# Patient Record
Sex: FEMALE | Race: WHITE | NOT HISPANIC OR LATINO | Employment: OTHER | URBAN - NONMETROPOLITAN AREA
[De-identification: names, ages, dates, MRNs, and addresses within clinical notes are randomized per-mention and may not be internally consistent; named-entity substitution may affect disease eponyms.]

---

## 2018-08-14 ENCOUNTER — TRANSFERRED RECORDS (OUTPATIENT)
Dept: HEALTH INFORMATION MANAGEMENT | Facility: HOSPITAL | Age: 83
End: 2018-08-14

## 2018-08-17 ENCOUNTER — TRANSFERRED RECORDS (OUTPATIENT)
Dept: HEALTH INFORMATION MANAGEMENT | Facility: HOSPITAL | Age: 83
End: 2018-08-17

## 2018-08-20 ENCOUNTER — TRANSFERRED RECORDS (OUTPATIENT)
Dept: HEALTH INFORMATION MANAGEMENT | Facility: HOSPITAL | Age: 83
End: 2018-08-20

## 2018-08-20 LAB
ALT SERPL-CCNC: 22 U/L (ref 18–65)
AST SERPL-CCNC: 18 U/L (ref 10–30)
CHOLEST SERPL-MCNC: 174 MG/DL
CREAT SERPL-MCNC: 1.07 MG/DL (ref 0.7–1.2)
GFR SERPL CREATININE-BSD FRML MDRD: 49 ML/MIN/1.73M2
GLUCOSE SERPL-MCNC: 82 MG/DL (ref 60–99)
HDLC SERPL-MCNC: 47 MG/DL
LDLC SERPL CALC-MCNC: 96 MG/DL
POTASSIUM SERPL-SCNC: 3.9 MEQ/L (ref 3.5–5.1)
TRIGL SERPL-MCNC: 153 MG/DL
TSH SERPL-ACNC: 3.08 UIU/ML (ref 0.35–4.8)

## 2018-08-20 RX ORDER — OLOPATADINE HYDROCHLORIDE 1 MG/ML
1 SOLUTION/ DROPS OPHTHALMIC 2 TIMES DAILY
Status: ON HOLD | COMMUNITY
End: 2021-11-28

## 2018-08-20 RX ORDER — LORAZEPAM 0.5 MG/1
0.5 TABLET ORAL AT BEDTIME
Status: ON HOLD | COMMUNITY
End: 2024-01-01

## 2018-08-20 RX ORDER — MULTIPLE VITAMINS W/ MINERALS TAB 9MG-400MCG
1 TAB ORAL DAILY
Status: ON HOLD | COMMUNITY
End: 2021-11-29

## 2018-08-20 RX ORDER — PREDNISONE 10 MG/1
5 TABLET ORAL DAILY
COMMUNITY
End: 2023-01-01 | Stop reason: DRUGHIGH

## 2018-08-21 ENCOUNTER — ANESTHESIA EVENT (OUTPATIENT)
Dept: SURGERY | Facility: HOSPITAL | Age: 83
End: 2018-08-21
Payer: MEDICARE

## 2018-08-21 RX ORDER — ACETAMINOPHEN AND CODEINE PHOSPHATE 300; 30 MG/1; MG/1
1 TABLET ORAL EVERY 6 HOURS PRN
COMMUNITY
End: 2021-11-27

## 2018-08-21 ASSESSMENT — LIFESTYLE VARIABLES: TOBACCO_USE: 1

## 2018-08-21 NOTE — ANESTHESIA PREPROCEDURE EVALUATION
Anesthesia Evaluation     . Pt has had prior anesthetic.     History of anesthetic complications   - PONV        ROS/MED HX    ENT/Pulmonary:     (+)tobacco use, Past use , . .    Neurologic:  - neg neurologic ROS     Cardiovascular:  - neg cardiovascular ROS       METS/Exercise Tolerance:     Hematologic:  - neg hematologic  ROS       Musculoskeletal:   (+) , , other musculoskeletal- osteopenia      GI/Hepatic:  - neg GI/hepatic ROS       Renal/Genitourinary:         Endo:     (+) Chronic steroid usage for Arthritis .      Psychiatric:  - neg psychiatric ROS       Infectious Disease:         Malignancy:      - no malignancy   Other:    - neg other ROS                 Physical Exam      Airway   Mallampati: IV  TM distance: >3 FB  Neck ROM: full    Dental   (+) upper dentures and missing    Cardiovascular   Rhythm and rate: regular and normal  (+) murmur     PE comment: 1-2/6 murmur @ RSB    Pulmonary    breath sounds clear to auscultation                    Anesthesia Plan      History & Physical Review  History and physical reviewed and following examination; no interval change.    ASA Status:  2 .    NPO Status:  > 8 hours    Plan for Peripheral Nerve Block and MAC with Intravenous and Propofol induction. Maintenance will be TIVA.  Reason for MAC:  Deep or markedly invasive procedure (G8)  PONV prophylaxis:  Ondansetron (or other 5HT-3) and Dexamethasone or Solumedrol  Will provide periop stress-dose steroids      Postoperative Care  Postoperative pain management:  IV analgesics, Peripheral nerve block (Single Shot) and Oral pain medications.      Consents  Anesthetic plan, risks, benefits and alternatives discussed with:  Patient..                          .

## 2018-08-22 ENCOUNTER — HOSPITAL ENCOUNTER (OUTPATIENT)
Facility: HOSPITAL | Age: 83
Discharge: HOME | End: 2018-08-22
Attending: ORTHOPAEDIC SURGERY | Admitting: ORTHOPAEDIC SURGERY
Payer: MEDICARE

## 2018-08-22 ENCOUNTER — ANESTHESIA (OUTPATIENT)
Dept: SURGERY | Facility: HOSPITAL | Age: 83
End: 2018-08-22
Payer: MEDICARE

## 2018-08-22 ENCOUNTER — APPOINTMENT (OUTPATIENT)
Dept: GENERAL RADIOLOGY | Facility: HOSPITAL | Age: 83
End: 2018-08-22
Attending: ORTHOPAEDIC SURGERY
Payer: MEDICARE

## 2018-08-22 VITALS
DIASTOLIC BLOOD PRESSURE: 94 MMHG | BODY MASS INDEX: 23.39 KG/M2 | RESPIRATION RATE: 16 BRPM | OXYGEN SATURATION: 94 % | WEIGHT: 137 LBS | TEMPERATURE: 97.9 F | HEIGHT: 64 IN | SYSTOLIC BLOOD PRESSURE: 145 MMHG

## 2018-08-22 DIAGNOSIS — S52.571A OTHER CLOSED INTRA-ARTICULAR FRACTURE OF DISTAL END OF RIGHT RADIUS, INITIAL ENCOUNTER: Primary | ICD-10-CM

## 2018-08-22 PROCEDURE — 27210794 ZZH OR GENERAL SUPPLY STERILE: Performed by: ORTHOPAEDIC SURGERY

## 2018-08-22 PROCEDURE — 25607 OPTX DST RD XARTC FX/EPI SEP: CPT | Performed by: NURSE ANESTHETIST, CERTIFIED REGISTERED

## 2018-08-22 PROCEDURE — 25000128 H RX IP 250 OP 636: Performed by: NURSE ANESTHETIST, CERTIFIED REGISTERED

## 2018-08-22 PROCEDURE — 99100 ANES PT EXTEME AGE<1 YR&>70: CPT | Performed by: NURSE ANESTHETIST, CERTIFIED REGISTERED

## 2018-08-22 PROCEDURE — 76942 ECHO GUIDE FOR BIOPSY: CPT | Mod: 26 | Performed by: NURSE ANESTHETIST, CERTIFIED REGISTERED

## 2018-08-22 PROCEDURE — 25000128 H RX IP 250 OP 636: Performed by: ORTHOPAEDIC SURGERY

## 2018-08-22 PROCEDURE — 71000027 ZZH RECOVERY PHASE 2 EACH 15 MINS: Performed by: ORTHOPAEDIC SURGERY

## 2018-08-22 PROCEDURE — 40000277 XR SURGERY CARM FLUORO LESS THAN 5 MIN W STILLS: Mod: TC

## 2018-08-22 PROCEDURE — 25000125 ZZHC RX 250: Performed by: NURSE ANESTHETIST, CERTIFIED REGISTERED

## 2018-08-22 PROCEDURE — 25000128 H RX IP 250 OP 636: Performed by: ANESTHESIOLOGY

## 2018-08-22 PROCEDURE — 27110028 ZZH OR GENERAL SUPPLY NON-STERILE: Performed by: ORTHOPAEDIC SURGERY

## 2018-08-22 PROCEDURE — 36000065 ZZH SURGERY LEVEL 4 W FLUORO 1ST 30 MIN: Performed by: ORTHOPAEDIC SURGERY

## 2018-08-22 PROCEDURE — 64450 NJX AA&/STRD OTHER PN/BRANCH: CPT | Mod: 59 | Performed by: NURSE ANESTHETIST, CERTIFIED REGISTERED

## 2018-08-22 PROCEDURE — 36000063 ZZH SURGERY LEVEL 4 EA 15 ADDTL MIN: Performed by: ORTHOPAEDIC SURGERY

## 2018-08-22 PROCEDURE — C1713 ANCHOR/SCREW BN/BN,TIS/BN: HCPCS | Performed by: ORTHOPAEDIC SURGERY

## 2018-08-22 PROCEDURE — 37000009 ZZH ANESTHESIA TECHNICAL FEE, EACH ADDTL 15 MIN: Performed by: ORTHOPAEDIC SURGERY

## 2018-08-22 PROCEDURE — 40000305 ZZH STATISTIC PRE PROC ASSESS I: Performed by: ORTHOPAEDIC SURGERY

## 2018-08-22 PROCEDURE — 25607 OPTX DST RD XARTC FX/EPI SEP: CPT | Performed by: ANESTHESIOLOGY

## 2018-08-22 PROCEDURE — 37000008 ZZH ANESTHESIA TECHNICAL FEE, 1ST 30 MIN: Performed by: ORTHOPAEDIC SURGERY

## 2018-08-22 DEVICE — IMPLANTABLE DEVICE: Type: IMPLANTABLE DEVICE | Site: RADIUS | Status: FUNCTIONAL

## 2018-08-22 RX ORDER — LABETALOL HYDROCHLORIDE 5 MG/ML
10 INJECTION, SOLUTION INTRAVENOUS
Status: DISCONTINUED | OUTPATIENT
Start: 2018-08-22 | End: 2018-08-22 | Stop reason: HOSPADM

## 2018-08-22 RX ORDER — EPHEDRINE SULFATE 50 MG/ML
INJECTION, SOLUTION INTRAMUSCULAR; INTRAVENOUS; SUBCUTANEOUS PRN
Status: DISCONTINUED | OUTPATIENT
Start: 2018-08-22 | End: 2018-08-22

## 2018-08-22 RX ORDER — FENTANYL CITRATE 50 UG/ML
25-50 INJECTION, SOLUTION INTRAMUSCULAR; INTRAVENOUS
Status: DISCONTINUED | OUTPATIENT
Start: 2018-08-22 | End: 2018-08-22 | Stop reason: HOSPADM

## 2018-08-22 RX ORDER — HYDROCODONE BITARTRATE AND ACETAMINOPHEN 5; 325 MG/1; MG/1
1-2 TABLET ORAL EVERY 4 HOURS PRN
Qty: 30 TABLET | Refills: 0 | Status: SHIPPED | OUTPATIENT
Start: 2018-08-22 | End: 2021-11-27

## 2018-08-22 RX ORDER — FENTANYL CITRATE 50 UG/ML
INJECTION, SOLUTION INTRAMUSCULAR; INTRAVENOUS PRN
Status: DISCONTINUED | OUTPATIENT
Start: 2018-08-22 | End: 2018-08-22

## 2018-08-22 RX ORDER — PROPOFOL 10 MG/ML
INJECTION, EMULSION INTRAVENOUS CONTINUOUS PRN
Status: DISCONTINUED | OUTPATIENT
Start: 2018-08-22 | End: 2018-08-22

## 2018-08-22 RX ORDER — ONDANSETRON 4 MG/1
4 TABLET, ORALLY DISINTEGRATING ORAL EVERY 30 MIN PRN
Status: DISCONTINUED | OUTPATIENT
Start: 2018-08-22 | End: 2018-08-22 | Stop reason: HOSPADM

## 2018-08-22 RX ORDER — PROPOFOL 10 MG/ML
INJECTION, EMULSION INTRAVENOUS PRN
Status: DISCONTINUED | OUTPATIENT
Start: 2018-08-22 | End: 2018-08-22

## 2018-08-22 RX ORDER — SODIUM CHLORIDE, SODIUM LACTATE, POTASSIUM CHLORIDE, CALCIUM CHLORIDE 600; 310; 30; 20 MG/100ML; MG/100ML; MG/100ML; MG/100ML
INJECTION, SOLUTION INTRAVENOUS CONTINUOUS
Status: DISCONTINUED | OUTPATIENT
Start: 2018-08-22 | End: 2018-08-22 | Stop reason: HOSPADM

## 2018-08-22 RX ORDER — ONDANSETRON 2 MG/ML
4 INJECTION INTRAMUSCULAR; INTRAVENOUS EVERY 30 MIN PRN
Status: DISCONTINUED | OUTPATIENT
Start: 2018-08-22 | End: 2018-08-22 | Stop reason: HOSPADM

## 2018-08-22 RX ORDER — NALOXONE HYDROCHLORIDE 0.4 MG/ML
.1-.4 INJECTION, SOLUTION INTRAMUSCULAR; INTRAVENOUS; SUBCUTANEOUS
Status: DISCONTINUED | OUTPATIENT
Start: 2018-08-22 | End: 2018-08-22 | Stop reason: HOSPADM

## 2018-08-22 RX ORDER — DEXAMETHASONE SODIUM PHOSPHATE 4 MG/ML
4 INJECTION, SOLUTION INTRA-ARTICULAR; INTRALESIONAL; INTRAMUSCULAR; INTRAVENOUS; SOFT TISSUE EVERY 10 MIN PRN
Status: DISCONTINUED | OUTPATIENT
Start: 2018-08-22 | End: 2018-08-22 | Stop reason: HOSPADM

## 2018-08-22 RX ORDER — ROPIVACAINE HYDROCHLORIDE 5 MG/ML
INJECTION, SOLUTION EPIDURAL; INFILTRATION; PERINEURAL PRN
Status: DISCONTINUED | OUTPATIENT
Start: 2018-08-22 | End: 2018-08-22

## 2018-08-22 RX ORDER — DEXAMETHASONE SODIUM PHOSPHATE 10 MG/ML
INJECTION, SOLUTION INTRAMUSCULAR; INTRAVENOUS PRN
Status: DISCONTINUED | OUTPATIENT
Start: 2018-08-22 | End: 2018-08-22

## 2018-08-22 RX ORDER — GLYCOPYRROLATE 0.2 MG/ML
INJECTION, SOLUTION INTRAMUSCULAR; INTRAVENOUS PRN
Status: DISCONTINUED | OUTPATIENT
Start: 2018-08-22 | End: 2018-08-22

## 2018-08-22 RX ORDER — MEPERIDINE HYDROCHLORIDE 50 MG/ML
12.5 INJECTION INTRAMUSCULAR; INTRAVENOUS; SUBCUTANEOUS
Status: DISCONTINUED | OUTPATIENT
Start: 2018-08-22 | End: 2018-08-22 | Stop reason: HOSPADM

## 2018-08-22 RX ORDER — FENTANYL CITRATE 50 UG/ML
25-50 INJECTION, SOLUTION INTRAMUSCULAR; INTRAVENOUS EVERY 5 MIN PRN
Status: DISCONTINUED | OUTPATIENT
Start: 2018-08-22 | End: 2018-08-22 | Stop reason: HOSPADM

## 2018-08-22 RX ORDER — CEFAZOLIN SODIUM 2 G/100ML
2 INJECTION, SOLUTION INTRAVENOUS
Status: COMPLETED | OUTPATIENT
Start: 2018-08-22 | End: 2018-08-22

## 2018-08-22 RX ADMIN — PROPOFOL 20 MG: 10 INJECTION, EMULSION INTRAVENOUS at 07:49

## 2018-08-22 RX ADMIN — PROPOFOL 70 MCG/KG/MIN: 10 INJECTION, EMULSION INTRAVENOUS at 07:42

## 2018-08-22 RX ADMIN — DEXAMETHASONE SODIUM PHOSPHATE 10 MG: 10 INJECTION, SOLUTION INTRAMUSCULAR; INTRAVENOUS at 07:20

## 2018-08-22 RX ADMIN — CEFAZOLIN SODIUM 2 G: 2 INJECTION, SOLUTION INTRAVENOUS at 07:36

## 2018-08-22 RX ADMIN — FENTANYL CITRATE 50 MCG: 50 INJECTION, SOLUTION INTRAMUSCULAR; INTRAVENOUS at 07:39

## 2018-08-22 RX ADMIN — FENTANYL CITRATE 25 MCG: 50 INJECTION, SOLUTION INTRAMUSCULAR; INTRAVENOUS at 07:58

## 2018-08-22 RX ADMIN — FENTANYL CITRATE 25 MCG: 50 INJECTION, SOLUTION INTRAMUSCULAR; INTRAVENOUS at 07:56

## 2018-08-22 RX ADMIN — PROPOFOL 20 MG: 10 INJECTION, EMULSION INTRAVENOUS at 07:45

## 2018-08-22 RX ADMIN — PROPOFOL 20 MG: 10 INJECTION, EMULSION INTRAVENOUS at 07:56

## 2018-08-22 RX ADMIN — PROPOFOL 20 MG: 10 INJECTION, EMULSION INTRAVENOUS at 07:43

## 2018-08-22 RX ADMIN — ROPIVACAINE HYDROCHLORIDE 20 ML: 5 INJECTION, SOLUTION EPIDURAL; INFILTRATION; PERINEURAL at 07:20

## 2018-08-22 RX ADMIN — SODIUM CHLORIDE, POTASSIUM CHLORIDE, SODIUM LACTATE AND CALCIUM CHLORIDE: 600; 310; 30; 20 INJECTION, SOLUTION INTRAVENOUS at 07:31

## 2018-08-22 RX ADMIN — PROPOFOL 20 MG: 10 INJECTION, EMULSION INTRAVENOUS at 07:39

## 2018-08-22 RX ADMIN — GLYCOPYRROLATE 0.2 MG: 0.2 INJECTION, SOLUTION INTRAMUSCULAR; INTRAVENOUS at 08:30

## 2018-08-22 RX ADMIN — Medication 5 MG: at 08:35

## 2018-08-22 RX ADMIN — HYDROCORTISONE SODIUM SUCCINATE 100 MG: 100 INJECTION, POWDER, FOR SOLUTION INTRAMUSCULAR; INTRAVENOUS at 07:31

## 2018-08-22 NOTE — DISCHARGE INSTRUCTIONS
Post-Anesthesia Patient Instructions    IMMEDIATELY FOLLOWING SURGERY:  Do not drive or operate machinery for the first twenty four hours after surgery.  Do not make any important decisions for twenty four hours after surgery or while taking narcotic pain medications or sedatives.  If you develop intractable nausea and vomiting or a severe headache please notify your doctor immediately.    FOLLOW-UP: YOUR FOLLOW-UP APPOINTMENT WITH DR ACOSTA AT [facility] [place] IS AUGUST, 29TH, 2018 AT 3:30PM     WOUND CARE INSTRUCTIONS (if applicable):  Keep a dry clean dressing on the anesthesia/puncture wound site if there is drainage.  Once the wound has quit draining you may leave it open to air.  Generally you should leave the bandage intact for twenty four hours unless there is drainage.  If the epidural site drains for more than 36-48 hours please call the anesthesia department.    QUESTIONS?:  Please feel free to call your physician or the hospital  if you have any questions, and they will be happy to assist you.

## 2018-08-22 NOTE — ANESTHESIA PROCEDURE NOTES
Peripheral nerve/Neuraxial procedure note : Other (axillary)  Pre-Procedure    Location: pre-op    Procedure Times:8/22/2018 7:18 AM and 8/22/2018 7:25 AM  Pre-Anesthestic Checklist: patient identified, IV checked, site marked, risks and benefits discussed, informed consent, monitors and equipment checked, pre-op evaluation, at physician/surgeon's request and post-op pain management    Timeout  Correct Patient: Yes   Correct Procedure: Yes   Correct Site: Yes   Correct Laterality: Yes   Correct Position: Yes   Site Marked: Yes   .   Procedure Documentation    .    Procedure:  right  Other (axillary).     Ultrasound used to identify targeted nerve, plexus, or vascular marker and placed a needle adjacent to it., Ultrasound was used to visualize the spread of the anesthetic in close proximity to the above stated nerve. A permanent image is entered into the patient's record.  Patient Prep;chlorhexidine gluconate and isopropyl alcohol.  .  Needle: insulated, short bevel Needle Gauge: 20.    Needle Length (Inches) 2  Insertion Method: Single Shot.       Assessment/Narrative  Paresthesias: Resolved.  Injection made incrementally with aspirations every 5 mL..  The placement was negative for: blood aspirated, painful injection and site bleeding.  Bolus given via needle..   Secured via.   Complications: none. Comments:  Assisted by mathew lucas crna

## 2018-08-22 NOTE — BRIEF OP NOTE
[facility]  Orthopedics Brief Operative Note    Pre-operative diagnosis: FRACTURE RIGHT WRIST   Post-operative diagnosis Same as Above   Procedure: Procedure(s):  OPEN REDUCTION INTERNAL FIXATION RIGHT DISTAL RADIUS - Wound Class: I-Clean   Surgeon: Taqueria Edwards MD, MD   Assistants(s): Macario Rutherford PAC   Anesthesia: General    Estimated blood loss: Less than 10 ml    Total IV fluids: (See anesthesia record)   Blood transfusion: No transfusion was given during surgery   Total urine output: (See anesthesia record)   Drains: None   Specimens: None   Implants: Synthes DR Plate   Findings: Displaced DR Fx   Complications: None   Condition: Stable   Comments: See dictated operative report for full details      Dictation Number: [id number]

## 2018-08-22 NOTE — OR NURSING
Patient and responsible adult given discharge instructions with no questions regarding instructions. Keagan score 20/20. Pain level 0/10.  Discharged from unit via wheelchair. Patient discharged to home.

## 2018-08-22 NOTE — OP NOTE
Procedure Date: 08/22/2018      DATE OF SERVICE: 08/22/2018      PREOPERATIVE DIAGNOSIS:  Closed right intraarticular distal radius fracture.      POSTOPERATIVE DIAGNOSIS:  Closed right intraarticular distal radius fracture.      PROCEDURES:  Open reduction and internal fixation, 3-part intraarticular distal radius fracture.      SURGEON:  Taqueria Edwards MD      ASSISTANT:  Macario Rutherford PA-C.       ANESTHESIA:  Axillary nerve block with sedation.      FINDINGS:   1.  Displaced 3-part intraarticular distal radius fracture.   2.  Satisfactory reduction postoperatively..   3.  Evidence of prior fractures and mild malunion.      IMPLANTS:  Synthes precontoured 3-hole distal radius locking plate with a combination of cortical and locking screws.      ESTIMATED BLOOD LOSS:  Is 5 mL.      SPECIMENS:  None.      DRAINS:  None.      COMPLICATIONS:  None.      TOURNIQUET TIME:  Was 29 minutes.      INDICATIONS:  The patient is an 85-year-old female who had a mechanical fall.  She takes prednisone. This is her third distal radius fracture of the right hand.  She is right hand dominant.  She had significant articular step-off displacement.  Given this, I discussed operative stabilization of the fracture. Risks and benefits were discussed with her.  Informed consent was obtained.      DESCRIPTION OF PROCEDURE:  The patient was seen in the preoperative area, her surgical site was marked.  Questions were answered.  She was taken to the operating room and placed under axillary nerve block and sedation.  Right upper extremity splint was removed.  Her arm was scrubbed with Hibiclens wash and was then prepped with ChloraPrep and draped in a sterile fashion.  Timeout was called to identify the correct patient, correct surgical site.  The limb was exsanguinated, tourniquet inflated to 250 mmHg. I made an FCR splitting approach to the distal radius.  I elevated the pronator quadratus sharply off the distal radius in an ulnar direction  and placed Hohmann retractors, identified the fracture site.  I cleaned the fracture edges and reduced it.  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  Once I had the fracture adequately aligned,  I placed a Synthes 3-hole distal radius locking plate stainless steel and placed a screw in the shaft and then 2 screws distally and confirmed plate placement and alignment.  Satisfied with this, I placed a total of 4 locking screws distally and one locking screw and 1 cortical screw proximally in the shaft.  I then took final x-rays, 4 views of the wrist and was satisfied with the alignment.  The wound was then copiously irrigated, tourniquet deflated.  Hemostasis was achieved.  I closed the wound with a 3-0 Vicryl with 3-0 Monocryl in running fashion.  Steri-Strips were applied.  A volar wrist splint was placed.  She was awakened, transferred to the PACU in stable condition.      POSTOPERATIVE PLAN:  The patient will be in her dressing until follow up. She will follow up in a week for wound check.  We will also take x-rays, 3 views right wrist out of splint.  Likely transition to an Exos brace at that time and start early finger wrist range of motion.         ANTONIO ACOSTA MD             D: 2018   T: 2018   MT: NANI      Name:     GONZALEZ BAUTISTA   MRN:      [id number]        Account:        [id number]   :      1933           Procedure Date: 2018      Document: [id number]

## 2018-08-22 NOTE — IP AVS SNAPSHOT
[facility] Preop/Phase II    [address] [address]    Phone:  [phone]                                       After Visit Summary   8/22/2018    Shannon Walls    MRN: [id number]           After Visit Summary Signature Page     I have received my discharge instructions, and my questions have been answered. I have discussed any challenges I see with this plan with the nurse or doctor.    ..........................................................................................................................................  Patient/Patient Representative Signature      ..........................................................................................................................................  Patient Representative Print Name and Relationship to Patient    ..................................................               ................................................  Date                                            Time    ..........................................................................................................................................  Reviewed by Signature/Title    ...................................................              ..............................................  Date                                                            Time

## 2018-08-22 NOTE — ANESTHESIA CARE TRANSFER NOTE
Patient: Shannon Walls    Procedure(s):  OPEN REDUCTION INTERNAL FIXATION RIGHT DISTAL RADIUS - Wound Class: I-Clean    Diagnosis: FRACTURE RIGHT WRIST  Diagnosis Additional Information: No value filed.    Anesthesia Type:   Peripheral Nerve Block, General, LMA     Note:  Airway :Nasal Cannula  Patient transferred to:Phase II  Handoff Report: Identifed the Patient, Identified the Reponsible Provider, Reviewed the pertinent medical history, Discussed the surgical course, Reviewed Intra-OP anesthesia mangement and issues during anesthesia, Set expectations for post-procedure period and Allowed opportunity for questions and acknowledgement of understanding      Vitals: (Last set prior to Anesthesia Care Transfer)    CRNA VITALS  8/22/2018 0825 - 8/22/2018 0905      8/22/2018             Resp Rate (set): 8                Electronically Signed By: SHAINA Ocasio CRNA  August 22, 2018  9:05 AM

## 2018-08-22 NOTE — IP AVS SNAPSHOT
MRN:[id number]                      After Visit Summary   8/22/2018    Shannon Walls    MRN: [id number]           Thank you!     Thank you for choosing [facility] for your care. Our goal is always to provide you with excellent care. Hearing back from our patients is one way we can continue to improve our services. Please take a few minutes to complete the written survey that you may receive in the mail after you visit with us. Thank you!        Patient Information     Date Of Birth          1/12/1933        About your hospital stay     You were admitted on:  August 22, 2018 You last received care in the:  [facility] Preop/Phase II    You were discharged on:  August 22, 2018       Who to Call     For medical emergencies, please call 911.  For non-urgent questions about your medical care, please call your primary care provider or clinic, [phone]  For questions related to your surgery, please call your surgery clinic        Attending Provider     Provider Specialty    Taqueria Edwards MD Orthopedics       Primary Care Provider Office Phone # Fax #    Gilda Mcgregor [phone]      After Care Instructions     Diet Instructions       Resume pre-procedure diet            Discharge Instructions       Patient to follow up with appointment in 1 weeks            Dressing       Keep dressing clean and dry.  Dressing / incisional care as instructed by RN and or Surgeon  Keep Dressing Intact/Clean/Dry until follow up            No lifting        No lifting over 2 lbs and no strenuous physical activity for 2 weeks                  Further instructions from your care team           Post-Anesthesia Patient Instructions    IMMEDIATELY FOLLOWING SURGERY:  Do not drive or operate machinery for the first twenty four hours after surgery.  Do not make any important decisions for twenty four hours after surgery or while taking narcotic pain medications or sedatives.  If you develop intractable  "nausea and vomiting or a severe headache please notify your doctor immediately.    FOLLOW-UP: YOUR FOLLOW-UP APPOINTMENT WITH DR EDWARDS [facility] IS  AT 3:30PM     WOUND CARE INSTRUCTIONS (if applicable):  Keep a dry clean dressing on the anesthesia/puncture wound site if there is drainage.  Once the wound has quit draining you may leave it open to air.  Generally you should leave the bandage intact for twenty four hours unless there is drainage.  If the epidural site drains for more than 36-48 hours please call the anesthesia department.    QUESTIONS?:  Please feel free to call your physician or the hospital  if you have any questions, and they will be happy to assist you.       Pending Results     No orders found from 2018 to 2018.            Admission Information     Date & Time Provider Department Dept. Phone    2018 Taqueria Edwards MD [facility] Preop/Phase -[phone]      Your Vitals Were     Blood Pressure Temperature Respirations Height Weight Pulse Oximetry    136/69 97.1  F (36.2  C) (Oral) 16 1.626 m (5' 4\") 62.1 kg (137 lb) 91%    BMI (Body Mass Index)                   23.52 kg/m2           MyChart Information     [employer] lets you send messages to your doctor, view your test results, renew your prescriptions, schedule appointments and more. To sign up, go to [email or web address] . Click on \"Log in\" on the left side of the screen, which will take you to the Welcome page. Then click on \"Sign up Now\" on the right side of the page.     You will be asked to enter the access code listed below, as well as some personal information. Please follow the directions to create your username and password.     Your access code is: [email or web address]  Expires: 2018  9:17 AM     Your access code will  in 90 days. If you need help or a new code, please call your [facility] clinic or [phone].        Care EveryWhere ID     This is your " Care EveryWhere ID. This could be used by other organizations to access your [facility] medical records  [id number]        Equal Access to Services     ALMA DELIA PEREZ : Hadii gilmar Gale, jonathan warren, vanda mitchellmapartha bowden, machelle ricepravinchente osorio. So [facility] [phone].    ATENCIÓN: Si habla español, tiene a ohara disposición servicios gratuitos de asistencia lingüística. Llame al [phone].    We comply with applicable federal civil rights laws and [place] laws. We do not discriminate on the basis of race, color, national origin, age, disability, sex, sexual orientation, or gender identity.               Review of your medicines      START taking        Dose / Directions    HYDROcodone-acetaminophen 5-325 MG per tablet   Commonly known as:  NORCO   Used for:  Other closed intra-articular fracture of distal end of right radius, initial encounter        Dose:  1-2 tablet   Take 1-2 tablets by mouth every 4 hours as needed for other (Moderate to Severe Pain)   Quantity:  30 tablet   Refills:  0         CONTINUE these medicines which have NOT CHANGED        Dose / Directions    CALTRATE 600 PO        Dose:  600 mg   Take 600 mg by mouth 2 times daily   Refills:  0       ERYTHROMYCIN OP        Apply 1 application to affected area 3 times daily   Refills:  0       LORAZEPAM PO        Dose:  0.5 mg   Take 0.5 mg by mouth Take 1/2 to 1 tablet orally every 6 hours as needed   Refills:  0       Multi-vitamin Tabs tablet        Dose:  1 tablet   Take 1 tablet by mouth daily   Refills:  0       olopatadine 0.1 % ophthalmic solution   Commonly known as:  PATANOL        Dose:  1 drop   1 drop 2 times daily To affected eye   Refills:  0       OMEPRAZOLE PO        Dose:  20 mg   Take 20 mg by mouth every morning   Refills:  0       PREDNISONE PO        Dose:  5 mg   Take 5 mg by mouth daily   Refills:  0       TYLENOL WITH CODEINE #3 300-30 MG per tablet   Generic drug:  acetaminophen-codeine         Dose:  1 tablet   Take 1 tablet by mouth every 6 hours as needed for mild pain   Refills:  0       VITAMIN D PO        Dose:  400 Units   Take 400 Units by mouth daily   Refills:  0            Where to get your medicines      Some of these will need a paper prescription and others can be bought over the counter. Ask your nurse if you have questions.     Bring a paper prescription for each of these medications     HYDROcodone-acetaminophen 5-325 MG per tablet                Protect others around you: Learn how to safely use, store and throw away your medicines at www.disposemymeds.org.        Information about OPIOIDS     PRESCRIPTION OPIOIDS: WHAT YOU NEED TO KNOW   We gave you an opioid (narcotic) pain medicine. It is important to manage your pain, but opioids are not always the best choice. You should first try all the other options your care team gave you. Take this medicine for as short a time (and as few doses) as possible.    Some activities can increase your pain, such as bandage changes or therapy sessions. It may help to take your pain medicine 30 to 60 minutes before these activities. Reduce your stress by getting enough sleep, working on hobbies you enjoy and practicing relaxation or meditation. Talk to your care team about ways to manage your pain beyond prescription opioids.    These medicines have risks:    DO NOT drive when on new or higher doses of pain medicine. These medicines can affect your alertness and reaction times, and you could be arrested for driving under the influence (DUI). If you need to use opioids long-term, talk to your care team about driving.    DO NOT operate heavy machinery    DO NOT do any other dangerous activities while taking these medicines.    DO NOT drink any alcohol while taking these medicines.     If the opioid prescribed includes acetaminophen, DO NOT take with any other medicines that contain acetaminophen. Read all labels carefully. Look for the word   acetaminophen  or  Tylenol.  Ask your pharmacist if you have questions or are unsure.    You can get addicted to pain medicines, especially if you have a history of addiction (chemical, alcohol or substance dependence). Talk to your care team about ways to reduce this risk.    All opioids tend to cause constipation. Drink plenty of water and eat foods that have a lot of fiber, such as fruits, vegetables, prune juice, apple juice and high-fiber cereal. Take a laxative (Miralax, milk of magnesia, Colace, Senna) if you don t move your bowels at least every other day. Other side effects include upset stomach, sleepiness, dizziness, throwing up, tolerance (needing more of the medicine to have the same effect), physical dependence and slowed breathing.    Store your pills in a secure place, locked if possible. We will not replace any lost or stolen medicine. If you don t finish your medicine, please throw away (dispose) as directed by your pharmacist. The [place] Pollution [employer] has more information about safe disposal: https://www.pca.[facility].mn.us/living-green/managing-unwanted-medications             Medication List: This is a list of all your medications and when to take them. Check marks below indicate your daily home schedule. Keep this list as a reference.      Medications           Morning Afternoon Evening Bedtime As Needed    CALTRATE 600 PO   Take 600 mg by mouth 2 times daily                                ERYTHROMYCIN OP   Apply 1 application to affected area 3 times daily                                HYDROcodone-acetaminophen 5-325 MG per tablet   Commonly known as:  NORCO   Take 1-2 tablets by mouth every 4 hours as needed for other (Moderate to Severe Pain)                                LORAZEPAM PO   Take 0.5 mg by mouth Take 1/2 to 1 tablet orally every 6 hours as needed                                Multi-vitamin Tabs tablet   Take 1 tablet by mouth daily                                 olopatadine 0.1 % ophthalmic solution   Commonly known as:  PATANOL   1 drop 2 times daily To affected eye                                OMEPRAZOLE PO   Take 20 mg by mouth every morning                                PREDNISONE PO   Take 5 mg by mouth daily                                TYLENOL WITH CODEINE #3 300-30 MG per tablet   Take 1 tablet by mouth every 6 hours as needed for mild pain   Generic drug:  acetaminophen-codeine                                VITAMIN D PO   Take 400 Units by mouth daily

## 2018-08-22 NOTE — ANESTHESIA POSTPROCEDURE EVALUATION
Patient: Shannon Walls    Procedure(s):  OPEN REDUCTION INTERNAL FIXATION RIGHT DISTAL RADIUS - Wound Class: I-Clean    Diagnosis:FRACTURE RIGHT WRIST  Diagnosis Additional Information: No value filed.    Anesthesia Type:  Peripheral Nerve Block, General, LMA    Note:  Anesthesia Post Evaluation    Patient location during evaluation: Phase 2 and Bedside  Patient participation: Able to participate in evaluation but full recovery from regional anesthesia has not yet ocurrred but is anticipated to occur within 48 hours  Level of consciousness: awake and alert  Pain management: adequate  Airway patency: patent  Cardiovascular status: acceptable  Respiratory status: acceptable  Hydration status: stable  PONV: none     Anesthetic complications: None          Last vitals:  Vitals:    08/22/18 0950 08/22/18 0955 08/22/18 1000   BP: 139/77 139/84 145/94   Resp: 16 16 16   Temp:   97.9  F (36.6  C)   SpO2: 94% 95% 94%         Electronically Signed By: Uriel Smalls MD  August 22, 2018  10:37 AM

## 2018-10-03 ENCOUNTER — HOSPITAL ENCOUNTER (OUTPATIENT)
Dept: OCCUPATIONAL THERAPY | Facility: HOSPITAL | Age: 83
Setting detail: THERAPIES SERIES
End: 2018-10-03
Attending: ORTHOPAEDIC SURGERY
Payer: MEDICARE

## 2018-10-03 ENCOUNTER — TRANSFERRED RECORDS (OUTPATIENT)
Dept: HEALTH INFORMATION MANAGEMENT | Facility: CLINIC | Age: 83
End: 2018-10-03

## 2018-10-03 PROCEDURE — G8987 SELF CARE CURRENT STATUS: HCPCS | Mod: GO,CJ

## 2018-10-03 PROCEDURE — 97110 THERAPEUTIC EXERCISES: CPT | Mod: GO

## 2018-10-03 PROCEDURE — G8988 SELF CARE GOAL STATUS: HCPCS | Mod: GO,CI

## 2018-10-03 PROCEDURE — 97166 OT EVAL MOD COMPLEX 45 MIN: CPT | Mod: GO

## 2018-10-03 PROCEDURE — 40000118 ZZH STATISTIC OT DEPT VISIT

## 2018-10-15 ENCOUNTER — HOSPITAL ENCOUNTER (OUTPATIENT)
Dept: OCCUPATIONAL THERAPY | Facility: HOSPITAL | Age: 83
Setting detail: THERAPIES SERIES
End: 2018-10-15
Attending: FAMILY MEDICINE
Payer: MEDICARE

## 2018-10-15 PROCEDURE — 40000118 ZZH STATISTIC OT DEPT VISIT

## 2018-10-15 PROCEDURE — 97140 MANUAL THERAPY 1/> REGIONS: CPT | Mod: GO

## 2018-10-15 PROCEDURE — 97110 THERAPEUTIC EXERCISES: CPT | Mod: GO

## 2018-10-17 ENCOUNTER — HOSPITAL ENCOUNTER (OUTPATIENT)
Dept: OCCUPATIONAL THERAPY | Facility: HOSPITAL | Age: 83
Setting detail: THERAPIES SERIES
End: 2018-10-17
Attending: FAMILY MEDICINE
Payer: MEDICARE

## 2018-10-17 PROCEDURE — 40000118 ZZH STATISTIC OT DEPT VISIT

## 2018-10-17 PROCEDURE — 97140 MANUAL THERAPY 1/> REGIONS: CPT | Mod: GO

## 2018-10-17 PROCEDURE — 97110 THERAPEUTIC EXERCISES: CPT | Mod: GO

## 2018-10-22 ENCOUNTER — HOSPITAL ENCOUNTER (OUTPATIENT)
Dept: OCCUPATIONAL THERAPY | Facility: HOSPITAL | Age: 83
Setting detail: THERAPIES SERIES
End: 2018-10-22
Attending: FAMILY MEDICINE
Payer: MEDICARE

## 2018-10-22 PROCEDURE — 40000118 ZZH STATISTIC OT DEPT VISIT

## 2018-10-22 PROCEDURE — 97110 THERAPEUTIC EXERCISES: CPT | Mod: GO

## 2018-10-24 ENCOUNTER — HOSPITAL ENCOUNTER (OUTPATIENT)
Dept: OCCUPATIONAL THERAPY | Facility: HOSPITAL | Age: 83
Setting detail: THERAPIES SERIES
End: 2018-10-24
Attending: FAMILY MEDICINE
Payer: MEDICARE

## 2018-10-24 PROCEDURE — 40000118 ZZH STATISTIC OT DEPT VISIT

## 2018-10-24 PROCEDURE — 97110 THERAPEUTIC EXERCISES: CPT | Mod: GO

## 2018-10-31 ENCOUNTER — HOSPITAL ENCOUNTER (OUTPATIENT)
Dept: OCCUPATIONAL THERAPY | Facility: HOSPITAL | Age: 83
Setting detail: THERAPIES SERIES
End: 2018-10-31
Attending: FAMILY MEDICINE
Payer: MEDICARE

## 2018-10-31 PROCEDURE — 97110 THERAPEUTIC EXERCISES: CPT | Mod: GO

## 2018-10-31 PROCEDURE — 40000118 ZZH STATISTIC OT DEPT VISIT

## 2018-11-02 ENCOUNTER — HOSPITAL ENCOUNTER (OUTPATIENT)
Dept: OCCUPATIONAL THERAPY | Facility: HOSPITAL | Age: 83
Setting detail: THERAPIES SERIES
End: 2018-11-02
Attending: FAMILY MEDICINE
Payer: MEDICARE

## 2018-11-02 PROCEDURE — 97110 THERAPEUTIC EXERCISES: CPT | Mod: GO

## 2018-11-02 PROCEDURE — 97140 MANUAL THERAPY 1/> REGIONS: CPT | Mod: GO

## 2018-11-02 PROCEDURE — 40000118 ZZH STATISTIC OT DEPT VISIT

## 2018-11-05 ENCOUNTER — HOSPITAL ENCOUNTER (OUTPATIENT)
Dept: OCCUPATIONAL THERAPY | Facility: HOSPITAL | Age: 83
Setting detail: THERAPIES SERIES
End: 2018-11-05
Attending: FAMILY MEDICINE
Payer: MEDICARE

## 2018-11-05 PROCEDURE — 40000118 ZZH STATISTIC OT DEPT VISIT

## 2018-11-05 PROCEDURE — 97140 MANUAL THERAPY 1/> REGIONS: CPT | Mod: GO

## 2018-11-05 PROCEDURE — 97110 THERAPEUTIC EXERCISES: CPT | Mod: GO

## 2018-11-07 ENCOUNTER — HOSPITAL ENCOUNTER (OUTPATIENT)
Dept: OCCUPATIONAL THERAPY | Facility: HOSPITAL | Age: 83
Setting detail: THERAPIES SERIES
End: 2018-11-07
Attending: FAMILY MEDICINE
Payer: MEDICARE

## 2018-11-07 PROCEDURE — 97140 MANUAL THERAPY 1/> REGIONS: CPT | Mod: GO

## 2018-11-07 PROCEDURE — G8988 SELF CARE GOAL STATUS: HCPCS | Mod: GO,CI

## 2018-11-07 PROCEDURE — 40000118 ZZH STATISTIC OT DEPT VISIT

## 2018-11-07 PROCEDURE — 97110 THERAPEUTIC EXERCISES: CPT | Mod: GO

## 2018-11-07 PROCEDURE — G8989 SELF CARE D/C STATUS: HCPCS | Mod: GO,CI

## 2018-11-07 NOTE — PROGRESS NOTES
11/07/18 1029   Notes   Note Type Discharge Summary   Signing Clinician's Name / Credentials   Signing clinician's name / credentials Aimee Seo, OTR/L, CLT   Session Number   Session Number 9/16   Providers   Referring Physician Taqueria Edwards MD   Reporting Period   Reporting period from 10/03/18   Reporting period to 11/07/18   OT Medicare Only G-code   G-code Self Care   Self Care   Self Care Goal,  (eval/re-eval, every progress note & discharge) CI: 1-19% impairment   Self Care Discharge Status,  (discharge) CI: 1-19% impairment   Discharge Self Care Modifier Rationale Quick Dash   General Information   Rxs Authorized 16   Rxs Used 9   Medical Diagnosis right wrist distal radius fracture.     Orders Evaluate And Treat As Indicated   Insurance Medicare   Start Of Care Date 10/03/18   Beginning of Cert (Date Period) 10/03/18   End of Cert period date 11/14/18   Onset date of current episode/exacerbation 08/14/18   Surgical procedure ORIF   Date of surgery 08/22/18   Days Post Surgery 77   Subjective Measures   Subjective Pt treated [id number].  Pt reports she saw Dr. Edwards yesterday and her stated her arm wasn't perfect to start with and it won't be now.  Pt is comfortable with discontinuing OT at this time and continuing her home program   Initial Pain level 4/10   Current Pain level 0/10   Functional Improvement Reported Leisure Activities;Self care activities   QuickDASH [Functional Disability Questionnaire; 0-100 (0=no dysfunction; 100=dysfunction)] Open Dash   Open Jar 5   Heavy Household Chores 1   Carry a shopping bag 1   Wash back 1   Cut food with knife 1   Recreational activities 1   Social activities 1   Work, daily activities 1   Pain 2   Tingling 1   Sleeping 1   QuickDASH Sum 16   QuickDASH Count 11   QuickDASH Disability/Symptom Score 11.36   Objective Measures   Objective Measures ROM;Strength   ROM   Location (anatomical) forearm/elbow   Location Right   Motion  Supination;Pronation;Extension   ROM Comments forearm supination 78, elbow ext -17   Strength   Location Right    21#, improved 15#   Therapeutic Exercise   Therapeutic Exercise Strengthening   Skilled Interventions To Increase Strength   Minutes of Treatment 10   Strengthening   Strengthening Isometrics ;Isotonics Wrist;Isotonics Forearm;Isotonics Pinch   Isometrics  3 sets;10 reps   Equipment red flexbar   Isotonics Pinch 3 Point;Lateral   Sets/Reps 1 set;30 reps   Resistance Yellow;Red;Green;Blue;Black;Clothes Pin   Isotonics Wrist Extension;Flexion   Sets/Reps 3 sets;10 reps   Resistance Red  (flexbar)   Isotonics Forearm All Planes   Sets/Reps 3 sets;10 reps   Resistance Red  (flexbar)   Manual   Manual MEM   Skilled Interventions To Decrease Edema   Minutes of Treatment 10   MEM elbow;forearm;hand   Position Sitting   Time 10   Hand Goals   Hand Goals Sports/Recreation;Household Chores;Driving   Household Chores   Current Functional Task (hang clothes and iron)   Previous Performance Level Independent   Current Performance Level Mild difficulty   Goal Target Task (hang clothes and use heavy iron)   Goal Target Performance Level Mild difficulty   Due Date 11/28/18   Date Goal Met 11/07/18   Driving   Current Functional Task Shifting   Previous Performance Level Independent   Current Performance Level (no difficulty)   Goal Target Task Shift into gear   Goal Target Performance Level Mild difficulty   Due Date 11/28/18   Date Goal Met 11/07/18   Sports/Recreation   Current Functional Task Playing   Previous Performance Level Independent   Curent Performance Level (no difficulty)   Goal Target Task Play cards   Goal Target Performance Level No difficulty   Due Date 11/19/18   Date Goal Met 11/07/18   Assessment   Clinical Impression(s) Comments Pt has progressed nicely and able to cont independently with home program   Response to Therapy: Improvements ROM;Strength;Edema;Pain;Self Care Skills   Plan    Homework Cont AROM and yellow theraputty exercises   Plan d/c OT   Total Session Time   Timed Code Treatment Minutes 20   Total Treatment Time (sum of timed and untimed services) 30  (10 min remeasuring and reviewing goals)

## 2021-11-09 ENCOUNTER — IMMUNIZATION (OUTPATIENT)
Dept: FAMILY MEDICINE | Facility: OTHER | Age: 86
End: 2021-11-09
Attending: FAMILY MEDICINE
Payer: MEDICARE

## 2021-11-09 PROCEDURE — 91300 PR COVID VAC PFIZER DIL RECON 30 MCG/0.3 ML IM: CPT

## 2021-11-27 ENCOUNTER — HOSPITAL ENCOUNTER (OUTPATIENT)
Facility: HOSPITAL | Age: 86
Setting detail: OBSERVATION
Discharge: HOME | End: 2021-11-29
Attending: NURSE PRACTITIONER | Admitting: INTERNAL MEDICINE
Payer: MEDICARE

## 2021-11-27 ENCOUNTER — APPOINTMENT (OUTPATIENT)
Dept: CT IMAGING | Facility: HOSPITAL | Age: 86
End: 2021-11-27
Attending: NURSE PRACTITIONER
Payer: MEDICARE

## 2021-11-27 ENCOUNTER — APPOINTMENT (OUTPATIENT)
Dept: GENERAL RADIOLOGY | Facility: HOSPITAL | Age: 86
End: 2021-11-27
Attending: NURSE PRACTITIONER
Payer: MEDICARE

## 2021-11-27 DIAGNOSIS — S32.10XA FRACTURE OF SACRUM (H): ICD-10-CM

## 2021-11-27 DIAGNOSIS — M84.40XA PATHOLOGICAL FRACTURE: ICD-10-CM

## 2021-11-27 DIAGNOSIS — M84.48XA SACRAL INSUFFICIENCY FRACTURE, INITIAL ENCOUNTER: Primary | ICD-10-CM

## 2021-11-27 LAB
ALBUMIN SERPL-MCNC: 3.2 G/DL (ref 3.4–5)
ALP SERPL-CCNC: 57 U/L (ref 40–150)
ALT SERPL W P-5'-P-CCNC: 29 U/L (ref 0–50)
ANION GAP SERPL CALCULATED.3IONS-SCNC: 5 MMOL/L (ref 3–14)
AST SERPL W P-5'-P-CCNC: 20 U/L (ref 0–45)
BASOPHILS # BLD AUTO: 0.1 10E3/UL (ref 0–0.2)
BASOPHILS NFR BLD AUTO: 1 %
BILIRUB SERPL-MCNC: 0.6 MG/DL (ref 0.2–1.3)
BUN SERPL-MCNC: 19 MG/DL (ref 7–30)
CALCIUM SERPL-MCNC: 9.2 MG/DL (ref 8.5–10.1)
CHLORIDE BLD-SCNC: 106 MMOL/L (ref 94–109)
CO2 SERPL-SCNC: 31 MMOL/L (ref 20–32)
CREAT SERPL-MCNC: 1.08 MG/DL (ref 0.52–1.04)
EOSINOPHIL # BLD AUTO: 0.1 10E3/UL (ref 0–0.7)
EOSINOPHIL NFR BLD AUTO: 0 %
ERYTHROCYTE [DISTWIDTH] IN BLOOD BY AUTOMATED COUNT: 13.5 % (ref 10–15)
GFR SERPL CREATININE-BSD FRML MDRD: 46 ML/MIN/1.73M2
GLUCOSE BLD-MCNC: 129 MG/DL (ref 70–99)
HCT VFR BLD AUTO: 43.3 % (ref 35–47)
HGB BLD-MCNC: 14.4 G/DL (ref 11.7–15.7)
IMM GRANULOCYTES # BLD: 0.1 10E3/UL
IMM GRANULOCYTES NFR BLD: 1 %
INR PPP: 1.11 (ref 0.85–1.15)
LYMPHOCYTES # BLD AUTO: 1.9 10E3/UL (ref 0.8–5.3)
LYMPHOCYTES NFR BLD AUTO: 14 %
MCH RBC QN AUTO: 32.1 PG (ref 26.5–33)
MCHC RBC AUTO-ENTMCNC: 33.3 G/DL (ref 31.5–36.5)
MCV RBC AUTO: 96 FL (ref 78–100)
MONOCYTES # BLD AUTO: 1.6 10E3/UL (ref 0–1.3)
MONOCYTES NFR BLD AUTO: 12 %
NEUTROPHILS # BLD AUTO: 9.7 10E3/UL (ref 1.6–8.3)
NEUTROPHILS NFR BLD AUTO: 72 %
NRBC # BLD AUTO: 0 10E3/UL
NRBC BLD AUTO-RTO: 0 /100
NT-PROBNP SERPL-MCNC: 4886 PG/ML (ref 0–1800)
PLATELET # BLD AUTO: 260 10E3/UL (ref 150–450)
POTASSIUM BLD-SCNC: 3.8 MMOL/L (ref 3.4–5.3)
PROT SERPL-MCNC: 6.3 G/DL (ref 6.8–8.8)
RBC # BLD AUTO: 4.49 10E6/UL (ref 3.8–5.2)
SARS-COV-2 RNA RESP QL NAA+PROBE: NEGATIVE
SODIUM SERPL-SCNC: 142 MMOL/L (ref 133–144)
WBC # BLD AUTO: 13.5 10E3/UL (ref 4–11)

## 2021-11-27 PROCEDURE — 250N000013 HC RX MED GY IP 250 OP 250 PS 637: Mod: GY | Performed by: NURSE PRACTITIONER

## 2021-11-27 PROCEDURE — 85610 PROTHROMBIN TIME: CPT | Performed by: EMERGENCY MEDICINE

## 2021-11-27 PROCEDURE — C9803 HOPD COVID-19 SPEC COLLECT: HCPCS

## 2021-11-27 PROCEDURE — 73502 X-RAY EXAM HIP UNI 2-3 VIEWS: CPT

## 2021-11-27 PROCEDURE — 99284 EMERGENCY DEPT VISIT MOD MDM: CPT | Performed by: EMERGENCY MEDICINE

## 2021-11-27 PROCEDURE — U0003 INFECTIOUS AGENT DETECTION BY NUCLEIC ACID (DNA OR RNA); SEVERE ACUTE RESPIRATORY SYNDROME CORONAVIRUS 2 (SARS-COV-2) (CORONAVIRUS DISEASE [COVID-19]), AMPLIFIED PROBE TECHNIQUE, MAKING USE OF HIGH THROUGHPUT TECHNOLOGIES AS DESCRIBED BY CMS-2020-01-R: HCPCS | Performed by: NURSE PRACTITIONER

## 2021-11-27 PROCEDURE — 83880 ASSAY OF NATRIURETIC PEPTIDE: CPT | Performed by: INTERNAL MEDICINE

## 2021-11-27 PROCEDURE — 99219 PR INITIAL OBSERVATION CARE,LEVEL II: CPT | Performed by: INTERNAL MEDICINE

## 2021-11-27 PROCEDURE — 99285 EMERGENCY DEPT VISIT HI MDM: CPT | Mod: 25

## 2021-11-27 PROCEDURE — G1004 CDSM NDSC: HCPCS

## 2021-11-27 PROCEDURE — 82040 ASSAY OF SERUM ALBUMIN: CPT | Performed by: EMERGENCY MEDICINE

## 2021-11-27 PROCEDURE — 36415 COLL VENOUS BLD VENIPUNCTURE: CPT | Performed by: EMERGENCY MEDICINE

## 2021-11-27 PROCEDURE — 85025 COMPLETE CBC W/AUTO DIFF WBC: CPT | Performed by: EMERGENCY MEDICINE

## 2021-11-27 PROCEDURE — 72131 CT LUMBAR SPINE W/O DYE: CPT | Mod: ME

## 2021-11-27 PROCEDURE — G0378 HOSPITAL OBSERVATION PER HR: HCPCS

## 2021-11-27 PROCEDURE — 80053 COMPREHEN METABOLIC PANEL: CPT | Performed by: EMERGENCY MEDICINE

## 2021-11-27 RX ORDER — ACETAMINOPHEN 325 MG/1
650 TABLET ORAL EVERY 4 HOURS PRN
Status: DISCONTINUED | OUTPATIENT
Start: 2021-11-27 | End: 2021-11-28

## 2021-11-27 RX ORDER — SENNOSIDES 8.6 MG
8.6 TABLET ORAL 2 TIMES DAILY
Status: DISCONTINUED | OUTPATIENT
Start: 2021-11-27 | End: 2021-11-29 | Stop reason: HOSPADM

## 2021-11-27 RX ORDER — ACETAMINOPHEN 325 MG/1
975 TABLET ORAL ONCE
Status: COMPLETED | OUTPATIENT
Start: 2021-11-27 | End: 2021-11-27

## 2021-11-27 RX ORDER — FUROSEMIDE 20 MG
40 TABLET ORAL EVERY MORNING
COMMUNITY
End: 2024-01-01

## 2021-11-27 RX ORDER — FUROSEMIDE 20 MG
20 TABLET ORAL DAILY
Status: DISCONTINUED | OUTPATIENT
Start: 2021-11-28 | End: 2021-11-29 | Stop reason: HOSPADM

## 2021-11-27 RX ORDER — DIGOXIN 125 MCG
62.5 TABLET ORAL EVERY MORNING
Status: ON HOLD | COMMUNITY
End: 2024-01-01

## 2021-11-27 RX ORDER — LORAZEPAM 0.5 MG/1
0.5 TABLET ORAL EVERY 4 HOURS PRN
Status: DISCONTINUED | OUTPATIENT
Start: 2021-11-27 | End: 2021-11-29 | Stop reason: HOSPADM

## 2021-11-27 RX ORDER — DIGOXIN 125 MCG
125 TABLET ORAL DAILY
Status: DISCONTINUED | OUTPATIENT
Start: 2021-11-28 | End: 2021-11-29 | Stop reason: HOSPADM

## 2021-11-27 RX ORDER — PANTOPRAZOLE SODIUM 40 MG/1
40 TABLET, DELAYED RELEASE ORAL EVERY MORNING
Status: DISCONTINUED | OUTPATIENT
Start: 2021-11-28 | End: 2021-11-29 | Stop reason: HOSPADM

## 2021-11-27 RX ORDER — PREDNISONE 5 MG/1
5 TABLET ORAL DAILY
Status: DISCONTINUED | OUTPATIENT
Start: 2021-11-28 | End: 2021-11-29 | Stop reason: HOSPADM

## 2021-11-27 RX ADMIN — ACETAMINOPHEN 975 MG: 325 TABLET, FILM COATED ORAL at 20:42

## 2021-11-27 RX ADMIN — ACETAMINOPHEN 650 MG: 325 TABLET, FILM COATED ORAL at 23:03

## 2021-11-27 RX ADMIN — OXYCODONE HYDROCHLORIDE 2.5 MG: 5 TABLET ORAL at 23:04

## 2021-11-27 ASSESSMENT — ENCOUNTER SYMPTOMS
FEVER: 0
HEADACHES: 0
SHORTNESS OF BREATH: 0
LIGHT-HEADEDNESS: 0
PALPITATIONS: 0

## 2021-11-27 ASSESSMENT — VISUAL ACUITY: OU: 1

## 2021-11-27 NOTE — ED TRIAGE NOTES
Pt opened car door and slipped on snow landing on her right side.  Pts friend was there and helped her get into car.  No loss of consciousness.  Pt able to extend her knee without difficulty.  Cms intact.  Pt rates pain 5/10

## 2021-11-28 LAB
ALBUMIN UR-MCNC: 30 MG/DL
ANION GAP SERPL CALCULATED.3IONS-SCNC: 5 MMOL/L (ref 3–14)
APPEARANCE UR: CLEAR
BILIRUB UR QL STRIP: NEGATIVE
BUN SERPL-MCNC: 20 MG/DL (ref 7–30)
CALCIUM SERPL-MCNC: 8.9 MG/DL (ref 8.5–10.1)
CHLORIDE BLD-SCNC: 106 MMOL/L (ref 94–109)
CO2 SERPL-SCNC: 30 MMOL/L (ref 20–32)
COLOR UR AUTO: ABNORMAL
CREAT SERPL-MCNC: 1.18 MG/DL (ref 0.52–1.04)
DIGOXIN SERPL-MCNC: 0.8 UG/L
ERYTHROCYTE [DISTWIDTH] IN BLOOD BY AUTOMATED COUNT: 13.7 % (ref 10–15)
GFR SERPL CREATININE-BSD FRML MDRD: 41 ML/MIN/1.73M2
GLUCOSE BLD-MCNC: 96 MG/DL (ref 70–99)
GLUCOSE UR STRIP-MCNC: NEGATIVE MG/DL
HCT VFR BLD AUTO: 43.2 % (ref 35–47)
HGB BLD-MCNC: 14.1 G/DL (ref 11.7–15.7)
HGB UR QL STRIP: NEGATIVE
HYALINE CASTS: 2 /LPF
KETONES UR STRIP-MCNC: NEGATIVE MG/DL
LEUKOCYTE ESTERASE UR QL STRIP: NEGATIVE
MCH RBC QN AUTO: 31.7 PG (ref 26.5–33)
MCHC RBC AUTO-ENTMCNC: 32.6 G/DL (ref 31.5–36.5)
MCV RBC AUTO: 97 FL (ref 78–100)
MUCOUS THREADS #/AREA URNS LPF: PRESENT /LPF
NITRATE UR QL: NEGATIVE
PH UR STRIP: 7 [PH] (ref 4.7–8)
PLATELET # BLD AUTO: 248 10E3/UL (ref 150–450)
POTASSIUM BLD-SCNC: 3.6 MMOL/L (ref 3.4–5.3)
RBC # BLD AUTO: 4.45 10E6/UL (ref 3.8–5.2)
RBC URINE: 2 /HPF
SODIUM SERPL-SCNC: 141 MMOL/L (ref 133–144)
SP GR UR STRIP: 1.02 (ref 1–1.03)
SQUAMOUS EPITHELIAL: 0 /HPF
UROBILINOGEN UR STRIP-MCNC: NORMAL MG/DL
WBC # BLD AUTO: 9.1 10E3/UL (ref 4–11)
WBC URINE: 3 /HPF

## 2021-11-28 PROCEDURE — 250N000013 HC RX MED GY IP 250 OP 250 PS 637: Performed by: INTERNAL MEDICINE

## 2021-11-28 PROCEDURE — 80162 ASSAY OF DIGOXIN TOTAL: CPT | Performed by: INTERNAL MEDICINE

## 2021-11-28 PROCEDURE — G0378 HOSPITAL OBSERVATION PER HR: HCPCS

## 2021-11-28 PROCEDURE — 85027 COMPLETE CBC AUTOMATED: CPT | Performed by: INTERNAL MEDICINE

## 2021-11-28 PROCEDURE — 99225 PR SUBSEQUENT OBSERVATION CARE,LEVEL II: CPT | Performed by: INTERNAL MEDICINE

## 2021-11-28 PROCEDURE — 82310 ASSAY OF CALCIUM: CPT | Performed by: INTERNAL MEDICINE

## 2021-11-28 PROCEDURE — 36415 COLL VENOUS BLD VENIPUNCTURE: CPT | Performed by: INTERNAL MEDICINE

## 2021-11-28 PROCEDURE — 250N000013 HC RX MED GY IP 250 OP 250 PS 637

## 2021-11-28 PROCEDURE — 250N000013 HC RX MED GY IP 250 OP 250 PS 637: Mod: GY | Performed by: INTERNAL MEDICINE

## 2021-11-28 PROCEDURE — 81001 URINALYSIS AUTO W/SCOPE: CPT | Performed by: INTERNAL MEDICINE

## 2021-11-28 PROCEDURE — 250N000012 HC RX MED GY IP 250 OP 636 PS 637: Mod: GY | Performed by: INTERNAL MEDICINE

## 2021-11-28 RX ORDER — ONDANSETRON 4 MG/1
4 TABLET, ORALLY DISINTEGRATING ORAL EVERY 6 HOURS PRN
Status: DISCONTINUED | OUTPATIENT
Start: 2021-11-28 | End: 2021-11-29 | Stop reason: HOSPADM

## 2021-11-28 RX ORDER — NAPROXEN SODIUM 220 MG
220 TABLET ORAL 2 TIMES DAILY WITH MEALS
Status: DISCONTINUED | OUTPATIENT
Start: 2021-11-28 | End: 2021-11-28

## 2021-11-28 RX ORDER — ONDANSETRON 2 MG/ML
4 INJECTION INTRAMUSCULAR; INTRAVENOUS EVERY 6 HOURS PRN
Status: DISCONTINUED | OUTPATIENT
Start: 2021-11-28 | End: 2021-11-29 | Stop reason: HOSPADM

## 2021-11-28 RX ORDER — ACETAMINOPHEN 325 MG/1
650 TABLET ORAL 4 TIMES DAILY
Status: DISCONTINUED | OUTPATIENT
Start: 2021-11-28 | End: 2021-11-29 | Stop reason: HOSPADM

## 2021-11-28 RX ORDER — GABAPENTIN 100 MG/1
200 CAPSULE ORAL 3 TIMES DAILY
Status: DISCONTINUED | OUTPATIENT
Start: 2021-11-28 | End: 2021-11-29 | Stop reason: HOSPADM

## 2021-11-28 RX ORDER — NAPROXEN 250 MG/1
250 TABLET ORAL 2 TIMES DAILY WITH MEALS
Status: DISCONTINUED | OUTPATIENT
Start: 2021-11-28 | End: 2021-11-29 | Stop reason: HOSPADM

## 2021-11-28 RX ORDER — LANOLIN ALCOHOL/MO/W.PET/CERES
3 CREAM (GRAM) TOPICAL
Status: DISCONTINUED | OUTPATIENT
Start: 2021-11-28 | End: 2021-11-29 | Stop reason: HOSPADM

## 2021-11-28 RX ADMIN — LORAZEPAM 0.5 MG: 0.5 TABLET ORAL at 20:28

## 2021-11-28 RX ADMIN — NAPROXEN 250 MG: 250 TABLET ORAL at 17:31

## 2021-11-28 RX ADMIN — PANTOPRAZOLE SODIUM 40 MG: 40 TABLET, DELAYED RELEASE ORAL at 08:55

## 2021-11-28 RX ADMIN — OXYCODONE HYDROCHLORIDE 2.5 MG: 5 TABLET ORAL at 04:50

## 2021-11-28 RX ADMIN — DIGOXIN 125 MCG: 125 TABLET ORAL at 08:53

## 2021-11-28 RX ADMIN — ACETAMINOPHEN 650 MG: 325 TABLET, FILM COATED ORAL at 12:36

## 2021-11-28 RX ADMIN — GABAPENTIN 200 MG: 100 CAPSULE ORAL at 08:51

## 2021-11-28 RX ADMIN — STANDARDIZED SENNA CONCENTRATE 8.6 MG: 8.6 TABLET ORAL at 08:51

## 2021-11-28 RX ADMIN — ACETAMINOPHEN 650 MG: 325 TABLET, FILM COATED ORAL at 08:51

## 2021-11-28 RX ADMIN — OXYCODONE HYDROCHLORIDE 2.5 MG: 5 TABLET ORAL at 16:28

## 2021-11-28 RX ADMIN — ACETAMINOPHEN 650 MG: 325 TABLET, FILM COATED ORAL at 16:28

## 2021-11-28 RX ADMIN — LORAZEPAM 0.5 MG: 0.5 TABLET ORAL at 01:19

## 2021-11-28 RX ADMIN — STANDARDIZED SENNA CONCENTRATE 8.6 MG: 8.6 TABLET ORAL at 20:28

## 2021-11-28 RX ADMIN — PREDNISONE 5 MG: 5 TABLET ORAL at 08:52

## 2021-11-28 RX ADMIN — OXYCODONE HYDROCHLORIDE 2.5 MG: 5 TABLET ORAL at 12:36

## 2021-11-28 RX ADMIN — ACETAMINOPHEN 650 MG: 325 TABLET, FILM COATED ORAL at 20:27

## 2021-11-28 RX ADMIN — GABAPENTIN 200 MG: 100 CAPSULE ORAL at 14:50

## 2021-11-28 RX ADMIN — GABAPENTIN 200 MG: 100 CAPSULE ORAL at 20:28

## 2021-11-28 RX ADMIN — NAPROXEN 250 MG: 250 TABLET ORAL at 08:53

## 2021-11-28 RX ADMIN — FUROSEMIDE 20 MG: 20 TABLET ORAL at 08:55

## 2021-11-28 RX ADMIN — OXYCODONE HYDROCHLORIDE 2.5 MG: 5 TABLET ORAL at 20:26

## 2021-11-28 NOTE — PLAN OF CARE
"Reason for hospital stay:  Sacral fracture  Living situation PTA: alone  Most recent vitals: BP 99/73 (BP Location: Right arm)   Pulse 110   Temp 98.1  F (36.7  C) (Oral)   Resp 16   Ht 1.575 m (5' 2\")   Wt 59 kg (130 lb 1.1 oz)   SpO2 93%   BMI 23.79 kg/m      Pain Management:  oral pain medications  LOC:  A&Ox4  Cardiac:  HRR with this assessment  Respiratory:  Lungs clear throughout  GI:  bsa  :  voiding without difficulty   Skin Issues:  bruising noted on left leg and right arm    IVF:  none  ABX:  none    Nutrition: Regular diet, eating well  ADL's:  standby assist with walker  Ambulation: Standby assist with walker  Safety:  No alarms at this time, pt uses call light appropriately and makes needs known      Comments: Pt was able to lift right leg a little bit during the afternoon walk, will be walking again soon.  Pt reports no pain with sitting or lying in bed but reports 6/10 pain with weight bearing and ambulation.       11/28/2021  4:41 PM  Jahaira Petit RN    "

## 2021-11-28 NOTE — ED PROVIDER NOTES
History     Chief Complaint   Patient presents with     Fall     unable to put weight on right leg.       The history is provided by the patient and medical records.     Shannon Walls is a 88 year old female who presents to the ED with right groin pain which began after she slipped on snow while getting in to the car. She did not hit her head or lose consciousness.  She is not anticoagulated.  Patient lives independently, she is unable to move the right leg on her own.  She tells me she cannot move the leg because it is painful, not because the muscles were at work.  Patient has a walker at home but notes that the walker will not help her with ambulation at this point.  Patient reports that pain is located in the right groin and radiates down the inner thigh, also notes some discomfort along the lateral portion of the femur.  Patient reports chronic back pain.    Allergies:  No Known Allergies    Problem List:    Patient Active Problem List    Diagnosis Date Noted     Pathological fracture 11/27/2021     Priority: Medium     Fracture of sacrum (H) 11/27/2021     Priority: Medium        Past Medical History:    No past medical history on file.    Past Surgical History:    Past Surgical History:   Procedure Laterality Date     EYE SURGERY       OPEN REDUCTION INTERNAL FIXATION WRIST Right 8/22/2018    Procedure: OPEN REDUCTION INTERNAL FIXATION WRIST;  OPEN REDUCTION INTERNAL FIXATION RIGHT DISTAL RADIUS;  Surgeon: Taqueria Edwards MD;  Location: [facility] OR       Family History:    No family history on file.    Social History:  Marital Status:   [5]  Social History     Tobacco Use     Smoking status: Former Smoker     Smokeless tobacco: Never Used   Substance Use Topics     Alcohol use: Not on file     Drug use: Not on file        Medications:    Calcium Carbonate (CALTRATE 600 PO)  Cholecalciferol (VITAMIN D PO)  digoxin (LANOXIN) 125 MCG tablet  furosemide (LASIX) 20 MG tablet  LORAZEPAM PO  multivitamin,  therapeutic with minerals (MULTI-VITAMIN) TABS tablet  olopatadine (PATANOL) 0.1 % ophthalmic solution  OMEPRAZOLE PO  PREDNISONE PO          Review of Systems   Constitutional: Negative for fever.   Eyes: Negative for visual disturbance.   Respiratory: Negative for shortness of breath.    Cardiovascular: Negative for chest pain and palpitations.   Musculoskeletal: Positive for gait problem.   Neurological: Negative for light-headedness and headaches.       Physical Exam   BP: 156/87  Pulse: 98 (irregular)  Temp: 97.8  F (36.6  C)  Resp: 16  SpO2: 95 %      Physical Exam  Vitals and nursing note reviewed.   Constitutional:       General: She is not in acute distress.     Appearance: She is well-developed. She is not ill-appearing.   HENT:      Head: Normocephalic and atraumatic.      Nose: Nose normal.      Mouth/Throat:      Mouth: Mucous membranes are moist.   Eyes:      General: Vision grossly intact.      Pupils: Pupils are equal, round, and reactive to light.   Cardiovascular:      Rate and Rhythm: Normal rate and regular rhythm.      Pulses:           Radial pulses are 2+ on the right side and 2+ on the left side.        Dorsalis pedis pulses are 2+ on the right side and 2+ on the left side.        Posterior tibial pulses are 2+ on the right side and 2+ on the left side.   Pulmonary:      Effort: Pulmonary effort is normal. No accessory muscle usage or respiratory distress.   Musculoskeletal:      Cervical back: Neck supple. No spinous process tenderness.      Right lower leg: No edema.      Left lower leg: No edema.        Legs:       Comments: Pain with internal and external rotation of the hip.  No evidence of deformity  CMS intact    The knee and ankle are with normal range of motion.     Skin:     General: Skin is warm and dry.      Capillary Refill: Capillary refill takes less than 2 seconds.   Neurological:      Mental Status: She is alert and oriented to person, place, and time.      Sensory: Sensation  is intact.      Gait: Gait abnormal.   Psychiatric:         Mood and Affect: Mood normal.         Behavior: Behavior is cooperative.         ED Course     The patient was interviewed and examined.  She was slightly hypertensive, afebrile, heart rate in the 90s, no increased work of breathing or hypoxia.  On exam, the patient had pain with internal and external rotation of the hip.  She had pain with ambulation and weightbearing in the inner thigh.  Initial x-ray of the hip and pelvis was negative for fracture however patient is having pain out of proportion for what I am seeing on exam/x-ray therefore further imaging was warranted.  I opted to obtain a CT scan of the lumbar spine and hip.    ED Course as of 11/27/21 2226   Sat Nov 27, 2021 2224 Case discussed with Dr. Duggan, hospitalist, who graciously agreed to admit the patient to observation for pain control and rehab.  Jairon Cuevas D.O.       Results for orders placed or performed during the hospital encounter of 11/27/21 (from the past 24 hour(s))   XR Pelvis including Hip Right 2-3 Views    Narrative    XR PELVIS AND HIP RIGHT 2 VIEWS    HISTORY: fall, groin pain .    COMPARISON: None.    TECHNIQUE: AP pelvis, 2 views right hip.    FINDINGS:    Osteoporosis. No proximal femoral fracture. No anterior pelvic ring  fracture. Mildly limited assessment of the sacrum due to bowel gas.  Lumbar degenerative changes. Vascular calcifications.        Impression    IMPRESSION:     No evidence of acute fracture.      AKOSUA ROJO MD         SYSTEM ID:  [id number]   CT Hip Right w/o Contrast    Narrative    CT LUMBAR SPINE W/O CONTRAST, CT HIP RIGHT W/O CONTRAST    HISTORY: Compression fracture, L-spine; please run through pelvis and  assure views of the right hip .    COMPARISON: None.    TECHNIQUE: Helical noncontrast CT images of the lumbar spine and right  hip.    FINDINGS:    Osteoporosis.    There is mild superior endplate height loss at L1, favored to  be  chronic. There is moderate superior endplate height loss at L4, age  indeterminate.     There is lumbar levoscoliosis with degenerative facet hypertrophy and  multifocal foraminal narrowing.      Degenerative hypertrophy is present at both SI joints. There is slight  subchondral cortical lucency along the SI joints. Nondisplaced sacral  sufficiency fractures are questioned, age indeterminant.     Vascular calcifications are noted      Impression    IMPRESSION:     Age-indeterminate moderate L4 osteoporotic pathologic fracture.     Equivocal age indeterminant sacral insufficiency fractures.    Chronic mild L1 osteoporotic pathologic fracture.      No evidence of acute right hip fracture.    Correlate for focal tenderness. MR may be helpful to delineate  fracture age.    AKOSUA ROJO MD         SYSTEM ID:  [id number]   Lumbar spine CT w/o contrast    Narrative    CT LUMBAR SPINE W/O CONTRAST, CT HIP RIGHT W/O CONTRAST    HISTORY: Compression fracture, L-spine; please run through pelvis and  assure views of the right hip .    COMPARISON: None.    TECHNIQUE: Helical noncontrast CT images of the lumbar spine and right  hip.    FINDINGS:    Osteoporosis.    There is mild superior endplate height loss at L1, favored to be  chronic. There is moderate superior endplate height loss at L4, age  indeterminate.     There is lumbar levoscoliosis with degenerative facet hypertrophy and  multifocal foraminal narrowing.      Degenerative hypertrophy is present at both SI joints. There is slight  subchondral cortical lucency along the SI joints. Nondisplaced sacral  sufficiency fractures are questioned, age indeterminant.     Vascular calcifications are noted      Impression    IMPRESSION:     Age-indeterminate moderate L4 osteoporotic pathologic fracture.     Equivocal age indeterminant sacral insufficiency fractures.    Chronic mild L1 osteoporotic pathologic fracture.      No evidence of acute right hip  fracture.    Correlate for focal tenderness. MR may be helpful to delineate  fracture age.    AKOSUA ROJO MD         SYSTEM ID:  [id number]       Medications   digoxin (LANOXIN) tablet 125 mcg (has no administration in time range)   furosemide (LASIX) tablet 20 mg (has no administration in time range)   LORazepam (ATIVAN) tablet 0.5 mg (has no administration in time range)   pantoprazole (PROTONIX) EC tablet 40 mg (has no administration in time range)   predniSONE (DELTASONE) tablet 5 mg (has no administration in time range)   oxyCODONE IR (ROXICODONE) half-tab 2.5 mg (has no administration in time range)   acetaminophen (TYLENOL) tablet 650 mg (has no administration in time range)   acetaminophen (TYLENOL) tablet 975 mg (975 mg Oral Given 11/27/21 2042)       Assessments & Plan (with Medical Decision Making)     Plain films of the hip were negative, CT scan of the lumbar spine suggests age indeterminate moderate L4 pathologic fracture, equivalent age indeterminant sacral insufficiency fracture, and a chronic mild L1 osteoporotic pathologic fracture without evidence of acute right hip fracture.  I suspect sacral fractures after independent review of the CT images as the patient has significant pain within the inner thigh.  The patient resides independently, she will need to stay in the hospital, unfortunately there are no beds available and the patient will need to board in the emergency department.  Patient tells me that she is comfortable sitting in the chair after she had Tylenol.  She knows that she cannot go home because even with the assist of a walker she will be unable to move about her house.  I spoke with Dr. Cuevas who will be assuming care of the patient while she boards in the ED.  She will need either short-term rehab with a PT evaluation or nursing home placement until she can get her pain under control.  COVID is pending, home meds and dietary orders placed.  Patient comfortable with the plan  of care, transferred to the emergency department at 2200.      I have reviewed the nursing notes.    I have reviewed the findings, diagnosis, plan and need for follow up with the patient.       UC to ED Handoff for boarding patient     Discussed with Dr. Cuevas at 2200  Patient accepted for ED boarder as the patient has fractures and is unable to walk  Pending studies include covid  Code Status: Not Addressed           New Prescriptions    No medications on file       Final diagnoses:   Fracture of sacrum (H)   Pathological fracture - L1 and L4       11/27/2021   [facility]     Sheri Patel, CNP  11/27/21 2210       Chester Cuevas,   11/27/21 2223

## 2021-11-28 NOTE — PLAN OF CARE
Telephone report given with opportunity to observe patient.    Report given to LIZBET Askew RN   11/28/2021  5:45 PM

## 2021-11-28 NOTE — PLAN OF CARE
Shannon Walls  MRN: [id number]    Report from Sari MATTHEWS RN.  Pt admitted observation for pain control. Pt fell when she was getting out of the car and now c/o right hip/groin pain. Pt needs assist of 1 for transfers. Denies pain at this time. CMS intact.   Patient is alert and oriented X 3,Patient oriented to room, unit, hourly rounding, and plan of care.  Call light within reach. Explained admission packet with patient bill of rights brochure. Will continue to monitor and document as needed.     Inpatient nursing criteria listed below was met:    Health care directives status obtained and documented: Yes     Core Measure diagnosis present:: No  Vaccine assessment done and vaccines ordered if appropriate. Pt had flu vax in oct 2021   Clergy visit ordered if patient requests: Yes  Skin issues/needs documented:yes  Isolation needs addressed, if appropriate: N/A  Fall Prevention (Med and High risk): Care plan updated, Education given and documented and signage used: Yes  Care Plan initiated: Yes  Education Documented (Reminder to educate patient if MRSA is present on admission): Yes  Education Assessment documented:Yes  Patient has discharge needs (If yes, please explain): Yes, will need medical supplies ie w/w, BSC and PT/OT

## 2021-11-28 NOTE — PLAN OF CARE
"Reason for hospital stay:  Sacral Fracture  Living situation PTA: Alone  Most recent vitals: BP 96/53   Pulse 83   Temp 97.9  F (36.6  C) (Oral)   Resp 16   Ht 1.575 m (5' 2\")   Wt 59 kg (130 lb 1.1 oz)   SpO2 95%   BMI 23.79 kg/m      Pain Management:  Oral pain medications  LOC:  A&Ox4  Cardiac:  Irregular heart beat d/t afib  Respiratory:  Lungs clear throughout  GI:  BSA, pt does report having history of constipation but states prune juice usually does the trick  :  Voiding without issues.   Skin Issues:  Bruising noted on left leg     IVF:  none  ABX:  none    Nutrition: Regular diet  ADL's:  Stand by assist with walker  Ambulation:Standby assist with walker  Safety:  Pt uses call light appropriately     Discharge care conference held.   Attendees: Nursing and Physician  Comments: Pt to see PT and OT tomorrow to determine if pt needs placement for rehab.       11/28/2021  10:22 AM  Jahaira Petit RN    "

## 2021-11-28 NOTE — PLAN OF CARE
Pt up to BSC with assist of 1 staff. Pt moving slowly, and painful to move. Pt does not have pain while sitting up and does not have pain if not moving. Pt sitting up in chair watching tv for comfort. Urine sample sent.

## 2021-11-28 NOTE — PROGRESS NOTES
Care Transitions focused note:      Writer attempted to meet with patient to review SUBRAMANIAN letter and complete CM assessment. Patient was asleep and did not rouse to voice or gentle touch. Writer will continue to attempt to meet with patient.

## 2021-11-28 NOTE — H&P
[facility]    History and Physical - Hospitalist Service       Date of Admission:  11/27/2021    Assessment & Plan      Shannon Walls is a 88 year old female admitted on 11/27/2021.     Fall with L4 compression fracture and sacral insufficiency fracture: scheduled Tylenol, Aleve, Gabapentin, with narcotic for breakthrough, PT/OT    Chronic AFIB on ASA as anticoagulant: continue ASA    General: check UA, Digoxin level, BNP    BNP is elevated 4886. 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 Could consider ECHO and trial of Lasix     Diet: Regular Diet Adult    DVT Prophylaxis: Pneumatic Compression Devices  Garcia Catheter: Not present  Central Lines: None  Code Status:   DNR       Disposition Plan   Expected discharge:<=2 daysrecommended to may need rehabilitation admit once discharge plan established.     The patient's care was discussed with the Patient and a close friend, who helps with care    Dejon Duggan, [facility]  Securely message with the Vocera Web Console (learn more here)  Text page via [employer] Paging/Directory        ______________________________________________________________________    Chief Complaint   Slipped and fell in the snow and could not get up today    History is obtained from the patient, ER Provider, EHR review    History of Present Illness   Shannon Walls is a 88 year old female who lives alone independently and who has a close community network who overlooks and assists with her cares; she is generally healthy and has no fall history    ER Course: vitals were unremarkable and there was no distress. The right groin pain could be completely relieved by lying down in bed. Lab diagnostics resulted an elevated WBC (13.5) with left shift    Review of Systems    Constitutional: No fever or chills, no generalized weakness, no unintentional weight change, no particular change in  appetite  Ears, Nose & Throat: no sore throat, no nasal drainage, no congestion. No ear pain, no ear drainage, no particular change in hearing  Eyes: no particular change in vision, no redness, no drainage  Cardiovascular: No chest pain at rest, no chest pain with familiar activities.  Pulmonary: No cough, no particular change in work of breathing, no particular change in shortness of breath with position changes or familiar activites  Gastrointestinal: No abdominal pain, no nausea, no vomiting, no diarrhea. No particular change in bowel movement pattern, no black stools, no bloody stools  Genitourinary: No particular change in incontinence, no pain with urination, no particular change in stream, no particular change in amount urinated with urge, no discharge  Skin: No particular change in bruising, no rashes  Musculoskeletal: no particular change in strength, no particular change in muscle development. New right groin, inner thigh pain  Neurological: no numbness and tingling, no headache, no particular change in balance, no dizziness  Psychologic: No particular change in depression and/or anxiety  Endocrine: No particular change in heat or cold intolerance        Past Medical History    I have reviewed this patient's medical history and updated it with pertinent information if needed.   No past medical history on file.    Past Surgical History   I have reviewed this patient's surgical history and updated it with pertinent information if needed.  Past Surgical History:   Procedure Laterality Date     EYE SURGERY       OPEN REDUCTION INTERNAL FIXATION WRIST Right 8/22/2018    Procedure: OPEN REDUCTION INTERNAL FIXATION WRIST;  OPEN REDUCTION INTERNAL FIXATION RIGHT DISTAL RADIUS;  Surgeon: Taqueria Edwards MD;  Location: [facility]       Social History   I have reviewed this patient's social history and updated it with pertinent information if needed.  Social History     Tobacco Use     Smoking status: Former Smoker      "Smokeless tobacco: Never Used   Substance Use Topics     Alcohol use: Not on file     Drug use: Not on file       Family History   Not relevant to this admit    Prior to Admission Medications   Prior to Admission Medications   Prescriptions Last Dose Informant Patient Reported? Taking?   Calcium Carbonate (CALTRATE 600 PO) 2021 at Unknown time  Yes Yes   Sig: Take 600 mg by mouth 2 times daily   Cholecalciferol (VITAMIN D PO) 2021 at Unknown time  Yes Yes   Sig: Take 400 Units by mouth daily    LORAZEPAM PO 2021 at Unknown time  Yes Yes   Sig: Take 0.5 mg by mouth Take 1/2 to 1 tablet orally every 6 hours as needed   OMEPRAZOLE PO 2021 at Unknown time  Yes Yes   Sig: Take 20 mg by mouth every morning    PREDNISONE PO 2021 at Unknown time  Yes Yes   Sig: Take 5 mg by mouth daily    digoxin (LANOXIN) 125 MCG tablet 2021 at Unknown time  Yes Yes   Sig: Take 125 mcg by mouth daily   furosemide (LASIX) 20 MG tablet 2021 at Unknown time  Yes Yes   Sig: Take 20 mg by mouth daily   multivitamin, therapeutic with minerals (MULTI-VITAMIN) TABS tablet 2021 at Unknown time  Yes Yes   Sig: Take 1 tablet by mouth daily   olopatadine (PATANOL) 0.1 % ophthalmic solution More than a month at Unknown time  Yes Yes   Si drop 2 times daily To affected eye      Facility-Administered Medications: None     Allergies   No Known Allergies    Physical Exam   Vital Signs: Temp: 97.8  F (36.6  C) Temp src: Tympanic BP: 156/87 Pulse: 98 (irregular)   Resp: 16 SpO2: 95 % O2 Device: None (Room air)    Weight: 0 lbs 0 oz    Case reviewed with the ER Provider, EHR reviewed; patient seen in ER room 11, with a friend present    Vital signs:  Temp: 97.8  F (36.6  C) Temp src: Tympanic BP: 156/87 Pulse: 98 (irregular)   Resp: 16 SpO2: 95 % O2 Device: None (Room air)        Estimated body mass index is 23.52 kg/m  as calculated from the following:    Height as of 18: 1.626 m (5' 4\").    Weight as " of 8/22/18: 62.1 kg (137 lb).      General: No distress, interactive  Head: normocephalic, no obvious trauma  Eyes: Gaze directed normally, sclera clear, no discharge, no abnormal ocular movements  Ears: Normal appearing age-related external ears, no discharge, stable hearing acuity loss  Nose: Normal age-related appearance  Mouth: Normal appearing oral mucosa, Gums and throat appear age-related normal  Neck: Normal age-related appearance, age-related range of motion, supple, no adenopathy  Pulmonary: Normal work of breathing, no expiratory delay, no coarseness, no wheezing  Cardiovascular: Distant heart sounds, irregular rhythm   Abdomen: No obvious distention, soft, bowel sounds present with normal frequency and pitch  Rectal: Deferred  Back: Age-related normal  Skin: Age-related normal, no rashes  Extremities: Not tender, no lower extremity edema. Moving upper and lower extremities  Neurological: Grossly in tact  Psychiatric: Mood is stable, appropriately interactive          Data   Data reviewed today: I reviewed all medications, new labs and imaging results over the last 24 hours.

## 2021-11-28 NOTE — ED NOTES
Pt ate dinner.  Took pain meds without difficulty.  Oriented to call light and tv remote.  All questions answered.  No other needs at this time

## 2021-11-28 NOTE — PLAN OF CARE
Face to face report given with opportunity to observe patient.    Report given to mohini Jeffries RN   11/28/2021  7:13 AM

## 2021-11-28 NOTE — PLAN OF CARE
Pt up to BSC with assist. Staff needed to assist pt up to a sitting position, pt then stood by herself and pivoted to the BSC. Pt then shuffled her way to the chair while holding onto the bed rail.

## 2021-11-28 NOTE — ED TRIAGE NOTES
Pt presents with a friend and fell before getting into her friends car. States that she can't lift her right leg.

## 2021-11-28 NOTE — PLAN OF CARE
Pt sitting up in chair, ordered breakfast. Pt denies pain when not moving. Pt watching TV. Call light in reach.

## 2021-11-28 NOTE — PROGRESS NOTES
[facility]    Hospitalist Progress Note      Assessment & Plan   Shannon Walls is a 88 year old female who was admitted on 11/27/2021.      1.  Status post fall: Patient was not trying to get in the car and slipped on the snow falling on her right side.  Friend helped her get up to try to get up a couple of stairs to get into her house were unable to do so due to pain in her right groin therefore she came to the ER for evaluation.  Imaging in the ER showed sort of an age indeterminate L4 fracture and sacral insufficiency fractures.  No evidence of acute right hip fracture.  At rest she has absolutely no pain.  Getting up she has some discomfort although today with nurse help she was able to stand up using a walker and walk to the door and back.  Does have the right groin pain.  So we will have physical therapy see the patient tomorrow and assess whether or not she is safe to return home or if she requires temporary structured nursing facility stay.  I am somewhat favoring that at this point just given the fact that she does live alone.    2.  History of chronic A. fib on aspirin as a stroke prophylaxis agent.  Rates up a little bit after she got up walking around.  But otherwise she has done well.      Diet: Regular Diet Adult  Fluids: None    DVT Prophylaxis: Pneumatic Compression Devices  Code Status: No CPR- Do NOT Intubate    Disposition: Expected discharge in 1-2 days once safe disposition is found.    Markel Ricardo    Interval History   Patient alert katey has no pain resting in bed.  Getting up she has 7-9 out of pain right groin area.  Was able to walk to the door and back with walker and nursing assistance.  Otherwise has no complaints no nausea vomiting shortness of breath chest pain abdominal pain.  Did discuss her options.  We will have physical therapy see her tomorrow.  And then make a decision as to whether or not she is safe to return home or should go to rehab at a structured  nursing facility.    -Data reviewed today: I reviewed all new labs and imaging results over the last 24 hours. I personally reviewed no images or EKG's today.    Physical Exam   Temp: 97.9  F (36.6  C) Temp src: Oral BP: 96/53 Pulse: 83   Resp: 16 SpO2: 95 % O2 Device: None (Room air)    Vitals:    11/28/21 0050   Weight: 59 kg (130 lb 1.1 oz)     Vital Signs with Ranges  Temp:  [97.5  F (36.4  C)-97.9  F (36.6  C)] 97.9  F (36.6  C)  Pulse:  [] 83  Resp:  [16] 16  BP: ()/(53-96) 96/53  SpO2:  [92 %-95 %] 95 %  I/O last 3 completed shifts:  In: -   Out: 250 [Urine:250]    Peripheral IV 11/27/21 Left Upper forearm (Active)   Site Assessment WDL 11/27/21 2306   Line Status Saline locked 11/27/21 2306   Number of days: 1       Incision/Surgical Site 08/22/18 Right Wrist (Active)   Number of days: 1194     No line/device    Constitutional: Alert and oriented x3. No distress    HEENT: Normocephalic/atraumatic, PERRL, EOMI, mouth moist, neck supple, no LN.     Cardiovascular:IrregRRR. No  Murmur, no  rubs, or gallops.  JVD no.  Bruits np.  Pulses 2+    Respiratory: Clear to auscultation bilaterally    Abdomen: Soft, nontender, nondistended, no organomegaly. Bowel sounds present    Extremities: Warm/dry.  Right leg is slightly larger than left.    Neuro:   Non focal, cranial nerves intact, Moves all extremities.  Medications       acetaminophen  650 mg Oral 4x Daily     digoxin  125 mcg Oral Daily     furosemide  20 mg Oral Daily     gabapentin  200 mg Oral TID     naproxen  250 mg Oral BID w/meals     pantoprazole  40 mg Oral QAM     predniSONE  5 mg Oral Daily     sennosides  8.6 mg Oral BID       Data   Recent Labs   Lab 11/28/21  0725 11/27/21  2259   WBC 9.1 13.5*   HGB 14.1 14.4   MCV 97 96    260   INR  --  1.11    142   POTASSIUM 3.6 3.8   CHLORIDE 106 106   CO2 30 31   BUN 20 19   CR 1.18* 1.08*   ANIONGAP 5 5   EFFIE 8.9 9.2   GLC 96 129*   ALBUMIN  --  3.2*   PROTTOTAL  --  6.3*    BILITOTAL  --  0.6   ALKPHOS  --  57   ALT  --  29   AST  --  20       Recent Results (from the past 24 hour(s))   XR Pelvis including Hip Right 2-3 Views    Narrative    XR PELVIS AND HIP RIGHT 2 VIEWS    HISTORY: fall, groin pain .    COMPARISON: None.    TECHNIQUE: AP pelvis, 2 views right hip.    FINDINGS:    Osteoporosis. No proximal femoral fracture. No anterior pelvic ring  fracture. Mildly limited assessment of the sacrum due to bowel gas.  Lumbar degenerative changes. Vascular calcifications.        Impression    IMPRESSION:     No evidence of acute fracture.      AKOSUA ROJO MD         SYSTEM ID:  [id number]   CT Hip Right w/o Contrast    Narrative    CT LUMBAR SPINE W/O CONTRAST, CT HIP RIGHT W/O CONTRAST    HISTORY: Compression fracture, L-spine; please run through pelvis and  assure views of the right hip .    COMPARISON: None.    TECHNIQUE: Helical noncontrast CT images of the lumbar spine and right  hip.    FINDINGS:    Osteoporosis.    There is mild superior endplate height loss at L1, favored to be  chronic. There is moderate superior endplate height loss at L4, age  indeterminate.     There is lumbar levoscoliosis with degenerative facet hypertrophy and  multifocal foraminal narrowing.      Degenerative hypertrophy is present at both SI joints. There is slight  subchondral cortical lucency along the SI joints. Nondisplaced sacral  sufficiency fractures are questioned, age indeterminant.     Vascular calcifications are noted      Impression    IMPRESSION:     Age-indeterminate moderate L4 osteoporotic pathologic fracture.     Equivocal age indeterminant sacral insufficiency fractures.    Chronic mild L1 osteoporotic pathologic fracture.      No evidence of acute right hip fracture.    Correlate for focal tenderness. MR may be helpful to delineate  fracture age.    AKOSUA ROJO MD         SYSTEM ID:  [id number]   Lumbar spine CT w/o contrast    Narrative    CT LUMBAR SPINE W/O  CONTRAST, CT HIP RIGHT W/O CONTRAST    HISTORY: Compression fracture, L-spine; please run through pelvis and  assure views of the right hip .    COMPARISON: None.    TECHNIQUE: Helical noncontrast CT images of the lumbar spine and right  hip.    FINDINGS:    Osteoporosis.    There is mild superior endplate height loss at L1, favored to be  chronic. There is moderate superior endplate height loss at L4, age  indeterminate.     There is lumbar levoscoliosis with degenerative facet hypertrophy and  multifocal foraminal narrowing.      Degenerative hypertrophy is present at both SI joints. There is slight  subchondral cortical lucency along the SI joints. Nondisplaced sacral  sufficiency fractures are questioned, age indeterminant.     Vascular calcifications are noted      Impression    IMPRESSION:     Age-indeterminate moderate L4 osteoporotic pathologic fracture.     Equivocal age indeterminant sacral insufficiency fractures.    Chronic mild L1 osteoporotic pathologic fracture.      No evidence of acute right hip fracture.    Correlate for focal tenderness. MR may be helpful to delineate  fracture age.    AKOSUA ROJO MD         SYSTEM ID:  [id number]

## 2021-11-28 NOTE — PLAN OF CARE
Pt brought down to room 3304 via wheel chair all medications and belonging brought down at this time as well.

## 2021-11-29 ENCOUNTER — APPOINTMENT (OUTPATIENT)
Dept: OCCUPATIONAL THERAPY | Facility: HOSPITAL | Age: 86
End: 2021-11-29
Attending: INTERNAL MEDICINE
Payer: MEDICARE

## 2021-11-29 ENCOUNTER — APPOINTMENT (OUTPATIENT)
Dept: PHYSICAL THERAPY | Facility: HOSPITAL | Age: 86
End: 2021-11-29
Attending: INTERNAL MEDICINE
Payer: MEDICARE

## 2021-11-29 VITALS
SYSTOLIC BLOOD PRESSURE: 142 MMHG | HEIGHT: 62 IN | WEIGHT: 130.07 LBS | OXYGEN SATURATION: 92 % | RESPIRATION RATE: 16 BRPM | TEMPERATURE: 98.4 F | HEART RATE: 114 BPM | BODY MASS INDEX: 23.94 KG/M2 | DIASTOLIC BLOOD PRESSURE: 75 MMHG

## 2021-11-29 PROCEDURE — 250N000009 HC RX 250: Performed by: INTERNAL MEDICINE

## 2021-11-29 PROCEDURE — G0378 HOSPITAL OBSERVATION PER HR: HCPCS

## 2021-11-29 PROCEDURE — 250N000013 HC RX MED GY IP 250 OP 250 PS 637: Mod: GY | Performed by: INTERNAL MEDICINE

## 2021-11-29 PROCEDURE — 97166 OT EVAL MOD COMPLEX 45 MIN: CPT | Mod: GO

## 2021-11-29 PROCEDURE — 99217 PR OBSERVATION CARE DISCHARGE: CPT | Performed by: INTERNAL MEDICINE

## 2021-11-29 PROCEDURE — 97530 THERAPEUTIC ACTIVITIES: CPT | Mod: GO

## 2021-11-29 PROCEDURE — 97530 THERAPEUTIC ACTIVITIES: CPT | Mod: GP

## 2021-11-29 PROCEDURE — 97161 PT EVAL LOW COMPLEX 20 MIN: CPT | Mod: GP

## 2021-11-29 PROCEDURE — 250N000012 HC RX MED GY IP 250 OP 636 PS 637: Performed by: INTERNAL MEDICINE

## 2021-11-29 RX ORDER — MULTIVITAMIN,THERAPEUTIC
1 TABLET ORAL DAILY
COMMUNITY
End: 2024-01-01

## 2021-11-29 RX ORDER — OXYCODONE HYDROCHLORIDE 5 MG/1
2.5 TABLET ORAL EVERY 6 HOURS PRN
Qty: 30 TABLET | Refills: 0 | Status: ON HOLD | OUTPATIENT
Start: 2021-11-29 | End: 2022-12-30

## 2021-11-29 RX ORDER — ASPIRIN 81 MG/1
81 TABLET ORAL DAILY
COMMUNITY
End: 2023-01-01

## 2021-11-29 RX ORDER — A,C,E/ZINC/SOD SEL/COPP/LUTEIN 300MCG-200
1 TABLET ORAL DAILY
Status: ON HOLD | COMMUNITY
End: 2021-11-29

## 2021-11-29 RX ORDER — ERGOCALCIFEROL (VITAMIN D2) 10 MCG
1 TABLET ORAL EVERY MORNING
COMMUNITY

## 2021-11-29 RX ORDER — METOPROLOL TARTRATE 25 MG/1
25 TABLET, FILM COATED ORAL 2 TIMES DAILY
Status: DISCONTINUED | OUTPATIENT
Start: 2021-11-29 | End: 2021-11-29

## 2021-11-29 RX ORDER — METOPROLOL TARTRATE 1 MG/ML
5 INJECTION, SOLUTION INTRAVENOUS ONCE
Status: COMPLETED | OUTPATIENT
Start: 2021-11-29 | End: 2021-11-29

## 2021-11-29 RX ADMIN — DIGOXIN 125 MCG: 125 TABLET ORAL at 08:56

## 2021-11-29 RX ADMIN — GABAPENTIN 200 MG: 100 CAPSULE ORAL at 14:21

## 2021-11-29 RX ADMIN — METOPROLOL TARTRATE 5 MG: 1 INJECTION, SOLUTION INTRAVENOUS at 01:31

## 2021-11-29 RX ADMIN — ACETAMINOPHEN 650 MG: 325 TABLET, FILM COATED ORAL at 08:51

## 2021-11-29 RX ADMIN — GABAPENTIN 200 MG: 100 CAPSULE ORAL at 08:52

## 2021-11-29 RX ADMIN — PREDNISONE 5 MG: 5 TABLET ORAL at 08:52

## 2021-11-29 RX ADMIN — ACETAMINOPHEN 650 MG: 325 TABLET, FILM COATED ORAL at 16:04

## 2021-11-29 RX ADMIN — FUROSEMIDE 20 MG: 20 TABLET ORAL at 08:52

## 2021-11-29 RX ADMIN — OXYCODONE HYDROCHLORIDE 2.5 MG: 5 TABLET ORAL at 05:56

## 2021-11-29 RX ADMIN — NAPROXEN 250 MG: 250 TABLET ORAL at 08:52

## 2021-11-29 RX ADMIN — PANTOPRAZOLE SODIUM 40 MG: 40 TABLET, DELAYED RELEASE ORAL at 08:51

## 2021-11-29 RX ADMIN — OXYCODONE HYDROCHLORIDE 2.5 MG: 5 TABLET ORAL at 13:44

## 2021-11-29 RX ADMIN — STANDARDIZED SENNA CONCENTRATE 8.6 MG: 8.6 TABLET ORAL at 08:51

## 2021-11-29 RX ADMIN — NAPROXEN 250 MG: 250 TABLET ORAL at 16:04

## 2021-11-29 RX ADMIN — ACETAMINOPHEN 650 MG: 325 TABLET, FILM COATED ORAL at 13:41

## 2021-11-29 RX ADMIN — OXYCODONE HYDROCHLORIDE 2.5 MG: 5 TABLET ORAL at 17:24

## 2021-11-29 NOTE — DISCHARGE INSTRUCTIONS
[facility] nurse will be contacting you tomorrow to set up a hospital follow up appointment. If you do not hear from them please call them at [phone].

## 2021-11-29 NOTE — PROGRESS NOTES
Assessment completed by face to face visit with Shannon.    LOC: alert & oriented x 4. Pleasant and interactive with conversation.     Dx: fracture of sacrum  Chronic Disease Management: CHF, A-fib, polymyalgia rheumatic, osteopenia    Lives with: alone  Living at:  Home in [place]  Transportation: YES Drives self    Primary PCP: Gilda Mcgregor  Insurance:  Medicare/BCBS  Medicare SUBRAMANIAN letter reviewed with Shannon.    Support System:  Many friends involved and supportive  Homecare/PCA: No  /County Services:   No  : NO     Health Care Directive: On file with her PCP's office.   Guardian: No  POA: No    Pharmacy: [facility]  Meds management: Independent    Adequate Resources for needs (housing, utilities, food/med): YES  Household chores: Independent  Work/community/social activity: YES Retired from teaching. Community and social activity as needed.     ADLs: Independent  Ambulation:Independent  Falls: The fall that resulted in this hospital stay is the only fall in 3 years.   Nutrition: No nutrition concerns reported.  Sleep: No sleep concerns reported.    Equipment used: None, but she has a lift chair and a walker at home, if she needs them.      Oxygen supplier: N/A      Does the supplier have valid oxygen orders: N/A    Mental health: No mental health concerns reported.   Substance abuse: Denies  Exposure to violence/abuse: Denies  Stressors: Denies    Able to Return to Prior Living Arrangements: YES    Choice of Vendor: N/A    Barriers: None noted at this time.     ENRIQUE: Low    Plan: Anticipate discharge to home when medically ready.

## 2021-11-29 NOTE — PROGRESS NOTES
Inpatient Physical Therapy Evaluation    Name: Shannon Walls MRN# [id number]   Age: 88 year old YOB: 1933     Date of Consultation: 2021  Consultation is requested by:  Dr. Duggan  Specific Consultation Request:  Eval and Treat, Discharge facilitation  Primary care provider: Gilda Mcgregor    General Information:   Onset Date: 2021     History of Current Problem/Admission: Pathological fracture [M84.40XA]  Fracture of sacrum (H) [S32.10XA]    Prior Level of Function: Independent  Ambulation: 0 - Independent   Transferrin - Independent   Toiletin - Independent    Bathin - Independent   Dressin - Independent   Eatin - Independent   Communication: 0 - Understands/communicates without difficulty  Swallowing:   Cognition: 0 - no cognitive issues reported    Fall history within the last 6 months: Yes    Current Living Situation: Patient has 2 stairs to enter the home. Patient has a railing on R side.     Current Equipment Used at Home: none, does have FWW     Patient & Family Goals: To return home with help, and get Home Health PT     Past Medical History:   No past medical history on file.    Past Surgical History:  Past Surgical History:   Procedure Laterality Date     EYE SURGERY       OPEN REDUCTION INTERNAL FIXATION WRIST Right 2018    Procedure: OPEN REDUCTION INTERNAL FIXATION WRIST;  OPEN REDUCTION INTERNAL FIXATION RIGHT DISTAL RADIUS;  Surgeon: Taqueria Edwards MD;  Location: [facility]       Medications:   Current Facility-Administered Medications   Medication     acetaminophen (TYLENOL) tablet 650 mg     digoxin (LANOXIN) tablet 125 mcg     furosemide (LASIX) tablet 20 mg     gabapentin (NEURONTIN) capsule 200 mg     LORazepam (ATIVAN) tablet 0.5 mg     melatonin tablet 3 mg     naproxen (NAPROSYN) tablet 250 mg     ondansetron (ZOFRAN-ODT) ODT tab 4 mg    Or     ondansetron (ZOFRAN) injection 4 mg     oxyCODONE IR (ROXICODONE) half-tab 2.5 mg      pantoprazole (PROTONIX) EC tablet 40 mg     predniSONE (DELTASONE) tablet 5 mg     sennosides (SENOKOT) tablet 8.6 mg       Weight Bearing Status: WBAT     Cognitive Status Examination:  Orientation: awake and alert  Level of Consciousness: alert  Follows Commands and Answers Questions: 100% of the time  Personal Safety and Judgement: Intact  Memory: Immediate recall intact  Comments:     Pain:   Currently in pain? Yes  Pain Location? right SI joint  Pain Ratin    Physical Therapy Evaluation:   Integumentary/Edema: NT  ROM: WFL  Strength: WFL  Bed Mobility: NT  Transfers: SBA  Gait: SBA  Stairs: SBA  Balance: Fair  Sensory: Intact  Coordination: Intact    Physical Therapy Interventions: Gait Training , Strengthening and Progressive Activity/Exercise     Clinical Impressions:  Criteria for Skilled Therapeutic Intervention Met: Yes, treatment indicated  PT Diagnosis: Impaired Gait   Influenced by the following impairments: Impaired gait, low back pain, decreased ambulation  Functional limitations due to impairment: walking, self care, sitting, transfers  Clinical presentation: Stable/Uncomplicated  Clinical presentation rationale: therapist discretion  Clinical Decision making (complexity): Low Complexity  Frequency: 6 times/week  Predicted Duration of Therapy Intervention (days/wks): 5 days  Anticipated Discharge Disposition: Home with Home Therapy  Anticipated Equipment Needs at Discharge:   Risks and Benefits of therapy have been explained: Yes  Patient, Family & other staff in agreement with plan of care: Yes  Clinical Impression Comments: Patient showing improvement this afternoon, able to ambulate distances up to 220' and ambulate up to 6 stairs w/ rest breaks w/ SBA.  Patient has significant assistance at home, and likely will do well if she can continue to have help w/ transfers and self cares.  Would benefit from having a commode to reduce level of assistace, and home health PT to start working on gait  and LE strengthening to improve healing time.     Total Eval Time: 10

## 2021-11-29 NOTE — PROGRESS NOTES
Name: Shannon Walls    MRN#: [id number]    Reason for Hospitalization: Pathological fracture [M84.40XA]  Fracture of sacrum (H) [S32.10XA]    ENRIQUE: Low    Discharge Date: 11/29/2021    Patient / Family response to discharge plan: Patient is agreeable to discharge home today. She has a walker, wheelchair and commode to use at home and she reports that she has many friends who will take turns being at home with her, if she needs assistance. Her friend, Merlyn, will be transporting her home.     Follow-Up Appt:   Future Appointments   Date Time Provider Department Center   11/30/2021 10:00 AM Celeste Pyle, PT [facility]       Other Providers (Care Coordinator, County Services, PCA services etc): No    Discharge Disposition: home    Lena Leiva CM

## 2021-11-29 NOTE — PLAN OF CARE
"/89 (BP Location: Left arm, Patient Position: Chair)   Pulse 111   Temp 98  F (36.7  C) (Tympanic)   Resp 16   Ht 1.575 m (5' 2\")   Wt 59 kg (130 lb 1.1 oz)   SpO2 93%   BMI 23.79 kg/m       Pt is A&O, makes needs known. BP soft, pt asymptomatic. Afebrile. Pt denies pain at rest, reports severe pain with any type of activity. Scheduled tylenol and PRN oxycodone given this shift. On RA, O2 sats mid 90's. LS clear. HR irregular. BS active. Assist of 1 gait belt & walker. IV SL. Friend here to visit this afternoon, concerns from patient and friend about outpatient status- case management in to see patient and friend.     Face to face report given with opportunity to observe patient.    Report given to LIZBET Morris RN   11/29/2021  3:28 PM    "

## 2021-11-29 NOTE — PROGRESS NOTES
11/29/21 1600   Signing Clinician's Name / Credentials   Signing clinician's name / credentials Chasity Joyce, OTR/L   Quick Adds   Rehab Discipline OT   Therapeutic Activity   Minutes of Treatment 23 minutes   Symptoms Noted During/After Treatment Increased pain   Treatment Detail Pt transferred off the toilet with CGA using the grab bar on the L (simulated home set up as she has a grab bar on the R). She ambulated to the sink using FWW with CGA. Pt stood at the sink with SBA and washed her hands. Pt returned to the chair with CGA. DC plans were discussed. Pt indicated she feels comfortable returning home and has lots of friend support. Pt informed that she did well from an OT perspective but she would likely have to trial stairs with PT. Pt was left resting in the chair with the call light within reach.   OT Discharge Planning    OT Discharge Recommendation (DC Rec) home with home care occupational therapy;Home with assist   OT Rationale for DC Rec Pt presents with mild deficits in ADLs due to pain, decreased balance, and decreased activity tolerance. Pt was still able to participate in ADLs today with SBA-CGA with 6/10 pain. She states she has very good support from friends, and can have someone stay with her at home. Pt does have 2 steps to enter her home, and then the bedroom and bathroom are on the main floor. Pt may be able to return home with assist and home therapy pending PT evaluation. Plan to treat pt during her acute care stay to progress her safety and independence in ADLs and continue to assist with discharge planning.   OT Brief overview of current status  SBA to CGA   Additional Documentation   OT Plan Progress safety and independence in ADLs as able   Total Session Time   Total Session Time (minutes) 23 minutes

## 2021-11-29 NOTE — PLAN OF CARE
Took over care of this pt at 1730. A&O. Did take nap while in chair. BP in chair: 77/55. Denied dizziness. Assisted to BSC and then to bed. BP when lying flat: 113/72. Transfers with assist 1-2. Moves slowly. Does not holler out with pain when up, tho that is when pain most severe. Med with oxycodone and ativan at HS. Oral cares done. Dentures out for night.  Uses call light approp. Free of falls/injuries this shift.     Face to face report given with opportunity to observe patient.    Report given to Fortunato Taylor RN   11/28/2021  11:08 PM

## 2021-11-29 NOTE — PROGRESS NOTES
Nursing noticed higher AFIB rates, so tried IV Metoprolol, which seemed to show promise. Started PO Metoprolol for this am    Desaturation noticed overnight, so ordered oxygen

## 2021-11-29 NOTE — PLAN OF CARE
"BP 91/72 (BP Location: Right arm)   Pulse 98   Temp 97.2  F (36.2  C) (Tympanic)   Resp 16   Ht 1.575 m (5' 2\")   Wt 59 kg (130 lb 1.1 oz)   SpO2 96%   BMI 23.79 kg/m      Pt A&O, able to make needs known. Pt denied pain at rest but does report pain with activity, PRN oxycodone given for pain management. Pt placed on 1L of oxygen due to dipping to 89% on RA. Apical pulse irregular, tachycardic 110s-120s, IV metoprolol given HR 90s-100s after this. Low BPs this shift. Assist of one with walker and gait belt.     Face to face report given with opportunity to observe patient.    Report given to Polina Coon RN   11/29/2021  7:34 AM  3  "

## 2021-11-29 NOTE — PROGRESS NOTES
Inpatient Occupational Therapy Evaluation    Name: Shannon Walls MRN# [id number]   Age: 88 year old YOB: 1933     Date of Consultation: 2021  Consultation is requested by: Dr. Duggan  Specific Consultation Request: self-care deficits/confusion; discharge recommendations and therapy to facilitate a safe discharge  Primary care provider: Gilda Mcgregor    General Information:   Onset Date: 2021    History of Current Problem/Admission: Pathological fracture [M84.40XA]  Fracture of sacrum (H) [S32.10XA]    Prior Level of Function: Pt completely independent in ADLs prior to admission and did not use an assistive device.   Ambulation: 0 - Independent   Transferrin - Independent   Toiletin - Assistive Equipment    Bathin - Assistive Equipment   Dressin - Independent   Eatin - Independent   Communication: 0 - Understands/communicates without difficulty  Swallowin - swallows foods/liquids without difficulty  Cognition: 0 - no cognitive issues reported    Fall history within the last 6 months: Yes - just the one that resulted in hospitalization     Current Living Situation: Pt lives alone in a house with 2 steps to enter home and13 steps to the basement. Bedroom and bathroom are on the main floor. Bathroom has grab bars on the R of the higher toilet and near the tub. Pt uses just a tub; she cannot use her shower/tub combo.     Current Equipment Used at Home: grab bars in bathroom     Patient & Family Goals: return home with assist of friends    Past Medical History:   No past medical history on file.    Past Surgical History:  Past Surgical History:   Procedure Laterality Date     EYE SURGERY       OPEN REDUCTION INTERNAL FIXATION WRIST Right 2018    Procedure: OPEN REDUCTION INTERNAL FIXATION WRIST;  OPEN REDUCTION INTERNAL FIXATION RIGHT DISTAL RADIUS;  Surgeon: Taqueria Edwards MD;  Location: [facility]       Medications:   Current Facility-Administered  Medications   Medication     acetaminophen (TYLENOL) tablet 650 mg     digoxin (LANOXIN) tablet 125 mcg     furosemide (LASIX) tablet 20 mg     gabapentin (NEURONTIN) capsule 200 mg     LORazepam (ATIVAN) tablet 0.5 mg     melatonin tablet 3 mg     naproxen (NAPROSYN) tablet 250 mg     ondansetron (ZOFRAN-ODT) ODT tab 4 mg    Or     ondansetron (ZOFRAN) injection 4 mg     oxyCODONE IR (ROXICODONE) half-tab 2.5 mg     pantoprazole (PROTONIX) EC tablet 40 mg     predniSONE (DELTASONE) tablet 5 mg     sennosides (SENOKOT) tablet 8.6 mg       Weight Bearing Status: WBAT     Cognitive Status Examination:  Orientation: oriented to time, place and person  Level of Consciousness: alert  Follows Commands and Answers Questions: 100% of the time  Personal Safety and Judgement: Intact  Memory: Immediate recall intact and Long-term memory intact  Comments: no cognitive concerns    Pain:   Currently in pain? None at rest, increased to 6/10 with activity   Pain Location? R groin/thigh region  Pain Ratin    Occupational Therapy Evaluation:   Integumentary/Edema: NT  Range of Motion: BUEs WFL   Strength: BUEs WFL  Muscle Tone Assessment: No issues observed  Coordination: Normal    Mobility:   Transfer Skills: CGA-SBA sit<>stand from the chair  Bed to Chair/Chair to Bed: NT as pt was already up in the chair upon OT arrival  Toilet Transfer: CGA on/off the toilet   Balance: Fair with support     ADLs:  Lower Body Dressing: SBA to doff/don socks in sitting  Toileting: Independent with con-cares  Grooming: SBA standing at the sink    IADLs:   Previous Responsibilities of the Patient: Meal Prep, Housekeeping, Laundry, Shopping, Medication Management, Finances  and Driving   Comments: Pt receives assist with yard work (does weed wacking in the summer) otherwise completes all IADLs herself      Activities of Daily Living Analysis:   Impairments Contributing to Impaired Activities of Daily Living: Balance impaired , pain and activity  tolerance decreased    Occupational Therapy Interventions: ADL Retraining , IADL retraining, strengthening , home program guidelines , progressive activity/exercise and risk factor education     Clinical Impressions:  Criteria for Skilled Therapeutic Intervention Met: Yes, treatment indicated  OT Diagnosis: Impaired ADLs  Influenced by the following impairments: pain, decreased balance, decreased activity tolerance  Functional limitations due to impairment: decreased tolerance for ADLs and IADLs  Clinical presentation: Stable/Uncomplicated  Clinical presentation rationale: clinical judgement  Clinical Decision making (complexity): Moderate Complexity  Frequency: 5 times/week  Predicted Duration of Therapy Intervention (days/wks): 5 days    Anticipated Discharge Disposition: Home with Assist, Home with Home Therapy  Anticipated Equipment Needs at Discharge:   Risks and Benefits of therapy have been explained: Yes  Patient, Family & other staff in agreement with plan of care: Yes  Clinical Impression Comments: Pt presents with mild deficits in ADLs due to pain, decreased balance, and decreased activity tolerance. Pt was still able to participate in ADLs today with SBA-CGA with 6/10 pain. She states she has very good support from friends, and can have someone stay with her at home. Pt does have 2 steps to enter her home, and then the bedroom and bathroom are on the main floor. Pt may be able to return home with assist and home therapy pending PT evaluation. Plan to treat pt during her acute care stay to progress her safety and independence in ADLs and continue to assist with discharge planning.     Total Eval Time: 20

## 2021-11-29 NOTE — PLAN OF CARE
Patient discharged at 5:31 PM via wheel chair accompanied by family friend and staff. Prescriptions sent to patients preferred pharmacy. All belongings sent with patient.     Discharge instructions reviewed with Patient. Listed belongings gathered and returned to patient.     Patient discharged to Home.   Report called to n/a    Surgical Patient   Surgical Procedures during stay: no  Did patient receive discharge instruction on wound care and recognition of infection symptoms? No    MISC  Follow up appointment made:  No - clinic will call pt   Home medications returned to patient: Yes  Patient reports pain was well managed at discharge: Yes

## 2021-11-29 NOTE — PROGRESS NOTES
[facility]    Hospitalist Progress Note      Assessment & Plan   Shannon Walls is a 88 year old female who was admitted on 11/27/2021.         1.  Status post fall: Patient was not trying to get in the car and slipped on the snow falling on her right side.  Friend helped her get up to try to get up a couple of stairs to get into her house were unable to do so due to pain in her right groin therefore she came to the ER for evaluation.  Imaging in the ER showed sort of an age indeterminate L4 fracture and sacral insufficiency fractures.  No evidence of acute right hip fracture.  At rest she has absolutely no pain.  Getting up she has some discomfort although today with nurse help she was able to stand up using a walker and walk to the door and back.  Does have the right groin pain.  So we will have physical therapy see the patient tomorrow and assess whether or not she is safe to return home or if she requires temporary structured nursing facility stay.  I am somewhat favoring that at this point just given the fact that she does live alone.     He was seen by physical therapy.  She did ambulate quite well they feel that she should actually do fine at home.  She will get home physical therapy.  She has friends will come check on her.  They are getting a commode for her also.      2.  History of chronic A. fib on aspirin as a stroke prophylaxis agent.  Rates up a little bit after she got up walking around.  But otherwise she has done well.  Arm keeping her just on the Lanoxin only she has had issues with bradycardia and hypotension in the past we will leave her well enough alone.  This is her usual outpatient regimen.     Diet: Regular Diet Adult  Fluids: none    DVT Prophylaxis: Pneumatic Compression Devices  Code Status: No CPR- Do NOT Intubate    Disposition: Expected discharge today   Markel Ricardo    Interval History   Overnight her heart rate was up a little bit they did give her some  metoprolol.  Did not start the oral dosing.  Otherwise she is in no real distress.  Ambulated quite a distance with physical therapy.  Feels she is safe to go home.    -Data reviewed today: I reviewed all new labs and imaging results over the last 24 hours. I personally reviewed no images or EKG's today.    Physical Exam   Temp: 97.1  F (36.2  C) Temp src: Tympanic BP: 90/60 Pulse: 80   Resp: 16 SpO2: 96 % O2 Device: Nasal cannula Oxygen Delivery: 1 LPM  Vitals:    11/28/21 0050   Weight: 59 kg (130 lb 1.1 oz)     Vital Signs with Ranges  Temp:  [97.1  F (36.2  C)-98.1  F (36.7  C)] 97.1  F (36.2  C)  Pulse:  [] 80  Resp:  [16] 16  BP: ()/(55-73) 90/60  SpO2:  [89 %-96 %] 96 %  I/O last 3 completed shifts:  In: -   Out: 200 [Urine:200]    Peripheral IV 11/27/21 Left Upper forearm (Active)   Site Assessment WDL 11/29/21 1100   Line Status Saline locked 11/29/21 1100   Phlebitis Scale 0-->no symptoms 11/29/21 1100   Infiltration Scale 0 11/29/21 1100   Number of days: 2       Incision/Surgical Site 08/22/18 Right Wrist (Active)   Number of days: 1195     No line/device    Constitutional:  Alert and oriented x3. No distress    HEENT: Normocephalic/atraumatic, PERRL, EOMI, mouth moist, neck supple, no LN.     Cardiovascular:IrregRRR. no Murmur, no  rubs, or gallops.  JVD no.  Bruits no.  Pulses 2+    Respiratory: Clear to auscultation bilaterally    Abdomen: Soft, nontender, nondistended, no organomegaly. Bowel sounds present    Extremities: Warm/dry. No edema    Neuro:   Non focal, cranial nerves intact, Moves all extremities.  Medications       acetaminophen  650 mg Oral 4x Daily     digoxin  125 mcg Oral Daily     furosemide  20 mg Oral Daily     gabapentin  200 mg Oral TID     naproxen  250 mg Oral BID w/meals     pantoprazole  40 mg Oral QAM     predniSONE  5 mg Oral Daily     sennosides  8.6 mg Oral BID       Data   Recent Labs   Lab 11/28/21  0725 11/27/21  9971   WBC 9.1 13.5*   HGB 14.1 14.4   MCV  97 96    260   INR  --  1.11    142   POTASSIUM 3.6 3.8   CHLORIDE 106 106   CO2 30 31   BUN 20 19   CR 1.18* 1.08*   ANIONGAP 5 5   EFFIE 8.9 9.2   GLC 96 129*   ALBUMIN  --  3.2*   PROTTOTAL  --  6.3*   BILITOTAL  --  0.6   ALKPHOS  --  57   ALT  --  29   AST  --  20       No results found for this or any previous visit (from the past 24 hour(s)).

## 2021-11-29 NOTE — PROVIDER NOTIFICATION
Dr. Ricardo notified of morning BP 90/60, HR irregular 60-80s. BID 25mg PO metoprolol discontinued.

## 2021-11-29 NOTE — PLAN OF CARE
Prior to Admission Medication Reconciliation:     Medications added:   [] None  [x] As listed below:    Asa 81 mg    Medications deleted:   [x] None  [] As listed below:      Medications marked for review/removal by attending:  [x] None  [] As listed below:    Changes made to existing medications:   [] None  [x] Updated strengths and frequencies to most current  [x] As listed below:    Digoxin from 125 mcg (ordered) daily to 62.5 mcg daily    Lorazepam from 1/2-1 tab q6h prn to 1 tab at bedtime     caltrate from BID to daily    Last times/dates taken verified with patient:  [x] Yes- completed myself  [] Prepared PTA medlist for review only. (will not be available to review personally)  [] Did not review with patient. Rx verification only. Review completed by nursing.    [] Nurse completed no changes made (double checked entries)  [] Unable to review with patient at this time:  [x] Nurse completed/changes made:     Pt reports to me that she took her PTA meds Friday, nurse input Saturday, pt may have days mixed up    Allergies listed at another location:  []Allergies match allergies listed in Epic  [x]Other allergies listed: see below    Allergy review:    []Did not review: reviewed by nursing  []Did not review: pt unable at this time  []Patient/MAR verified NKDA  []Patient/MAR verified current existing allergies: no changes made  [x]Patient confirmed current existing allergies and new allergies added:    Levothyroxine    Nitrogen- pt unsure about this allergy    Medication reconciliation sources:   [x]Patient  []Patient family member/emergency contact: **  [x][facility] Report Review:  Home Medications     - Last Reconciled 07/13/21 by Lupe Gleason, Med Assist    [x]cholecalciferol (vitamin D3) 10 mcg (400 unit) tablet 400 units PO DAILY   [x]digoxin 125 mcg (0.125 mg) tablet 62.5 mcg (1/2 x 125 mcg (0.125 mg)) PO DAILY   [x]furosemide 20 mg tablet (Lasix) 20 mg PO DAILY   [x]lorazepam 0.5 mg tablet 0.5 mg PO  BEDTIME   [x]multivitamin-minerals-lutein tablet  1 tab PO DAILY   [x]omeprazole 20 mg capsule,delayed release 20 mg PO BID   [x]prednisone 10 mg tablet 5 mg (1/2 x 10 mg) PO DAILY     Allergies    levothyroxine Adverse Reaction (Intermediate, Verified 03/31/21 09:21)   Swelling in feet, SOB  liquid nitrogen Adverse Reaction (Intermediate, Uncoded 02/03/21 14:09)   Blisters, swelling at sight  []Epic Chart Review  []Care Everywhere review  []Pharmacy med list: **  []Pharmacy phone call  [x]Outside meds dispense report: see below  []Nursing home or Assisted Living MAR:  []Other: **    Pharmacy desired at discharge: [facility]    Is patient on coumadin?  [x]No    Requests for consultation by provider or pharmacist:   [x] Patient understands why all of their meds were prescribed and how to take them. No questions.   [] Managing party has no questions.   [] Patient/ managing party has questions about the following:  [] Did not review with patient. Cannot assess.     Fill dates and reported compliancy:  [x] Fill dates coincide with reported compliancy for all/most maintenance meds.   [] Fill dates do not coincide with compliancy with maintenance meds. See notes in PTA medlist and in comments.    [] Fill dates do not coincide with the following medications but pt reports compliancy:  [] Did not review with patient. Cannot assess.     Historian accuracy:  [] Excellent- alert and oriented, understands why meds were prescribed and how to take, able to answer specifics  [x] Good- alert and oriented, understands why meds were prescribed and how to take, some confusion   [] Fair- alert and oriented, doesn't know medications without list, cannot answer specifics about medications, but has a decent process for which to take at home  [] Poor- does not know medications, may not have a process to take at home, may be cognitively unable to review at this time  []Medication management done by family member or facility, no concerns about  historian accuracy.   [] Did not review with patient. Cannot assess.     Medication Management:  [x] Manages meds independently  [] Family member/ other party manages meds/assists:  [] Meds managed by staff at facility  [] Meds set up by home care, family/other party helps administer  [] Meds set up by home care, self administers  [] Did not review with patient. Cannot assess.     Other medications aside from PTA:  [x] Denies taking any medications aside from those listed in PTA meds  [] Reports taking another medication(s) but cannot recall the name(s)  [] Refuses to say.  [] Did not review with patient. Cannot assess.     Comments: none    Alexandra Anderson on 11/29/2021 at 7:32 AM       Discrepancies: [x] No []Not Applicable [x]Yes: listed below    Issues completing PTA medication reconciliation:  [] On hold for a long time  [] Waited for a call back  [] Fax didn't come through  [] Fax took a long time  [] Other:    Notifying appropriate party of changes/additions/discrepancies:  []No pertinent changes made, notification not necessary.   [x] Notified attending provider via text page/phone call  [] Notified attending provider in person  [] Notified pharmacy  [] Notified nurse  [] Medications have not been reconciled by a provider yet, notification not necessary  [] Pt is not admitted to floor yet, PTA meds completed before admission.     Medications Prior to Admission   Medication Sig Dispense Refill Last Dose     aspirin 81 MG EC tablet Take 81 mg by mouth daily   11/26/2021 at AM     calcium carbonate (CALTRATE 600) 1500 (600 Ca) MG tablet Take 600 mg by mouth 2 times daily    11/27/2021 at Unknown time     digoxin (LANOXIN) 125 MCG tablet Take 62.5 mcg by mouth daily    11/27/2021 at Unknown time     furosemide (LASIX) 20 MG tablet Take 20 mg by mouth daily   11/27/2021 at Unknown time     LORazepam (ATIVAN) 0.5 MG tablet Take 0.5 mg by mouth At Bedtime    Past Week at Unknown time     omeprazole (PRILOSEC) 20 MG   capsule Take 20 mg by mouth 2 times daily (before meals)    11/27/2021 at Unknown time     predniSONE (DELTASONE) 10 MG tablet Take 5 mg by mouth daily    11/27/2021 at Unknown time     Vitamin D, Cholecalciferol, 10 MCG (400 UNIT) TABS Take 1 tablet by mouth daily              Medication Dispense History (from 11/29/2020 to 11/27/2021)  Expand All  Collapse All    Cephalexin     Dispensed Days Supply Quantity Provider Pharmacy   CEPHALEXIN 500 MG CAPSULE [facility]13/2021 [facility]   CEPHALEXIN 500 MG CAPSULE 06/16/2021 7 [facility] [place]   CEPHALEXIN 500MG CAP 12/23/2020 5 10 capsule RYAN CASTILLO [facility] [place]           Clindamycin HCl     Dispensed Days Supply Quantity Provider Pharmacy   CLINDAMYCIN  MG CAPSULE 07/22/2021 7 [facility] [place]   CLINDAMYCIN  MG CAPSULE 06/28/2021 7 [facility] [place]           Digoxin     Dispensed Days Supply Quantity Provider Pharmacy   DIGOXIN 0.125 MG TABLET 10/04/2021 60 30 Units [facility] VIEIRA [facility] [place]   DIGOXIN 0.125 MG TABLET 07/28/2021 30 30 Units [facility] [place]   DIGOXIN 0.125 MG TABLET 06/16/2021 30 30 Units [facility]IE[facility] [place]   DIGOXIN 0.125 MG TABLET 05/04/2021 30 30 Units [facility] [place]           Furosemide     Dispensed Days Supply Quantity Provider Pharmacy   FUROSEMIDE 40 MG TABLET 05/04/2021 90 90 Units [facility] [place]   FUROSEMIDE 20MG TAB 04/26/2021 90 90 tablet [facility] [place]   FUROSEMIDE 20MG TAB 01/25/2021 90 90 [facility] [place]           LORazepam     Dispensed Days Supply Quantity Provider Pharmacy   LORAZEPAM 0.5 MG TABLET 10/18/2021 60 60 Units [facility] [place]   LORAZEPAM  0.5 MG TABLET 08/23/2021 60 60 Units [facility] [place]   LORAZEPAM 0.5 MG TABLET 06/16/2021 60 60 Units [facility] [place]   LORAZEPAM 0.5MG TAB 04/26/2021 60 60 tablet [facility] [place]   LORAZEPAM 0.5MG TAB 02/23/2021 60 60 tablet [facility] [place]   LORAZEPAM 0.5MG TAB 12/24/2020 60 60 tablet [facility] [place]           Levothyroxine Sodium     Dispensed Days Supply Quantity Provider Pharmacy   LEVOTHYROXINE SODIUM 50MCG TAB 01/27/2021 90 90 tablet [facility] ...           Omeprazole     Dispensed Days Supply Quantity Provider Pharmacy   OMEPRAZOLE DR 20 MG CAPSULE 10/04/2021 90 180 Units [facility] [place]   OMEPRAZOLE DR 20 MG CAPSULE 07/05/2021 90 180 Units [facility] [place]   OMEPRAZOLE 20MG CAP 04/26/2021 90 90 capsule [facility] [place]   OMEPRAZOLE 20MG CAP 01/25/2021 90 90 capsule [facility] [place]           Thyroid     Dispensed Days Supply Quantity Provider Pharmacy   NP THYROID 30 30MG TAB 04/01/2021 90 90 tablet [facility] [place]           predniSONE     Dispensed Days Supply Quantity Provider Pharmacy   PREDNISONE 10 MG TABLET 10/14/2021 90 [facility] [place]   PREDNISONE 10 MG TABLET 07/06/2021 90 45 Units [facility] [place]   PREDNISONE 10MG TAB 04/26/2021 90 45 tablet [facility] [place]   PREDNISONE 10MG TAB 01/25/2021 90 45 tablet [facility] [place]           Disclaimer    Certain dispenses may not be available or accurate in this report, including over-the-counter medications, low cost prescriptions, prescriptions paid for by the patient or non-participating sources, or errors in insurance claims  information. The provider should independently verify medication history with the patient.     External Sources

## 2021-11-29 NOTE — PROVIDER NOTIFICATION
Pt's HR ranging from 110s-120s, apical pulse irregular, BP 93/57.  Orders for Metoprolol 5 mg IV once.

## 2021-11-30 ENCOUNTER — TELEPHONE (OUTPATIENT)
Dept: CASE MANAGEMENT | Facility: HOSPITAL | Age: 86
End: 2021-11-30
Payer: MEDICARE

## 2021-11-30 NOTE — TELEPHONE ENCOUNTER
.Shannon Walls, was discharged to home on 11/29/21 from [facility]. I spoke today with her regarding the patient's discharge.   She indicates she has receive(d) sufficient information upon discharge. Medications were reviewed in full on discharge, including: Medications to be started; medications to be stopped; medications to be continued from preadmission and any side effects. Prescriptions were received at discharge and were able to be filled. Medications are being taken as prescribed.   She indicates they have an appointment scheduled for 12/1/21 and have transportation available. They are able to confirm their appointment time and have it written down.   She did not have any questions regarding discharge instructions or condition.  Per their request, the following employee (s) can be recognized for their outstanding services delivered:  Everybody was just great  Suggestions to improve service: None  She was informed they may receive a survey in the mail from the hospital. Asked if they would kindly complete the survey in order for [facility] to know if services fully met patient needs.    Zoey Sandhu RN on 11/30/2021 at 1:22 PM

## 2021-11-30 NOTE — PLAN OF CARE
Occupational Therapy Discharge Summary    Reason for therapy discharge:    Discharged to home with home PT.    Progress towards therapy goal(s). See goals on Care Plan in [facility] electronic health record for goal details.  Goals partially met.  Barriers to achieving goals:   discharge on same date as initial evaluation.    Therapy recommendation(s):    Continued therapy is recommended.  Rationale/Recommendations:  see OT evaluation from earlier today.

## 2022-01-02 NOTE — DISCHARGE SUMMARY
[facility]  Hospitalist Discharge Summary      Date of Admission:  11/27/2021  Date of Discharge:  11/29/2021  6:04 PM  Discharging Provider: Markel Ricardo MD      Discharge Diagnoses      Sacral fracture  Pathological fracture L4 vertebrae  Status post fall  Permanent atrial fibrillation    Follow-ups Needed After Discharge   Follow-up Appointments     Follow-up and recommended labs and tests       Follow up with primary care provider, Gilda Mcgregor, within 7 days   for hospital follow- up.  No follow up labs or test are needed.         None    Unresulted Labs Ordered in the Past 30 Days of this Admission     No orders found from 10/28/2021 to 11/28/2021.      These results will be followed up by not needed    Discharge Disposition   Discharged to home  Condition at discharge: Stable      Hospital Course   Patient is an 88-year-old female who was admitted on 1127.  She was trying to get into her car and slipped on snow falling onto her right side.  A friend was with her helped her get up and try to get up a couple of stairs into her home but was unable to do so due to the pain in her right groin therefore she was brought to the ER for evaluation.  She had imaging in the ER which showed an age indeterminate L4 fracture and sacral insufficiency fractures.  There was no evidence of any acute hip fracture.  At rest she really had no pain at all.  But trying to get up she had a fair amount of discomfort and was admitted to the hospital for further work-up and evaluation.    The patient had no discomfort as a said just sitting in bed.  As time went on she was able to stand up with a walker and walk back to the door and back and did have some right groin pain.  She was given some pain medications.  She was subsequently seen by physical therapy and Occupational Therapy on the day of her discharge 11/29.  They both felt that she actually did relatively well and that should be able to return home.  She  was able to do several stairs and walk a couple 100 feet with a walker.  She felt comfortable with it also.  And at this point she was decided to be discharged home.  Some pain medication.  She does have friends who will help her.  Also will follow up with Dr. Tegan Lee within the next week.    Patient also has a history of A. fib.  She is on stroke prophylaxis with aspirin.  Rate was up a little bit when she got up walking around but otherwise basically stable.      Consultations This Hospital Stay   PHYSICAL THERAPY ADULT IP CONSULT  OCCUPATIONAL THERAPY ADULT IP CONSULT    Code Status   Prior    Time Spent on this Encounter   I, Markel Ricardo MD, personally saw the patient today and spent greater than 30 minutes discharging this patient.       Markel Ricardo MD  [address]  [address] [place] [id number]  Phone: [phone]  Fax: [phone]  ______________________________________________________________________    Physical Exam   Vital Signs:                    Weight: 130 lbs 1.14 oz  Constitutional: awake, alert, cooperative, no apparent distress, and appears stated age  Respiratory: No increased work of breathing, good air exchange, clear to auscultation bilaterally, no crackles or wheezing  Cardiovascular: Irregularly irregular rhythm no murmurs rubs or gallops pulses 2+ no jugular venous distention.  GI: No scars, normal bowel sounds, soft, non-distended, non-tender, no masses palpated, no hepatosplenomegally  Musculoskeletal: There is no redness, warmth, or swelling of the joints.  Full range of motion noted.  Motor strength is 5 out of 5 all extremities bilaterally.  Tone is normal.       Primary Care Physician   Gilda Mcgregor    Discharge Orders      Home Care PT Referral for Hospital Discharge      Reason for your hospital stay    Patient suffered of fall getting into her car.  Landing on her right side.  Had right groin pain.  Evaluation showed no hip fracture  and does have probably a sacral insufficiency fracture and unclear or undetermined age of an L4 fracture also     Follow-up and recommended labs and tests     Follow up with primary care provider, Gilda Mcgregor, within 7 days for hospital follow- up.  No follow up labs or test are needed.     Activity    Your activity upon discharge: activity as tolerated with walker only     MD face to face encounter    Documentation of Face to Face and Certification for Home Health Services    I certify that patient: Shannon Walls is under my care and that I, or a nurse practitioner or physician's assistant working with me, had a face-to-face encounter that meets the physician face-to-face encounter requirements with this patient on: November 29, 2021.    This encounter with the patient was in whole, or in part, for the following medical condition, which is the primary reason for home health care: Status post fall with sacral insufficiency fracture.    I certify that, based on my findings, the following services are medically necessary home health services: Physical Therapy.    My clinical findings support the need for the above services because: Physical Therapy Services are needed to assess and treat the following functional impairments: Pain with ambulation.    Further, I certify that my clinical findings support that this patient is homebound (i.e. absences from home require considerable and taxing effort and are for medical reasons or [employer] services or infrequently or of short duration when for other reasons) because: Requires assistance of another person or specialized equipment to access medical services because patient: Is unable to exit home safely on own due to: Pain...    Based on the above findings. I certify that this patient is confined to the home and needs intermittent skilled nursing care, physical therapy and/or speech therapy.  The patient is under my care, and I have initiated the establishment  of the plan of care.  This patient will be followed by a physician who will periodically review the plan of care.  Physician/Provider to provide follow up care: Gilda Mcgregor    Attending hospital physician (the Medicare certified [place] provider): Markel Ricardo MD  Physician Signature: See electronic signature associated with these discharge orders.  Date: 11/29/2021     Diet    Follow this diet upon discharge: Orders Placed This Encounter      Regular Diet Adult       Significant Results and Procedures   Most Recent 3 CBC's:Recent Labs   Lab Test 11/28/21 0725 11/27/21 2259   WBC 9.1 13.5*   HGB 14.1 14.4   MCV 97 96    260     Most Recent 3 BMP's:Recent Labs   Lab Test 11/28/21 0725 11/27/21 2259 08/20/18  0000    142  --    POTASSIUM 3.6 3.8 3.9   CHLORIDE 106 106  --    CO2 30 31  --    BUN 20 19  --    CR 1.18* 1.08* 1.07   ANIONGAP 5 5  --    EFFIE 8.9 9.2  --    GLC 96 129* 82     Most Recent 2 LFT's:Recent Labs   Lab Test 11/27/21 2259 08/20/18  0000   AST 20 18   ALT 29 22   ALKPHOS 57  --    BILITOTAL 0.6  --      Most Recent TSH and T4:Recent Labs   Lab Test 08/20/18  0000   TSH 3.080     Most Recent Urinalysis:Recent Labs   Lab Test 11/28/21  0507   COLOR Light Yellow   APPEARANCE Clear   URINEGLC Negative   URINEBILI Negative   URINEKETONE Negative   SG 1.017   UBLD Negative   URINEPH 7.0   PROTEIN 30 *   NITRITE Negative   LEUKEST Negative   RBCU 2   WBCU 3     Most Recent ESR & CRP:No lab results found.,   Results for orders placed or performed during the hospital encounter of 11/27/21   XR Pelvis including Hip Right 2-3 Views    Narrative    XR PELVIS AND HIP RIGHT 2 VIEWS    HISTORY: fall, groin pain .    COMPARISON: None.    TECHNIQUE: AP pelvis, 2 views right hip.    FINDINGS:    Osteoporosis. No proximal femoral fracture. No anterior pelvic ring  fracture. Mildly limited assessment of the sacrum due to bowel gas.  Lumbar degenerative changes. Vascular calcifications.         Impression    IMPRESSION:     No evidence of acute fracture.      AKOSUA ROJO MD         SYSTEM ID:  [id number]   Lumbar spine CT w/o contrast    Narrative    CT LUMBAR SPINE W/O CONTRAST, CT HIP RIGHT W/O CONTRAST    HISTORY: Compression fracture, L-spine; please run through pelvis and  assure views of the right hip .    COMPARISON: None.    TECHNIQUE: Helical noncontrast CT images of the lumbar spine and right  hip.    FINDINGS:    Osteoporosis.    There is mild superior endplate height loss at L1, favored to be  chronic. There is moderate superior endplate height loss at L4, age  indeterminate.     There is lumbar levoscoliosis with degenerative facet hypertrophy and  multifocal foraminal narrowing.      Degenerative hypertrophy is present at both SI joints. There is slight  subchondral cortical lucency along the SI joints. Nondisplaced sacral  sufficiency fractures are questioned, age indeterminant.     Vascular calcifications are noted      Impression    IMPRESSION:     Age-indeterminate moderate L4 osteoporotic pathologic fracture.     Equivocal age indeterminant sacral insufficiency fractures.    Chronic mild L1 osteoporotic pathologic fracture.      No evidence of acute right hip fracture.    Correlate for focal tenderness. MR may be helpful to delineate  fracture age.    AKOSUA ROJO MD         SYSTEM ID:  [id number]   CT Hip Right w/o Contrast    Narrative    CT LUMBAR SPINE W/O CONTRAST, CT HIP RIGHT W/O CONTRAST    HISTORY: Compression fracture, L-spine; please run through pelvis and  assure views of the right hip .    COMPARISON: None.    TECHNIQUE: Helical noncontrast CT images of the lumbar spine and right  hip.    FINDINGS:    Osteoporosis.    There is mild superior endplate height loss at L1, favored to be  chronic. There is moderate superior endplate height loss at L4, age  indeterminate.     There is lumbar levoscoliosis with degenerative facet hypertrophy and  multifocal  foraminal narrowing.      Degenerative hypertrophy is present at both SI joints. There is slight  subchondral cortical lucency along the SI joints. Nondisplaced sacral  sufficiency fractures are questioned, age indeterminant.     Vascular calcifications are noted      Impression    IMPRESSION:     Age-indeterminate moderate L4 osteoporotic pathologic fracture.     Equivocal age indeterminant sacral insufficiency fractures.    Chronic mild L1 osteoporotic pathologic fracture.      No evidence of acute right hip fracture.    Correlate for focal tenderness. MR may be helpful to delineate  fracture age.    AKOSUA ROJO MD         SYSTEM ID:  [id number]       Discharge Medications   Discharge Medication List as of 11/29/2021  3:52 PM      START taking these medications    Details   oxyCODONE (ROXICODONE) 5 MG tablet Take 0.5 tablets (2.5 mg) by mouth every 6 hours as needed for moderate to severe pain, Disp-30 tablet, R-0, E-Prescribe         CONTINUE these medications which have NOT CHANGED    Details   aspirin 81 MG EC tablet Take 81 mg by mouth daily, Historical      calcium carbonate (CALTRATE 600) 1500 (600 Ca) MG tablet Take 600 mg by mouth daily , Historical      digoxin (LANOXIN) 125 MCG tablet Take 62.5 mcg by mouth daily , Historical      furosemide (LASIX) 20 MG tablet Take 20 mg by mouth daily, Historical      LORazepam (ATIVAN) 0.5 MG tablet Take 0.5 mg by mouth At Bedtime , Historical      multivitamin, therapeutic (THERA-VIT) TABS tablet Take 1 tablet by mouth daily, Historical      omeprazole (PRILOSEC) 20 MG DR capsule Take 20 mg by mouth 2 times daily (before meals) , Historical      predniSONE (DELTASONE) 10 MG tablet Take 5 mg by mouth daily , Historical      Vitamin D, Cholecalciferol, 10 MCG (400 UNIT) TABS Take 1 tablet by mouth daily, Historical           Allergies   Allergies   Allergen Reactions     Levothyroxine Shortness Of Breath and Swelling     Nitrogen Swelling and Blisters      Liquid nitrogen

## 2022-12-29 ENCOUNTER — APPOINTMENT (OUTPATIENT)
Dept: CT IMAGING | Facility: HOSPITAL | Age: 87
DRG: 309 | End: 2022-12-29
Attending: INTERNAL MEDICINE
Payer: MEDICARE

## 2022-12-29 ENCOUNTER — HOSPITAL ENCOUNTER (EMERGENCY)
Facility: HOSPITAL | Age: 87
End: 2022-12-29
Payer: MEDICARE

## 2022-12-29 ENCOUNTER — APPOINTMENT (OUTPATIENT)
Dept: GENERAL RADIOLOGY | Facility: HOSPITAL | Age: 87
DRG: 309 | End: 2022-12-29
Attending: INTERNAL MEDICINE
Payer: MEDICARE

## 2022-12-29 ENCOUNTER — HOSPITAL ENCOUNTER (INPATIENT)
Facility: HOSPITAL | Age: 87
LOS: 6 days | Discharge: NURSING, REHAB OR LONG-TERM CARE | DRG: 309 | End: 2023-01-04
Attending: INTERNAL MEDICINE | Admitting: FAMILY MEDICINE
Payer: MEDICARE

## 2022-12-29 DIAGNOSIS — R41.82 ALTERED MENTAL STATUS, UNSPECIFIED ALTERED MENTAL STATUS TYPE: ICD-10-CM

## 2022-12-29 DIAGNOSIS — I48.91 ATRIAL FIBRILLATION WITH RAPID VENTRICULAR RESPONSE (H): ICD-10-CM

## 2022-12-29 LAB
ALBUMIN SERPL BCG-MCNC: 4 G/DL (ref 3.5–5.2)
ALP SERPL-CCNC: 46 U/L (ref 35–104)
ALT SERPL W P-5'-P-CCNC: 17 U/L (ref 10–35)
ANION GAP SERPL CALCULATED.3IONS-SCNC: 15 MMOL/L (ref 7–15)
AST SERPL W P-5'-P-CCNC: 20 U/L (ref 10–35)
BASOPHILS # BLD AUTO: 0 10E3/UL (ref 0–0.2)
BASOPHILS NFR BLD AUTO: 0 %
BILIRUB SERPL-MCNC: 0.6 MG/DL
BUN SERPL-MCNC: 47.1 MG/DL (ref 8–23)
CALCIUM SERPL-MCNC: 9.5 MG/DL (ref 8.8–10.2)
CHLORIDE SERPL-SCNC: 101 MMOL/L (ref 98–107)
CREAT SERPL-MCNC: 1.24 MG/DL (ref 0.51–0.95)
CRP SERPL-MCNC: 7.57 MG/L
DEPRECATED HCO3 PLAS-SCNC: 23 MMOL/L (ref 22–29)
DIGOXIN SERPL-MCNC: <0.4 NG/ML (ref 0.6–2)
EOSINOPHIL # BLD AUTO: 0.1 10E3/UL (ref 0–0.7)
EOSINOPHIL NFR BLD AUTO: 1 %
ERYTHROCYTE [DISTWIDTH] IN BLOOD BY AUTOMATED COUNT: 13.5 % (ref 10–15)
FLUAV RNA SPEC QL NAA+PROBE: NEGATIVE
FLUBV RNA RESP QL NAA+PROBE: NEGATIVE
GFR SERPL CREATININE-BSD FRML MDRD: 41 ML/MIN/1.73M2
GLUCOSE SERPL-MCNC: 146 MG/DL (ref 70–99)
HCT VFR BLD AUTO: 44.1 % (ref 35–47)
HGB BLD-MCNC: 14.6 G/DL (ref 11.7–15.7)
HOLD SPECIMEN: NORMAL
IMM GRANULOCYTES # BLD: 0 10E3/UL
IMM GRANULOCYTES NFR BLD: 0 %
LACTATE SERPL-SCNC: 2 MMOL/L (ref 0.7–2)
LYMPHOCYTES # BLD AUTO: 1.2 10E3/UL (ref 0.8–5.3)
LYMPHOCYTES NFR BLD AUTO: 12 %
MCH RBC QN AUTO: 31.1 PG (ref 26.5–33)
MCHC RBC AUTO-ENTMCNC: 33.1 G/DL (ref 31.5–36.5)
MCV RBC AUTO: 94 FL (ref 78–100)
MONOCYTES # BLD AUTO: 1.1 10E3/UL (ref 0–1.3)
MONOCYTES NFR BLD AUTO: 12 %
NEUTROPHILS # BLD AUTO: 6.9 10E3/UL (ref 1.6–8.3)
NEUTROPHILS NFR BLD AUTO: 75 %
NRBC # BLD AUTO: 0 10E3/UL
NRBC BLD AUTO-RTO: 0 /100
NT-PROBNP SERPL-MCNC: ABNORMAL PG/ML (ref 0–1800)
PLATELET # BLD AUTO: 232 10E3/UL (ref 150–450)
POTASSIUM SERPL-SCNC: 4 MMOL/L (ref 3.4–5.3)
PROT SERPL-MCNC: 6.1 G/DL (ref 6.4–8.3)
RBC # BLD AUTO: 4.69 10E6/UL (ref 3.8–5.2)
RSV RNA SPEC NAA+PROBE: NEGATIVE
SARS-COV-2 RNA RESP QL NAA+PROBE: NEGATIVE
SODIUM SERPL-SCNC: 139 MMOL/L (ref 136–145)
TROPONIN T SERPL HS-MCNC: 32 NG/L
WBC # BLD AUTO: 9.4 10E3/UL (ref 4–11)

## 2022-12-29 PROCEDURE — 250N000011 HC RX IP 250 OP 636: Performed by: INTERNAL MEDICINE

## 2022-12-29 PROCEDURE — 96361 HYDRATE IV INFUSION ADD-ON: CPT

## 2022-12-29 PROCEDURE — 84443 ASSAY THYROID STIM HORMONE: CPT | Performed by: FAMILY MEDICINE

## 2022-12-29 PROCEDURE — 83880 ASSAY OF NATRIURETIC PEPTIDE: CPT | Performed by: INTERNAL MEDICINE

## 2022-12-29 PROCEDURE — 96374 THER/PROPH/DIAG INJ IV PUSH: CPT

## 2022-12-29 PROCEDURE — 80053 COMPREHEN METABOLIC PANEL: CPT | Performed by: INTERNAL MEDICINE

## 2022-12-29 PROCEDURE — 96376 TX/PRO/DX INJ SAME DRUG ADON: CPT

## 2022-12-29 PROCEDURE — 83735 ASSAY OF MAGNESIUM: CPT | Performed by: FAMILY MEDICINE

## 2022-12-29 PROCEDURE — C9803 HOPD COVID-19 SPEC COLLECT: HCPCS

## 2022-12-29 PROCEDURE — 36415 COLL VENOUS BLD VENIPUNCTURE: CPT | Performed by: INTERNAL MEDICINE

## 2022-12-29 PROCEDURE — 120N000004 HC R&B MS OVERFLOW

## 2022-12-29 PROCEDURE — 99285 EMERGENCY DEPT VISIT HI MDM: CPT | Mod: 25,CS

## 2022-12-29 PROCEDURE — 99285 EMERGENCY DEPT VISIT HI MDM: CPT | Mod: CS | Performed by: INTERNAL MEDICINE

## 2022-12-29 PROCEDURE — 93005 ELECTROCARDIOGRAM TRACING: CPT

## 2022-12-29 PROCEDURE — 258N000003 HC RX IP 258 OP 636: Performed by: INTERNAL MEDICINE

## 2022-12-29 PROCEDURE — 85610 PROTHROMBIN TIME: CPT | Performed by: FAMILY MEDICINE

## 2022-12-29 PROCEDURE — G1010 CDSM STANSON: HCPCS

## 2022-12-29 PROCEDURE — 86140 C-REACTIVE PROTEIN: CPT | Performed by: INTERNAL MEDICINE

## 2022-12-29 PROCEDURE — 71045 X-RAY EXAM CHEST 1 VIEW: CPT

## 2022-12-29 PROCEDURE — 80162 ASSAY OF DIGOXIN TOTAL: CPT | Performed by: INTERNAL MEDICINE

## 2022-12-29 PROCEDURE — 87637 SARSCOV2&INF A&B&RSV AMP PRB: CPT | Performed by: INTERNAL MEDICINE

## 2022-12-29 PROCEDURE — 84484 ASSAY OF TROPONIN QUANT: CPT | Performed by: INTERNAL MEDICINE

## 2022-12-29 PROCEDURE — 93010 ELECTROCARDIOGRAM REPORT: CPT | Performed by: INTERNAL MEDICINE

## 2022-12-29 PROCEDURE — 83605 ASSAY OF LACTIC ACID: CPT | Performed by: INTERNAL MEDICINE

## 2022-12-29 PROCEDURE — 85025 COMPLETE CBC W/AUTO DIFF WBC: CPT | Performed by: INTERNAL MEDICINE

## 2022-12-29 RX ORDER — DILTIAZEM HYDROCHLORIDE 5 MG/ML
10 INJECTION INTRAVENOUS ONCE
Status: COMPLETED | OUTPATIENT
Start: 2022-12-29 | End: 2022-12-29

## 2022-12-29 RX ADMIN — SODIUM CHLORIDE 500 ML: 9 INJECTION, SOLUTION INTRAVENOUS at 21:27

## 2022-12-29 RX ADMIN — DILTIAZEM HYDROCHLORIDE 10 MG: 5 INJECTION INTRAVENOUS at 22:56

## 2022-12-29 RX ADMIN — DILTIAZEM HYDROCHLORIDE 10 MG: 5 INJECTION INTRAVENOUS at 21:28

## 2022-12-29 ASSESSMENT — ENCOUNTER SYMPTOMS
WOUND: 0
FEVER: 0
NAUSEA: 0
ABDOMINAL PAIN: 0
WHEEZING: 0
LIGHT-HEADEDNESS: 0
VOICE CHANGE: 0
CONFUSION: 1
ANAL BLEEDING: 0
NUMBNESS: 0
COUGH: 0
NECK PAIN: 0
BLOOD IN STOOL: 0
DIZZINESS: 0
MYALGIAS: 0
BACK PAIN: 0
COLOR CHANGE: 0
FATIGUE: 1
WEAKNESS: 1
ABDOMINAL DISTENTION: 0
ARTHRALGIAS: 0
CHEST TIGHTNESS: 0
HEMATURIA: 0
VOMITING: 0
ALTERED MENTAL STATUS: 1
HEADACHES: 0
SHORTNESS OF BREATH: 0
DYSURIA: 0
DIAPHORESIS: 0
CHILLS: 0
FLANK PAIN: 0
PALPITATIONS: 0
NECK STIFFNESS: 0

## 2022-12-29 ASSESSMENT — ACTIVITIES OF DAILY LIVING (ADL)
ADLS_ACUITY_SCORE: 35
ADLS_ACUITY_SCORE: 35

## 2022-12-30 ENCOUNTER — APPOINTMENT (OUTPATIENT)
Dept: OCCUPATIONAL THERAPY | Facility: HOSPITAL | Age: 87
DRG: 309 | End: 2022-12-30
Attending: FAMILY MEDICINE
Payer: MEDICARE

## 2022-12-30 ENCOUNTER — APPOINTMENT (OUTPATIENT)
Dept: CARDIOLOGY | Facility: HOSPITAL | Age: 87
DRG: 309 | End: 2022-12-30
Attending: FAMILY MEDICINE
Payer: MEDICARE

## 2022-12-30 ENCOUNTER — APPOINTMENT (OUTPATIENT)
Dept: PHYSICAL THERAPY | Facility: HOSPITAL | Age: 87
DRG: 309 | End: 2022-12-30
Attending: FAMILY MEDICINE
Payer: MEDICARE

## 2022-12-30 LAB
ALBUMIN UR-MCNC: 100 MG/DL
ANION GAP SERPL CALCULATED.3IONS-SCNC: 10 MMOL/L (ref 7–15)
APPEARANCE UR: CLEAR
BILIRUB UR QL STRIP: NEGATIVE
BUN SERPL-MCNC: 35.2 MG/DL (ref 8–23)
CALCIUM SERPL-MCNC: 8.8 MG/DL (ref 8.8–10.2)
CHLORIDE SERPL-SCNC: 103 MMOL/L (ref 98–107)
COLOR UR AUTO: YELLOW
CREAT SERPL-MCNC: 1.11 MG/DL (ref 0.51–0.95)
DEPRECATED HCO3 PLAS-SCNC: 27 MMOL/L (ref 22–29)
ERYTHROCYTE [DISTWIDTH] IN BLOOD BY AUTOMATED COUNT: 13.3 % (ref 10–15)
GFR SERPL CREATININE-BSD FRML MDRD: 47 ML/MIN/1.73M2
GLUCOSE SERPL-MCNC: 107 MG/DL (ref 70–99)
GLUCOSE SERPL-MCNC: 111 MG/DL (ref 70–99)
GLUCOSE UR STRIP-MCNC: NEGATIVE MG/DL
HCT VFR BLD AUTO: 41.3 % (ref 35–47)
HGB BLD-MCNC: 13.4 G/DL (ref 11.7–15.7)
HGB UR QL STRIP: NEGATIVE
HYALINE CASTS: 17 /LPF
INR PPP: 1.31 (ref 0.85–1.15)
KETONES UR STRIP-MCNC: NEGATIVE MG/DL
LEUKOCYTE ESTERASE UR QL STRIP: NEGATIVE
LVEF ECHO: NORMAL
MAGNESIUM SERPL-MCNC: 2 MG/DL (ref 1.7–2.3)
MAGNESIUM SERPL-MCNC: 2.1 MG/DL (ref 1.7–2.3)
MCH RBC QN AUTO: 31.1 PG (ref 26.5–33)
MCHC RBC AUTO-ENTMCNC: 32.4 G/DL (ref 31.5–36.5)
MCV RBC AUTO: 96 FL (ref 78–100)
MUCOUS THREADS #/AREA URNS LPF: PRESENT /LPF
NITRATE UR QL: NEGATIVE
PH UR STRIP: 6 [PH] (ref 4.7–8)
PLATELET # BLD AUTO: 205 10E3/UL (ref 150–450)
POTASSIUM SERPL-SCNC: 4.2 MMOL/L (ref 3.4–5.3)
RBC # BLD AUTO: 4.31 10E6/UL (ref 3.8–5.2)
RBC URINE: 0 /HPF
SODIUM SERPL-SCNC: 140 MMOL/L (ref 136–145)
SP GR UR STRIP: 1.03 (ref 1–1.03)
SQUAMOUS EPITHELIAL: <1 /HPF
TROPONIN T SERPL HS-MCNC: 26 NG/L
TSH SERPL DL<=0.005 MIU/L-ACNC: 1.08 UIU/ML (ref 0.3–4.2)
UROBILINOGEN UR STRIP-MCNC: NORMAL MG/DL
WBC # BLD AUTO: 8.1 10E3/UL (ref 4–11)
WBC URINE: 3 /HPF

## 2022-12-30 PROCEDURE — 99223 1ST HOSP IP/OBS HIGH 75: CPT | Mod: AI | Performed by: FAMILY MEDICINE

## 2022-12-30 PROCEDURE — 83735 ASSAY OF MAGNESIUM: CPT | Performed by: FAMILY MEDICINE

## 2022-12-30 PROCEDURE — 250N000011 HC RX IP 250 OP 636: Performed by: INTERNAL MEDICINE

## 2022-12-30 PROCEDURE — 93306 TTE W/DOPPLER COMPLETE: CPT | Mod: 26 | Performed by: INTERNAL MEDICINE

## 2022-12-30 PROCEDURE — 93306 TTE W/DOPPLER COMPLETE: CPT

## 2022-12-30 PROCEDURE — 84484 ASSAY OF TROPONIN QUANT: CPT | Performed by: FAMILY MEDICINE

## 2022-12-30 PROCEDURE — 97165 OT EVAL LOW COMPLEX 30 MIN: CPT | Mod: GO

## 2022-12-30 PROCEDURE — 250N000013 HC RX MED GY IP 250 OP 250 PS 637: Performed by: FAMILY MEDICINE

## 2022-12-30 PROCEDURE — 85014 HEMATOCRIT: CPT | Performed by: INTERNAL MEDICINE

## 2022-12-30 PROCEDURE — 82947 ASSAY GLUCOSE BLOOD QUANT: CPT | Performed by: FAMILY MEDICINE

## 2022-12-30 PROCEDURE — 97162 PT EVAL MOD COMPLEX 30 MIN: CPT | Mod: GP

## 2022-12-30 PROCEDURE — 36415 COLL VENOUS BLD VENIPUNCTURE: CPT | Performed by: INTERNAL MEDICINE

## 2022-12-30 PROCEDURE — 36415 COLL VENOUS BLD VENIPUNCTURE: CPT | Performed by: FAMILY MEDICINE

## 2022-12-30 PROCEDURE — 82947 ASSAY GLUCOSE BLOOD QUANT: CPT | Performed by: INTERNAL MEDICINE

## 2022-12-30 PROCEDURE — 81001 URINALYSIS AUTO W/SCOPE: CPT | Performed by: FAMILY MEDICINE

## 2022-12-30 PROCEDURE — 250N000011 HC RX IP 250 OP 636: Performed by: FAMILY MEDICINE

## 2022-12-30 PROCEDURE — 200N000001 HC R&B ICU

## 2022-12-30 RX ORDER — DIGOXIN 0.25 MG/ML
250 INJECTION INTRAMUSCULAR; INTRAVENOUS EVERY 6 HOURS
Status: DISCONTINUED | OUTPATIENT
Start: 2022-12-30 | End: 2022-12-30

## 2022-12-30 RX ORDER — DEXTROSE MONOHYDRATE 25 G/50ML
25-50 INJECTION, SOLUTION INTRAVENOUS
Status: DISCONTINUED | OUTPATIENT
Start: 2022-12-30 | End: 2023-01-04 | Stop reason: HOSPADM

## 2022-12-30 RX ORDER — HEPARIN SODIUM 5000 [USP'U]/.5ML
5000 INJECTION, SOLUTION INTRAVENOUS; SUBCUTANEOUS EVERY 8 HOURS
Status: DISCONTINUED | OUTPATIENT
Start: 2022-12-30 | End: 2023-01-01

## 2022-12-30 RX ORDER — LEVOTHYROXINE, LIOTHYRONINE 19; 4.5 UG/1; UG/1
30 TABLET ORAL
COMMUNITY
Start: 2022-12-19

## 2022-12-30 RX ORDER — DIGOXIN 0.25 MG/ML
250 INJECTION INTRAMUSCULAR; INTRAVENOUS ONCE
Status: COMPLETED | OUTPATIENT
Start: 2022-12-30 | End: 2022-12-30

## 2022-12-30 RX ORDER — MAGNESIUM OXIDE 400 MG/1
400 TABLET ORAL EVERY 4 HOURS
Status: COMPLETED | OUTPATIENT
Start: 2022-12-30 | End: 2022-12-30

## 2022-12-30 RX ORDER — NICOTINE POLACRILEX 4 MG
15-30 LOZENGE BUCCAL
Status: DISCONTINUED | OUTPATIENT
Start: 2022-12-30 | End: 2023-01-04 | Stop reason: HOSPADM

## 2022-12-30 RX ORDER — ACETAMINOPHEN 650 MG/20.3ML
650 LIQUID ORAL EVERY 4 HOURS PRN
Status: DISCONTINUED | OUTPATIENT
Start: 2022-12-30 | End: 2023-01-04 | Stop reason: HOSPADM

## 2022-12-30 RX ORDER — DIGOXIN 0.25 MG/ML
500 INJECTION INTRAMUSCULAR; INTRAVENOUS ONCE
Status: COMPLETED | OUTPATIENT
Start: 2022-12-30 | End: 2022-12-30

## 2022-12-30 RX ORDER — ACETAMINOPHEN 325 MG/1
650 TABLET ORAL EVERY 4 HOURS PRN
Status: DISCONTINUED | OUTPATIENT
Start: 2022-12-30 | End: 2023-01-04 | Stop reason: HOSPADM

## 2022-12-30 RX ORDER — ONDANSETRON 4 MG/1
4 TABLET, ORALLY DISINTEGRATING ORAL EVERY 6 HOURS PRN
Status: DISCONTINUED | OUTPATIENT
Start: 2022-12-30 | End: 2023-01-04 | Stop reason: HOSPADM

## 2022-12-30 RX ADMIN — HEPARIN SODIUM 5000 UNITS: 5000 INJECTION, SOLUTION INTRAVENOUS; SUBCUTANEOUS at 08:19

## 2022-12-30 RX ADMIN — HEPARIN SODIUM 5000 UNITS: 5000 INJECTION, SOLUTION INTRAVENOUS; SUBCUTANEOUS at 01:09

## 2022-12-30 RX ADMIN — DIGOXIN 250 MCG: 0.25 INJECTION INTRAMUSCULAR; INTRAVENOUS at 01:09

## 2022-12-30 RX ADMIN — HEPARIN SODIUM 5000 UNITS: 5000 INJECTION, SOLUTION INTRAVENOUS; SUBCUTANEOUS at 17:18

## 2022-12-30 RX ADMIN — DIGOXIN 250 MCG: 0.25 INJECTION INTRAMUSCULAR; INTRAVENOUS at 06:23

## 2022-12-30 RX ADMIN — MAGNESIUM OXIDE TAB 400 MG (241.3 MG ELEMENTAL MG) 400 MG: 400 (241.3 MG) TAB at 04:05

## 2022-12-30 RX ADMIN — DIGOXIN 500 MCG: 0.25 INJECTION INTRAMUSCULAR; INTRAVENOUS at 11:29

## 2022-12-30 RX ADMIN — MAGNESIUM OXIDE TAB 400 MG (241.3 MG ELEMENTAL MG) 400 MG: 400 (241.3 MG) TAB at 08:19

## 2022-12-30 RX ADMIN — DIGOXIN 250 MCG: 0.25 INJECTION INTRAMUSCULAR; INTRAVENOUS at 03:50

## 2022-12-30 ASSESSMENT — ACTIVITIES OF DAILY LIVING (ADL)
ADLS_ACUITY_SCORE: 29
PREVIOUS_RESPONSIBILITIES: HOUSEKEEPING;LAUNDRY
ADLS_ACUITY_SCORE: 29
ADLS_ACUITY_SCORE: 21
ADLS_ACUITY_SCORE: 29
ADLS_ACUITY_SCORE: 29

## 2022-12-30 NOTE — ED NOTES
Pt reports she first noticed the bilateral ankle and feet swelling approximately one week ago. Updated Dr. Carreon.

## 2022-12-30 NOTE — PROGRESS NOTES
12/30/22 1100   Appointment Info   Signing Clinician's Name / Credentials (OT) Chasity Joyce, OTR/L   Living Environment   People in Home alone   Current Living Arrangements house   Home Accessibility stairs within home   Number of Stairs, Within Home, Primary greater than 10 stairs   Transportation Anticipated family or friend will provide   Living Environment Comments Pt lives in a 1 story home + basement. Bathroom is handicap accessible. Pt has no local family but very supportive friends. One of her friends offered to stay with her a day when she returns home.   Self-Care   Usual Activity Tolerance good   Current Activity Tolerance moderate   Equipment Currently Used at Home shower chair;grab bar, toilet;grab bar, tub/shower   Fall history within last six months no   Activity/Exercise/Self-Care Comment Pt independent with ADLs   Instrumental Activities of Daily Living (IADL)   Previous Responsibilities housekeeping;laundry   IADL Comments Pt has MOWs, has some assist with laundry. She does not drive and friends transport her. They also do her shopping.   General Information   Onset of Illness/Injury or Date of Surgery 12/29/22   Referring Physician Dr. Luo   Patient/Family Therapy Goal Statement (OT) return home when medically ready   Additional Occupational Profile Info/Pertinent History of Current Problem Pt here with AMS, afib, elevated trop, elevated CRP, chronic NATALIE   Cognitive Status Examination   Orientation Status orientation to person, place and time   Affect/Mental Status (Cognitive) other (see comments)  (lethargic at times during eval and then pt appeared to have some difficulties with confusion but mild)   Follows Commands WNL   Pain Assessment   Patient Currently in Pain No   Range of Motion Comprehensive   Comment, General Range of Motion WFL   Strength Comprehensive (MMT)   Comment, General Manual Muscle Testing (MMT) Assessment WFL but weakness noted   Bed Mobility   Comment (Bed Mobility)  NT, pt already up in the chair upon OT arrival   Transfers   Transfers sit-stand transfer;toilet transfer   Sit-Stand Transfer   Sit-Stand [place] (Transfers) contact guard   Assistive Device (Sit-Stand Transfers) walker, front-wheeled   Toilet Transfer   Type (Toilet Transfer) sit-stand;stand-sit   [place] Level (Toilet Transfer) contact guard   Assistive Device (Toilet Transfer) commode chair  (due to pt still having some HR issues this morning and pt on wall monitor)   Balance   Balance Comments no LOB   Activities of Daily Living   BADL Assessment/Intervention lower body dressing;toileting   Lower Body Dressing Assessment/Training   Position (Lower Body Dressing) unsupported sitting   Comment, (Lower Body Dressing) pt needed some assist due to the chair and cushion being too high   [place] Level (Lower Body Dressing) minimum assist (75% patient effort)   Toileting   Position (Toileting) unsupported sitting   Comment, (Toileting) she was able to complete con-cares   [place] Level (Toileting) modified independence   Clinical Impression   Criteria for Skilled Therapeutic Interventions Met (OT) Yes, treatment indicated   OT Diagnosis impaired ADLs   Influenced by the following impairments decreased activity tolerance, mild confusion, weakness   Assessment of Occupational Performance 1-3 Performance Deficits   Identified Performance Deficits ADLs   Planned Therapy Interventions (OT) ADL retraining;cognition;risk factor education;progressive activity/exercise;home program guidelines;strengthening   Clinical Decision Making Complexity (OT) low complexity   Risk & Benefits of therapy have been explained evaluation/treatment results reviewed;care plan/treatment goals reviewed;participants voiced agreement with care plan;patient   Clinical Impression Comments Pt appears safe to return home at MI based off of her current functional status. Pt has a very good support system of friends. No concerns with  home accessibility. She would benefit from home OT as she is functioning slightly below her baseline to continue progressing her strength, activity tolerance and ADLs.   OT Total Evaluation Time   OT [place], Low Complexity Minutes (30295) 20   OT Goals   Therapy Frequency (OT) 5 times/wk   OT Predicted Duration/Target Date for Goal Attainment 01/06/23   OT Goals Hygiene/Grooming;Toilet Transfer/Toileting;Aerobic Activity   OT: Hygiene/Grooming independent   OT: Toilet Transfer/Toileting Modified independent   OT: Perform aerobic activity with stable cardiovascular response 10 minutes   OT Discharge Planning   OT Plan Progress strength, activity tolerance, and ADLs   OT Discharge Recommendation (DC Rec) home with home care occupational therapy;home with assist   OT Rationale for DC Rec Pt appears safe to return home at DC based off of her current functional status. Pt has a very good support system of friends. No concerns with home accessibility. She may benefit from home OT as she is functioning slightly below her baseline to continue progressing her strength, activity tolerance and ADLs.   OT Brief overview of current status Nishant LB dressing, CGA transfers from chair and to/from commode, mod I toileting, HR increased to about 120 with using the commode   Total Session Time   Total Session Time (sum of timed and untimed services) 20

## 2022-12-30 NOTE — ED NOTES
Up to the bedside commode with stand by assist. Voided approximately 3cc.   Able to transfer to commode without difficulty. Remains alert and oriented x 4.

## 2022-12-30 NOTE — PROGRESS NOTES
Assessment completed by face to face visit with Shannon.     LOC: alert & oriented x 4. Pleasant and interactive with conversation.      Dx: A-fib, altered mental status  Chronic Disease Management: CHF, A-fib, polymyalgia rheumatic, osteopenia     Lives with: alone  Living at:  Home in [place]  Transportation: YES Drives self or her friends drive her     Primary PCP: Gilda Mcgregor  Insurance:  Medicare/BCBS  Medicare IMM Admit letter reviewed with Shannon.     Support System:  Many friends involved and supportive  Homecare/PCA: No  /County Services:   No  : NO        Health Care Directive: On file with her PCP's office.   Guardian: No  POA: No     Pharmacy: [facility]  Meds management: Independent     Adequate Resources for needs (housing, utilities, food/med): YES  Household chores: Independent  Work/community/social activity: YES Retired from teaching. Community and social activity as needed.      ADLs: Independent  Ambulation:Independent  Falls: Denies any recent falls.  Nutrition: No nutrition concerns reported. Gets Meals on Wheels  Sleep: No sleep concerns reported.     Equipment used: Shower chair, grab bars and she has a lift chair and a walker at home, if she needs them.                                        Oxygen supplier: N/A                                                 Does the supplier have valid oxygen orders: N/A     Mental health: No mental health concerns reported.   Substance abuse: Denies  Exposure to violence/abuse: Denies  Stressors: Denies     Able to Return to Prior Living Arrangements: YES     Choice of Vendor: Pt/family was provided with the Medicare Compare list for Home Care.  Discussed associated Medicare star ratings to assist with choice for referrals/discharge planning Yes    Education was given to pt/family that star ratings are updated/maintained by Medicare and can be reviewed by visiting www.medicare.gov Yes    Patient/family  choices for facility in priority are:   First Choice : [facility] [place]                 [phone]    Second Choice: [facility] [place]: [facility]                                                      [phone]       Barriers: None noted at this time.      ENRIQUE: Low     Plan: Anticipate discharge to home with home care when medically ready.

## 2022-12-30 NOTE — PROGRESS NOTES
[facility]    Hospitalist Progress Note  Patient is an 89-year-old female who apparently lives independently.  She was brought in by a friend because of some increasing weakness and confusion.  Friend felt that she was a lot slower than her usual responses.  She does have a history of chronic persistent atrial fibrillation.  Unclear if she has been compliant with her medications.  Upon arrival her heart rate was 180s A. fib blood pressure 115-122 systolic.  Afebrile sats are 92% room air.  Labs her creatinine was up a little bit at 1.24 BUN was 47 normals in the 20 range electrolytes were normal CRP was 7.5 BNP was quite high at 33,000 troponin was 32 TSH 1.08 white count was 9400.  Hemoglobin was 18.6 COVID influenza negative dig level was less than 0.4.  Which she is supposed to be on for her rate control.  Chest x-ray was unremarkable.  She was given a small bolus of fluid.  Was given a dose of IV diltiazem which dropped her pressures to less than 100.  Is already given 2 doses.  Rate was still up she was given 2 doses of IV digoxin 0.25 mg.  And admitted to the ICU.    1.  Atrial fibrillation rapid ventricular response: Patient does have known chronic A. fib.  At times not sure clear if she is compliant with her medications.  She is only on digoxin for rate control.  Is only on aspirin also for deep stroke prophylaxis given multiple falls in the past.  An echocardiogram done in 2019 showed basically normal systolic function.  Currently heart rates in the 100-120 range.  Blood pressure stable 100-135 systolic range.  We will finish up a load of the digoxin in the past she has been well controlled with that if need be can add a little bit of beta-blocker we will repeat her echocardiogram today.  Her oxygenation remains normal on room air.  Her echo does show a reduced systolic function EF from 35 to 40% globally so.  Does have mild aortic stenosis and severe left atrial enlargement.  I do not have an  echo to compare this to but secondhand previous EF's have been 55% several years ago.  We will give her 500 mcg of digoxin now just to get her rate under control with ambulation she does jump up pretty quickly which she did in the past also.  But was doing okay on just the diltiazem so we will get her completely loaded and go from there.    2.  Question encephalopathy: Patient apparently was little more confused than usual per her family but no signs of infection.  CT unremarkable neurologically stable.  We will follow at this point seems to be better.  She is a little confused for both me and the nursing staff.  We will follow her closely at this time.  This afternoon she is actually doing better at this time able to have a good conversation with me seems relatively alert and with it so is improved to go for a walk did get pretty fatigued after short distance but at least started.    3.  Elevated BNP: She has been voiding a significant amount on her own about a liter.  Initially thought she may be a bit on the dry side of things but perhaps not at this time we will see how she does on her own without giving her any further fluid as she is hemodynamically stable.  As above her echo does show reduction in systolic function compared to several years ago.  Likely this is due to her persistent A. fib there is no focal wall motion abnormalities.  She is voiding adequately on her own.  Normally she is on some Lasix but I will withhold that for now.  4.  Acute kidney injury:.  Creatinine are improved from yesterday 35/1.11.  Monitor closely.    Diet: Combination Diet Low Saturated Fat Na <2400mg Diet  Fluids: None    DVT Prophylaxis: Heparin SQ  Code Status: No CPR- Do NOT Intubate    Disposition: Expected discharge in probably a couple days as we get her heart rate controlled and mental status cleared up.  Hopefully she will be able to return to her previous living situation.  .    Markel Ricardo MD    Interval History    Midday here is actually doing very well she is alert and pleasant for most part is pretty clear now she was a little foggy this morning.  Did go for short walk and did okay.  Heart rate did jump up a fair amount of 1 6170 during that time.  Otherwise she is in the 80s to 90s.  We will finish up her digoxin load give another 500 mcg for a total of 1 mg.  And see how she does if need be could consider trying some minimal metoprolol.    -Data reviewed today: I reviewed all new labs and imaging results over the last 24 hours. I personally reviewed no images or EKG's today.    Physical Exam   Temp: 99.6  F (37.6  C) Temp src: Tympanic BP: 126/84 Pulse: 107   Resp: 20 SpO2: (!) 88 % O2 Device: None (Room air)    Vitals:    12/29/22 2047 12/30/22 0048   Weight: 52.6 kg (116 lb) 55.2 kg (121 lb 11.1 oz)     Vital Signs with Ranges  Temp:  [97.7  F (36.5  C)-99.6  F (37.6  C)] 99.6  F (37.6  C)  Pulse:  [105-186] 107  Resp:  [13-47] 20  BP: ()/() 126/84  SpO2:  [88 %-94 %] 88 %  No intake/output data recorded.    Peripheral IV 12/29/22 Left Upper forearm (Active)   Site Assessment [facility] 12/30/22 0400   Line Status Saline locked 12/30/22 0400   Number of days: 1       Peripheral IV 12/30/22 Anterior;Distal;Left Lower forearm (Active)   Site Assessment [facility] 12/30/22 0400   Line Status Saline locked 12/30/22 0400   Number of days: 0       Incision/Surgical Site 08/22/18 Right Wrist (Active)   Number of days: 1591     No line/device    Constitutional: Alert and oriented x3. No distress    HEENT: Normocephalic/atraumatic, PERRL, EOMI, mouth moist, neck supple, no LN.     Cardiovascular: Irregular RRR.  1 out of 6 to 2 out of 6 systolic murmur, no  rubs, or gallops.  JVD appears flat.  Bruits no.  Pulses 2    Respiratory:  Clear to auscultation bilaterally    Abdomen: Soft, nontender, nondistended, no organomegaly. Bowel sounds present    Extremities: Warm/dry.  Trace edema    Neuro:   Non focal, cranial nerves intact,  Moves all extremities.  Medications     Reason anticoagulant not prescribed for atrial fibrillation         digoxin  250 mcg Intravenous Q6H     heparin ANTICOAGULANT  5,000 Units Subcutaneous Q8H     magnesium oxide  400 mg Oral Q4H       Data   Recent Labs   Lab 12/30/22  0106 12/29/22  2106   WBC  --  9.4   HGB  --  14.6   MCV  --  94   PLT  --  232   INR  --  1.31*   NA  --  139   POTASSIUM  --  4.0   CHLORIDE  --  101   CO2  --  23   BUN  --  47.1*   CR  --  1.24*   ANIONGAP  --  15   EFFIE  --  9.5   * 146*   ALBUMIN  --  4.0   PROTTOTAL  --  6.1*   BILITOTAL  --  0.6   ALKPHOS  --  46   ALT  --  17   AST  --  20       No results found for this or any previous visit (from the past 24 hour(s)).

## 2022-12-30 NOTE — PROGRESS NOTES
"NOTE    Shannon Walls : Admission Nutrition Risk Screen - 2-13lb weight loss, reduced appetite/intake    89 yof admitted for AMS. Medical hx includes CHF, a fib. Poor appetite before admission. Intake this admission continues to be poor. Weight trending down over the last year. Minimal weights available to determine timeframe of weight loss.    Diet Order: Low Saturated Fat, <2400mg sodium  Intake: none documented so far    Height: 5' 3\"  Weight: 121 lbs 11.1 oz  Body mass index is 21.56 kg/m .  Weight Status:  Normal BMI  Weight History:   Wt Readings from Last 10 Encounters:   12/30/22 55.2 kg (121 lb 11.1 oz)   11/28/21 59 kg (130 lb 1.1 oz)   08/22/18 62.1 kg (137 lb)      Malnutrition Diagnosis: At risk with poor intake and weight loss    NUTRITION RECOMMENDATIONS  - Encourage intake during meal times  - Offer Ensure with poor appetite/intake    MONITORING AND EVALUATION:  RD will monitor intake, weight, labs                                "

## 2022-12-30 NOTE — CARE PLAN
Prior to Admission Medication Reconciliation:     Medications added:   [x] None  [] As listed below:    Medications deleted:   [] None  [x] As listed below:    Oxycodone from 2021- therapy completed    Medications marked for review/removal by attending:  [x] None  [] As listed below:    Changes made to existing medications:   [x] None  [] Updated time of day, strengths and frequencies to most current.     Pertinent notes/medications patient takes different than prescribed:     Patient has specific ways she takes her medications but unable to remember specifics at this point.     Last times/dates taken verified with patient:  [x] Yes- completed myself : past week at some point. Confusion.   [] Nurse completed, no changes made (double checked entries)  [] Unable to review with patient at this time:    Allergy review:    []Did not review: reviewed by nursing  [x]Allergies reviewed and updated if needed.     Medication reconciliation sources:   [x]Patient  []Patient family member/emergency contact: **  [x][facility] Report Review:  [x]Vit d3 400 units daily   [x]Digoxin 125 mcg 62.5 mcg daily   [x]Lasix 20 mg daily   [x]lorazepam 0.5 mg at bedtime   []Eye multivitamin daily   [x]Omeprazole 20 mg BID   [x]Prednisone 10 mg 5 mg daily   [x]Thyroid (pork) 30 mg daily   []Epic Chart Review  []Care Everywhere review  []Pharmacy med list/phone call: **  []Fill dates reflect compliancy. No concerns.  []Fill dates do not reflect compliancy on the following medications:   [x]Outside meds dispense report:   [x]Fill dates reflect compliancy. No concerns.  []Fill dates do not reflect compliancy on the following medications:   []HomeCare medlist, Nursing home or Assisted Living MAR:  []Behavioral Health Provider:  []Other: **    [place] [facility] [place]    Is patient on coumadin?   [x]No  []Yes      Fill dates and reported compliancy:  [x] Compliant: fill dates reflect reported compliancy.   [] Not applicable. Patient is  not taking any prescribed maintenance medications at this time.   [] Fill dates do not coincide with compliancy, but reports compliancy.    [] Fill dates reflect compliancy, but reports non-compliancy.   [] Cannot assess at this time.     Historian accuracy:  [] Excellent- alert and oriented, understands why meds were prescribed and how to take, able to answer specifics  [] Good- alert and oriented, understands why meds were prescribed and how to take, some confusion   [x] Fair- alert and oriented, doesn't know medications without list, cannot answer specifics about medications, but has a decent process for which to take at home  [] Poor- does not know medications, may not have a process to take at home, may be cognitively unable to review at this time  []Medication management done by family member or facility, no concerns about historian accuracy.   [] Cannot assess at this time.     Medication Management:  [x] Manages meds independently  [] Family member/ other party manages meds/assists:  [] Meds managed by staff at facility  [] Meds set up by home care, family/other party helps administer  [] Meds set up by home care, self administers  [] Cannot assess at this time.     Other medications aside from PTA:  [x] Denies taking any other medications aside from those listed in PTA meds, this includes over-the-counter vitamins, supplements and analgesics.   [] Unable to confirm at this time.     Alexandra Anderson on 12/30/2022 at 7:10 AM     Notifying appropriate party of changes/additions/discrepancies:  [x]No pertinent changes made, notification not necessary.   [] Notified attending provider via text page/phone call/sticky note or other:  [] Notified other:  [] Medications have not been reconciled by a provider yet, notification not necessary  [] Pt is not admitted to floor yet or patient is boarding, PTA meds completed before admission.     Medications Prior to Admission   Medication Sig Dispense Refill Last Dose      aspirin 81 MG EC tablet Take 81 mg by mouth daily   Past Week     calcium carbonate (OS-EFFIE) 1500 (600 Ca) MG tablet Take 600 mg by mouth daily    Past Week     digoxin (LANOXIN) 125 MCG tablet Take 62.5 mcg by mouth daily    Past Week     furosemide (LASIX) 20 MG tablet Take 20 mg by mouth daily   Past Week     LORazepam (ATIVAN) 0.5 MG tablet Take 0.5 mg by mouth At Bedtime    Past Week     multivitamin, therapeutic (THERA-VIT) TABS tablet Take 1 tablet by mouth daily   Past Week     NP THYROID 30 MG tablet Take 30 mg by mouth daily   Past Week     omeprazole (PRILOSEC) 20 MG DR capsule Take 20 mg by mouth 2 times daily (before meals)    Past Week     predniSONE (DELTASONE) 10 MG tablet Take 5 mg by mouth daily    Past Week     Vitamin D, Cholecalciferol, 10 MCG (400 UNIT) TABS Take 1 tablet by mouth daily   Past Week

## 2022-12-30 NOTE — ED NOTES
's after IV Cardizem 10mg. Pt laying in bed with HOB elevated. Eyes closed. RR even and non-labored.

## 2022-12-30 NOTE — PROGRESS NOTES
12/30/22 1200   Appointment Info   Signing Clinician's Name / Credentials (PT) Celeste Pyle DPT   Living Environment   People in Home alone   Current Living Arrangements house   Home Accessibility stairs within home   Number of Stairs, Within Home, Primary greater than 10 stairs  (To basement)   Transportation Anticipated family or friend will provide   Living Environment Comments Pt lives in a 1 story home + basement. Bathroom is handicap accessible. Pt has no local family but very supportive friends. One of her friends offered to stay with her a day when she returns home. Reports she has felt a decline in function since last week   Self-Care   Usual Activity Tolerance good   Current Activity Tolerance moderate   Equipment Currently Used at Home shower chair;grab bar, toilet;grab bar, tub/shower;walker, rolling   Fall history within last six months no   General Information   Onset of Illness/Injury or Date of Surgery 12/30/22   Referring Physician Dr. Luo   Patient/Family Therapy Goals Statement (PT) Go back home   Pertinent History of Current Problem (include personal factors and/or comorbidities that impact the POC) Pt hospitalized c A-fib, weakness and confusion   Existing Precautions/Restrictions fall   Cognition   Affect/Mental Status (Cognition) WFL   Orientation Status (Cognition) oriented x 4   Follows Commands (Cognition) WFL   Pain Assessment   Patient Currently in Pain No   Integumentary/Edema   Integumentary/Edema no deficits were identifed   Range of Motion (ROM)   Range of Motion ROM is WFL   Strength (Manual Muscle Testing)   Strength (Manual Muscle Testing) Deficits observed during functional mobility   Transfers   Transfers sit-stand transfer   Sit-Stand Transfer   Sit-Stand Drumright (Transfers) contact guard   Assistive Device (Sit-Stand Transfers) walker, front-wheeled   Gait/Stairs (Locomotion)   Drumright Level (Gait) supervision   Assistive Device (Gait) walker, front-wheeled    Distance in Feet 150'   Comment, (Gait/Stairs) HR increased to 170 on telemonitor during ambulation. Pt asymptomatic but ended ambulation at that time. Pt was 90's at rest. Nursing monitoring HR during session   Sensory Examination   Sensory Perception patient reports no sensory changes   Coordination   Coordination no deficits were identified   Muscle Tone   Muscle Tone no deficits were identified   Clinical Impression   Criteria for Skilled Therapeutic Intervention Yes, treatment indicated   PT Diagnosis (PT) Decreased activity tolerance   Influenced by the following impairments Mild LE weakness, decrased activity tolerance   Functional limitations due to impairments Decreased ability to tolerate ambulation, stairs   Clinical Presentation (PT Evaluation Complexity) Evolving/Changing   Clinical Presentation Rationale Clinical judgement   Clinical Decision Making (Complexity) moderate complexity   Planned Therapy Interventions (PT) balance training;bed mobility training;gait training;patient/family education;strengthening;transfer training;progressive activity/exercise   Risk & Benefits of therapy have been explained evaluation/treatment results reviewed;patient;care plan/treatment goals reviewed;current/potential barriers reviewed;risks/benefits reviewed;participants voiced agreement with care plan   Clinical Impression Comments PT evaluation completed for discharge recommendation. Pt did well with transfers and ambulation however session was limited by pt experiencing high HR. Anticipate pt will be safe to discharge home with assistance and home PT if needed. Plan to treat pt during her stay and monitor progress   PT Total Evaluation Time   PT Eval, Moderate Complexity Minutes (85390) 20   Physical Therapy Goals   PT Frequency 6x/week   PT Predicted Duration/Target Date for Goal Attainment 01/06/23   PT Goals Transfers;Bed Mobility;Gait   PT: Bed Mobility Independent   PT: Transfers Modified independent   PT:  Gait Modified independent;Greater than 200 feet   PT Discharge Planning   PT Plan Progress mobility as tolerated   PT Discharge Recommendation (DC Rec) home with assist;home with home care physical therapy   PT Rationale for DC Rec PT evaluation completed for discharge recommendation. Pt did well with transfers and ambulation however session was limited by pt experiencing high HR. Anticipate pt will be safe to discharge home with assistance and home PT if needed. Plan to treat pt during her stay and monitor progress   PT Brief overview of current status 150' c FWW and supervision limited by elevated HR   Total Session Time   Total Session Time (sum of timed and untimed services) 20

## 2022-12-30 NOTE — H&P
[facility]    History and Physical - Hospitalist Service       Date of Admission:  12/29/2022    Assessment & Plan      Shannon Walls is a 89 year old female admitted on 12/29/2022.     Atrial fibrillation RVR  -PTA Digoxin 62.5 mcg daily, states have not been compliant, level <0.4  -CHADS 4, only on aspirin baby PTA, will need to discuss  -ED given Diltiazem 10mg x 2, not rate control  -will Digoxin IV load, may need further drip, ICU, cardiac  -check labs, Mg, TSH      Altered mental status/encephalopathy  -confusion ?duration  -pending UA  -Head CT negative, can consider CTA head/neck  -PT/OT    Elevated Trop  -Trop 32, recheck  -EKG, no chest pain/dyspnea    Elevated CRP  -CRP 7.57, monitor    NATALIE/chronic  -Cr 1.24 < 1.18    CHF  -BNP 59036  -PTA lasix, borderline BP  -no chest pain/dyspnea/cough  -Bilateral LE edema, at times have forgotten to take lasix  -check Echo    Compression fractures 09/2022  -chronic prednisone    PCP Dr[facility]    Day team to verify med reconciliation     Diet:   Cardiac  DVT Prophylaxis: Heparin SQ  Garcia Catheter: Not present  Central Lines: None  Cardiac Monitoring: Yes  Code Status:   DNR/DNI    Clinically Significant Risk Factors Present on Admission                               Disposition Plan      Expected Discharge Date: 12/31/2022             ICU, anticipate > 2 midnight stay    The patient's care was discussed with the Patient.    Bhavani Luo MD  Hospitalist Service  [facility]  Securely message with the Vocera Web Console (learn more here)  Text page via [employer] Paging/Directory         ______________________________________________________________________    Chief Complaint   Confusion    History is obtained from the patient, electronic health record and emergency department physician    History of Present Illness   Shannon Walls is a 89 year old female who lives alone, history of Chronic Afib - Digoxin (noncompliant)/Aspirin,  CHF, CKD, Compression fx - prednisone?, presented to ED via friend bringing in, with some weakness and confusion, less sharp then usual. She denies any headache, chest pain/dyspnea, nausea, abdominal pain, fever/chills, diarrhea, blood stool/melena. No specific weakness/paresthesia. She has some LE edema, no pain. Fells generalized weak, has chronic back pain, wa doing well until recently tryingto close door due to wind and increased back pain, taking prednisone.Ambulatory at home.   ED:'s Afib, given Diltiazem 10mg x 2, BP borderline  Dig level < 0.4, Cr 1.24, Trop 34, BNP 86585  Head CT negative    Review of Systems    The 10 point Review of Systems is negative other than noted in the HPI or here.     Past Medical History    I have reviewed this patient's medical history and updated it with pertinent information if needed.       Past Surgical History   I have reviewed this patient's surgical history and updated it with pertinent information if needed.  Past Surgical History:   Procedure Laterality Date     EYE SURGERY       OPEN REDUCTION INTERNAL FIXATION WRIST Right 8/22/2018    Procedure: OPEN REDUCTION INTERNAL FIXATION WRIST;  OPEN REDUCTION INTERNAL FIXATION RIGHT DISTAL RADIUS;  Surgeon: Taqueria Edwards MD;  Location: [facility] OR       Social History   I have reviewed this patient's social history and updated it with pertinent information if needed.  Social History     Tobacco Use     Smoking status: Former     Smokeless tobacco: Never       Family History         Prior to Admission Medications   Prior to Admission Medications   Prescriptions Last Dose Informant Patient Reported? Taking?   LORazepam (ATIVAN) 0.5 MG tablet   Yes No   Sig: Take 0.5 mg by mouth At Bedtime    Vitamin D, Cholecalciferol, 10 MCG (400 UNIT) TABS   Yes No   Sig: Take 1 tablet by mouth daily   aspirin 81 MG EC tablet   Yes No   Sig: Take 81 mg by mouth daily   calcium carbonate (CALTRATE 600) 1500 (600 Ca) MG tablet   Yes No   Sig:  Take 600 mg by mouth daily    digoxin (LANOXIN) 125 MCG tablet   Yes No   Sig: Take 62.5 mcg by mouth daily    furosemide (LASIX) 20 MG tablet   Yes No   Sig: Take 20 mg by mouth daily   multivitamin, therapeutic (THERA-VIT) TABS tablet   Yes No   Sig: Take 1 tablet by mouth daily   omeprazole (PRILOSEC) 20 MG DR capsule   Yes No   Sig: Take 20 mg by mouth 2 times daily (before meals)    oxyCODONE (ROXICODONE) 5 MG tablet   No No   Sig: Take 0.5 tablets (2.5 mg) by mouth every 6 hours as needed for moderate to severe pain   predniSONE (DELTASONE) 10 MG tablet   Yes No   Sig: Take 5 mg by mouth daily       Facility-Administered Medications: None     Allergies   Allergies   Allergen Reactions     Levothyroxine Shortness Of Breath and Swelling     Nitrogen Swelling and Blisters     Liquid nitrogen       Physical Exam   Vital Signs: Temp: 97.7  F (36.5  C) Temp src: Oral BP: 103/75 Pulse: (!) 131   Resp: 18 SpO2: 92 % O2 Device: None (Room air)    Weight: 116 lbs 0 oz    General Appearance: Alert in NAD  Eyes: EOMI, PERRL  HEENT: AT/NC, OP clear, tongue midline, Neck Supple  Respiratory: Clear bilateral  Cardiovascular: IRRR  GI: Soft, bowel sounds present, NT/ND, no guarding  Lymph/Hematologic: No bruising  Genitourinary: NO CVAT  Skin: No rash, 1+ Bilateral LE edema, nontender  Musculoskeletal: Movement all extremities  Neurologic: CN2-12 grossly intact, nonfocal  Psychiatric: Appropriate affect    Data   Data reviewed today: I reviewed all medications, new labs and imaging results over the last 24 hours. I personally reviewed labs, imaging, EKG

## 2022-12-30 NOTE — UTILIZATION REVIEW
[facility] Utilization Review  Admission Status; Secondary Review Determination     Admission Date: 12/29/2022  8:53 PM      Under the authority of the Utilization Management Committee, the utilization review process indicated a secondary review on the above patient.  The review outcome is based on review of the medical records, discussions with staff, and applying clinical experience noted on the date of the review.        (X)      Inpatient Status Appropriate - This patient's medical care is consistent with medical management for inpatient care and reasonable inpatient medical practice.          RATIONALE FOR DETERMINATION   89-year-old female with history of atrial fibrillation with RVR, altered mental status, admitted with worsening weakness and confusion.  Acute DTs with A. fib, no fevers, patient received 1 dose of IV diltiazem, metoprolol blood sugars to less than 100, received digoxin and admitted for further work-up.  Echocardiogram showed reduced systolic function from 35 to 40% with aortic stenosis and severe left atrial enlargement.  Plan to complete digoxin load, intermittent episodes of heart rate greater than 160, needs close monitoring in the hospital with ongoing interventions, anticipate more than 2 midnight hospital stay with ongoing interventions, recommend continue inpatient status      The severity of illness, intensity of service provided, expected LOS and risk for adverse outcome make the care complex, high risk and appropriate for hospital admission.The patient requires hospital based medical care which is anticipated to require a stay of 2 or more midnights; according to CMS guidelines the patient should be admitted as inpatient        The information on this document is developed by the utilization review team in order for the business office to ensure compliance.  This only denotes the appropriateness of proper admission status and does not reflect the quality of care  rendered.         The definitions of Inpatient Status and Observation Status used in making the determination above are those provided in the CMS Coverage Manual, Chapter 1 and Chapter 6, section 70.4.      Sincerely,       Lisandro Rosario MD  Physician Advisor  Utilization Review-[place]    Phone: [phone]

## 2022-12-30 NOTE — ED PROVIDER NOTES
History     Chief Complaint   Patient presents with     Altered Mental Status     The history is provided by the patient and a friend.   Altered Mental Status  Presenting symptoms: confusion    Severity:  Mild  Most recent episode:  Today  Episode history:  Multiple  Timing:  Intermittent  Chronicity:  New  Associated symptoms: weakness    Associated symptoms: no abdominal pain, no fever, no headaches, no light-headedness, no nausea, no palpitations, no rash and no vomiting      Allergies:  Allergies   Allergen Reactions     Levothyroxine Shortness Of Breath and Swelling     Nitrogen Swelling and Blisters     Liquid nitrogen       Problem List:    Patient Active Problem List    Diagnosis Date Noted     Atrial fibrillation with rapid ventricular response (H) 12/29/2022     Priority: Medium     Altered mental status, unspecified altered mental status type 12/29/2022     Priority: Medium     Permanent atrial fibrillation (H) 11/29/2021     Priority: Medium     Pathological fracture L4 vertebrae 11/27/2021     Priority: Medium     Fracture of sacrum (H) 11/27/2021     Priority: Medium        Past Medical History:    No past medical history on file.    Past Surgical History:    Past Surgical History:   Procedure Laterality Date     EYE SURGERY       OPEN REDUCTION INTERNAL FIXATION WRIST Right 8/22/2018    Procedure: OPEN REDUCTION INTERNAL FIXATION WRIST;  OPEN REDUCTION INTERNAL FIXATION RIGHT DISTAL RADIUS;  Surgeon: Taqueria Edwards MD;  Location: [facility] OR       Family History:    No family history on file.    Social History:  Marital Status:   [5]  Social History     Tobacco Use     Smoking status: Former     Smokeless tobacco: Never        Medications:    No current outpatient medications on file.        Review of Systems   Constitutional: Positive for fatigue. Negative for chills, diaphoresis and fever.   HENT: Negative for voice change.    Eyes: Negative for visual disturbance.   Respiratory: Negative for  "cough, chest tightness, shortness of breath and wheezing.    Cardiovascular: Negative for chest pain, palpitations and leg swelling.   Gastrointestinal: Negative for abdominal distention, abdominal pain, anal bleeding, blood in stool, nausea and vomiting.   Genitourinary: Negative for decreased urine volume, dysuria, flank pain and hematuria.   Musculoskeletal: Negative for arthralgias, back pain, gait problem, myalgias, neck pain and neck stiffness.   Skin: Negative for color change, pallor, rash and wound.   Neurological: Positive for weakness. Negative for dizziness, syncope, light-headedness, numbness and headaches.   Psychiatric/Behavioral: Positive for confusion. Negative for suicidal ideas.       Physical Exam   BP: 113/73  Pulse: (!) 186  Temp: 97.7  F (36.5  C)  Resp: 16  Height: 162.6 cm (5' 4\")  Weight: 52.6 kg (116 lb)  SpO2: 92 %      Physical Exam  Vitals and nursing note reviewed.   Constitutional:       Appearance: She is well-developed.   HENT:      Head: Normocephalic and atraumatic.      Mouth/Throat:      Pharynx: No oropharyngeal exudate.   Eyes:      Conjunctiva/sclera: Conjunctivae normal.      Pupils: Pupils are equal, round, and reactive to light.   Neck:      Thyroid: No thyromegaly.      Vascular: No JVD.      Trachea: No tracheal deviation.   Cardiovascular:      Rate and Rhythm: Tachycardia present. Rhythm irregular.      Heart sounds: Normal heart sounds. No murmur heard.    No friction rub. No gallop.   Pulmonary:      Effort: Pulmonary effort is normal. No respiratory distress.      Breath sounds: Normal breath sounds. No stridor. No wheezing or rales.   Chest:      Chest wall: No tenderness.   Abdominal:      General: Bowel sounds are normal. There is no distension.      Palpations: Abdomen is soft. There is no mass.      Tenderness: There is no abdominal tenderness. There is no guarding or rebound.   Musculoskeletal:         General: No tenderness. Normal range of motion.      " Cervical back: Normal range of motion and neck supple.   Lymphadenopathy:      Cervical: No cervical adenopathy.   Skin:     General: Skin is warm and dry.      Coloration: Skin is not pale.      Findings: No erythema or rash.   Neurological:      Mental Status: She is alert and oriented to person, place, and time.   Psychiatric:         Behavior: Behavior normal.         ED Course                 Procedures                Results for orders placed or performed during the hospital encounter of 12/29/22 (from the past 24 hour(s))   [place] Draw    Narrative    The following orders were created for panel order [place] Draw.  Procedure                               Abnormality         Status                     ---------                               -----------         ------                     Extra Blue Top Tube[[id number]]                              Final result               Extra Green Top (Lithium...[[id number]]                      Final result               Extra Purple Top Tube[[id number]]                            Final result               Extra Heparinized Syringe[[id number]]                        Final result                 Please view results for these tests on the individual orders.   Extra Blue Top Tube   Result Value Ref Range    Hold Specimen JIC    Extra Green Top (Lithium Heparin) Tube   Result Value Ref Range    Hold Specimen JIC    Extra Purple Top Tube   Result Value Ref Range    Hold Specimen JIC    Extra Heparinized Syringe   Result Value Ref Range    Hold Specimen JIC    CBC with Platelets & Differential    Narrative    The following orders were created for panel order CBC with Platelets & Differential.  Procedure                               Abnormality         Status                     ---------                               -----------         ------                     CBC with platelets and d...[[id number]]                      Final result                 Please view results for  these tests on the individual orders.   Comprehensive metabolic panel   Result Value Ref Range    Sodium 139 136 - 145 mmol/L    Potassium 4.0 3.4 - 5.3 mmol/L    Chloride 101 98 - 107 mmol/L    Carbon Dioxide (CO2) 23 22 - 29 mmol/L    Anion Gap 15 7 - 15 mmol/L    Urea Nitrogen 47.1 (H) 8.0 - 23.0 mg/dL    Creatinine 1.24 (H) 0.51 - 0.95 mg/dL    Calcium 9.5 8.8 - 10.2 mg/dL    Glucose 146 (H) 70 - 99 mg/dL    Alkaline Phosphatase 46 35 - 104 U/L    AST 20 10 - 35 U/L    ALT 17 10 - 35 U/L    Protein Total 6.1 (L) 6.4 - 8.3 g/dL    Albumin 4.0 3.5 - 5.2 g/dL    Bilirubin Total 0.6 <=1.2 mg/dL    GFR Estimate 41 (L) >60 mL/min/1.73m2   CRP inflammation   Result Value Ref Range    CRP Inflammation 7.57 (H) <5.00 mg/L   CBC with platelets and differential   Result Value Ref Range    WBC Count 9.4 4.0 - 11.0 10e3/uL    RBC Count 4.69 3.80 - 5.20 10e6/uL    Hemoglobin 14.6 11.7 - 15.7 g/dL    Hematocrit 44.1 35.0 - 47.0 %    MCV 94 78 - 100 fL    MCH 31.1 26.5 - 33.0 pg    MCHC 33.1 31.5 - 36.5 g/dL    RDW 13.5 10.0 - 15.0 %    Platelet Count 232 150 - 450 10e3/uL    % Neutrophils 75 %    % Lymphocytes 12 %    % Monocytes 12 %    % Eosinophils 1 %    % Basophils 0 %    % Immature Granulocytes 0 %    NRBCs per 100 WBC 0 <1 /100    Absolute Neutrophils 6.9 1.6 - 8.3 10e3/uL    Absolute Lymphocytes 1.2 0.8 - 5.3 10e3/uL    Absolute Monocytes 1.1 0.0 - 1.3 10e3/uL    Absolute Eosinophils 0.1 0.0 - 0.7 10e3/uL    Absolute Basophils 0.0 0.0 - 0.2 10e3/uL    Absolute Immature Granulocytes 0.0 <=0.4 10e3/uL    Absolute NRBCs 0.0 10e3/uL   Digoxin level   Result Value Ref Range    Digoxin <0.4 (L) 0.6 - 2.0 ng/mL   Nt probnp inpatient   Result Value Ref Range    N terminal Pro BNP Inpatient 33,109 (H) 0 - 1,800 pg/mL   Troponin T, High Sensitivity   Result Value Ref Range    Troponin T, High Sensitivity 32 (H) <=14 ng/L   Magnesium   Result Value Ref Range    Magnesium 2.0 1.7 - 2.3 mg/dL   TSH with free T4 reflex   Result  Value Ref Range    TSH 1.08 0.30 - 4.20 uIU/mL   INR   Result Value Ref Range    INR 1.31 (H) 0.85 - 1.15   Lactic acid whole blood   Result Value Ref Range    Lactic Acid 2.0 0.7 - 2.0 mmol/L   Symptomatic Influenza A/B & SARS-CoV2 (COVID-19) Virus PCR Multiplex Nasopharyngeal    Specimen: Nasopharyngeal; Swab   Result Value Ref Range    Influenza A PCR Negative Negative    Influenza B PCR Negative Negative    RSV PCR Negative Negative    SARS CoV2 PCR Negative Negative    Narrative    Testing was performed using the Xpert Xpress CoV2/Flu/RSV Assay on the Cepheid GeneXpert Instrument. This test should be ordered for the detection of SARS-CoV-2 and influenza viruses in individuals who meet clinical and/or epidemiological criteria. Test performance is unknown in asymptomatic patients. This test is for in vitro diagnostic use under the FDA EUA for laboratories certified under CLIA to perform high or moderate complexity testing. This test has not been FDA cleared or approved. A negative result does not rule out the presence of PCR inhibitors in the specimen or target RNA in concentration below the limit of detection for the assay. If only one viral target is positive but coinfection with multiple targets is suspected, the sample should be re-tested with another FDA cleared, approved, or authorized test, if coinfection would change clinical management. This test was validated by the [facility]. These laboratories are certified under the Clinical Laboratory Improvement Amendments of 1988 (CLIA-88) as qualified to perform high complexity laboratory testing.   XR Chest Port 1 View    Narrative    Exam:  XR CHEST PORT 1 VIEW    HISTORY: Palpitations.    COMPARISON:  None.    FINDINGS:     The cardiomediastinal contours are mildly enlarged.      Mild streaky bibasilar opacities.  No definite pleural effusion. No  pneumothorax.    No acute osseous abnormality.       Impression    IMPRESSION:      Mild  streaky bibasilar opacities are most likely atelectasis or  scarring.      PAULO MARTINEZ MD         SYSTEM ID:  [id number]   CT Head w/o Contrast    Narrative    PROCEDURE: CT HEAD W/O CONTRAST   12/29/2022 10:01 PM    HISTORY:Female, age,  89 years, , , AMS    COMPARISON:No relevant prior imaging.    TECHNIQUE: CT of the brain without contrast. Axial; sagittal and  coronal MIP images were reviewed.    FINDINGS: Ventricles and sulci are enlarged consistent with atrophy.  Gray and white matter demonstrate scattered areas of decreased  density.    There is no evidence of mass, mass effect or midline shift. No  evidence of acute hemorrhage.    The bones are unremarkable. No fracture.       Impression    IMPRESSION:   No acute intracranial abnormality.  No acute fracture.     Nonspecific white matter changes suggesting small vessel disease.    This report is in agreement with the preliminary report.      This facility minimizes radiation dose by adjusting the mA and/or kV  according to each patient size.      This CT scan was performed using one or more the following dose  reduction techniques:    -Automated exposure control,  -Adjustment of the mA and/or kV according to patient's size, and/or,  -Use of iterative reconstruction technique.      RIK HUTCHINSON MD         SYSTEM ID:  [id number]   Troponin T, High Sensitivity   Result Value Ref Range    Troponin T, High Sensitivity 26 (H) <=14 ng/L   Glucose   Result Value Ref Range    Glucose 111 (H) 70 - 99 mg/dL   UA with Microscopic reflex to Culture    Specimen: Urine, Clean Catch   Result Value Ref Range    Color Urine Yellow Colorless, Straw, Light Yellow, Yellow    Appearance Urine Clear Clear    Glucose Urine Negative Negative mg/dL    Bilirubin Urine Negative Negative    Ketones Urine Negative Negative mg/dL    Specific Gravity Urine 1.026 1.003 - 1.035    Blood Urine Negative Negative    pH Urine 6.0 4.7 - 8.0    Protein Albumin Urine 100 (A) Negative mg/dL     Urobilinogen Urine Normal Normal, 2.0 mg/dL    Nitrite Urine Negative Negative    Leukocyte Esterase Urine Negative Negative    Mucus Urine Present (A) None Seen /LPF    RBC Urine 0 <=2 /HPF    WBC Urine 3 <=5 /HPF    Squamous Epithelials Urine <1 <=1 /HPF    Hyaline Casts Urine 17 (H) <=2 /LPF    Narrative    Urine Culture not indicated   Magnesium   Result Value Ref Range    Magnesium 2.1 1.7 - 2.3 mg/dL   Basic metabolic panel   Result Value Ref Range    Sodium 140 136 - 145 mmol/L    Potassium 4.2 3.4 - 5.3 mmol/L    Chloride 103 98 - 107 mmol/L    Carbon Dioxide (CO2) 27 22 - 29 mmol/L    Anion Gap 10 7 - 15 mmol/L    Urea Nitrogen 35.2 (H) 8.0 - 23.0 mg/dL    Creatinine 1.11 (H) 0.51 - 0.95 mg/dL    Calcium 8.8 8.8 - 10.2 mg/dL    Glucose 107 (H) 70 - 99 mg/dL    GFR Estimate 47 (L) >60 mL/min/1.73m2   CBC with platelets   Result Value Ref Range    WBC Count 8.1 4.0 - 11.0 10e3/uL    RBC Count 4.31 3.80 - 5.20 10e6/uL    Hemoglobin 13.4 11.7 - 15.7 g/dL    Hematocrit 41.3 35.0 - 47.0 %    MCV 96 78 - 100 fL    MCH 31.1 26.5 - 33.0 pg    MCHC 32.4 31.5 - 36.5 g/dL    RDW 13.3 10.0 - 15.0 %    Platelet Count 205 150 - 450 10e3/uL   Echocardiogram Complete   Result Value Ref Range    LVEF  35-40% (moderately reduced)     Narrative    [id number]  [id number]  [id number]  [id number]^DANY^[facility]  Ech[facility]ography Laboratory  [address] [place] [id number]     Name: GONZALEZ BAUTISTA  MRN: [id number]  : 1933  Study Date: 2022 07:06 AM  Age: 89 yrs  Gender: Female  Patient Location: [facility]  Reason For Study: Atrial Fibrillation  History: Afib  Ordering Physician: LUPE SAENZ  Performed By: Cira Beaver RDCS     BSA: 1.6 m2  Height: 63 in  Weight: 121 lb  ______________________________________________________________________________  Procedure  Complete Portable Echo Adult. The patient's rhythm is atrial  fibrillation.  ______________________________________________________________________________  Interpretation Summary  Left ventricular function is decreased. The ejection fraction is 35-40%  (moderately reduced).  Mild to moderate diffuse hypokinesis is present.  The right ventricle is normal size.  Global right ventricular function is normal.  Severe left atrial enlargement is present.  Mild mitral insufficiency is present.  Mild aortic valve calcification is present.  Mild aortic stenosis is present.  The mean gradient across the aortic valve is17 mmHg.  Trace tricuspid insufficiency is present.  Mild pulmonic insufficiency is present.  Previous study not available for comparison.  ______________________________________________________________________________  Left Ventricle  Left ventricular function is decreased. The ejection fraction is 35-40%  (moderately reduced). Mild to moderate diffuse hypokinesis is present.     Right Ventricle  The right ventricle is normal size. Global right ventricular function is  normal.     Atria  Severe left atrial enlargement is present.     Mitral Valve  Mild mitral insufficiency is present.     Aortic Valve  Mild aortic valve calcification is present. Mild aortic stenosis is present.  The mean AoV pressure gradient is 17.0 mmHg. The peak AoV pressure gradient is  32.5 mmHg. The peak aortic velocity is 2.85 m/sec. The mean gradient across  the aortic valve is17 mmHg.     Tricuspid Valve  Trace tricuspid insufficiency is present.     Pulmonic Valve  Mild pulmonic insufficiency is present.     Vessels  Ascending aorta 3.13 cm.     Pericardium  No pericardial effusion is present.     Compared to Previous Study  Previous study not available for comparison.     ______________________________________________________________________________  MMode/2D Measurements & Calculations  IVSd: 1.1 cm  LVIDd: 4.7 cm  LVIDs: 4.0 cm  LVPWd: 1.1 cm     FS: 14.3 %  LV mass(C)d: 174.6 grams  LV  mass(C)dI: 111.8 grams/m2  Ao root diam: 3.4 cm  LA dimension: 4.2 cm  asc Aorta Diam: 3.1 cm  LA/Ao: 1.3  LVOT diam: 2.0 cm  LVOT area: 3.2 cm2  LA Volume Indexed (AL/bp): 60.1 ml/m2  RWT: 0.45     Doppler Measurements & Calculations  Ao V2 max: 285.0 cm/sec  Ao max P.5 mmHg  Ao V2 mean: 193.0 cm/sec  Ao mean P.0 mmHg  Ao V2 VTI: 43.9 cm  KOJO(I,D): 0.88 cm2  KOJO(V,D): 1.1 cm2     LV V1 max PG: 3.9 mmHg  LV V1 max: 99.0 cm/sec  LV V1 VTI: 12.1 cm  SV(LVOT): 38.5 ml  SI(LVOT): 24.7 ml/m2  AV Tavo Ratio (DI): 0.35  KOJO Index (cm2/m2): 0.56     ______________________________________________________________________________  Report approved by: Michelle Corral 2022 11:17 AM             Medications   glucose gel 15-30 g (has no administration in time range)     Or   dextrose 50 % injection 25-50 mL (has no administration in time range)     Or   glucagon injection 1 mg (has no administration in time range)   No anticoagulants IF patient has had acute trauma/surgery or recent intracranial, GI or urinary tract bleeding.  (has no administration in time range)   heparin ANTICOAGULANT injection 5,000 Units (5,000 Units Subcutaneous Given 22)   acetaminophen (TYLENOL) tablet 650 mg (has no administration in time range)     Or   acetaminophen (TYLENOL) solution 650 mg (has no administration in time range)   sodium chloride (PF) 0.9% PF flush 3 mL (3 mLs Intracatheter Given 22 172)   ondansetron (ZOFRAN ODT) ODT tab 4 mg (has no administration in time range)   diltiazem (CARDIZEM) injection 10 mg (10 mg Intravenous Given 22)   0.9% sodium chloride BOLUS (0 mLs Intravenous Stopped 22)   diltiazem (CARDIZEM) injection 10 mg (10 mg Intravenous Given 22)   digoxin (LANOXIN) injection 250 mcg (250 mcg Intravenous Given 22)     Followed by   digoxin (LANOXIN) injection 500 mcg (500 mcg Intravenous Given 22)   digoxin (LANOXIN) injection 250 mcg  (250 mcg Intravenous Given 12/30/22 0350)   magnesium oxide (MAG-OX) tablet 400 mg (400 mg Oral Given 12/30/22 5919)       Assessments & Plan (with Medical Decision Making)   Episode of confusion today , brought by friend, forgot to take her daily medication   Generalized weakness, no neuro deficit in exam   Lives alone, walks with walker at baseline , today had more difficulty to walk  Non complaint with medication  EKG afib @ 160s  Labs reviewed   ml NS given  Cardizem 10 mg IV x 2 given  Rate did not controlled    AMS unknown etiology,    AFib RVR  Spoke to Dr Thompson, accepted for admission for ICU  I have reviewed the nursing notes.    I have reviewed the findings, diagnosis, plan and need for follow up with the patient.          Current Discharge Medication List          Final diagnoses:   Atrial fibrillation with rapid ventricular response (H)   Altered mental status, unspecified altered mental status type       12/29/2022   [facility]     Freeman Carreon MD  12/30/22 2054

## 2022-12-30 NOTE — ED TRIAGE NOTES
Pt was brought in by a friend with some weakness and confusion. Friend reports she the patient is usually very sharp and today she is slow with her responses. Pt reports a history of Afib.

## 2022-12-31 ENCOUNTER — APPOINTMENT (OUTPATIENT)
Dept: PHYSICAL THERAPY | Facility: HOSPITAL | Age: 87
DRG: 309 | End: 2022-12-31
Payer: MEDICARE

## 2022-12-31 LAB
ANION GAP SERPL CALCULATED.3IONS-SCNC: 13 MMOL/L (ref 7–15)
BUN SERPL-MCNC: 26.4 MG/DL (ref 8–23)
CALCIUM SERPL-MCNC: 9.3 MG/DL (ref 8.8–10.2)
CHLORIDE SERPL-SCNC: 100 MMOL/L (ref 98–107)
CREAT SERPL-MCNC: 0.87 MG/DL (ref 0.51–0.95)
DEPRECATED HCO3 PLAS-SCNC: 24 MMOL/L (ref 22–29)
ERYTHROCYTE [DISTWIDTH] IN BLOOD BY AUTOMATED COUNT: 13.1 % (ref 10–15)
GFR SERPL CREATININE-BSD FRML MDRD: 63 ML/MIN/1.73M2
GLUCOSE SERPL-MCNC: 110 MG/DL (ref 70–99)
HCT VFR BLD AUTO: 48.7 % (ref 35–47)
HGB BLD-MCNC: 16.2 G/DL (ref 11.7–15.7)
MAGNESIUM SERPL-MCNC: 1.9 MG/DL (ref 1.7–2.3)
MCH RBC QN AUTO: 31.3 PG (ref 26.5–33)
MCHC RBC AUTO-ENTMCNC: 33.3 G/DL (ref 31.5–36.5)
MCV RBC AUTO: 94 FL (ref 78–100)
PLATELET # BLD AUTO: 221 10E3/UL (ref 150–450)
POTASSIUM SERPL-SCNC: 4 MMOL/L (ref 3.4–5.3)
RBC # BLD AUTO: 5.17 10E6/UL (ref 3.8–5.2)
SODIUM SERPL-SCNC: 137 MMOL/L (ref 136–145)
WBC # BLD AUTO: 11.3 10E3/UL (ref 4–11)

## 2022-12-31 PROCEDURE — 97530 THERAPEUTIC ACTIVITIES: CPT | Mod: GP | Performed by: PHYSICAL THERAPIST

## 2022-12-31 PROCEDURE — 80048 BASIC METABOLIC PNL TOTAL CA: CPT | Performed by: INTERNAL MEDICINE

## 2022-12-31 PROCEDURE — 250N000013 HC RX MED GY IP 250 OP 250 PS 637: Performed by: INTERNAL MEDICINE

## 2022-12-31 PROCEDURE — 250N000011 HC RX IP 250 OP 636: Performed by: FAMILY MEDICINE

## 2022-12-31 PROCEDURE — 83735 ASSAY OF MAGNESIUM: CPT | Performed by: FAMILY MEDICINE

## 2022-12-31 PROCEDURE — 85027 COMPLETE CBC AUTOMATED: CPT | Performed by: INTERNAL MEDICINE

## 2022-12-31 PROCEDURE — 99233 SBSQ HOSP IP/OBS HIGH 50: CPT | Performed by: INTERNAL MEDICINE

## 2022-12-31 PROCEDURE — 36415 COLL VENOUS BLD VENIPUNCTURE: CPT | Performed by: INTERNAL MEDICINE

## 2022-12-31 PROCEDURE — 120N000001 HC R&B MED SURG/OB

## 2022-12-31 RX ORDER — MAGNESIUM OXIDE 400 MG/1
400 TABLET ORAL EVERY 4 HOURS
Status: COMPLETED | OUTPATIENT
Start: 2022-12-31 | End: 2022-12-31

## 2022-12-31 RX ORDER — METOPROLOL SUCCINATE 25 MG/1
25 TABLET, EXTENDED RELEASE ORAL DAILY
Status: DISCONTINUED | OUTPATIENT
Start: 2022-12-31 | End: 2023-01-01

## 2022-12-31 RX ORDER — DIGOXIN 125 MCG
125 TABLET ORAL DAILY
Status: DISCONTINUED | OUTPATIENT
Start: 2022-12-31 | End: 2023-01-04 | Stop reason: HOSPADM

## 2022-12-31 RX ADMIN — METOPROLOL SUCCINATE 25 MG: 25 TABLET, EXTENDED RELEASE ORAL at 08:43

## 2022-12-31 RX ADMIN — HEPARIN SODIUM 5000 UNITS: 5000 INJECTION, SOLUTION INTRAVENOUS; SUBCUTANEOUS at 08:44

## 2022-12-31 RX ADMIN — HEPARIN SODIUM 5000 UNITS: 5000 INJECTION, SOLUTION INTRAVENOUS; SUBCUTANEOUS at 16:15

## 2022-12-31 RX ADMIN — MAGNESIUM OXIDE TAB 400 MG (241.3 MG ELEMENTAL MG) 400 MG: 400 (241.3 MG) TAB at 10:56

## 2022-12-31 RX ADMIN — DIGOXIN 125 MCG: 125 TABLET ORAL at 08:43

## 2022-12-31 RX ADMIN — MAGNESIUM OXIDE TAB 400 MG (241.3 MG ELEMENTAL MG) 400 MG: 400 (241.3 MG) TAB at 06:07

## 2022-12-31 RX ADMIN — HEPARIN SODIUM 5000 UNITS: 5000 INJECTION, SOLUTION INTRAVENOUS; SUBCUTANEOUS at 01:07

## 2022-12-31 ASSESSMENT — ACTIVITIES OF DAILY LIVING (ADL)
ADLS_ACUITY_SCORE: 29

## 2022-12-31 NOTE — PROGRESS NOTES
[facility]    Hospitalist Progress Note                   Patient is an 89-year-old female who apparently lives independently.  She was brought in by a friend because of some increasing weakness and confusion.  Friend felt that she was a lot slower than her usual responses.  She does have a history of chronic persistent atrial fibrillation.  Unclear if she has been compliant with her medications.  Upon arrival her heart rate was 180s A. fib blood pressure 115-122 systolic.  Afebrile sats are 92% room air.  Labs her creatinine was up a little bit at 1.24 BUN was 47 normals in the 20 range electrolytes were normal CRP was 7.5 BNP was quite high at 33,000 troponin was 32 TSH 1.08 white count was 9400.  Hemoglobin was 18.6 COVID influenza negative dig level was less than 0.4.  Which she is supposed to be on for her rate control.  Chest x-ray was unremarkable.  She was given a small bolus of fluid.  Was given a dose of IV diltiazem which dropped her pressures to less than 100.  Is already given 2 doses.  Rate was still up she was given 2 doses of IV digoxin 0.25 mg.  And admitted to the ICU.     1.  Atrial fibrillation rapid ventricular response: Patient does have known chronic A. fib.  At times not sure clear if she is compliant with her medications.  She is only on digoxin for rate control.  Is only on aspirin also for deep stroke prophylaxis given multiple falls in the past.  An echocardiogram done in 2019 showed basically normal systolic function.  Currently heart rates in the 100-120 range.  Blood pressure stable 100-135 systolic range.  We will finish up a load of the digoxin in the past she has been well controlled with that if need be can add a little bit of beta-blocker we will repeat her echocardiogram today.  Her oxygenation remains normal on room air.  Her echo does show a reduced systolic function EF from 35 to 40% globally so.  Does have mild aortic stenosis and severe left atrial enlargement.   I do not have an echo to compare this to but secondhand previous EF's have been 55% several years ago.  We will give her 500 mcg of digoxin now just to get her rate under control with ambulation she does jump up pretty quickly which she did in the past also.  But was doing okay on just the diltiazem so we will get her completely loaded and go from there.  -12/31: We will order bump on digoxin heart rate usually less than 100 but does go up with exertion we will add some beta-blocker on top of this continue with her usual oral dose of digoxin.  She has not been a candidate for anticoagulation from previous discussions.  She is on aspirin at this time.  Hemodynamically stable however.     2.  Question encephalopathy: Patient apparently was little more confused than usual per her family but no signs of infection.  CT unremarkable neurologically stable.  We will follow at this point seems to be better.  She is a little confused for both me and the nursing staff.  We will follow her closely at this time.  This afternoon she is actually doing better at this time able to have a good conversation with me seems relatively alert and with it so is improved to go for a walk did get pretty fatigued after short distance but at least started.  -12/31: Mental status wise she seems to be a bit better.  Still is a little fuzzy on the edges.  We will see how she does over the next day or so in terms of ability to return home.  To get up and ambulate.     3.  Elevated BNP: She has been voiding a significant amount on her own about a liter.  Initially thought she may be a bit on the dry side of things but perhaps not at this time we will see how she does on her own without giving her any further fluid as she is hemodynamically stable.  As above her echo does show reduction in systolic function compared to several years ago.  Likely this is due to her persistent A. fib there is no focal wall motion abnormalities.  She is voiding  adequately on her own.  Normally she is on some Lasix but I will withhold that for now.  -12/31: Echo did show decreased systolic function a fair amount from previously she has been voiding on her own without any Lasix significantly did have a lot of urinary retention has been straight cath on several occasions.  If she needs it again would place a Garcia catheter and leave it in place.      4.  Acute kidney injury:.  Creatinine are improved from yesterday 35/1.11.  Monitor closely.  -12/31: BUN/creatinine have continued to improve as patient is sort of auto diuresing.  26 and 0.87 today.    5.  Disposition: We need to get the patient up mobilizing her once again today we will be adding the beta-blocker     Diet: Combination Diet Low Saturated Fat Na <2400mg Diet  Fluids: None    DVT Prophylaxis: Heparin SQ  Code Status: No CPR- Do NOT Intubate    Disposition: Expected discharge in 2-3 days once safe disposition is found.    Markel Ricardo MD    Interval History   No big issues.  Patient is awoken easily she is very alert and pleasant denies any current pains or problems just fairly weak.  No current nausea at all.  No chest pains.  Heart rate A. fib less than 100.  I decided to add a little bit of oral beta-blocker low-dose Toprol-XL in addition to the dig and see if that helps with her exercise heart rate.  Otherwise no fevers having some difficulty voiding with postvoid residual if continues to be unable to void will need Garcia catheter placed unfortunately.    -Data reviewed today: I reviewed all new labs and imaging results over the last 24 hours. I personally reviewed no images or EKG's today.    Physical Exam   Temp: 97.5  F (36.4  C) Temp src: Tympanic BP: 114/84 Pulse: 100   Resp: 22 SpO2: 92 % O2 Device: None (Room air)    Vitals:    12/29/22 2047 12/30/22 0048 12/31/22 0513   Weight: 52.6 kg (116 lb) 55.2 kg (121 lb 11.1 oz) 52.6 kg (115 lb 15.4 oz)     Vital Signs with Ranges  Temp:  [97.4  F (36.3   C)-99.4  F (37.4  C)] 97.5  F (36.4  C)  Pulse:  [] 100  Resp:  [0-108] 22  BP: (108-151)/() 114/84  SpO2:  [74 %-96 %] 92 %  I/O last 3 completed shifts:  In: 360 [P.O.:360]  Out: 2000 [Urine:2000]    Peripheral IV 12/29/22 Left Upper forearm (Active)   Site Assessment L 12/31/22 0400   Line Status Saline locked;Checked every 1-2 hour 12/31/22 0400   Number of days: 2       Peripheral IV 12/30/22 Anterior;Distal;Left Lower forearm (Active)   Site Assessment [facility] 12/31/22 0400   Line Status Saline locked;Checked every 1-2 hour 12/31/22 0400   Number of days: 1       Incision/Surgical Site 08/22/18 Right Wrist (Active)   Number of days: 1592     No line/device    Constitutional: Alert and oriented x3. No distress    HEENT: Normocephalic/atraumatic, PERRL, EOMI, mouth moist, neck supple, no LN.     Cardiovascular:IRRR. no Murmur, no  rubs, or gallops.  JVD no.  Bruits no.  Pulses 2    Respiratory: Clear to auscultation bilaterally    Abdomen: Soft, nontender, nondistended, no organomegaly. Bowel sounds present    Extremities: Warm/dry. No edema    Neuro:   Non focal, cranial nerves intact, Moves all extremities.  Medications     Reason anticoagulant not prescribed for atrial fibrillation         heparin ANTICOAGULANT  5,000 Units Subcutaneous Q8H     magnesium oxide  400 mg Oral Q4H     sodium chloride (PF)  3 mL Intracatheter Q8H       Data   Recent Labs   Lab 12/31/22  0510 12/30/22  0657 12/30/22  0106 12/29/22  2106   WBC 11.3* 8.1  --  9.4   HGB 16.2* 13.4  --  14.6   MCV 94 96  --  94    205  --  232   INR  --   --   --  1.31*    140  --  139   POTASSIUM 4.0 4.2  --  4.0   CHLORIDE 100 103  --  101   CO2 24 27  --  23   BUN 26.4* 35.2*  --  47.1*   CR 0.87 1.11*  --  1.24*   ANIONGAP 13 10  --  15   EFFIE 9.3 8.8  --  9.5   * 107* 111* 146*   ALBUMIN  --   --   --  4.0   PROTTOTAL  --   --   --  6.1*   BILITOTAL  --   --   --  0.6   ALKPHOS  --   --   --  46   ALT  --   --   --   17   AST  --   --   --  20       Recent Results (from the past 24 hour(s))   Echocardiogram Complete   Result Value    LVEF  35-40% (moderately reduced)    [facility]    [id number]  [id number]  [id number]  [id number]^DANY^LUPE     [facility]  Echocardiography Laboratory  [address] [address]     Name: GONZALEZ BAUTISTA  MRN: [id number]  : 1933  Study Date: 2022 07:06 AM  Age: 89 yrs  Gender: Female  Patient Location: Psychiatric  Reason For Study: Atrial Fibrillation  History: Afib  Ordering Physician: LUPE SAENZ  Performed By: Cira Beaver RDCS     BSA: 1.6 m2  Height: 63 in  Weight: 121 lb  ______________________________________________________________________________  Procedure  Complete Portable Echo Adult. The patient's rhythm is atrial fibrillation.  ______________________________________________________________________________  Interpretation Summary  Left ventricular function is decreased. The ejection fraction is 35-40%  (moderately reduced).  Mild to moderate diffuse hypokinesis is present.  The right ventricle is normal size.  Global right ventricular function is normal.  Severe left atrial enlargement is present.  Mild mitral insufficiency is present.  Mild aortic valve calcification is present.  Mild aortic stenosis is present.  The mean gradient across the aortic valve is17 mmHg.  Trace tricuspid insufficiency is present.  Mild pulmonic insufficiency is present.  Previous study not available for comparison.  ______________________________________________________________________________  Left Ventricle  Left ventricular function is decreased. The ejection fraction is 35-40%  (moderately reduced). Mild to moderate diffuse hypokinesis is present.     Right Ventricle  The right ventricle is normal size. Global right ventricular function is  normal.     Atria  Severe left atrial enlargement is present.     Mitral Valve  Mild mitral insufficiency is present.      Aortic Valve  Mild aortic valve calcification is present. Mild aortic stenosis is present.  The mean AoV pressure gradient is 17.0 mmHg. The peak AoV pressure gradient is  32.5 mmHg. The peak aortic velocity is 2.85 m/sec. The mean gradient across  the aortic valve is17 mmHg.     Tricuspid Valve  Trace tricuspid insufficiency is present.     Pulmonic Valve  Mild pulmonic insufficiency is present.     Vessels  Ascending aorta 3.13 cm.     Pericardium  No pericardial effusion is present.     Compared to Previous Study  Previous study not available for comparison.     ______________________________________________________________________________  MMode/2D Measurements & Calculations  IVSd: 1.1 cm  LVIDd: 4.7 cm  LVIDs: 4.0 cm  LVPWd: 1.1 cm     FS: 14.3 %  LV mass(C)d: 174.6 grams  LV mass(C)dI: 111.8 grams/m2  Ao root diam: 3.4 cm  LA dimension: 4.2 cm  asc Aorta Diam: 3.1 cm  LA/Ao: 1.3  LVOT diam: 2.0 cm  LVOT area: 3.2 cm2  LA Volume Indexed (AL/bp): 60.1 ml/m2  RWT: 0.45     Doppler Measurements & Calculations  Ao V2 max: 285.0 cm/sec  Ao max P.5 mmHg  Ao V2 mean: 193.0 cm/sec  Ao mean P.0 mmHg  Ao V2 VTI: 43.9 cm  KOJO(I,D): 0.88 cm2  KOJO(V,D): 1.1 cm2     LV V1 max PG: 3.9 mmHg  LV V1 max: 99.0 cm/sec  LV V1 VTI: 12.1 cm  SV(LVOT): 38.5 ml  SI(LVOT): 24.7 ml/m2  AV Tavo Ratio (DI): 0.35  KOJO Index (cm2/m2): 0.56     ______________________________________________________________________________  Report approved by: Michelle Corral 2022 11:17 AM

## 2022-12-31 NOTE — PROGRESS NOTES
12/31/22 1128   Appointment Info   Signing Clinician's Name / Credentials (PT) Delaney Turcios DPT   Interventions   Interventions Quick Adds Therapeutic Activity   Therapeutic Activity   Therapeutic Activities: dynamic activities to improve functional performance Minutes ([id number]) 23   Symptoms Noted During/After Treatment Fatigue   Treatment Detail/Skilled Intervention Pt up in chair upon PT arrival, agreeable to PT.  Reports she continues to feel tired.  Pt's HR at rest prior to activity in the 90s.  ICU RN monitoring telemetry during activity.  Pt transferred sit>stand CGA from chair.  Pt ambulated 90' with FWW CGA before RN instructed PT to have pt rest due to HR increasing into 160s, pt denies symptoms but also requests seated rest break due to general fatigue.  After seated rest break, HR returned to 104.  Pt transferred sit>stand CGA from w/c, able to make 180 degree turns with FWW and ambulated additional 90' CGA, HR in 140s with activity.  Discussed discharge and pt feels she has enough support from family and friends to return home.  Pt left sitting up in chair with call light in reach and clip alarm on.   PT Discharge Planning   PT Plan Progress mobility as tolerated, trial stairs   PT Discharge Recommendation (DC Rec) home with assist;home with home care physical therapy   PT Rationale for DC Rec Pt tolerating activity well, is more or less housebound at baseline, states she doesn't get out much.  Pt tolerating household level of activity.  Recommend home with continued assist and possible home PT services to improve mobility.   PT Brief overview of current status Pt up in chair upon PT arrival, agreeable to PT.  Reports she continues to feel tired.  Pt's HR at rest prior to activity in the 90s.  ICU RN monitoring telemetry during activity.  Pt transferred sit>stand CGA from chair.  Pt ambulated 90' with FWW CGA before RN instructed PT to have pt rest due to HR increasing into 160s, pt denies symptoms  but also requests seated rest break due to general fatigue.  After seated rest break, HR returned to 104.  Pt transferred sit>stand CGA from w/c, able to make 180 degree turns with FWW and ambulated additional 90' CGA, HR in 140s with activity.  Discussed discharge and pt feels she has enough support from family and friends to return home.  Pt left sitting up in chair with call light in reach and clip alarm on.   Total Session Time   Timed Code Treatment Minutes 23   Total Session Time (sum of timed and untimed services) 23

## 2023-01-01 ENCOUNTER — PATIENT OUTREACH (OUTPATIENT)
Dept: FAMILY MEDICINE | Facility: OTHER | Age: 88
End: 2023-01-01
Payer: MEDICARE

## 2023-01-01 ENCOUNTER — HOSPITAL ENCOUNTER (OUTPATIENT)
Dept: ULTRASOUND IMAGING | Facility: HOSPITAL | Age: 88
Discharge: HOME | End: 2023-10-31
Attending: NURSE PRACTITIONER
Payer: MEDICARE

## 2023-01-01 ENCOUNTER — MEDICAL CORRESPONDENCE (OUTPATIENT)
Dept: HEALTH INFORMATION MANAGEMENT | Facility: CLINIC | Age: 88
End: 2023-01-01
Payer: MEDICARE

## 2023-01-01 ENCOUNTER — OFFICE VISIT (OUTPATIENT)
Dept: WOUND CARE | Facility: OTHER | Age: 88
End: 2023-01-01
Attending: CLINICAL NURSE SPECIALIST
Payer: MEDICARE

## 2023-01-01 ENCOUNTER — ANCILLARY PROCEDURE (OUTPATIENT)
Dept: GENERAL RADIOLOGY | Facility: OTHER | Age: 88
End: 2023-01-01
Attending: NURSE PRACTITIONER
Payer: MEDICARE

## 2023-01-01 ENCOUNTER — APPOINTMENT (OUTPATIENT)
Dept: GENERAL RADIOLOGY | Facility: HOSPITAL | Age: 88
End: 2023-01-01
Attending: STUDENT IN AN ORGANIZED HEALTH CARE EDUCATION/TRAINING PROGRAM
Payer: MEDICARE

## 2023-01-01 ENCOUNTER — TELEPHONE (OUTPATIENT)
Dept: WOUND CARE | Facility: OTHER | Age: 88
End: 2023-01-01

## 2023-01-01 ENCOUNTER — APPOINTMENT (OUTPATIENT)
Dept: OCCUPATIONAL THERAPY | Facility: OTHER | Age: 88
DRG: 309 | End: 2023-01-01
Attending: FAMILY MEDICINE
Payer: MEDICARE

## 2023-01-01 ENCOUNTER — APPOINTMENT (OUTPATIENT)
Dept: CT IMAGING | Facility: HOSPITAL | Age: 88
End: 2023-01-01
Attending: STUDENT IN AN ORGANIZED HEALTH CARE EDUCATION/TRAINING PROGRAM
Payer: MEDICARE

## 2023-01-01 ENCOUNTER — OFFICE VISIT (OUTPATIENT)
Dept: WOUND CARE | Facility: OTHER | Age: 88
End: 2023-01-01
Attending: NURSE PRACTITIONER
Payer: MEDICARE

## 2023-01-01 ENCOUNTER — APPOINTMENT (OUTPATIENT)
Dept: OCCUPATIONAL THERAPY | Facility: OTHER | Age: 88
DRG: 309 | End: 2023-01-01
Payer: MEDICARE

## 2023-01-01 ENCOUNTER — APPOINTMENT (OUTPATIENT)
Dept: CT IMAGING | Facility: HOSPITAL | Age: 88
End: 2023-01-01
Attending: NURSE PRACTITIONER
Payer: MEDICARE

## 2023-01-01 ENCOUNTER — TRANSFERRED RECORDS (OUTPATIENT)
Dept: HEALTH INFORMATION MANAGEMENT | Facility: CLINIC | Age: 88
End: 2023-01-01

## 2023-01-01 ENCOUNTER — MEDICAL CORRESPONDENCE (OUTPATIENT)
Dept: HEALTH INFORMATION MANAGEMENT | Facility: HOSPITAL | Age: 88
End: 2023-01-01

## 2023-01-01 ENCOUNTER — APPOINTMENT (OUTPATIENT)
Dept: PHYSICAL THERAPY | Facility: OTHER | Age: 88
DRG: 309 | End: 2023-01-01
Payer: MEDICARE

## 2023-01-01 ENCOUNTER — HOSPITAL ENCOUNTER (EMERGENCY)
Facility: HOSPITAL | Age: 88
Discharge: HOME | End: 2023-10-21
Attending: STUDENT IN AN ORGANIZED HEALTH CARE EDUCATION/TRAINING PROGRAM | Admitting: STUDENT IN AN ORGANIZED HEALTH CARE EDUCATION/TRAINING PROGRAM
Payer: MEDICARE

## 2023-01-01 ENCOUNTER — TRANSCRIBE ORDERS (OUTPATIENT)
Dept: OTHER | Age: 88
End: 2023-01-01

## 2023-01-01 ENCOUNTER — HOSPITAL ENCOUNTER (EMERGENCY)
Facility: HOSPITAL | Age: 88
Discharge: HOME | End: 2023-10-13
Attending: NURSE PRACTITIONER | Admitting: NURSE PRACTITIONER
Payer: MEDICARE

## 2023-01-01 ENCOUNTER — PATIENT OUTREACH (OUTPATIENT)
Dept: FAMILY MEDICINE | Facility: OTHER | Age: 88
End: 2023-01-01

## 2023-01-01 ENCOUNTER — APPOINTMENT (OUTPATIENT)
Dept: CT IMAGING | Facility: OTHER | Age: 88
DRG: 309 | End: 2023-01-01
Attending: FAMILY MEDICINE
Payer: MEDICARE

## 2023-01-01 ENCOUNTER — TELEPHONE (OUTPATIENT)
Dept: VASCULAR SURGERY | Facility: CLINIC | Age: 88
End: 2023-01-01
Payer: MEDICARE

## 2023-01-01 ENCOUNTER — HOSPITAL ENCOUNTER (INPATIENT)
Facility: OTHER | Age: 88
LOS: 4 days | Discharge: NURSING, REHAB OR LONG-TERM CARE | DRG: 309 | End: 2023-12-18
Attending: FAMILY MEDICINE | Admitting: FAMILY MEDICINE
Payer: MEDICARE

## 2023-01-01 ENCOUNTER — TELEPHONE (OUTPATIENT)
Dept: FAMILY MEDICINE | Facility: OTHER | Age: 88
End: 2023-01-01
Payer: MEDICARE

## 2023-01-01 ENCOUNTER — OFFICE VISIT (OUTPATIENT)
Dept: PODIATRY | Facility: OTHER | Age: 88
End: 2023-01-01
Attending: PODIATRIST
Payer: MEDICARE

## 2023-01-01 ENCOUNTER — MEDICAL CORRESPONDENCE (OUTPATIENT)
Dept: HEALTH INFORMATION MANAGEMENT | Facility: CLINIC | Age: 88
End: 2023-01-01

## 2023-01-01 ENCOUNTER — HOSPITAL ENCOUNTER (EMERGENCY)
Facility: HOSPITAL | Age: 88
Discharge: NURSING, REHAB OR LONG-TERM CARE | End: 2023-12-29
Attending: STUDENT IN AN ORGANIZED HEALTH CARE EDUCATION/TRAINING PROGRAM | Admitting: STUDENT IN AN ORGANIZED HEALTH CARE EDUCATION/TRAINING PROGRAM
Payer: MEDICARE

## 2023-01-01 ENCOUNTER — HOSPITAL ENCOUNTER (EMERGENCY)
Facility: HOSPITAL | Age: 88
Discharge: HOME | End: 2023-10-04
Attending: STUDENT IN AN ORGANIZED HEALTH CARE EDUCATION/TRAINING PROGRAM | Admitting: STUDENT IN AN ORGANIZED HEALTH CARE EDUCATION/TRAINING PROGRAM
Payer: MEDICARE

## 2023-01-01 ENCOUNTER — APPOINTMENT (OUTPATIENT)
Dept: PHYSICAL THERAPY | Facility: OTHER | Age: 88
DRG: 309 | End: 2023-01-01
Attending: FAMILY MEDICINE
Payer: MEDICARE

## 2023-01-01 ENCOUNTER — HOSPITAL ENCOUNTER (EMERGENCY)
Facility: HOSPITAL | Age: 88
Discharge: HOME | End: 2023-12-22
Attending: NURSE PRACTITIONER | Admitting: NURSE PRACTITIONER
Payer: MEDICARE

## 2023-01-01 VITALS
DIASTOLIC BLOOD PRESSURE: 70 MMHG | SYSTOLIC BLOOD PRESSURE: 113 MMHG | TEMPERATURE: 98.4 F | HEART RATE: 89 BPM | OXYGEN SATURATION: 96 %

## 2023-01-01 VITALS
HEART RATE: 80 BPM | TEMPERATURE: 98.6 F | DIASTOLIC BLOOD PRESSURE: 67 MMHG | HEIGHT: 59 IN | BODY MASS INDEX: 21.51 KG/M2 | OXYGEN SATURATION: 91 % | WEIGHT: 106.7 LBS | RESPIRATION RATE: 18 BRPM | SYSTOLIC BLOOD PRESSURE: 154 MMHG

## 2023-01-01 VITALS
OXYGEN SATURATION: 92 % | HEART RATE: 106 BPM | RESPIRATION RATE: 18 BRPM | TEMPERATURE: 97.8 F | DIASTOLIC BLOOD PRESSURE: 86 MMHG | SYSTOLIC BLOOD PRESSURE: 111 MMHG

## 2023-01-01 VITALS
TEMPERATURE: 97.9 F | HEART RATE: 100 BPM | DIASTOLIC BLOOD PRESSURE: 73 MMHG | SYSTOLIC BLOOD PRESSURE: 106 MMHG | OXYGEN SATURATION: 90 %

## 2023-01-01 VITALS
OXYGEN SATURATION: 97 % | DIASTOLIC BLOOD PRESSURE: 121 MMHG | TEMPERATURE: 97.6 F | HEART RATE: 93 BPM | RESPIRATION RATE: 20 BRPM | SYSTOLIC BLOOD PRESSURE: 145 MMHG

## 2023-01-01 VITALS
OXYGEN SATURATION: 96 % | SYSTOLIC BLOOD PRESSURE: 125 MMHG | RESPIRATION RATE: 16 BRPM | HEART RATE: 92 BPM | DIASTOLIC BLOOD PRESSURE: 78 MMHG | BODY MASS INDEX: 19.26 KG/M2 | WEIGHT: 108.7 LBS | TEMPERATURE: 97.2 F

## 2023-01-01 VITALS
HEART RATE: 131 BPM | SYSTOLIC BLOOD PRESSURE: 127 MMHG | BODY MASS INDEX: 19.49 KG/M2 | TEMPERATURE: 98.2 F | WEIGHT: 110.01 LBS | OXYGEN SATURATION: 91 % | RESPIRATION RATE: 19 BRPM | DIASTOLIC BLOOD PRESSURE: 80 MMHG

## 2023-01-01 VITALS
OXYGEN SATURATION: 94 % | TEMPERATURE: 97.6 F | DIASTOLIC BLOOD PRESSURE: 74 MMHG | HEART RATE: 70 BPM | SYSTOLIC BLOOD PRESSURE: 118 MMHG

## 2023-01-01 VITALS
SYSTOLIC BLOOD PRESSURE: 116 MMHG | BODY MASS INDEX: 19.49 KG/M2 | HEART RATE: 76 BPM | TEMPERATURE: 97.8 F | WEIGHT: 110 LBS | DIASTOLIC BLOOD PRESSURE: 77 MMHG

## 2023-01-01 VITALS
SYSTOLIC BLOOD PRESSURE: 116 MMHG | OXYGEN SATURATION: 93 % | DIASTOLIC BLOOD PRESSURE: 81 MMHG | HEART RATE: 58 BPM | TEMPERATURE: 97.9 F

## 2023-01-01 VITALS
HEART RATE: 66 BPM | DIASTOLIC BLOOD PRESSURE: 69 MMHG | OXYGEN SATURATION: 91 % | SYSTOLIC BLOOD PRESSURE: 100 MMHG | TEMPERATURE: 97.6 F

## 2023-01-01 VITALS
HEART RATE: 108 BPM | SYSTOLIC BLOOD PRESSURE: 113 MMHG | OXYGEN SATURATION: 97 % | DIASTOLIC BLOOD PRESSURE: 82 MMHG | TEMPERATURE: 97.4 F | RESPIRATION RATE: 20 BRPM

## 2023-01-01 DIAGNOSIS — R79.1 SUPRATHERAPEUTIC INR: ICD-10-CM

## 2023-01-01 DIAGNOSIS — S00.03XA CONTUSION OF SCALP, INITIAL ENCOUNTER: ICD-10-CM

## 2023-01-01 DIAGNOSIS — I83.029 STASIS ULCER OF LEFT LOWER EXTREMITY (H): ICD-10-CM

## 2023-01-01 DIAGNOSIS — R68.89 ABNORMAL ANKLE BRACHIAL INDEX (ABI): ICD-10-CM

## 2023-01-01 DIAGNOSIS — L08.9 WOUND INFECTION: Primary | ICD-10-CM

## 2023-01-01 DIAGNOSIS — I69.398 OTHER SEQUELAE OF CEREBRAL INFARCTION: ICD-10-CM

## 2023-01-01 DIAGNOSIS — L97.929 STASIS ULCER OF LEFT LOWER EXTREMITY (H): ICD-10-CM

## 2023-01-01 DIAGNOSIS — R60.0 BILATERAL LOWER EXTREMITY EDEMA: ICD-10-CM

## 2023-01-01 DIAGNOSIS — N17.9 ACUTE KIDNEY INJURY (H): ICD-10-CM

## 2023-01-01 DIAGNOSIS — I50.22 CHRONIC SYSTOLIC HEART FAILURE (H): ICD-10-CM

## 2023-01-01 DIAGNOSIS — I48.91 ATRIAL FIBRILLATION WITH RVR (H): ICD-10-CM

## 2023-01-01 DIAGNOSIS — I70.235 ATHEROSCLEROSIS OF NATIVE ARTERY OF RIGHT LOWER EXTREMITY WITH ULCERATION OF OTHER PART OF FOOT (H): ICD-10-CM

## 2023-01-01 DIAGNOSIS — S91.105A OPEN WOUND OF LESSER TOE OF LEFT FOOT: ICD-10-CM

## 2023-01-01 DIAGNOSIS — S81.802A OPEN WOUND OF LEFT LOWER LEG, INITIAL ENCOUNTER: ICD-10-CM

## 2023-01-01 DIAGNOSIS — L97.929 STASIS ULCER OF LEFT LOWER EXTREMITY (H): Primary | ICD-10-CM
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
## 2023-01-01 DIAGNOSIS — R53.1 GENERALIZED WEAKNESS: ICD-10-CM

## 2023-01-01 DIAGNOSIS — I63.9 ISCHEMIC STROKE (H): ICD-10-CM
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
## 2023-01-01 DIAGNOSIS — S81.802A OPEN WOUND OF LEFT LOWER LEG, INITIAL ENCOUNTER: Primary | ICD-10-CM

## 2023-01-01 DIAGNOSIS — I83.029 STASIS ULCER OF LEFT LOWER EXTREMITY (H): Primary | ICD-10-CM

## 2023-01-01 DIAGNOSIS — R55 COLLAPSE: ICD-10-CM

## 2023-01-01 DIAGNOSIS — M62.81 MUSCLE WEAKNESS (GENERALIZED): ICD-10-CM

## 2023-01-01 DIAGNOSIS — R09.89 ABSENT PEDAL PULSES: Primary | ICD-10-CM
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
## 2023-01-01 DIAGNOSIS — S91.104A UNSPECIFIED OPEN WOUND OF RIGHT LESSER TOE(S) WITHOUT DAMAGE TO NAIL, INITIAL ENCOUNTER: ICD-10-CM

## 2023-01-01 DIAGNOSIS — L97.922 ULCER OF LEFT LOWER LEG, WITH FAT LAYER EXPOSED (H): Primary | ICD-10-CM

## 2023-01-01 DIAGNOSIS — Z79.01 LONG TERM (CURRENT) USE OF ANTICOAGULANTS: Primary | ICD-10-CM

## 2023-01-01 DIAGNOSIS — L97.516 ULCER OF RIGHT FOOT WITH BONE INVOLVEMENT WITHOUT EVIDENCE OF NECROSIS (H): ICD-10-CM

## 2023-01-01 DIAGNOSIS — L97.922 NONHEALING ULCER OF LEFT LOWER LEG WITH FAT LAYER EXPOSED (H): Primary | ICD-10-CM

## 2023-01-01 DIAGNOSIS — L97.222 NON-PRESSURE CHRONIC ULCER OF LEFT CALF WITH FAT LAYER EXPOSED (H): ICD-10-CM

## 2023-01-01 DIAGNOSIS — S01.80XA OPEN WOUND OF FOREHEAD, INITIAL ENCOUNTER: ICD-10-CM

## 2023-01-01 DIAGNOSIS — Z91.81 PERSONAL HISTORY OF FALL: ICD-10-CM

## 2023-01-01 DIAGNOSIS — S00.03XA SCALP HEMATOMA, INITIAL ENCOUNTER: ICD-10-CM

## 2023-01-01 DIAGNOSIS — Z66 DNAR (DO NOT ATTEMPT RESUSCITATION): ICD-10-CM

## 2023-01-01 DIAGNOSIS — L97.922 ULCER OF LEFT LOWER LEG, WITH FAT LAYER EXPOSED (H): ICD-10-CM

## 2023-01-01 DIAGNOSIS — S80.211A KNEE ABRASION, RIGHT, INITIAL ENCOUNTER: ICD-10-CM
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
## 2023-01-01 DIAGNOSIS — T14.8XXA BRUISING: ICD-10-CM

## 2023-01-01 DIAGNOSIS — I73.9 PERIPHERAL ARTERIAL DISEASE (H): ICD-10-CM

## 2023-01-01 DIAGNOSIS — R68.89 ABNORMAL ANKLE BRACHIAL INDEX: ICD-10-CM

## 2023-01-01 DIAGNOSIS — L60.3 ONYCHODYSTROPHY: ICD-10-CM

## 2023-01-01 DIAGNOSIS — R09.89 ABSENT PEDAL PULSES: ICD-10-CM

## 2023-01-01 DIAGNOSIS — S91.104A UNSPECIFIED OPEN WOUND OF RIGHT LESSER TOE(S) WITHOUT DAMAGE TO NAIL, INITIAL ENCOUNTER: Primary | ICD-10-CM

## 2023-01-01 DIAGNOSIS — E86.0 DEHYDRATION: ICD-10-CM

## 2023-01-01 DIAGNOSIS — T14.8XXA WOUND INFECTION: Primary | ICD-10-CM

## 2023-01-01 LAB
ALBUMIN SERPL BCG-MCNC: 3.7 G/DL (ref 3.5–5.2)
ALBUMIN UR-MCNC: 70 MG/DL
ALP SERPL-CCNC: 65 U/L (ref 40–150)
ALT SERPL W P-5'-P-CCNC: 24 U/L (ref 0–50)
ANION GAP SERPL CALCULATED.3IONS-SCNC: 10 MMOL/L (ref 7–15)
ANION GAP SERPL CALCULATED.3IONS-SCNC: 15 MMOL/L (ref 7–15)
ANION GAP SERPL CALCULATED.3IONS-SCNC: 9 MMOL/L (ref 7–15)
ANION GAP SERPL CALCULATED.3IONS-SCNC: 9 MMOL/L (ref 7–15)
APPEARANCE UR: CLEAR
APTT PPP: 37 SECONDS (ref 22–38)
AST SERPL W P-5'-P-CCNC: 29 U/L (ref 0–45)
ATRIAL RATE - MUSE: 132 BPM
ATRIAL RATE - MUSE: NORMAL BPM
BACTERIA #/AREA URNS HPF: ABNORMAL /HPF
BACTERIA WND CULT: ABNORMAL
BASO+EOS+MONOS # BLD AUTO: NORMAL 10*3/UL
BASO+EOS+MONOS # BLD AUTO: NORMAL 10*3/UL
BASO+EOS+MONOS NFR BLD AUTO: NORMAL %
BASO+EOS+MONOS NFR BLD AUTO: NORMAL %
BASOPHILS # BLD AUTO: 0 10E3/UL (ref 0–0.2)
BASOPHILS # BLD AUTO: 0 10E3/UL (ref 0–0.2)
BASOPHILS # BLD AUTO: 0.1 10E3/UL (ref 0–0.2)
BASOPHILS NFR BLD AUTO: 0 %
BASOPHILS NFR BLD AUTO: 0 %
BASOPHILS NFR BLD AUTO: 1 %
BILIRUB SERPL-MCNC: 0.8 MG/DL
BILIRUB UR QL STRIP: NEGATIVE
BUN SERPL-MCNC: 16.8 MG/DL (ref 8–23)
BUN SERPL-MCNC: 18.9 MG/DL (ref 8–23)
BUN SERPL-MCNC: 24.3 MG/DL (ref 8–23)
BUN SERPL-MCNC: 25.3 MG/DL (ref 8–23)
BUN SERPL-MCNC: 32.5 MG/DL (ref 8–23)
BUN SERPL-MCNC: 58.4 MG/DL (ref 8–23)
CALCIUM SERPL-MCNC: 8.8 MG/DL (ref 8.2–9.6)
CALCIUM SERPL-MCNC: 9 MG/DL (ref 8.8–10.2)
CALCIUM SERPL-MCNC: 9.3 MG/DL (ref 8.2–9.6)
CALCIUM SERPL-MCNC: 9.4 MG/DL (ref 8.2–9.6)
CALCIUM SERPL-MCNC: 9.4 MG/DL (ref 8.2–9.6)
CALCIUM SERPL-MCNC: 9.5 MG/DL (ref 8.2–9.6)
CHLORIDE SERPL-SCNC: 97 MMOL/L (ref 98–107)
CHLORIDE SERPL-SCNC: 97 MMOL/L (ref 98–107)
CHLORIDE SERPL-SCNC: 98 MMOL/L (ref 98–107)
COLOR UR AUTO: YELLOW
CREAT SERPL-MCNC: 0.91 MG/DL (ref 0.51–0.95)
CREAT SERPL-MCNC: 0.98 MG/DL (ref 0.51–0.95)
CREAT SERPL-MCNC: 0.99 MG/DL (ref 0.51–0.95)
CREAT SERPL-MCNC: 1.07 MG/DL (ref 0.51–0.95)
CREAT SERPL-MCNC: 1.09 MG/DL (ref 0.51–0.95)
CREAT SERPL-MCNC: 1.42 MG/DL (ref 0.51–0.95)
DEPRECATED HCO3 PLAS-SCNC: 28 MMOL/L (ref 22–29)
DEPRECATED HCO3 PLAS-SCNC: 29 MMOL/L (ref 22–29)
DEPRECATED HCO3 PLAS-SCNC: 30 MMOL/L (ref 22–29)
DEPRECATED HCO3 PLAS-SCNC: 30 MMOL/L (ref 22–29)
DIASTOLIC BLOOD PRESSURE - MUSE: NORMAL MMHG
DIGOXIN SERPL-MCNC: 1 NG/ML (ref 0.6–2)
DIGOXIN SERPL-MCNC: 1.6 NG/ML (ref 0.6–2)
DIGOXIN SERPL-MCNC: 2.9 NG/ML (ref 0.6–2)
EGFRCR SERPLBLD CKD-EPI 2021: 35 ML/MIN/1.73M2
EGFRCR SERPLBLD CKD-EPI 2021: 48 ML/MIN/1.73M2
EGFRCR SERPLBLD CKD-EPI 2021: 54 ML/MIN/1.73M2
EGFRCR SERPLBLD CKD-EPI 2021: 55 ML/MIN/1.73M2
EGFRCR SERPLBLD CKD-EPI 2021: 60 ML/MIN/1.73M2
EOSINOPHIL # BLD AUTO: 0 10E3/UL (ref 0–0.7)
EOSINOPHIL NFR BLD AUTO: 0 %
ERYTHROCYTE [DISTWIDTH] IN BLOOD BY AUTOMATED COUNT: 13.2 % (ref 10–15)
ERYTHROCYTE [DISTWIDTH] IN BLOOD BY AUTOMATED COUNT: 14.2 % (ref 10–15)
ERYTHROCYTE [DISTWIDTH] IN BLOOD BY AUTOMATED COUNT: 14.2 % (ref 10–15)
ERYTHROCYTE [DISTWIDTH] IN BLOOD BY AUTOMATED COUNT: 14.6 % (ref 10–15)
ERYTHROCYTE [DISTWIDTH] IN BLOOD BY AUTOMATED COUNT: 14.8 % (ref 10–15)
ERYTHROCYTE [DISTWIDTH] IN BLOOD BY AUTOMATED COUNT: 14.8 % (ref 10–15)
ERYTHROCYTE [DISTWIDTH] IN BLOOD BY AUTOMATED COUNT: 15.3 % (ref 10–15)
GFR SERPL CREATININE-BSD FRML MDRD: 49 ML/MIN/1.73M2
GLUCOSE SERPL-MCNC: 101 MG/DL (ref 70–99)
GLUCOSE SERPL-MCNC: 106 MG/DL (ref 70–99)
GLUCOSE SERPL-MCNC: 133 MG/DL (ref 70–99)
GLUCOSE SERPL-MCNC: 149 MG/DL (ref 70–99)
GLUCOSE SERPL-MCNC: 96 MG/DL (ref 70–99)
GLUCOSE SERPL-MCNC: 97 MG/DL (ref 70–99)
GLUCOSE UR STRIP-MCNC: NEGATIVE MG/DL
HCT VFR BLD AUTO: 39.5 % (ref 35–47)
HCT VFR BLD AUTO: 39.6 % (ref 35–47)
HCT VFR BLD AUTO: 41.7 % (ref 35–47)
HCT VFR BLD AUTO: 42.7 % (ref 35–47)
HCT VFR BLD AUTO: 43 % (ref 35–47)
HCT VFR BLD AUTO: 44.8 % (ref 35–47)
HCT VFR BLD AUTO: 48.5 % (ref 35–47)
HGB BLD-MCNC: 12.9 G/DL (ref 11.7–15.7)
HGB BLD-MCNC: 12.9 G/DL (ref 11.7–15.7)
HGB BLD-MCNC: 13.5 G/DL (ref 11.7–15.7)
HGB BLD-MCNC: 14.1 G/DL (ref 11.7–15.7)
HGB BLD-MCNC: 14.6 G/DL (ref 11.7–15.7)
HGB BLD-MCNC: 14.7 G/DL (ref 11.7–15.7)
HGB BLD-MCNC: 16.1 G/DL (ref 11.7–15.7)
HGB UR QL STRIP: NEGATIVE
HOLD SPECIMEN: NORMAL
HYALINE CASTS: 11 /LPF
IMM GRANULOCYTES # BLD: 0 10E3/UL
IMM GRANULOCYTES # BLD: 0.1 10E3/UL
IMM GRANULOCYTES # BLD: 0.1 10E3/UL
IMM GRANULOCYTES NFR BLD: 1 %
INR PPP: 2.3 (ref 0.85–1.15)
INR PPP: 2.48 (ref 0.85–1.15)
INR PPP: 2.63 (ref 0.85–1.15)
INR PPP: 2.85 (ref 0.85–1.15)
INR PPP: 3.03 (ref 0.85–1.15)
INR PPP: 3.43 (ref 0.85–1.15)
INR PPP: 5.8 (ref 0.85–1.15)
INTERPRETATION ECG - MUSE: NORMAL
KETONES UR STRIP-MCNC: NEGATIVE MG/DL
LEUKOCYTE ESTERASE UR QL STRIP: NEGATIVE
LYMPHOCYTES # BLD AUTO: 0.8 10E3/UL (ref 0.8–5.3)
LYMPHOCYTES # BLD AUTO: 0.9 10E3/UL (ref 0.8–5.3)
LYMPHOCYTES # BLD AUTO: 1.5 10E3/UL (ref 0.8–5.3)
LYMPHOCYTES NFR BLD AUTO: 13 %
LYMPHOCYTES NFR BLD AUTO: 15 %
LYMPHOCYTES NFR BLD AUTO: 6 %
MAGNESIUM SERPL-MCNC: 2.1 MG/DL (ref 1.7–2.3)
MCH RBC QN AUTO: 30.4 PG (ref 26.5–33)
MCH RBC QN AUTO: 31 PG (ref 26.5–33)
MCH RBC QN AUTO: 31.2 PG (ref 26.5–33)
MCH RBC QN AUTO: 31.3 PG (ref 26.5–33)
MCH RBC QN AUTO: 31.5 PG (ref 26.5–33)
MCH RBC QN AUTO: 31.6 PG (ref 26.5–33)
MCH RBC QN AUTO: 33 PG (ref 26.5–33)
MCHC RBC AUTO-ENTMCNC: 32.4 G/DL (ref 31.5–36.5)
MCHC RBC AUTO-ENTMCNC: 32.6 G/DL (ref 31.5–36.5)
MCHC RBC AUTO-ENTMCNC: 32.6 G/DL (ref 31.5–36.5)
MCHC RBC AUTO-ENTMCNC: 32.7 G/DL (ref 31.5–36.5)
MCHC RBC AUTO-ENTMCNC: 32.8 G/DL (ref 31.5–36.5)
MCHC RBC AUTO-ENTMCNC: 33.2 G/DL (ref 31.5–36.5)
MCHC RBC AUTO-ENTMCNC: 34.4 G/DL (ref 31.5–36.5)
MCV RBC AUTO: 93 FL (ref 78–100)
MCV RBC AUTO: 95 FL (ref 78–100)
MCV RBC AUTO: 95 FL (ref 78–100)
MCV RBC AUTO: 96 FL (ref 78–100)
MCV RBC AUTO: 97 FL (ref 78–100)
MONOCYTES # BLD AUTO: 0.8 10E3/UL (ref 0–1.3)
MONOCYTES # BLD AUTO: 0.8 10E3/UL (ref 0–1.3)
MONOCYTES # BLD AUTO: 1 10E3/UL (ref 0–1.3)
MONOCYTES NFR BLD AUTO: 11 %
MONOCYTES NFR BLD AUTO: 8 %
MONOCYTES NFR BLD AUTO: 8 %
MUCOUS THREADS #/AREA URNS LPF: PRESENT /LPF
NEUTROPHILS # BLD AUTO: 10.6 10E3/UL (ref 1.6–8.3)
NEUTROPHILS # BLD AUTO: 5.5 10E3/UL (ref 1.6–8.3)
NEUTROPHILS # BLD AUTO: 7.5 10E3/UL (ref 1.6–8.3)
NEUTROPHILS NFR BLD AUTO: 75 %
NEUTROPHILS NFR BLD AUTO: 76 %
NEUTROPHILS NFR BLD AUTO: 84 %
NITRATE UR QL: NEGATIVE
NRBC # BLD AUTO: 0 10E3/UL
NRBC BLD AUTO-RTO: 0 /100
NT-PROBNP SERPL-MCNC: 1498 PG/ML (ref 0–1800)
NT-PROBNP SERPL-MCNC: 2113 PG/ML (ref 0–1800)
P AXIS - MUSE: NORMAL DEGREES
PH UR STRIP: 5.5 [PH] (ref 4.7–8)
PLATELET # BLD AUTO: 239 10E3/UL (ref 150–450)
PLATELET # BLD AUTO: 255 10E3/UL (ref 150–450)
PLATELET # BLD AUTO: 260 10E3/UL (ref 150–450)
PLATELET # BLD AUTO: 281 10E3/UL (ref 150–450)
PLATELET # BLD AUTO: 296 10E3/UL (ref 150–450)
PLATELET # BLD AUTO: 308 10E3/UL (ref 150–450)
PLATELET # BLD AUTO: 310 10E3/UL (ref 150–450)
POTASSIUM SERPL-SCNC: 3.9 MMOL/L (ref 3.4–5.3)
POTASSIUM SERPL-SCNC: 4.1 MMOL/L (ref 3.4–5.3)
POTASSIUM SERPL-SCNC: 4.2 MMOL/L (ref 3.4–5.3)
POTASSIUM SERPL-SCNC: 4.3 MMOL/L (ref 3.4–5.3)
POTASSIUM SERPL-SCNC: 4.4 MMOL/L (ref 3.4–5.3)
POTASSIUM SERPL-SCNC: 4.5 MMOL/L (ref 3.4–5.3)
PR INTERVAL - MUSE: NORMAL MS
PROCALCITONIN SERPL IA-MCNC: 0.18 NG/ML
PROT SERPL-MCNC: 5.9 G/DL (ref 6.4–8.3)
QRS DURATION - MUSE: 72 MS
QRS DURATION - MUSE: 74 MS
QRS DURATION - MUSE: 76 MS
QRS DURATION - MUSE: 78 MS
QT - MUSE: 284 MS
QT - MUSE: 292 MS
QT - MUSE: 298 MS
QT - MUSE: 304 MS
QTC - MUSE: 375 MS
QTC - MUSE: 385 MS
QTC - MUSE: 388 MS
QTC - MUSE: 420 MS
R AXIS - MUSE: -38 DEGREES
R AXIS - MUSE: -41 DEGREES
R AXIS - MUSE: -49 DEGREES
R AXIS - MUSE: -50 DEGREES
RBC # BLD AUTO: 4.12 10E6/UL (ref 3.8–5.2)
RBC # BLD AUTO: 4.13 10E6/UL (ref 3.8–5.2)
RBC # BLD AUTO: 4.29 10E6/UL (ref 3.8–5.2)
RBC # BLD AUTO: 4.46 10E6/UL (ref 3.8–5.2)
RBC # BLD AUTO: 4.55 10E6/UL (ref 3.8–5.2)
RBC # BLD AUTO: 4.81 10E6/UL (ref 3.8–5.2)
RBC # BLD AUTO: 5.1 10E6/UL (ref 3.8–5.2)
RBC URINE: 1 /HPF
SODIUM SERPL-SCNC: 136 MMOL/L (ref 135–145)
SODIUM SERPL-SCNC: 136 MMOL/L (ref 135–145)
SODIUM SERPL-SCNC: 137 MMOL/L (ref 135–145)
SODIUM SERPL-SCNC: 137 MMOL/L (ref 135–145)
SODIUM SERPL-SCNC: 137 MMOL/L (ref 136–145)
SODIUM SERPL-SCNC: 141 MMOL/L (ref 135–145)
SP GR UR STRIP: 1.02 (ref 1–1.03)
SQUAMOUS EPITHELIAL: 0 /HPF
SYSTOLIC BLOOD PRESSURE - MUSE: NORMAL MMHG
T AXIS - MUSE: -33 DEGREES
T AXIS - MUSE: 111 DEGREES
T AXIS - MUSE: 244 DEGREES
T AXIS - MUSE: 34 DEGREES
UROBILINOGEN UR STRIP-MCNC: NORMAL MG/DL
VENTRICULAR RATE- MUSE: 102 BPM
VENTRICULAR RATE- MUSE: 105 BPM
VENTRICULAR RATE- MUSE: 132 BPM
VENTRICULAR RATE- MUSE: 92 BPM
WBC # BLD AUTO: 10.2 10E3/UL (ref 4–11)
WBC # BLD AUTO: 12.6 10E3/UL (ref 4–11)
WBC # BLD AUTO: 12.6 10E3/UL (ref 4–11)
WBC # BLD AUTO: 7.4 10E3/UL (ref 4–11)
WBC # BLD AUTO: 9.6 10E3/UL (ref 4–11)
WBC # BLD AUTO: 9.8 10E3/UL (ref 4–11)
WBC # BLD AUTO: 9.9 10E3/UL (ref 4–11)
WBC URINE: 4 /HPF

## 2023-01-01 PROCEDURE — 90714 TD VACC NO PRESV 7 YRS+ IM: CPT | Performed by: STUDENT IN AN ORGANIZED HEALTH CARE EDUCATION/TRAINING PROGRAM

## 2023-01-01 PROCEDURE — 85027 COMPLETE CBC AUTOMATED: CPT | Performed by: FAMILY MEDICINE

## 2023-01-01 PROCEDURE — 250N000011 HC RX IP 250 OP 636: Performed by: INTERNAL MEDICINE

## 2023-01-01 PROCEDURE — 250N000012 HC RX MED GY IP 250 OP 636 PS 637: Performed by: FAMILY MEDICINE

## 2023-01-01 PROCEDURE — 85610 PROTHROMBIN TIME: CPT | Performed by: STUDENT IN AN ORGANIZED HEALTH CARE EDUCATION/TRAINING PROGRAM

## 2023-01-01 PROCEDURE — 97116 GAIT TRAINING THERAPY: CPT | Mod: GP

## 2023-01-01 PROCEDURE — 73630 X-RAY EXAM OF FOOT: CPT | Mod: TC,RT

## 2023-01-01 PROCEDURE — 250N000013 HC RX MED GY IP 250 OP 250 PS 637: Performed by: INTERNAL MEDICINE

## 2023-01-01 PROCEDURE — 83735 ASSAY OF MAGNESIUM: CPT | Performed by: INTERNAL MEDICINE

## 2023-01-01 PROCEDURE — 83880 ASSAY OF NATRIURETIC PEPTIDE: CPT | Performed by: STUDENT IN AN ORGANIZED HEALTH CARE EDUCATION/TRAINING PROGRAM

## 2023-01-01 PROCEDURE — G0463 HOSPITAL OUTPT CLINIC VISIT: HCPCS | Mod: 25

## 2023-01-01 PROCEDURE — 97530 THERAPEUTIC ACTIVITIES: CPT | Mod: GO | Performed by: OCCUPATIONAL THERAPIST

## 2023-01-01 PROCEDURE — 96376 TX/PRO/DX INJ SAME DRUG ADON: CPT | Mod: XU | Performed by: FAMILY MEDICINE

## 2023-01-01 PROCEDURE — 85610 PROTHROMBIN TIME: CPT | Performed by: FAMILY MEDICINE

## 2023-01-01 PROCEDURE — 85025 COMPLETE CBC W/AUTO DIFF WBC: CPT | Performed by: FAMILY MEDICINE

## 2023-01-01 PROCEDURE — 80048 BASIC METABOLIC PNL TOTAL CA: CPT | Performed by: FAMILY MEDICINE

## 2023-01-01 PROCEDURE — 99284 EMERGENCY DEPT VISIT MOD MDM: CPT | Performed by: STUDENT IN AN ORGANIZED HEALTH CARE EDUCATION/TRAINING PROGRAM

## 2023-01-01 PROCEDURE — 72125 CT NECK SPINE W/O DYE: CPT | Mod: ME

## 2023-01-01 PROCEDURE — 120N000001 HC R&B MED SURG/OB

## 2023-01-01 PROCEDURE — 36415 COLL VENOUS BLD VENIPUNCTURE: CPT | Performed by: INTERNAL MEDICINE

## 2023-01-01 PROCEDURE — 93005 ELECTROCARDIOGRAM TRACING: CPT

## 2023-01-01 PROCEDURE — 250N000013 HC RX MED GY IP 250 OP 250 PS 637: Performed by: FAMILY MEDICINE

## 2023-01-01 PROCEDURE — 11042 DBRDMT SUBQ TIS 1ST 20SQCM/<: CPT | Performed by: CLINICAL NURSE SPECIALIST

## 2023-01-01 PROCEDURE — 200N000001 HC R&B ICU

## 2023-01-01 PROCEDURE — 99223 1ST HOSP IP/OBS HIGH 75: CPT | Mod: AI | Performed by: FAMILY MEDICINE

## 2023-01-01 PROCEDURE — 85730 THROMBOPLASTIN TIME PARTIAL: CPT | Performed by: FAMILY MEDICINE

## 2023-01-01 PROCEDURE — 80048 BASIC METABOLIC PNL TOTAL CA: CPT | Performed by: INTERNAL MEDICINE

## 2023-01-01 PROCEDURE — 11042 DBRDMT SUBQ TIS 1ST 20SQCM/<: CPT

## 2023-01-01 PROCEDURE — 71046 X-RAY EXAM CHEST 2 VIEWS: CPT

## 2023-01-01 PROCEDURE — 999N000157 HC STATISTIC RCP TIME EA 10 MIN

## 2023-01-01 PROCEDURE — 73562 X-RAY EXAM OF KNEE 3: CPT | Mod: RT

## 2023-01-01 PROCEDURE — 05HB33Z INSERTION OF INFUSION DEVICE INTO RIGHT BASILIC VEIN, PERCUTANEOUS APPROACH: ICD-10-PCS | Performed by: FAMILY MEDICINE

## 2023-01-01 PROCEDURE — 250N000011 HC RX IP 250 OP 636: Mod: JZ | Performed by: FAMILY MEDICINE

## 2023-01-01 PROCEDURE — 96374 THER/PROPH/DIAG INJ IV PUSH: CPT | Mod: XU | Performed by: FAMILY MEDICINE

## 2023-01-01 PROCEDURE — 97165 OT EVAL LOW COMPLEX 30 MIN: CPT | Mod: GO | Performed by: OCCUPATIONAL THERAPIST

## 2023-01-01 PROCEDURE — 36415 COLL VENOUS BLD VENIPUNCTURE: CPT | Performed by: STUDENT IN AN ORGANIZED HEALTH CARE EDUCATION/TRAINING PROGRAM

## 2023-01-01 PROCEDURE — 72125 CT NECK SPINE W/O DYE: CPT | Mod: MA

## 2023-01-01 PROCEDURE — 250N000011 HC RX IP 250 OP 636: Performed by: FAMILY MEDICINE

## 2023-01-01 PROCEDURE — 80162 ASSAY OF DIGOXIN TOTAL: CPT | Performed by: FAMILY MEDICINE

## 2023-01-01 PROCEDURE — 99214 OFFICE O/P EST MOD 30 MIN: CPT | Mod: 25 | Performed by: NURSE PRACTITIONER

## 2023-01-01 PROCEDURE — 99232 SBSQ HOSP IP/OBS MODERATE 35: CPT | Performed by: INTERNAL MEDICINE

## 2023-01-01 PROCEDURE — 250N000009 HC RX 250: Performed by: FAMILY MEDICINE

## 2023-01-01 PROCEDURE — 71260 CT THORAX DX C+: CPT | Mod: MA

## 2023-01-01 PROCEDURE — 70450 CT HEAD/BRAIN W/O DYE: CPT | Mod: ME

## 2023-01-01 PROCEDURE — 93010 ELECTROCARDIOGRAM REPORT: CPT | Performed by: INTERNAL MEDICINE

## 2023-01-01 PROCEDURE — 99285 EMERGENCY DEPT VISIT HI MDM: CPT | Mod: 25

## 2023-01-01 PROCEDURE — 82040 ASSAY OF SERUM ALBUMIN: CPT | Performed by: FAMILY MEDICINE

## 2023-01-01 PROCEDURE — 99233 SBSQ HOSP IP/OBS HIGH 50: CPT | Performed by: FAMILY MEDICINE

## 2023-01-01 PROCEDURE — 11042 DBRDMT SUBQ TIS 1ST 20SQCM/<: CPT | Performed by: NURSE PRACTITIONER

## 2023-01-01 PROCEDURE — 99214 OFFICE O/P EST MOD 30 MIN: CPT | Performed by: NURSE PRACTITIONER

## 2023-01-01 PROCEDURE — 258N000003 HC RX IP 258 OP 636: Performed by: STUDENT IN AN ORGANIZED HEALTH CARE EDUCATION/TRAINING PROGRAM

## 2023-01-01 PROCEDURE — 97530 THERAPEUTIC ACTIVITIES: CPT | Mod: GP

## 2023-01-01 PROCEDURE — 36415 COLL VENOUS BLD VENIPUNCTURE: CPT | Performed by: FAMILY MEDICINE

## 2023-01-01 PROCEDURE — 99284 EMERGENCY DEPT VISIT MOD MDM: CPT | Mod: 25

## 2023-01-01 PROCEDURE — G1010 CDSM STANSON: HCPCS

## 2023-01-01 PROCEDURE — 84145 PROCALCITONIN (PCT): CPT | Performed by: STUDENT IN AN ORGANIZED HEALTH CARE EDUCATION/TRAINING PROGRAM

## 2023-01-01 PROCEDURE — 96361 HYDRATE IV INFUSION ADD-ON: CPT | Performed by: STUDENT IN AN ORGANIZED HEALTH CARE EDUCATION/TRAINING PROGRAM

## 2023-01-01 PROCEDURE — 96375 TX/PRO/DX INJ NEW DRUG ADDON: CPT | Mod: XU | Performed by: FAMILY MEDICINE

## 2023-01-01 PROCEDURE — 85027 COMPLETE CBC AUTOMATED: CPT | Performed by: STUDENT IN AN ORGANIZED HEALTH CARE EDUCATION/TRAINING PROGRAM

## 2023-01-01 PROCEDURE — 99232 SBSQ HOSP IP/OBS MODERATE 35: CPT | Performed by: FAMILY MEDICINE

## 2023-01-01 PROCEDURE — 82310 ASSAY OF CALCIUM: CPT | Performed by: STUDENT IN AN ORGANIZED HEALTH CARE EDUCATION/TRAINING PROGRAM

## 2023-01-01 PROCEDURE — 80048 BASIC METABOLIC PNL TOTAL CA: CPT | Performed by: STUDENT IN AN ORGANIZED HEALTH CARE EDUCATION/TRAINING PROGRAM

## 2023-01-01 PROCEDURE — 93005 ELECTROCARDIOGRAM TRACING: CPT | Performed by: FAMILY MEDICINE

## 2023-01-01 PROCEDURE — 99213 OFFICE O/P EST LOW 20 MIN: CPT | Mod: 25 | Performed by: CLINICAL NURSE SPECIALIST

## 2023-01-01 PROCEDURE — 36410 VNPNXR 3YR/> PHY/QHP DX/THER: CPT | Performed by: FAMILY MEDICINE

## 2023-01-01 PROCEDURE — 97161 PT EVAL LOW COMPLEX 20 MIN: CPT | Mod: GP

## 2023-01-01 PROCEDURE — 71045 X-RAY EXAM CHEST 1 VIEW: CPT

## 2023-01-01 PROCEDURE — 99285 EMERGENCY DEPT VISIT HI MDM: CPT | Performed by: FAMILY MEDICINE

## 2023-01-01 PROCEDURE — 99239 HOSP IP/OBS DSCHRG MGMT >30: CPT | Performed by: FAMILY MEDICINE

## 2023-01-01 PROCEDURE — 99285 EMERGENCY DEPT VISIT HI MDM: CPT | Mod: 25 | Performed by: FAMILY MEDICINE

## 2023-01-01 PROCEDURE — 93010 ELECTROCARDIOGRAM REPORT: CPT | Mod: 77 | Performed by: INTERNAL MEDICINE

## 2023-01-01 PROCEDURE — G0463 HOSPITAL OUTPT CLINIC VISIT: HCPCS

## 2023-01-01 PROCEDURE — 81001 URINALYSIS AUTO W/SCOPE: CPT | Performed by: STUDENT IN AN ORGANIZED HEALTH CARE EDUCATION/TRAINING PROGRAM

## 2023-01-01 PROCEDURE — 250N000011 HC RX IP 250 OP 636: Performed by: STUDENT IN AN ORGANIZED HEALTH CARE EDUCATION/TRAINING PROGRAM

## 2023-01-01 PROCEDURE — 99284 EMERGENCY DEPT VISIT MOD MDM: CPT | Performed by: NURSE PRACTITIONER

## 2023-01-01 PROCEDURE — 93922 UPR/L XTREMITY ART 2 LEVELS: CPT

## 2023-01-01 PROCEDURE — 93005 ELECTROCARDIOGRAM TRACING: CPT | Performed by: STUDENT IN AN ORGANIZED HEALTH CARE EDUCATION/TRAINING PROGRAM

## 2023-01-01 PROCEDURE — 96360 HYDRATION IV INFUSION INIT: CPT | Performed by: STUDENT IN AN ORGANIZED HEALTH CARE EDUCATION/TRAINING PROGRAM

## 2023-01-01 PROCEDURE — 85025 COMPLETE CBC W/AUTO DIFF WBC: CPT | Performed by: STUDENT IN AN ORGANIZED HEALTH CARE EDUCATION/TRAINING PROGRAM

## 2023-01-01 PROCEDURE — 99285 EMERGENCY DEPT VISIT HI MDM: CPT | Mod: 25 | Performed by: STUDENT IN AN ORGANIZED HEALTH CARE EDUCATION/TRAINING PROGRAM

## 2023-01-01 PROCEDURE — 82374 ASSAY BLOOD CARBON DIOXIDE: CPT | Performed by: STUDENT IN AN ORGANIZED HEALTH CARE EDUCATION/TRAINING PROGRAM

## 2023-01-01 PROCEDURE — 258N000003 HC RX IP 258 OP 636: Performed by: FAMILY MEDICINE

## 2023-01-01 PROCEDURE — 99205 OFFICE O/P NEW HI 60 MIN: CPT | Mod: 25 | Performed by: PODIATRIST

## 2023-01-01 PROCEDURE — 85027 COMPLETE CBC AUTOMATED: CPT | Performed by: INTERNAL MEDICINE

## 2023-01-01 PROCEDURE — 87077 CULTURE AEROBIC IDENTIFY: CPT | Mod: ZL | Performed by: NURSE PRACTITIONER

## 2023-01-01 PROCEDURE — 70450 CT HEAD/BRAIN W/O DYE: CPT | Mod: MA

## 2023-01-01 PROCEDURE — 11721 DEBRIDE NAIL 6 OR MORE: CPT | Performed by: PODIATRIST

## 2023-01-01 PROCEDURE — 90471 IMMUNIZATION ADMIN: CPT | Performed by: STUDENT IN AN ORGANIZED HEALTH CARE EDUCATION/TRAINING PROGRAM

## 2023-01-01 RX ORDER — WARFARIN SODIUM 2 MG/1
TABLET ORAL DAILY
Status: ON HOLD | COMMUNITY
Start: 2023-01-01 | End: 2023-01-01

## 2023-01-01 RX ORDER — METOPROLOL TARTRATE 25 MG/1
25 TABLET, FILM COATED ORAL ONCE
Status: COMPLETED | OUTPATIENT
Start: 2023-01-01 | End: 2023-01-01

## 2023-01-01 RX ORDER — TRAMADOL HYDROCHLORIDE 50 MG/1
50 TABLET ORAL EVERY 6 HOURS PRN
Status: DISCONTINUED | OUTPATIENT
Start: 2023-01-01 | End: 2023-01-01 | Stop reason: HOSPADM

## 2023-01-01 RX ORDER — WARFARIN SODIUM 2 MG/1
2 TABLET ORAL DAILY
Status: ON HOLD
Start: 2023-01-01 | End: 2024-01-01

## 2023-01-01 RX ORDER — NALOXONE HYDROCHLORIDE 0.4 MG/ML
0.2 INJECTION, SOLUTION INTRAMUSCULAR; INTRAVENOUS; SUBCUTANEOUS
Status: DISCONTINUED | OUTPATIENT
Start: 2023-01-01 | End: 2023-01-01 | Stop reason: HOSPADM

## 2023-01-01 RX ORDER — ACETAMINOPHEN 325 MG/1
650 TABLET ORAL EVERY 4 HOURS PRN
Status: DISCONTINUED | OUTPATIENT
Start: 2023-01-01 | End: 2023-01-01 | Stop reason: HOSPADM

## 2023-01-01 RX ORDER — TRAMADOL HYDROCHLORIDE 50 MG/1
50 TABLET ORAL 2 TIMES DAILY
Status: ON HOLD | COMMUNITY
Start: 2023-01-01 | End: 2024-01-01

## 2023-01-01 RX ORDER — CALCIUM CARBONATE 500 MG/1
1000 TABLET, CHEWABLE ORAL 4 TIMES DAILY PRN
Status: DISCONTINUED | OUTPATIENT
Start: 2023-01-01 | End: 2023-01-01 | Stop reason: HOSPADM

## 2023-01-01 RX ORDER — ATORVASTATIN CALCIUM 40 MG/1
1 TABLET, FILM COATED ORAL AT BEDTIME
COMMUNITY
Start: 2023-04-19

## 2023-01-01 RX ORDER — THYROID 30 MG/1
30 TABLET ORAL DAILY
Status: DISCONTINUED | OUTPATIENT
Start: 2023-01-01 | End: 2023-01-01 | Stop reason: HOSPADM

## 2023-01-01 RX ORDER — SODIUM CHLORIDE, SODIUM LACTATE, POTASSIUM CHLORIDE, CALCIUM CHLORIDE 600; 310; 30; 20 MG/100ML; MG/100ML; MG/100ML; MG/100ML
INJECTION, SOLUTION INTRAVENOUS CONTINUOUS
Status: DISCONTINUED | OUTPATIENT
Start: 2023-01-01 | End: 2023-01-01

## 2023-01-01 RX ORDER — POLYETHYLENE GLYCOL 3350 17 G/17G
17 POWDER, FOR SOLUTION ORAL DAILY
Status: DISCONTINUED | OUTPATIENT
Start: 2023-01-01 | End: 2023-01-01 | Stop reason: HOSPADM

## 2023-01-01 RX ORDER — PREDNISONE 5 MG/1
5 TABLET ORAL EVERY MORNING
COMMUNITY
Start: 2023-01-01

## 2023-01-01 RX ORDER — DIGOXIN 0.25 MG/ML
250 INJECTION INTRAMUSCULAR; INTRAVENOUS ONCE
Status: COMPLETED | OUTPATIENT
Start: 2023-01-01 | End: 2023-01-01

## 2023-01-01 RX ORDER — LIDOCAINE HYDROCHLORIDE 10 MG/ML
5 INJECTION, SOLUTION INFILTRATION; PERINEURAL ONCE
Status: DISCONTINUED | OUTPATIENT
Start: 2023-01-01 | End: 2023-01-01 | Stop reason: HOSPADM

## 2023-01-01 RX ORDER — LIDOCAINE 40 MG/G
CREAM TOPICAL
Status: DISCONTINUED | OUTPATIENT
Start: 2023-01-01 | End: 2023-01-01 | Stop reason: HOSPADM

## 2023-01-01 RX ORDER — METOPROLOL TARTRATE 1 MG/ML
2.5 INJECTION, SOLUTION INTRAVENOUS
Status: DISCONTINUED | OUTPATIENT
Start: 2023-01-01 | End: 2023-01-01

## 2023-01-01 RX ORDER — ONDANSETRON 2 MG/ML
4 INJECTION INTRAMUSCULAR; INTRAVENOUS EVERY 6 HOURS PRN
Status: DISCONTINUED | OUTPATIENT
Start: 2023-01-01 | End: 2023-01-01 | Stop reason: HOSPADM

## 2023-01-01 RX ORDER — WARFARIN SODIUM 2 MG/1
2 TABLET ORAL ONCE
Qty: 1 TABLET | Refills: 0 | Status: COMPLETED | OUTPATIENT
Start: 2023-01-01 | End: 2023-01-01

## 2023-01-01 RX ORDER — DIGOXIN 0.25 MG/ML
250 INJECTION INTRAMUSCULAR; INTRAVENOUS EVERY 6 HOURS
Status: DISCONTINUED | OUTPATIENT
Start: 2023-01-01 | End: 2023-01-01

## 2023-01-01 RX ORDER — FUROSEMIDE 20 MG
20 TABLET ORAL DAILY
Status: DISCONTINUED | OUTPATIENT
Start: 2023-01-01 | End: 2023-01-01 | Stop reason: HOSPADM

## 2023-01-01 RX ORDER — IOPAMIDOL 755 MG/ML
64 INJECTION, SOLUTION INTRAVASCULAR ONCE
Status: COMPLETED | OUTPATIENT
Start: 2023-01-01 | End: 2023-01-01

## 2023-01-01 RX ORDER — WARFARIN SODIUM 2 MG/1
2 TABLET ORAL
Status: COMPLETED | OUTPATIENT
Start: 2023-01-01 | End: 2023-01-01

## 2023-01-01 RX ORDER — FENTANYL CITRATE 50 UG/ML
25 INJECTION, SOLUTION INTRAMUSCULAR; INTRAVENOUS
Status: DISCONTINUED | OUTPATIENT
Start: 2023-01-01 | End: 2023-01-01

## 2023-01-01 RX ORDER — NALOXONE HYDROCHLORIDE 0.4 MG/ML
0.4 INJECTION, SOLUTION INTRAMUSCULAR; INTRAVENOUS; SUBCUTANEOUS
Status: DISCONTINUED | OUTPATIENT
Start: 2023-01-01 | End: 2023-01-01 | Stop reason: HOSPADM

## 2023-01-01 RX ORDER — SENNOSIDES 8.6 MG
650 CAPSULE ORAL EVERY 4 HOURS PRN
Status: ON HOLD | COMMUNITY
End: 2024-01-01

## 2023-01-01 RX ORDER — AMOXICILLIN 250 MG
1 CAPSULE ORAL 2 TIMES DAILY PRN
Status: DISCONTINUED | OUTPATIENT
Start: 2023-01-01 | End: 2023-01-01 | Stop reason: HOSPADM

## 2023-01-01 RX ORDER — WARFARIN SODIUM 4 MG/1
4 TABLET ORAL
Status: COMPLETED | OUTPATIENT
Start: 2023-01-01 | End: 2023-01-01

## 2023-01-01 RX ORDER — ONDANSETRON 4 MG/1
4 TABLET, ORALLY DISINTEGRATING ORAL EVERY 6 HOURS PRN
Status: DISCONTINUED | OUTPATIENT
Start: 2023-01-01 | End: 2023-01-01 | Stop reason: HOSPADM

## 2023-01-01 RX ORDER — BISACODYL 10 MG
10 SUPPOSITORY, RECTAL RECTAL DAILY PRN
Status: DISCONTINUED | OUTPATIENT
Start: 2023-01-01 | End: 2023-01-01 | Stop reason: HOSPADM

## 2023-01-01 RX ORDER — MULTIVIT-MIN/FA/LYCOPEN/LUTEIN .4-300-25
TABLET ORAL
Status: ON HOLD | COMMUNITY
Start: 2023-01-01 | End: 2023-01-01

## 2023-01-01 RX ORDER — ATORVASTATIN CALCIUM 40 MG/1
40 TABLET, FILM COATED ORAL AT BEDTIME
Status: DISCONTINUED | OUTPATIENT
Start: 2023-01-01 | End: 2023-01-01 | Stop reason: HOSPADM

## 2023-01-01 RX ORDER — DIGOXIN 0.25 MG/ML
500 INJECTION INTRAMUSCULAR; INTRAVENOUS ONCE
Status: DISCONTINUED | OUTPATIENT
Start: 2023-01-01 | End: 2023-01-01

## 2023-01-01 RX ORDER — METOPROLOL SUCCINATE 50 MG/1
50 TABLET, EXTENDED RELEASE ORAL EVERY EVENING
Status: DISCONTINUED | OUTPATIENT
Start: 2023-01-01 | End: 2023-01-01 | Stop reason: HOSPADM

## 2023-01-01 RX ORDER — PREDNISONE 5 MG/1
5 TABLET ORAL DAILY
Status: DISCONTINUED | OUTPATIENT
Start: 2023-01-01 | End: 2023-01-01 | Stop reason: HOSPADM

## 2023-01-01 RX ORDER — METOPROLOL SUCCINATE 50 MG/1
50 TABLET, EXTENDED RELEASE ORAL DAILY
Status: DISCONTINUED | OUTPATIENT
Start: 2023-01-01 | End: 2023-01-04 | Stop reason: HOSPADM

## 2023-01-01 RX ORDER — WARFARIN SODIUM 2 MG/1
TABLET ORAL
Status: ON HOLD | COMMUNITY
End: 2023-01-01

## 2023-01-01 RX ORDER — HEPARIN SODIUM 5000 [USP'U]/.5ML
5000 INJECTION, SOLUTION INTRAVENOUS; SUBCUTANEOUS 2 TIMES DAILY
Status: DISCONTINUED | OUTPATIENT
Start: 2023-01-01 | End: 2023-01-04 | Stop reason: HOSPADM

## 2023-01-01 RX ORDER — CLINDAMYCIN HCL 300 MG
300 CAPSULE ORAL 4 TIMES DAILY
Qty: 28 CAPSULE | Refills: 0 | Status: SHIPPED | OUTPATIENT
Start: 2023-01-01 | End: 2023-01-01

## 2023-01-01 RX ORDER — ACETAMINOPHEN 650 MG/1
650 SUPPOSITORY RECTAL EVERY 4 HOURS PRN
Status: DISCONTINUED | OUTPATIENT
Start: 2023-01-01 | End: 2023-01-01 | Stop reason: HOSPADM

## 2023-01-01 RX ORDER — DIGOXIN 0.25 MG/ML
125 INJECTION INTRAMUSCULAR; INTRAVENOUS EVERY 6 HOURS
Status: COMPLETED | OUTPATIENT
Start: 2023-01-01 | End: 2023-01-01

## 2023-01-01 RX ORDER — HYDROMORPHONE HCL IN WATER/PF 6 MG/30 ML
0.2 PATIENT CONTROLLED ANALGESIA SYRINGE INTRAVENOUS
Status: DISCONTINUED | OUTPATIENT
Start: 2023-01-01 | End: 2023-01-01 | Stop reason: HOSPADM

## 2023-01-01 RX ORDER — WARFARIN SODIUM 4 MG/1
4 TABLET ORAL EVERY OTHER DAY
Status: ON HOLD | COMMUNITY
Start: 2023-01-01 | End: 2023-01-01

## 2023-01-01 RX ORDER — LORAZEPAM 0.5 MG/1
0.5 TABLET ORAL AT BEDTIME
Status: DISCONTINUED | OUTPATIENT
Start: 2023-01-01 | End: 2023-01-01 | Stop reason: HOSPADM

## 2023-01-01 RX ORDER — PANTOPRAZOLE SODIUM 40 MG/1
40 TABLET, DELAYED RELEASE ORAL DAILY
Status: DISCONTINUED | OUTPATIENT
Start: 2023-01-01 | End: 2023-01-01 | Stop reason: HOSPADM

## 2023-01-01 RX ORDER — TETANUS AND DIPHTHERIA TOXOIDS ADSORBED 2; 2 [LF]/.5ML; [LF]/.5ML
0.5 INJECTION INTRAMUSCULAR ONCE
Status: COMPLETED | OUTPATIENT
Start: 2023-01-01 | End: 2023-01-01

## 2023-01-01 RX ORDER — POLYETHYLENE GLYCOL 3350 17 G/17G
17 POWDER, FOR SOLUTION ORAL EVERY MORNING
COMMUNITY
Start: 2023-01-01

## 2023-01-01 RX ADMIN — HEPARIN SODIUM 5000 UNITS: 5000 INJECTION, SOLUTION INTRAVENOUS; SUBCUTANEOUS at 01:58

## 2023-01-01 RX ADMIN — LORAZEPAM 0.5 MG: 0.5 TABLET ORAL at 21:36

## 2023-01-01 RX ADMIN — DIGOXIN 62.5 MCG: 125 TABLET ORAL at 09:44

## 2023-01-01 RX ADMIN — METOPROLOL SUCCINATE 25 MG: 25 TABLET, EXTENDED RELEASE ORAL at 08:10

## 2023-01-01 RX ADMIN — METOPROLOL SUCCINATE 50 MG: 50 TABLET, EXTENDED RELEASE ORAL at 21:02

## 2023-01-01 RX ADMIN — TRAMADOL HYDROCHLORIDE 50 MG: 50 TABLET, COATED ORAL at 19:06

## 2023-01-01 RX ADMIN — LORAZEPAM 0.5 MG: 0.5 TABLET ORAL at 21:02

## 2023-01-01 RX ADMIN — DIGOXIN 62.5 MCG: 125 TABLET ORAL at 09:51

## 2023-01-01 RX ADMIN — LEVOTHYROXINE, LIOTHYRONINE 30 MG: 19; 4.5 TABLET ORAL at 09:51

## 2023-01-01 RX ADMIN — HEPARIN SODIUM 5000 UNITS: 5000 INJECTION, SOLUTION INTRAVENOUS; SUBCUTANEOUS at 22:47

## 2023-01-01 RX ADMIN — PANTOPRAZOLE SODIUM 40 MG: 40 TABLET, DELAYED RELEASE ORAL at 09:50

## 2023-01-01 RX ADMIN — METOPROLOL SUCCINATE 50 MG: 50 TABLET, EXTENDED RELEASE ORAL at 21:36

## 2023-01-01 RX ADMIN — PANTOPRAZOLE SODIUM 40 MG: 40 TABLET, DELAYED RELEASE ORAL at 09:04

## 2023-01-01 RX ADMIN — DIGOXIN 62.5 MCG: 125 TABLET ORAL at 10:50

## 2023-01-01 RX ADMIN — PREDNISONE 5 MG: 5 TABLET ORAL at 10:44

## 2023-01-01 RX ADMIN — PREDNISONE 5 MG: 5 TABLET ORAL at 09:32

## 2023-01-01 RX ADMIN — TRAMADOL HYDROCHLORIDE 50 MG: 50 TABLET, COATED ORAL at 12:35

## 2023-01-01 RX ADMIN — DIGOXIN 125 MCG: 0.25 INJECTION INTRAMUSCULAR; INTRAVENOUS at 21:38

## 2023-01-01 RX ADMIN — METOPROLOL TARTRATE 2.5 MG: 5 INJECTION INTRAVENOUS at 14:47

## 2023-01-01 RX ADMIN — LEVOTHYROXINE, LIOTHYRONINE 30 MG: 19; 4.5 TABLET ORAL at 10:27

## 2023-01-01 RX ADMIN — POLYETHYLENE GLYCOL 3350 17 G: 17 POWDER, FOR SOLUTION ORAL at 09:04

## 2023-01-01 RX ADMIN — TRAMADOL HYDROCHLORIDE 50 MG: 50 TABLET, COATED ORAL at 11:14

## 2023-01-01 RX ADMIN — TETANUS AND DIPHTHERIA TOXOIDS ADSORBED 0.5 ML: 2; 2 INJECTION INTRAMUSCULAR at 15:14

## 2023-01-01 RX ADMIN — ATORVASTATIN CALCIUM 40 MG: 40 TABLET, FILM COATED ORAL at 21:02

## 2023-01-01 RX ADMIN — DIGOXIN 62.5 MCG: 125 TABLET ORAL at 09:52

## 2023-01-01 RX ADMIN — DIGOXIN 125 MCG: 125 TABLET ORAL at 08:10

## 2023-01-01 RX ADMIN — LORAZEPAM 0.5 MG: 0.5 TABLET ORAL at 22:01

## 2023-01-01 RX ADMIN — FUROSEMIDE 20 MG: 20 TABLET ORAL at 09:04

## 2023-01-01 RX ADMIN — METOPROLOL TARTRATE 2.5 MG: 5 INJECTION INTRAVENOUS at 13:16

## 2023-01-01 RX ADMIN — POLYETHYLENE GLYCOL 3350 17 G: 17 POWDER, FOR SOLUTION ORAL at 09:51

## 2023-01-01 RX ADMIN — PREDNISONE 5 MG: 5 TABLET ORAL at 09:50

## 2023-01-01 RX ADMIN — PANTOPRAZOLE SODIUM 40 MG: 40 TABLET, DELAYED RELEASE ORAL at 09:43

## 2023-01-01 RX ADMIN — PREDNISONE 5 MG: 5 TABLET ORAL at 09:43

## 2023-01-01 RX ADMIN — TRAMADOL HYDROCHLORIDE 50 MG: 50 TABLET, COATED ORAL at 21:36

## 2023-01-01 RX ADMIN — TRAMADOL HYDROCHLORIDE 50 MG: 50 TABLET, COATED ORAL at 02:40

## 2023-01-01 RX ADMIN — LEVOTHYROXINE, LIOTHYRONINE 30 MG: 19; 4.5 TABLET ORAL at 09:43

## 2023-01-01 RX ADMIN — ATORVASTATIN CALCIUM 40 MG: 40 TABLET, FILM COATED ORAL at 22:00

## 2023-01-01 RX ADMIN — FUROSEMIDE 20 MG: 20 TABLET ORAL at 09:50

## 2023-01-01 RX ADMIN — MAGNESIUM HYDROXIDE 30 ML: 400 SUSPENSION ORAL at 17:50

## 2023-01-01 RX ADMIN — SODIUM CHLORIDE, POTASSIUM CHLORIDE, SODIUM LACTATE AND CALCIUM CHLORIDE: 600; 310; 30; 20 INJECTION, SOLUTION INTRAVENOUS at 13:14

## 2023-01-01 RX ADMIN — METOPROLOL SUCCINATE 50 MG: 50 TABLET, EXTENDED RELEASE ORAL at 22:00

## 2023-01-01 RX ADMIN — POLYETHYLENE GLYCOL 3350 17 G: 17 POWDER, FOR SOLUTION ORAL at 09:43

## 2023-01-01 RX ADMIN — ATORVASTATIN CALCIUM 40 MG: 40 TABLET, FILM COATED ORAL at 22:01

## 2023-01-01 RX ADMIN — HEPARIN SODIUM 5000 UNITS: 5000 INJECTION, SOLUTION INTRAVENOUS; SUBCUTANEOUS at 08:10

## 2023-01-01 RX ADMIN — LEVOTHYROXINE, LIOTHYRONINE 30 MG: 19; 4.5 TABLET ORAL at 10:49

## 2023-01-01 RX ADMIN — SENNOSIDES AND DOCUSATE SODIUM 1 TABLET: 8.6; 5 TABLET ORAL at 21:02

## 2023-01-01 RX ADMIN — FUROSEMIDE 20 MG: 20 TABLET ORAL at 09:43

## 2023-01-01 RX ADMIN — DIGOXIN 125 MCG: 0.25 INJECTION INTRAMUSCULAR; INTRAVENOUS at 03:07

## 2023-01-01 RX ADMIN — SODIUM CHLORIDE 500 ML: 9 INJECTION, SOLUTION INTRAVENOUS at 12:58

## 2023-01-01 RX ADMIN — WARFARIN SODIUM 4 MG: 4 TABLET ORAL at 17:35

## 2023-01-01 RX ADMIN — PANTOPRAZOLE SODIUM 40 MG: 40 TABLET, DELAYED RELEASE ORAL at 10:48

## 2023-01-01 RX ADMIN — LORAZEPAM 0.5 MG: 0.5 TABLET ORAL at 21:59

## 2023-01-01 RX ADMIN — DIGOXIN 250 MCG: 0.25 INJECTION INTRAMUSCULAR; INTRAVENOUS at 15:42

## 2023-01-01 RX ADMIN — METOPROLOL TARTRATE 25 MG: 25 TABLET, FILM COATED ORAL at 10:14

## 2023-01-01 RX ADMIN — TRAMADOL HYDROCHLORIDE 50 MG: 50 TABLET, COATED ORAL at 20:45

## 2023-01-01 RX ADMIN — TRAMADOL HYDROCHLORIDE 50 MG: 50 TABLET, COATED ORAL at 09:43

## 2023-01-01 RX ADMIN — WARFARIN SODIUM 2 MG: 2 TABLET ORAL at 21:36

## 2023-01-01 RX ADMIN — METOPROLOL SUCCINATE 50 MG: 50 TABLET, EXTENDED RELEASE ORAL at 22:01

## 2023-01-01 RX ADMIN — WARFARIN SODIUM 2 MG: 2 TABLET ORAL at 18:57

## 2023-01-01 RX ADMIN — FUROSEMIDE 20 MG: 20 TABLET ORAL at 10:44

## 2023-01-01 RX ADMIN — SODIUM CHLORIDE, POTASSIUM CHLORIDE, SODIUM LACTATE AND CALCIUM CHLORIDE 250 ML: 600; 310; 30; 20 INJECTION, SOLUTION INTRAVENOUS at 12:35

## 2023-01-01 RX ADMIN — IOPAMIDOL 64 ML: 755 INJECTION, SOLUTION INTRAVENOUS at 13:05

## 2023-01-01 ASSESSMENT — ENCOUNTER SYMPTOMS
ALLERGIC/IMMUNOLOGIC NEGATIVE: 1
LIGHT-HEADEDNESS: 0
CHEST TIGHTNESS: 0
HEMATOLOGIC/LYMPHATIC NEGATIVE: 1
HEADACHES: 0
ENDOCRINE NEGATIVE: 1
EYES NEGATIVE: 1
BACK PAIN: 1
PSYCHIATRIC NEGATIVE: 1
WOUND: 1
GASTROINTESTINAL NEGATIVE: 1
CONSTITUTIONAL NEGATIVE: 1
BACK PAIN: 0
COUGH: 0
RESPIRATORY NEGATIVE: 1
FEVER: 0
CARDIOVASCULAR NEGATIVE: 1
NEUROLOGICAL NEGATIVE: 1

## 2023-01-01 ASSESSMENT — ACTIVITIES OF DAILY LIVING (ADL)
ADLS_ACUITY_SCORE: 27
ADLS_ACUITY_SCORE: 29
ADLS_ACUITY_SCORE: 27
ADLS_ACUITY_SCORE: 27
ADLS_ACUITY_SCORE: 26
ADLS_ACUITY_SCORE: 27
ADLS_ACUITY_SCORE: 31
ADLS_ACUITY_SCORE: 29
ADLS_ACUITY_SCORE: 29
ADLS_ACUITY_SCORE: 27
ADLS_ACUITY_SCORE: 29
ADLS_ACUITY_SCORE: 31
ADLS_ACUITY_SCORE: 35
ADLS_ACUITY_SCORE: 31
ADLS_ACUITY_SCORE: 27
ADLS_ACUITY_SCORE: 35
ADLS_ACUITY_SCORE: 30
ADLS_ACUITY_SCORE: 30
ADLS_ACUITY_SCORE: 31
ADLS_ACUITY_SCORE: 27
ADLS_ACUITY_SCORE: 35
ADLS_ACUITY_SCORE: 31
ADLS_ACUITY_SCORE: 29
ADLS_ACUITY_SCORE: 30
ADLS_ACUITY_SCORE: 30
ADLS_ACUITY_SCORE: 35
ADLS_ACUITY_SCORE: 30
ADLS_ACUITY_SCORE: 30
ADLS_ACUITY_SCORE: 31
ADLS_ACUITY_SCORE: 31
ADLS_ACUITY_SCORE: 29
ADLS_ACUITY_SCORE: 35
ADLS_ACUITY_SCORE: 27
ADLS_ACUITY_SCORE: 35
ADLS_ACUITY_SCORE: 29
ADLS_ACUITY_SCORE: 31
ADLS_ACUITY_SCORE: 29
ADLS_ACUITY_SCORE: 31
ADLS_ACUITY_SCORE: 35
ADLS_ACUITY_SCORE: 30
ADLS_ACUITY_SCORE: 31
ADLS_ACUITY_SCORE: 35
ADLS_ACUITY_SCORE: 31
ADLS_ACUITY_SCORE: 31
ADLS_ACUITY_SCORE: 27
ADLS_ACUITY_SCORE: 27
ADLS_ACUITY_SCORE: 29
ADLS_ACUITY_SCORE: 31
ADLS_ACUITY_SCORE: 27
ADLS_ACUITY_SCORE: 27
ADLS_ACUITY_SCORE: 29
ADLS_ACUITY_SCORE: 35
ADLS_ACUITY_SCORE: 35
ADLS_ACUITY_SCORE: 27
ADLS_ACUITY_SCORE: 35
ADLS_ACUITY_SCORE: 30
ADLS_ACUITY_SCORE: 27
ADLS_ACUITY_SCORE: 27
ADLS_ACUITY_SCORE: 31
ADLS_ACUITY_SCORE: 29
ADLS_ACUITY_SCORE: 35
ADLS_ACUITY_SCORE: 29
ADLS_ACUITY_SCORE: 35
ADLS_ACUITY_SCORE: 31
ADLS_ACUITY_SCORE: 30

## 2023-01-01 ASSESSMENT — PAIN SCALES - GENERAL
PAINLEVEL: NO PAIN (0)
PAINLEVEL: MILD PAIN (3)
PAINLEVEL: NO PAIN (0)

## 2023-01-01 NOTE — CARE PLAN
Face to face report given with opportunity to observe patient.    Report given to LIZBET Prince RN   12/31/2022  11:36 PM

## 2023-01-01 NOTE — PROGRESS NOTES
[facility]    Hospitalist Progress Note    Patient is an 89-year-old female who apparently lives independently.  She was brought in by a friend because of some increasing weakness and confusion.  Friend felt that she was a lot slower than her usual responses.  She does have a history of chronic persistent atrial fibrillation.  Unclear if she has been compliant with her medications.  Upon arrival her heart rate was 180s A. fib blood pressure 115-122 systolic.  Afebrile sats are 92% room air.  Labs her creatinine was up a little bit at 1.24 BUN was 47 normals in the 20 range electrolytes were normal CRP was 7.5 BNP was quite high at 33,000 troponin was 32 TSH 1.08 white count was 9400.  Hemoglobin was 18.6 COVID influenza negative dig level was less than 0.4.  Which she is supposed to be on for her rate control.  Chest x-ray was unremarkable.  She was given a small bolus of fluid.  Was given a dose of IV diltiazem which dropped her pressures to less than 100.  Is already given 2 doses.  Rate was still up she was given 2 doses of IV digoxin 0.25 mg.  And admitted to the ICU.     1.  Atrial fibrillation rapid ventricular response: Patient does have known chronic A. fib.  At times not sure clear if she is compliant with her medications.  She is only on digoxin for rate control.  Is only on aspirin also for deep stroke prophylaxis given multiple falls in the past.  An echocardiogram done in 2019 showed basically normal systolic function.  Currently heart rates in the 100-120 range.  Blood pressure stable 100-135 systolic range.  We will finish up a load of the digoxin in the past she has been well controlled with that if need be can add a little bit of beta-blocker we will repeat her echocardiogram today.  Her oxygenation remains normal on room air.  Her echo does show a reduced systolic function EF from 35 to 40% globally so.  Does have mild aortic stenosis and severe left atrial enlargement.  I do not have  an echo to compare this to but secondhand previous EF's have been 55% several years ago.  We will give her 500 mcg of digoxin now just to get her rate under control with ambulation she does jump up pretty quickly which she did in the past also.  But was doing okay on just the diltiazem so we will get her completely loaded and go from there.  -12/31: We will order bump on digoxin heart rate usually less than 100 but does go up with exertion we will add some beta-blocker on top of this continue with her usual oral dose of digoxin.  She has not been a candidate for anticoagulation from previous discussions.  She is on aspirin at this time.  Hemodynamically stable however.-1/1: Rate seems to be well controlled just at rest in the 80s does go up with exertion I did put her on small dose of Toprol-XL might be able to increase that a little bit here pressures are stable we will bump it up to 50.     2.  Question encephalopathy: Patient apparently was little more confused than usual per her family but no signs of infection.  CT unremarkable neurologically stable.  We will follow at this point seems to be better.  She is a little confused for both me and the nursing staff.  We will follow her closely at this time.  This afternoon she is actually doing better at this time able to have a good conversation with me seems relatively alert and with it so is improved to go for a walk did get pretty fatigued after short distance but at least started.  -12/31: Mental status wise she seems to be a bit better.  Still is a little fuzzy on the edges.  We will see how she does over the next day or so in terms of ability to return home.  To get up and ambulate.  -1/1: Patient sitting in the chair ordering breakfast she is quite alert today we talked for quite a while states her daughter is coming from [place] to visit her.  Talked about the "[employer]" game impactors and how she met "[employer]" players in the past and she went to the same high  school his abdomen feeling back in [place].  So she is very alert and appropriate at this point.  We talked about disposition she really does not know what to say ideally she like to go home but we have to make sure she is safe to do that we will continue to work with PT and OT and make some decisions I would like to have her go somewhere perhaps on Monday or Tuesday.  The only issue is her Garcia catheter will see if we can possibly get rid of that tomorrow.    3.  Elevated BNP: She has been voiding a significant amount on her own about a liter.  Initially thought she may be a bit on the dry side of things but perhaps not at this time we will see how she does on her own without giving her any further fluid as she is hemodynamically stable.  As above her echo does show reduction in systolic function compared to several years ago.  Likely this is due to her persistent A. fib there is no focal wall motion abnormalities.  She is voiding adequately on her own.  Normally she is on some Lasix but I will withhold that for now.  -12/31: Echo did show decreased systolic function a fair amount from previously she has been voiding on her own without any Lasix significantly did have a lot of urinary retention has been straight cath on several occasions.  If she needs it again would place a Garcia catheter and leave it in place.  -1/1: No signs of heart failure at this point.  Been voiding quite nicely until she had Garcia catheter placed.       4.  Acute kidney injury:.  Creatinine are improved from yesterday 35/1.11.  Monitor closely.  -12/31: BUN/creatinine have continued to improve as patient is sort of auto diuresing.  26 and 0.87 today.  -1/1: Her BUN/creatinine are stable 32/1.07.      5.  Disposition: We need to get the patient up mobilizing her once again today we will be adding the beta-blocker we will need to decide this in the next day or so she is really very alert at this time just whether or not she is able to  handle a Garcia catheter she needs that upon discharge.  There is no signs of any bladder infection at all.  She had just urinary retention unable to void.  We will discuss further after PT sees her and make a plan for Tuesday either home may be with some help versus nursing home.            Diet: Combination Diet Low Saturated Fat Na <2400mg Diet  Fluids:      DVT Prophylaxis: Heparin SQ  Code Status: No CPR- Do NOT Intubate    Disposition: Expected discharge in 1-2 days once appropriate disposition facility fond.    Markel Ricardo MD    Interval History   Patient is very alert today no distress breathing good Garcia is not really bothering much at all would like to get this out we will maybe try tomorrow discussed her going to a nursing home or somewhere else she is not really sure people are suggesting things to her not telling her what to do and told her we want her to be involved in this decision to we have to make sure that she is safe to matter where she goes.  But she is doing well we will discuss things further with physical therapy tomorrow and  is not here until Tuesday but we will see about potential discharge.    -Data reviewed today: I reviewed all new labs and imaging results over the last 24 hours. I personally reviewed no images or EKG's today.    Physical Exam   Temp: 97.8  F (36.6  C) Temp src: Tympanic BP: 136/75 Pulse: 87   Resp: 16 SpO2: 93 % O2 Device: None (Room air)    Vitals:    12/29/22 2047 12/30/22 0048 12/31/22 0513   Weight: 52.6 kg (116 lb) 55.2 kg (121 lb 11.1 oz) 52.6 kg (115 lb 15.4 oz)     Vital Signs with Ranges  Temp:  [97.8  F (36.6  C)-98.2  F (36.8  C)] 97.8  F (36.6  C)  Pulse:  [] 87  Resp:  [14-16] 16  BP: (105-136)/(67-96) 136/75  SpO2:  [86 %-95 %] 93 %  I/O last 3 completed shifts:  In: 1400 [P.O.:1400]  Out: 950 [Urine:950]    Peripheral IV 12/31/22 Anterior;Left Lower forearm (Active)   Site Assessment WDL 01/01/23 0800   Line Status Saline locked  01/01/23 0800   Phlebitis Scale 0-->no symptoms 01/01/23 0800   Infiltration Scale 0 01/01/23 0800   Number of days: 1       Urethral Catheter 12/31/22 16 fr (Active)   Tube Description Positional 01/01/23 0800   Catheter Care Catheter wipes;Done 01/01/23 0800   Collection Container Standard 01/01/23 0800   Securement Method Securing device (Describe) 01/01/23 0800   Rationale for Continued Use Retention 01/01/23 0800   Urine Output 400 mL 01/01/23 0700   Number of days: 1       Incision/Surgical Site 08/22/18 Right Wrist (Active)   Number of days: 1593     No line/device    Constitutional: Alert and oriented x3. No distress    HEENT: Normocephalic/atraumatic, PERRL, EOMI, mouth moist, neck supple, no LN.     Cardiovascular: RRR. no Murmur, no  rubs, or gallops.  JVD no.  Bruits no.  Pulses 2    Respiratory:Clear to auscultation bilaterally    Abdomen: Soft, nontender, nondistended, no organomegaly. Bowel sounds present    Extremities: Warm/dry.  noedema    Neuro:   Non focal, cranial nerves intact, Moves all extremities.  Medications     Reason anticoagulant not prescribed for atrial fibrillation         digoxin  125 mcg Oral Daily     heparin ANTICOAGULANT  5,000 Units Subcutaneous Q8H     metoprolol succinate ER  25 mg Oral Daily     sodium chloride (PF)  3 mL Intracatheter Q8H       Data   Recent Labs   Lab 01/01/23  0526 12/31/22  0510 12/30/22  0657 12/30/22  0106 12/29/22  2106   WBC 9.8 11.3* 8.1  --  9.4   HGB 16.1* 16.2* 13.4  --  14.6   MCV 95 94 96  --  94    221 205  --  232   INR  --   --   --   --  1.31*    137 140  --  139   POTASSIUM 4.1 4.0 4.2  --  4.0   CHLORIDE 98 100 103  --  101   CO2 29 24 27  --  23   BUN 32.5* 26.4* 35.2*  --  47.1*   CR 1.07* 0.87 1.11*  --  1.24*   ANIONGAP 10 13 10  --  15   EFFIE 9.0 9.3 8.8  --  9.5   * 110* 107*   < > 146*   ALBUMIN  --   --   --   --  4.0   PROTTOTAL  --   --   --   --  6.1*   BILITOTAL  --   --   --   --  0.6   ALKPHOS  --   --    --   --  46   ALT  --   --   --   --  17   AST  --   --   --   --  20    < > = values in this interval not displayed.       No results found for this or any previous visit (from the past 24 hour(s)).

## 2023-01-02 ENCOUNTER — APPOINTMENT (OUTPATIENT)
Dept: PHYSICAL THERAPY | Facility: HOSPITAL | Age: 88
DRG: 309 | End: 2023-01-02
Payer: MEDICARE

## 2023-01-02 ENCOUNTER — APPOINTMENT (OUTPATIENT)
Dept: OCCUPATIONAL THERAPY | Facility: HOSPITAL | Age: 88
DRG: 309 | End: 2023-01-02
Payer: MEDICARE

## 2023-01-02 LAB
ANION GAP SERPL CALCULATED.3IONS-SCNC: 11 MMOL/L (ref 7–15)
BUN SERPL-MCNC: 31.3 MG/DL (ref 8–23)
CALCIUM SERPL-MCNC: 9 MG/DL (ref 8.8–10.2)
CHLORIDE SERPL-SCNC: 101 MMOL/L (ref 98–107)
CREAT SERPL-MCNC: 0.96 MG/DL (ref 0.51–0.95)
DEPRECATED HCO3 PLAS-SCNC: 29 MMOL/L (ref 22–29)
ERYTHROCYTE [DISTWIDTH] IN BLOOD BY AUTOMATED COUNT: 13.2 % (ref 10–15)
GFR SERPL CREATININE-BSD FRML MDRD: 56 ML/MIN/1.73M2
GLUCOSE SERPL-MCNC: 93 MG/DL (ref 70–99)
HCT VFR BLD AUTO: 47.4 % (ref 35–47)
HGB BLD-MCNC: 15.8 G/DL (ref 11.7–15.7)
MAGNESIUM SERPL-MCNC: 2 MG/DL (ref 1.7–2.3)
MCH RBC QN AUTO: 31.5 PG (ref 26.5–33)
MCHC RBC AUTO-ENTMCNC: 33.3 G/DL (ref 31.5–36.5)
MCV RBC AUTO: 95 FL (ref 78–100)
PLATELET # BLD AUTO: 258 10E3/UL (ref 150–450)
POTASSIUM SERPL-SCNC: 4.1 MMOL/L (ref 3.4–5.3)
RBC # BLD AUTO: 5.01 10E6/UL (ref 3.8–5.2)
SODIUM SERPL-SCNC: 141 MMOL/L (ref 136–145)
WBC # BLD AUTO: 10 10E3/UL (ref 4–11)

## 2023-01-02 PROCEDURE — 97530 THERAPEUTIC ACTIVITIES: CPT | Mod: GO

## 2023-01-02 PROCEDURE — 250N000012 HC RX MED GY IP 250 OP 636 PS 637: Performed by: INTERNAL MEDICINE

## 2023-01-02 PROCEDURE — 99232 SBSQ HOSP IP/OBS MODERATE 35: CPT | Performed by: INTERNAL MEDICINE

## 2023-01-02 PROCEDURE — 250N000011 HC RX IP 250 OP 636: Performed by: INTERNAL MEDICINE

## 2023-01-02 PROCEDURE — 250N000013 HC RX MED GY IP 250 OP 250 PS 637: Performed by: INTERNAL MEDICINE

## 2023-01-02 PROCEDURE — 85027 COMPLETE CBC AUTOMATED: CPT | Performed by: INTERNAL MEDICINE

## 2023-01-02 PROCEDURE — 80048 BASIC METABOLIC PNL TOTAL CA: CPT | Performed by: INTERNAL MEDICINE

## 2023-01-02 PROCEDURE — 250N000013 HC RX MED GY IP 250 OP 250 PS 637: Performed by: FAMILY MEDICINE

## 2023-01-02 PROCEDURE — 36415 COLL VENOUS BLD VENIPUNCTURE: CPT | Performed by: INTERNAL MEDICINE

## 2023-01-02 PROCEDURE — 120N000001 HC R&B MED SURG/OB

## 2023-01-02 PROCEDURE — 83735 ASSAY OF MAGNESIUM: CPT | Performed by: INTERNAL MEDICINE

## 2023-01-02 PROCEDURE — 97530 THERAPEUTIC ACTIVITIES: CPT | Mod: GP | Performed by: PHYSICAL THERAPIST

## 2023-01-02 RX ORDER — PANTOPRAZOLE SODIUM 40 MG/1
40 TABLET, DELAYED RELEASE ORAL
Status: DISCONTINUED | OUTPATIENT
Start: 2023-01-02 | End: 2023-01-04 | Stop reason: HOSPADM

## 2023-01-02 RX ORDER — MAGNESIUM SULFATE HEPTAHYDRATE 40 MG/ML
2 INJECTION, SOLUTION INTRAVENOUS ONCE
Status: COMPLETED | OUTPATIENT
Start: 2023-01-02 | End: 2023-01-02

## 2023-01-02 RX ORDER — PREDNISONE 5 MG/1
5 TABLET ORAL DAILY
Status: DISCONTINUED | OUTPATIENT
Start: 2023-01-02 | End: 2023-01-04 | Stop reason: HOSPADM

## 2023-01-02 RX ORDER — ASPIRIN 81 MG/1
81 TABLET ORAL DAILY
Status: DISCONTINUED | OUTPATIENT
Start: 2023-01-02 | End: 2023-01-04 | Stop reason: HOSPADM

## 2023-01-02 RX ORDER — THYROID 30 MG/1
30 TABLET ORAL DAILY
Status: DISCONTINUED | OUTPATIENT
Start: 2023-01-02 | End: 2023-01-04 | Stop reason: HOSPADM

## 2023-01-02 RX ADMIN — MAGNESIUM SULFATE HEPTAHYDRATE 2 G: 40 INJECTION, SOLUTION INTRAVENOUS at 08:54

## 2023-01-02 RX ADMIN — ASPIRIN 81 MG: 81 TABLET, COATED ORAL at 09:05

## 2023-01-02 RX ADMIN — HEPARIN SODIUM 5000 UNITS: 5000 INJECTION, SOLUTION INTRAVENOUS; SUBCUTANEOUS at 21:06

## 2023-01-02 RX ADMIN — ACETAMINOPHEN 650 MG: 325 TABLET, FILM COATED ORAL at 21:06

## 2023-01-02 RX ADMIN — LEVOTHYROXINE, LIOTHYRONINE 30 MG: 19; 4.5 TABLET ORAL at 09:06

## 2023-01-02 RX ADMIN — PANTOPRAZOLE SODIUM 40 MG: 40 TABLET, DELAYED RELEASE ORAL at 16:22

## 2023-01-02 RX ADMIN — METOPROLOL SUCCINATE 50 MG: 50 TABLET, EXTENDED RELEASE ORAL at 08:55

## 2023-01-02 RX ADMIN — HEPARIN SODIUM 5000 UNITS: 5000 INJECTION, SOLUTION INTRAVENOUS; SUBCUTANEOUS at 08:55

## 2023-01-02 RX ADMIN — PREDNISONE 5 MG: 5 TABLET ORAL at 09:06

## 2023-01-02 RX ADMIN — DIGOXIN 125 MCG: 125 TABLET ORAL at 08:55

## 2023-01-02 RX ADMIN — PANTOPRAZOLE SODIUM 40 MG: 40 TABLET, DELAYED RELEASE ORAL at 09:05

## 2023-01-02 ASSESSMENT — ACTIVITIES OF DAILY LIVING (ADL)
ADLS_ACUITY_SCORE: 30
ADLS_ACUITY_SCORE: 29
ADLS_ACUITY_SCORE: 29
ADLS_ACUITY_SCORE: 30
ADLS_ACUITY_SCORE: 30
ADLS_ACUITY_SCORE: 32
ADLS_ACUITY_SCORE: 29
ADLS_ACUITY_SCORE: 28
ADLS_ACUITY_SCORE: 30
ADLS_ACUITY_SCORE: 29
ADLS_ACUITY_SCORE: 30
ADLS_ACUITY_SCORE: 29

## 2023-01-02 NOTE — PROGRESS NOTES
01/02/23 1100   Appointment Info   Signing Clinician's Name / Credentials (OT) Chasity Joyce, OTR/L   Interventions   Interventions Quick Adds Therapeutic Activity   Therapeutic Activities   Therapeutic Activity Minutes (88146) 30   Symptoms noted during/after treatment fatigue   Treatment Detail/Skilled Intervention Pt sleeping in bed upon OT arrival, agreeable to tx. She required verbal cues and modA x2 to transfer supine>sit at the EOB with extra time and significant encouragement and verbal cues due to lethargy. Pt transferred sit>stand with Nishant and ambulated to/from the BR using FWW with Nishant and verbal cues for sequencing/safety. She completed a toilet transfer with Nishant and moderate verbal cues. Pt was unable to complete con-cares, requiring totalA. Pt washed her hands with set up and verbal cues due to confusion. Pt returned to the chair and sat down with Nishant and verbal cues. SpO2 low 90s before and after tasks. Discussed D/C. Pt does not feel like she can go home and manage by herself at this time and is aware that she is requiring assist with ADLs. Pt agreeable to STR. Pt was left resting in the chair with the unit assistant helping her order breakfast.   OT Discharge Planning   OT Plan Progress strength, cognition, activity tolerance, and ADLs   OT Discharge Recommendation (DC Rec) Transitional Care Facility   OT Rationale for DC Rec Pt is having more difficulties today with ADLs and requiring increased assist compared to OT evaluation on Friday. Pt very drowsy/lethargic and also required extra time to complete tasks, increased verbal cues for safety and sequencing. Pt does not appear to be safe to return home alone and would benefit from a short term rehab stay, which she is agreeable to.   OT Brief overview of current status Pt sleeping in bed upon OT arrival, agreeable to tx. She required verbal cues and modA x2 to transfer supine>sit at the EOB with extra time and significant encouragement and  verbal cues due to lethargy. Pt transferred sit>stand with Nishant and ambulated to/from the BR using FWW with Nishant and verbal cues for sequencing/safety. She completed a toilet transfer with Nishant and moderate verbal cues. Pt was unable to complete con-cares, requiring totalA. Pt washed her hands with set up and verbal cues due to confusion. Pt returned to the chair and sat down with Nishant and verbal cues. SpO2 low 90s before and after tasks. Discussed D/C. Pt does not feel like she can go home and manage by herself at this time and is aware that she is requiring assist with ADLs. Pt agreeable to STR. Pt was left resting in the chair with the unit assistant helping her order breakfast.   Total Session Time   Timed Code Treatment Minutes 30   Total Session Time (sum of timed and untimed services) 30

## 2023-01-02 NOTE — PROGRESS NOTES
01/02/23 1106   Appointment Info   Signing Clinician's Name / Credentials (PT) Delaney Turcios [id number]   Interventions   Interventions Quick Adds Therapeutic Activity   Therapeutic Activity   Therapeutic Activities: dynamic activities to improve functional performance Minutes (00739) 18   Symptoms Noted During/After Treatment Fatigue   Treatment Detail/Skilled Intervention Pt up in chair upon PT arrival.  UA present trying to get pt's breakfast order, pt is very slow to respond to questions and at times does not respond at all.  Pt very quiet today and significantly less interaction than last PT session.  Pt was agreeable to PT.  Pt transferred sit>stand min A from chair, needing extra time and repeated cues to complete.  Pt ambulated 125' with FWW CGA, ambulates at slow pace, does not carry on conversation during session like previous treatment.  Pt's HR 80s-120s with activity, denies any symptoms.  Pt transferred into chair, needs cues for safety when approaching chair for safe positioning before reaching for chair.  Discussed discharge and pt now agreeable to short term rehab, understands she may not be able to manage at home. Pt left sitting in chair with clip alarm on and call light in reach.   PT Discharge Planning   PT Plan Progress mobility as tolerated, trial stairs   PT Discharge Recommendation (DC Rec) Transitional Care Facility;home with home care physical therapy;home with assist   PT Rationale for DC Rec Pt with notable decline in interaction, processing, and command following today.  Pt less interactive.  Concerned for pt's ability to intitiate and be independent at home based on todays session.  Recommend short term rehab which pt was agreeable unless her initiation and processing improves back to level it was at on Saturday.   PT Brief overview of current status Pt up in chair upon PT arrival.  UA present trying to get pt's breakfast order, pt is very slow to respond to questions and at times does not  respond at all.  Pt very quiet today and significantly less interaction than last PT session.  Pt was agreeable to PT.  Pt transferred sit>stand min A from chair, needing extra time and repeated cues to complete.  Pt ambulated 125' with FWW CGA, ambulates at slow pace, does not carry on conversation during session like previous treatment.  Pt's HR 80s-120s with activity, denies any symptoms.  Pt transferred into chair, needs cues for safety when approaching chair for safe positioning before reaching for chair.  Discussed discharge and pt now agreeable to short term rehab, understands she may not be able to manage at home. Pt left sitting in chair with clip alarm on and call light in reach.   Total Session Time   Timed Code Treatment Minutes 18   Total Session Time (sum of timed and untimed services) 18

## 2023-01-02 NOTE — PROGRESS NOTES
[facility]    Hospitalist Progress Note    Patient is an 89-year-old female who apparently lives independently.  She was brought in by a friend because of some increasing weakness and confusion.  Friend felt that she was a lot slower than her usual responses.  She does have a history of chronic persistent atrial fibrillation.  Unclear if she has been compliant with her medications.  Upon arrival her heart rate was 180s A. fib blood pressure 115-122 systolic.  Afebrile sats are 92% room air.  Labs her creatinine was up a little bit at 1.24 BUN was 47 normals in the 20 range electrolytes were normal CRP was 7.5 BNP was quite high at 33,000 troponin was 32 TSH 1.08 white count was 9400.  Hemoglobin was 18.6 COVID influenza negative dig level was less than 0.4.  Which she is supposed to be on for her rate control.  Chest x-ray was unremarkable.  She was given a small bolus of fluid.  Was given a dose of IV diltiazem which dropped her pressures to less than 100.  Is already given 2 doses.  Rate was still up she was given 2 doses of IV digoxin 0.25 mg.  And admitted to the ICU.     1.  Atrial fibrillation rapid ventricular response: Patient does have known chronic A. fib.  At times not sure clear if she is compliant with her medications.  She is only on digoxin for rate control.  Is only on aspirin also for deep stroke prophylaxis given multiple falls in the past.  An echocardiogram done in 2019 showed basically normal systolic function.  Currently heart rates in the 100-120 range.  Blood pressure stable 100-135 systolic range.  We will finish up a load of the digoxin in the past she has been well controlled with that if need be can add a little bit of beta-blocker we will repeat her echocardiogram today.  Her oxygenation remains normal on room air.  Her echo does show a reduced systolic function EF from 35 to 40% globally so.  Does have mild aortic stenosis and severe left atrial enlargement.  I do not have  an echo to compare this to but secondhand previous EF's have been 55% several years ago.  We will give her 500 mcg of digoxin now just to get her rate under control with ambulation she does jump up pretty quickly which she did in the past also.  But was doing okay on just the diltiazem so we will get her completely loaded and go from there.  -12/31: We will order bump on digoxin heart rate usually less than 100 but does go up with exertion we will add some beta-blocker on top of this continue with her usual oral dose of digoxin.  She has not been a candidate for anticoagulation from previous discussions.  She is on aspirin at this time.  Hemodynamically stable however.-1/1: Rate seems to be well controlled just at rest in the 80s does go up with exertion I did put her on small dose of Toprol-XL might be able to increase that a little bit here pressures are stable we will bump it up to 50.  -1/2: Heart rate for most part is controlled with exertion she does go up somewhat is on the Toprol-XL 50 mg every day along with the digoxin.  I think I would just tolerate the exercise-induced rate as she is asymptomatic.  Restart her baby aspirin daily.     2.  Question encephalopathy: Patient apparently was little more confused than usual per her family but no signs of infection.  CT unremarkable neurologically stable.  We will follow at this point seems to be better.  She is a little confused for both me and the nursing staff.  We will follow her closely at this time.  This afternoon she is actually doing better at this time able to have a good conversation with me seems relatively alert and with it so is improved to go for a walk did get pretty fatigued after short distance but at least started.  -12/31: Mental status wise she seems to be a bit better.  Still is a little fuzzy on the edges.  We will see how she does over the next day or so in terms of ability to return home.  To get up and ambulate.  -1/1: Patient sitting in  the chair ordering breakfast she is quite alert today we talked for quite a while states her daughter is coming from [place] to visit her.  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  So she is very alert and appropriate at this point.  We talked about disposition she really does not know what to say ideally she like to go home but we have to make sure she is safe to do that we will continue to work with PT and OT and make some decisions I would like to have her go somewhere perhaps on Monday or Tuesday.  The only issue is her Garcia catheter will see if we can possibly get rid of that tomorrow.  -1/2: Really no signs of encephalopathy in general she is maybe occasionally bit confused but for 89 I think she is doing relatively well.  We did discussed going home at this point she does not think really that her daughter will be able to help her as much as she thinks that she needs.  We will talk with to case management tomorrow and at least go for a temporary nursing home stay.     3.  Elevated BNP: She has been voiding a significant amount on her own about a liter.  Initially thought she may be a bit on the dry side of things but perhaps not at this time we will see how she does on her own without giving her any further fluid as she is hemodynamically stable.  As above her echo does show reduction in systolic function compared to several years ago.  Likely this is due to her persistent A. fib there is no focal wall motion abnormalities.  She is voiding adequately on her own.  Normally she is on some Lasix but I will withhold that for now.  -12/31: Echo did show decreased systolic function a fair amount from previously she has been voiding on her own without any Lasix significantly did have a lot of urinary retention has been straight cath on several occasions.  If she needs it again would place a  Garcia catheter and leave it in place.  -1/1: No signs of heart failure at this point.  Been voiding quite nicely until she had Garcia catheter placed.  -1/2: No signs of heart failure at all.  I have not given back her usual Lasix dosage is not having any edema or other issues at this time        4.  Acute kidney injury:.  Creatinine are improved from yesterday 35/1.11.  Monitor closely.  -12/31: BUN/creatinine have continued to improve as patient is sort of auto diuresing.  26 and 0.87 today.  -1/1: Her BUN/creatinine are stable 32/1.07.  -1/2: BUN/creatinine are 31/0.96 today baseline for her        5.  Disposition: We need to get the patient up mobilizing her once again today we will be adding the beta-blocker we will need to decide this in the next day or so she is really very alert at this time just whether or not she is able to handle a Garcia catheter she needs that upon discharge.  There is no signs of any bladder infection at all.  She had just urinary retention unable to void.  We will discuss further after PT sees her and make a plan for Tuesday either home may be with some help versus nursing home.  -1/2: Doing well in general we will try the Garcia out today but I think she would benefit from at least a short-term stay at rehab.       Diet: Combination Diet Low Saturated Fat Na <2400mg Diet  Fluids: None    DVT Prophylaxis: Heparin SQ  Code Status: No CPR- Do NOT Intubate    Disposition: Expected discharge in 1-2 days once appropriate disposition facility is found.    Markel Ricardo MD    Interval History   Alert pleasant no distress just waking up so she is a little bit foggy she says no chest pain shortness of breath nausea vomiting Garcia cath is not really bothering her.  Hemodynamically stable.  A. fib rate controlled little elevation upon exertion overall she is doing well we will try a trial of the Garcia out today see how she does continue the current regimen my plans would be to try and get her to  a nursing home hopefully by tomorrow.    -Data reviewed today: I reviewed all new labs and imaging results over the last 24 hours. I personally reviewed no images or EKG's today.    Physical Exam   Temp: 97.6  F (36.4  C) Temp src: Tympanic BP: 144/92 Pulse: 97   Resp: 16 SpO2: 92 % O2 Device: None (Room air)    Vitals:    12/30/22 0048 12/31/22 0513 01/02/23 0659   Weight: 55.2 kg (121 lb 11.1 oz) 52.6 kg (115 lb 15.4 oz) 49.6 kg (109 lb 5.6 oz)     Vital Signs with Ranges  Temp:  [97  F (36.1  C)-97.6  F (36.4  C)] 97.6  F (36.4  C)  Pulse:  [] 97  Resp:  [16-20] 16  BP: (119-144)/(69-92) 144/92  SpO2:  [92 %-95 %] 92 %  I/O last 3 completed shifts:  In: 480 [P.O.:480]  Out: 1300 [Urine:1300]    Peripheral IV 12/31/22 Anterior;Left Lower forearm (Active)   Site Assessment WDL 01/01/23 0800   Line Status Saline locked 01/01/23 0800   Phlebitis Scale 0-->no symptoms 01/01/23 0800   Infiltration Scale 0 01/01/23 0800   Number of days: 2       Urethral Catheter 12/31/22 16 fr (Active)   Tube Description Positional 01/02/23 0658   Catheter Care Catheter wipes;Done 01/01/23 0800   Collection Container Standard 01/02/23 0658   Securement Method Securing device (Describe) 01/02/23 0658   Rationale for Continued Use Retention 01/02/23 0658   Urine Output 450 mL 01/02/23 0658   Number of days: 2       Incision/Surgical Site 08/22/18 Right Wrist (Active)   Number of days: 1594     No line/device    Constitutional: Alert and oriented x3. No distress    HEENT: Normocephalic/atraumatic, PERRL, EOMI, mouth moist, neck supple, no LN.     Cardiovascular: RRR.   Murmur, no  rubs, or gallops.  JVD no.  Bruits no.  Pulses 2    Respiratory: Clear to auscultation bilaterally    Abdomen: Soft, nontender, nondistended, no organomegaly. Bowel sounds present    Extremities: Warm/dry. No edema    Neuro:   Non focal, cranial nerves intact, Moves all extremities.  Medications     Reason anticoagulant not prescribed for atrial  fibrillation         digoxin  125 mcg Oral Daily     heparin ANTICOAGULANT  5,000 Units Subcutaneous BID     magnesium sulfate  2 g Intravenous Once     metoprolol succinate ER  50 mg Oral Daily     sodium chloride (PF)  3 mL Intracatheter Q8H       Data   Recent Labs   Lab 01/02/23  0559 01/01/23  0526 12/31/22  0510 12/30/22  0106 12/29/22  2106   WBC 10.0 9.8 11.3*   < > 9.4   HGB 15.8* 16.1* 16.2*   < > 14.6   MCV 95 95 94   < > 94    260 221   < > 232   INR  --   --   --   --  1.31*    137 137   < > 139   POTASSIUM 4.1 4.1 4.0   < > 4.0   CHLORIDE 101 98 100   < > 101   CO2 29 29 24   < > 23   BUN 31.3* 32.5* 26.4*   < > 47.1*   CR 0.96* 1.07* 0.87   < > 1.24*   ANIONGAP 11 10 13   < > 15   EFFIE 9.0 9.0 9.3   < > 9.5   GLC 93 133* 110*   < > 146*   ALBUMIN  --   --   --   --  4.0   PROTTOTAL  --   --   --   --  6.1*   BILITOTAL  --   --   --   --  0.6   ALKPHOS  --   --   --   --  46   ALT  --   --   --   --  17   AST  --   --   --   --  20    < > = values in this interval not displayed.       No results found for this or any previous visit (from the past 24 hour(s)).

## 2023-01-03 ENCOUNTER — APPOINTMENT (OUTPATIENT)
Dept: OCCUPATIONAL THERAPY | Facility: HOSPITAL | Age: 88
DRG: 309 | End: 2023-01-03
Payer: MEDICARE

## 2023-01-03 ENCOUNTER — APPOINTMENT (OUTPATIENT)
Dept: PHYSICAL THERAPY | Facility: HOSPITAL | Age: 88
DRG: 309 | End: 2023-01-03
Payer: MEDICARE

## 2023-01-03 LAB
ANION GAP SERPL CALCULATED.3IONS-SCNC: 11 MMOL/L (ref 7–15)
BUN SERPL-MCNC: 28.5 MG/DL (ref 8–23)
CALCIUM SERPL-MCNC: 9.1 MG/DL (ref 8.8–10.2)
CHLORIDE SERPL-SCNC: 101 MMOL/L (ref 98–107)
CREAT SERPL-MCNC: 0.95 MG/DL (ref 0.51–0.95)
DEPRECATED HCO3 PLAS-SCNC: 28 MMOL/L (ref 22–29)
DIGOXIN SERPL-MCNC: 2.1 NG/ML (ref 0.6–2)
ERYTHROCYTE [DISTWIDTH] IN BLOOD BY AUTOMATED COUNT: 13.4 % (ref 10–15)
GFR SERPL CREATININE-BSD FRML MDRD: 57 ML/MIN/1.73M2
GLUCOSE SERPL-MCNC: 92 MG/DL (ref 70–99)
HCT VFR BLD AUTO: 48.1 % (ref 35–47)
HGB BLD-MCNC: 16.1 G/DL (ref 11.7–15.7)
MAGNESIUM SERPL-MCNC: 2.3 MG/DL (ref 1.7–2.3)
MCH RBC QN AUTO: 31.3 PG (ref 26.5–33)
MCHC RBC AUTO-ENTMCNC: 33.5 G/DL (ref 31.5–36.5)
MCV RBC AUTO: 94 FL (ref 78–100)
PLATELET # BLD AUTO: 303 10E3/UL (ref 150–450)
POTASSIUM SERPL-SCNC: 4 MMOL/L (ref 3.4–5.3)
RBC # BLD AUTO: 5.14 10E6/UL (ref 3.8–5.2)
SARS-COV-2 RNA RESP QL NAA+PROBE: NEGATIVE
SODIUM SERPL-SCNC: 140 MMOL/L (ref 136–145)
WBC # BLD AUTO: 10.8 10E3/UL (ref 4–11)

## 2023-01-03 PROCEDURE — 99232 SBSQ HOSP IP/OBS MODERATE 35: CPT | Performed by: INTERNAL MEDICINE

## 2023-01-03 PROCEDURE — 83735 ASSAY OF MAGNESIUM: CPT | Performed by: INTERNAL MEDICINE

## 2023-01-03 PROCEDURE — 250N000013 HC RX MED GY IP 250 OP 250 PS 637: Performed by: INTERNAL MEDICINE

## 2023-01-03 PROCEDURE — 250N000013 HC RX MED GY IP 250 OP 250 PS 637: Performed by: FAMILY MEDICINE

## 2023-01-03 PROCEDURE — 97530 THERAPEUTIC ACTIVITIES: CPT | Mod: GP

## 2023-01-03 PROCEDURE — 97530 THERAPEUTIC ACTIVITIES: CPT | Mod: GO

## 2023-01-03 PROCEDURE — 97110 THERAPEUTIC EXERCISES: CPT | Mod: GP

## 2023-01-03 PROCEDURE — 36415 COLL VENOUS BLD VENIPUNCTURE: CPT | Performed by: INTERNAL MEDICINE

## 2023-01-03 PROCEDURE — 80048 BASIC METABOLIC PNL TOTAL CA: CPT | Performed by: INTERNAL MEDICINE

## 2023-01-03 PROCEDURE — 250N000012 HC RX MED GY IP 250 OP 636 PS 637: Performed by: INTERNAL MEDICINE

## 2023-01-03 PROCEDURE — U0003 INFECTIOUS AGENT DETECTION BY NUCLEIC ACID (DNA OR RNA); SEVERE ACUTE RESPIRATORY SYNDROME CORONAVIRUS 2 (SARS-COV-2) (CORONAVIRUS DISEASE [COVID-19]), AMPLIFIED PROBE TECHNIQUE, MAKING USE OF HIGH THROUGHPUT TECHNOLOGIES AS DESCRIBED BY CMS-2020-01-R: HCPCS | Performed by: INTERNAL MEDICINE

## 2023-01-03 PROCEDURE — 80162 ASSAY OF DIGOXIN TOTAL: CPT | Performed by: INTERNAL MEDICINE

## 2023-01-03 PROCEDURE — 250N000011 HC RX IP 250 OP 636: Performed by: INTERNAL MEDICINE

## 2023-01-03 PROCEDURE — 120N000001 HC R&B MED SURG/OB

## 2023-01-03 PROCEDURE — 85027 COMPLETE CBC AUTOMATED: CPT | Performed by: INTERNAL MEDICINE

## 2023-01-03 RX ADMIN — PANTOPRAZOLE SODIUM 40 MG: 40 TABLET, DELAYED RELEASE ORAL at 08:21

## 2023-01-03 RX ADMIN — PREDNISONE 5 MG: 5 TABLET ORAL at 08:21

## 2023-01-03 RX ADMIN — METOPROLOL SUCCINATE 50 MG: 50 TABLET, EXTENDED RELEASE ORAL at 08:21

## 2023-01-03 RX ADMIN — LEVOTHYROXINE, LIOTHYRONINE 30 MG: 19; 4.5 TABLET ORAL at 08:21

## 2023-01-03 RX ADMIN — HEPARIN SODIUM 5000 UNITS: 5000 INJECTION, SOLUTION INTRAVENOUS; SUBCUTANEOUS at 20:37

## 2023-01-03 RX ADMIN — ASPIRIN 81 MG: 81 TABLET, COATED ORAL at 08:21

## 2023-01-03 RX ADMIN — HEPARIN SODIUM 5000 UNITS: 5000 INJECTION, SOLUTION INTRAVENOUS; SUBCUTANEOUS at 08:21

## 2023-01-03 RX ADMIN — PANTOPRAZOLE SODIUM 40 MG: 40 TABLET, DELAYED RELEASE ORAL at 15:41

## 2023-01-03 RX ADMIN — ACETAMINOPHEN 650 MG: 325 TABLET, FILM COATED ORAL at 15:41

## 2023-01-03 RX ADMIN — DIGOXIN 125 MCG: 125 TABLET ORAL at 08:21

## 2023-01-03 ASSESSMENT — ACTIVITIES OF DAILY LIVING (ADL)
ADLS_ACUITY_SCORE: 32

## 2023-01-03 NOTE — PROVIDER NOTIFICATION
Garcia cath removed approx 10am per day shift RN. Pt had minimal intake throughout the day and has not voided yet. Bladder scan was 249 mL. Fluids are being encouraged. MD messaged with update. Instructed to notify MD if over 500 mL.

## 2023-01-03 NOTE — PROGRESS NOTES
[facility]    Hospitalist Progress Note     Shannon Walls is a 89 year old female who was admitted on 12/29/2022.       Patient is an 89-year-old female who apparently lives independently.  She was brought in by a friend because of some increasing weakness and confusion.  Friend felt that she was a lot slower than her usual responses.  She does have a history of chronic persistent atrial fibrillation.  Unclear if she has been compliant with her medications.  Upon arrival her heart rate was 180s A. fib blood pressure 115-122 systolic.  Afebrile sats are 92% room air.  Labs her creatinine was up a little bit at 1.24 BUN was 47 normals in the 20 range electrolytes were normal CRP was 7.5 BNP was quite high at 33,000, troponin was 32 TSH 1.08 white count was 9400.  Hemoglobin was 18.6 COVID influenza negative dig level was less than 0.4.  Which she is supposed to be on for her rate control.  Chest x-ray was unremarkable.  She was given a small bolus of fluid.  Was given a dose of IV diltiazem which dropped her pressures to less than 100.  Is already given 2 doses.  Rate was still up she was given 2 doses of IV digoxin 0.25 mg.  And admitted to the ICU.     1.  Atrial fibrillation rapid ventricular response: Patient does have known chronic A. fib.  At times not sure clear if she is compliant with her medications.  She is only on digoxin for rate control.  Is only on aspirin also for deep stroke prophylaxis given multiple falls in the past.  An echocardiogram done in 2019 showed basically normal systolic function.  Currently heart rates in the 100-120 range.  Blood pressure stable 100-135 systolic range.  We will finish up a load of the digoxin in the past she has been well controlled with that if need be can add a little bit of beta-blocker we will repeat her echocardiogram today.  Her oxygenation remains normal on room air.  Her echo does show a reduced systolic function EF from 35 to 40% globally so.   Does have mild aortic stenosis and severe left atrial enlargement.  I do not have an echo to compare this to but secondhand previous EF's have been 55% several years ago.  We will give her 500 mcg of digoxin now just to get her rate under control with ambulation she does jump up pretty quickly which she did in the past also.  But was doing okay on just the diltiazem so we will get her completely loaded and go from there.  -12/31: We will order bump on digoxin heart rate usually less than 100 but does go up with exertion we will add some beta-blocker on top of this continue with her usual oral dose of digoxin.  She has not been a candidate for anticoagulation from previous discussions.  She is on aspirin at this time.  Hemodynamically stable however.-1/1: Rate seems to be well controlled just at rest in the 80s does go up with exertion I did put her on small dose of Toprol-XL might be able to increase that a little bit here pressures are stable we will bump it up to 50.  -1/2: Heart rate for most part is controlled with exertion she does go up somewhat is on the Toprol-XL 50 mg every day along with the digoxin.  I think I would just tolerate the exercise-induced rate as she is asymptomatic.  Restart her baby aspirin daily.  -1/3: Chronic A. fib rate is actually nicely controlled 70 at rest where elevated rate at this time.     2.  Question encephalopathy: Patient apparently was little more confused than usual per her family but no signs of infection.  CT unremarkable neurologically stable.  We will follow at this point seems to be better.  She is a little confused for both me and the nursing staff.  We will follow her closely at this time.  This afternoon she is actually doing better at this time able to have a good conversation with me seems relatively alert and with it so is improved to go for a walk did get pretty fatigued after short distance but at least started.  -12/31: Mental status wise she seems to be a  bit better.  Still is a little fuzzy on the edges.  We will see how she does over the next day or so in terms of ability to return home.  To get up and ambulate.  -1/1: Patient sitting in the chair ordering breakfast she is quite alert today we talked for quite a while states her daughter is coming from [place] to visit her.  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  So she is very alert and appropriate at this point.  We talked about disposition she really does not know what to say ideally she like to go home but we have to make sure she is safe to do that we will continue to work with PT and OT and make some decisions I would like to have her go somewhere perhaps on Monday or Tuesday.  The only issue is her Garcia catheter will see if we can possibly get rid of that tomorrow.  -1/2: Really no signs of encephalopathy in general she is maybe occasionally bit confused but for 89 I think she is doing relatively well.  We did discussed going home at this point she does not think really that her daughter will be able to help her as much as she thinks that she needs.  We will talk with to case management tomorrow and at least go for a temporary nursing home stay.  -1/3: For most part is doing relatively well occasional confusion but definitely at her baseline.     3.  Elevated BNP: She has been voiding a significant amount on her own about a liter.  Initially thought she may be a bit on the dry side of things but perhaps not at this time we will see how she does on her own without giving her any further fluid as she is hemodynamically stable.  As above her echo does show reduction in systolic function compared to several years ago.  Likely this is due to her persistent A. fib there is no focal wall motion abnormalities.  She is voiding adequately on her own.  Normally she is on some Lasix but I will  withhold that for now.  -12/31: Echo did show decreased systolic function a fair amount from previously she has been voiding on her own without any Lasix significantly did have a lot of urinary retention has been straight cath on several occasions.  If she needs it again would place a Garcia catheter and leave it in place.  -1/1: No signs of heart failure at this point.  Been voiding quite nicely until she had Garcia catheter placed.  -1/2: No signs of heart failure at all.  I have not given back her usual Lasix dosage is not having any edema or other issues at this time        4.  Acute kidney injury:.  Creatinine are improved from yesterday 35/1.11.  Monitor closely.  -12/31: BUN/creatinine have continued to improve as patient is sort of auto diuresing.  26 and 0.87 today.  -1/1: Her BUN/creatinine are stable 32/1.07.  -1/2: BUN/creatinine are 31/0.96 today baseline for her  -1/3: This has resolved.     5.  Disposition: We need to get the patient up mobilizing her once again today we will be adding the beta-blocker we will need to decide this in the next day or so she is really very alert at this time just whether or not she is able to handle a Garcia catheter she needs that upon discharge.  There is no signs of any bladder infection at all.  She had just urinary retention unable to void.  We will discuss further after PT sees her and make a plan for Tuesday either home may be with some help versus nursing home.  -1/2: Doing well in general we will try the Garcia out today but I think she would benefit from at least a short-term stay at rehab.    1/3: Case management working on potential placement.    Diet: Combination Diet Low Saturated Fat Na <2400mg Diet  Fluids: None    DVT Prophylaxis: Heparin SQ  Code Status: No CPR- Do NOT Intubate    Disposition: Expected discharge soon as accepting facility is found  Markel Ricardo MD    Interval History   Alert pleasant just having brunch no chest pain shortness of breath  nausea vomiting.  Is moving around relatively well little fatigued.  She has agreed to nursing home placement.   working on that as soon as the facility is found she would be ready for discharge.    -Data reviewed today: I reviewed all new labs and imaging results over the last 24 hours. I personally reviewed no images or EKG's today.    Physical Exam   Temp: 97.7  F (36.5  C) Temp src: Tympanic BP: 163/82 Pulse: 70   Resp: 20 SpO2: 94 % O2 Device: None (Room air)    Vitals:    12/31/22 0513 01/02/23 0659 01/03/23 0510   Weight: 52.6 kg (115 lb 15.4 oz) 49.6 kg (109 lb 5.6 oz) 49.8 kg (109 lb 12.6 oz)     Vital Signs with Ranges  Temp:  [97.2  F (36.2  C)-97.8  F (36.6  C)] 97.7  F (36.5  C)  Pulse:  [62-83] 70  Resp:  [15-20] 20  BP: (133-163)/(77-92) 163/82  SpO2:  [94 %-96 %] 94 %  I/O last 3 completed shifts:  In: 3 [I.V.:3]  Out: 125 [Urine:125]    Peripheral IV 01/02/23 Right;Posterior Wrist (Active)   Site Assessment WDL 01/03/23 0819   Line Status Saline locked 01/03/23 0819   Phlebitis Scale 0-->no symptoms 01/03/23 0819   Infiltration Scale 0 01/03/23 0819   Number of days: 1       Incision/Surgical Site 08/22/18 Right Wrist (Active)   Number of days: 1595     No line/device    Constitutional: Alert and oriented x3. No distress    HEENT: Normocephalic/atraumatic, PERRL, EOMI, mouth MOIST, neck supple, no LN.     Cardiovascular: irregRRR. no Murmur, no  rubs, or gallops.  JVD no.  Bruits no.  Pulses 2    Respiratory: Clear to auscultation bilaterally    Abdomen: Soft, nontender, nondistended, no organomegaly. Bowel sounds present    Extremities: Warm/dry. No edema    Neuro:   Non focal, cranial nerves intact, Moves all extremities.  Medications     Reason anticoagulant not prescribed for atrial fibrillation         aspirin  81 mg Oral Daily     digoxin  125 mcg Oral Daily     heparin ANTICOAGULANT  5,000 Units Subcutaneous BID     metoprolol succinate ER  50 mg Oral Daily     pantoprazole  40 mg  Oral BID AC     predniSONE  5 mg Oral Daily     sodium chloride (PF)  3 mL Intracatheter Q8H     thyroid  30 mg Oral Daily       Data   Recent Labs   Lab 01/03/23  0532 01/02/23  0559 01/01/23  0526 12/30/22  0106 12/29/22  2106   WBC 10.8 10.0 9.8   < > 9.4   HGB 16.1* 15.8* 16.1*   < > 14.6   MCV 94 95 95   < > 94    258 260   < > 232   INR  --   --   --   --  1.31*    141 137   < > 139   POTASSIUM 4.0 4.1 4.1   < > 4.0   CHLORIDE 101 101 98   < > 101   CO2 28 29 29   < > 23   BUN 28.5* 31.3* 32.5*   < > 47.1*   CR 0.95 0.96* 1.07*   < > 1.24*   ANIONGAP 11 11 10   < > 15   EFFIE 9.1 9.0 9.0   < > 9.5   GLC 92 93 133*   < > 146*   ALBUMIN  --   --   --   --  4.0   PROTTOTAL  --   --   --   --  6.1*   BILITOTAL  --   --   --   --  0.6   ALKPHOS  --   --   --   --  46   ALT  --   --   --   --  17   AST  --   --   --   --  20    < > = values in this interval not displayed.       No results found for this or any previous visit (from the past 24 hour(s)).

## 2023-01-03 NOTE — PROGRESS NOTES
01/03/23 1300   Appointment Info   Signing Clinician's Name / Credentials (PT) Celeste Pyle DPT   Interventions   Interventions Quick Adds Therapeutic Activity;Therapeutic Procedure   Therapeutic Procedure/Exercise   Ther. Procedure: strength, endurance, ROM, flexibillity Minutes (69385) 10   Symptoms Noted During/After Treatment none   Treatment Detail/Skilled Intervention Pt completed BLE ther ex including SLR, hip abduction/adduction, ankle pumps, seated LAQ 10x2 each exercise c cues for form   Therapeutic Activity   Therapeutic Activities: dynamic activities to improve functional performance Minutes (78551) 15   Symptoms Noted During/After Treatment Fatigue   Treatment Detail/Skilled Intervention Pt seated in recliner at start of treatment and agreeable to participate. Completed sit to stand c CGA. Pt ambulated 100' x2 c FWW and CGA c WC follow for safety. Ambulation distance limited by fatigue. Pt required about 4 minute seated rest breaks between bouts of ambulation. Pt seated in recliner resting at end of session c personal alarm on   PT Discharge Planning   PT Plan Progress mobility as tolerated, trial stairs   PT Discharge Recommendation (DC Rec) Transitional Care Facility;home with home care physical therapy;home with assist   PT Rationale for DC Rec Pt with notable decline in interaction, processing, and command following today.  Pt less interactive.  Concerned for pt's ability to intitiate and be independent at home based on todays session.  Recommend short term rehab which pt was agreeable unless her initiation and processing improves back to level it was at on Saturday.   PT Brief overview of current status Pt seated in recliner at start of treatment and agreeable to participate. Completed sit to stand c CGA. Pt ambulated 100' x2 c FWW and CGA c WC follow for safety. Ambulation distance limited by fatigue. Pt required about 4 minute seated rest breaks between bouts of ambulation. Pt seated in  recliner resting at end of session c personal alarm on   Total Session Time   Timed Code Treatment Minutes 25   Total Session Time (sum of timed and untimed services) 25

## 2023-01-03 NOTE — PROGRESS NOTES
Patient was accepted for admission to [facility] for Wednesday, 01/04. Patient, patient's daughter, and provider aware. Patient will need a covid test, resulted, prior to discharge. [facility] to pick patient up at 11:30 on Wednesday, 01/04.

## 2023-01-03 NOTE — PROGRESS NOTES
Spoke with patient and both of her daughters regarding short term rehab. All 3 are in agreement with short term rehab placement. Patient's daughter, Mary Carmen, is in town for the next couple pf weeks to help patient get/keep things in order.     Pt/family was provided with the Medicare Compare list for SNF.  Discussed associated Medicare star ratings to assist with choice for referrals/discharge planning Yes    Education was given to pt/family that star ratings are updated/maintained by Medicare and can be reviewed by visiting www.medicare.gov Yes    Patient/family choices for facility in priority are:   First Choice : Skilled Nursing Facility [facility]: [facility]     [phone]    Second Choice: Skilled Nursing Facility [facility]: [facility]    [phone]

## 2023-01-03 NOTE — PROGRESS NOTES
01/03/23 1600   Appointment Info   Signing Clinician's Name / Credentials (OT) Chasity Joyce, OTR/L   Therapeutic Activities   Therapeutic Activity Minutes (88396) 18   Symptoms noted during/after treatment fatigue   Treatment Detail/Skilled Intervention Pt up in the chair upon OT arrival, agreeable to tx. She was on RA. Pt transferred sit<>stand from the chair with CGA x2. She ambulated to/from window x2 and then to/from the door using FWW with CGA. Pt took a couple standing rest breaks. She wanted to look in her purse so went to the dresser to grab it but required assist and then to carry. Pt returned to the chair and browsed through. Once she was finished, OT put her purse back in bag on dresser. Pt was left resting in the chair with the call light within reach.   OT Discharge Planning   OT Plan Progress strength, cognition, activity tolerance, and ADLs   OT Discharge Recommendation (DC Rec) Transitional Care Facility   OT Rationale for DC Rec Pt improved from yesterday though is still requiring some assist for ADLs and would not be safe to return home alone.   OT Brief overview of current status Pt up in the chair upon OT arrival, agreeable to tx. She was on RA. Pt transferred sit<>stand from the chair with CGA x2. She ambulated to/from window x2 and then to/from the door using FWW with CGA. Pt took a couple standing rest breaks. She wanted to look in her purse so went to the dresser to grab it but required assist and then to carry. Pt returned to the chair and browsed through. Once she was finished, OT put her purse back in bag on dresser. Pt was left resting in the chair with the call light within reach.   Total Session Time   Timed Code Treatment Minutes 18   Total Session Time (sum of timed and untimed services) 18

## 2023-01-03 NOTE — PHARMACY-MEDICATION REGIMEN REVIEW
Pharmacy Medication Review    Subjective/Objective:    89 year old patient started on digoxin 125 mcg PO daily on 12/31/22            CrCl = 31 mL/minute    Assessment/Plan:    1. Decrease digoxin to 62.5 mcg daily as level is supratherapeutic, and patient has only received 3 doses prior to level.  Actual level at steady state will be higher than 2.1.  Goal range 0.8-2.    2. Recheck level on new regimen in 5-7 days      Thank you,    Sal Ledesma, [facility]

## 2023-01-04 VITALS
BODY MASS INDEX: 19.38 KG/M2 | RESPIRATION RATE: 16 BRPM | HEART RATE: 65 BPM | SYSTOLIC BLOOD PRESSURE: 128 MMHG | WEIGHT: 109.35 LBS | HEIGHT: 63 IN | DIASTOLIC BLOOD PRESSURE: 81 MMHG | TEMPERATURE: 97.4 F | OXYGEN SATURATION: 95 %

## 2023-01-04 PROCEDURE — 250N000011 HC RX IP 250 OP 636: Performed by: INTERNAL MEDICINE

## 2023-01-04 PROCEDURE — 250N000012 HC RX MED GY IP 250 OP 636 PS 637: Performed by: INTERNAL MEDICINE

## 2023-01-04 PROCEDURE — 99239 HOSP IP/OBS DSCHRG MGMT >30: CPT | Performed by: INTERNAL MEDICINE

## 2023-01-04 PROCEDURE — 250N000013 HC RX MED GY IP 250 OP 250 PS 637: Performed by: FAMILY MEDICINE

## 2023-01-04 PROCEDURE — 250N000013 HC RX MED GY IP 250 OP 250 PS 637: Performed by: INTERNAL MEDICINE

## 2023-01-04 RX ORDER — METOPROLOL SUCCINATE 50 MG/1
50 TABLET, EXTENDED RELEASE ORAL DAILY
DISCHARGE
Start: 2023-01-05 | End: 2024-01-01 | Stop reason: DRUGHIGH

## 2023-01-04 RX ADMIN — METOPROLOL SUCCINATE 50 MG: 50 TABLET, EXTENDED RELEASE ORAL at 09:09

## 2023-01-04 RX ADMIN — ASPIRIN 81 MG: 81 TABLET, COATED ORAL at 09:08

## 2023-01-04 RX ADMIN — PANTOPRAZOLE SODIUM 40 MG: 40 TABLET, DELAYED RELEASE ORAL at 06:35

## 2023-01-04 RX ADMIN — PREDNISONE 5 MG: 5 TABLET ORAL at 09:09

## 2023-01-04 RX ADMIN — LEVOTHYROXINE, LIOTHYRONINE 30 MG: 19; 4.5 TABLET ORAL at 09:09

## 2023-01-04 RX ADMIN — HEPARIN SODIUM 5000 UNITS: 5000 INJECTION, SOLUTION INTRAVENOUS; SUBCUTANEOUS at 09:10

## 2023-01-04 RX ADMIN — DIGOXIN 125 MCG: 125 TABLET ORAL at 09:09

## 2023-01-04 RX ADMIN — ACETAMINOPHEN 650 MG: 325 TABLET, FILM COATED ORAL at 11:29

## 2023-01-04 ASSESSMENT — ACTIVITIES OF DAILY LIVING (ADL)
ADLS_ACUITY_SCORE: 30
ADLS_ACUITY_SCORE: 29
ADLS_ACUITY_SCORE: 32
ADLS_ACUITY_SCORE: 28
ADLS_ACUITY_SCORE: 28
ADLS_ACUITY_SCORE: 32

## 2023-01-04 NOTE — DISCHARGE SUMMARY
[facility]  Hospitalist Discharge Summary      Date of Admission:  12/29/2022  Date of Discharge:  1/4/2023 11:46 AM  Discharging Provider: Markel Ricardo MD  Discharge Service: Hospitalist Service    Discharge Diagnoses      Atrial fibrillation rapid ventricular response  Chronic persistent atrial fibrillation  Encephalopathy  Acute kidney injury secondary volume depletion  Urinary retention      Follow-ups Needed After Discharge   Follow-up Appointments     Follow Up and recommended labs and tests      Follow up with Nursing home physician.  No follow up labs or test are   needed.         None    Unresulted Labs Ordered in the Past 30 Days of this Admission     No orders found from 11/29/2022 to 12/30/2022.      These results will be followed up by not needed    Discharge Disposition   Discharged to nursing home  Condition at discharge: Stable       Hospital Course    Patient is an 89-year-old female who apparently lives independently.  She was brought in by a friend because of some increasing weakness and confusion.  Friend felt that she was a lot slower than her usual responses.  She does have a history of chronic persistent atrial fibrillation.  Unclear if she has been compliant with her medications.  Upon arrival her heart rate was 180s A. fib blood pressure 115-122 systolic.  Afebrile sats are 92% room air.  Labs her creatinine was up a little bit at 1.24 BUN was 47 normals in the 20 range electrolytes were normal CRP was 7.5 BNP was quite high at 33,000, troponin was 32 TSH 1.08 white count was 9400.  Hemoglobin was 18.6 COVID influenza negative dig level was less than 0.4.  Which she is supposed to be on for her rate control.  Chest x-ray was unremarkable.  She was given a small bolus of fluid.  Was given a dose of IV diltiazem which dropped her pressures to less than 100.  Is already given 2 doses.  Rate was still up she was given 2 doses of IV digoxin 0.25 mg.  And admitted to the  ICU.     1.  Atrial fibrillation rapid ventricular response: Patient does have known chronic A. fib.  At times not sure clear if she is compliant with her medications.  She is only on digoxin for rate control.  Is only on aspirin also for deep stroke prophylaxis given multiple falls in the past.  An echocardiogram done in 2019 showed basically normal systolic function.  Currently heart rates in the 100-120 range.  Blood pressure stable 100-135 systolic range.  We will finish up a load of the digoxin in the past she has been well controlled with that if need be can add a little bit of beta-blocker we will repeat her echocardiogram today.  Her oxygenation remains normal on room air.  Her echo does show a reduced systolic function EF from 35 to 40% globally so.  Does have mild aortic stenosis and severe left atrial enlargement.  I do not have an echo to compare this to but secondhand previous EF's have been 55% several years ago.  We will give her 500 mcg of digoxin now just to get her rate under control with ambulation she does jump up pretty quickly which she did in the past also.  But was doing okay on just the diltiazem so we will get her completely loaded and go from there.  -12/31: We will order bump on digoxin heart rate usually less than 100 but does go up with exertion we will add some beta-blocker on top of this continue with her usual oral dose of digoxin.  She has not been a candidate for anticoagulation from previous discussions.  She is on aspirin at this time.  Hemodynamically stable however.-1/1: Rate seems to be well controlled just at rest in the 80s does go up with exertion I did put her on small dose of Toprol-XL might be able to increase that a little bit here pressures are stable we will bump it up to 50.  -1/2: Heart rate for most part is controlled with exertion she does go up somewhat is on the Toprol-XL 50 mg every day along with the digoxin.  I think I would just tolerate the exercise-induced  rate as she is asymptomatic.  Restart her baby aspirin daily.  -1/3: Chronic A. fib rate is actually nicely controlled 70 at rest where elevated rate at this time.  -1/4: Patient's rate continues to be nicely controlled.  Her digoxin level came back on the higher side 2.0 but not affecting current rhythm given her age and size we will cut down her dose now after having loaded her up to 0.625 mg daily.  She will continue with the Toprol-XL 50 mg also.     2.  Question encephalopathy: Patient apparently was little more confused than usual per her family but no signs of infection.  CT unremarkable neurologically stable.  We will follow at this point seems to be better.  She is a little confused for both me and the nursing staff.  We will follow her closely at this time.  This afternoon she is actually doing better at this time able to have a good conversation with me seems relatively alert and with it so is improved to go for a walk did get pretty fatigued after short distance but at least started.  -12/31: Mental status wise she seems to be a bit better.  Still is a little fuzzy on the edges.  We will see how she does over the next day or so in terms of ability to return home.  To get up and ambulate.  -1/1: Patient sitting in the chair ordering breakfast she is quite alert today we talked for quite a while states her daughter is coming from [place] to visit her.  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  So she is very alert and appropriate at this point.  We talked about disposition she really does not know what to say ideally she like to go home but we have to make sure she is safe to do that we will continue to work with PT and OT and make some decisions I would like to have her go somewhere perhaps on Monday or Tuesday.  The only issue is her Garcia catheter will see if we can possibly get  rid of that tomorrow.  -1/2: Really no signs of encephalopathy in general she is maybe occasionally bit confused but for 89 I think she is doing relatively well.  We did discussed going home at this point she does not think really that her daughter will be able to help her as much as she thinks that she needs.  We will talk with to case management tomorrow and at least go for a temporary nursing home stay.  -1/3: For most part is doing relatively well occasional confusion but definitely at her baseline.  -1/4: Once again for the most part she is doing very well in terms of her overall mentation.  At times a little confused but nothing seems to be overly persistent or concerning.     3.  Elevated BNP: She has been voiding a significant amount on her own about a liter.  Initially thought she may be a bit on the dry side of things but perhaps not at this time we will see how she does on her own without giving her any further fluid as she is hemodynamically stable.  As above her echo does show reduction in systolic function compared to several years ago.  Likely this is due to her persistent A. fib there is no focal wall motion abnormalities.  She is voiding adequately on her own.  Normally she is on some Lasix but I will withhold that for now.  -12/31: Echo did show decreased systolic function a fair amount from previously she has been voiding on her own without any Lasix significantly did have a lot of urinary retention has been straight cath on several occasions.  If she needs it again would place a Garcia catheter and leave it in place.  -1/1: No signs of heart failure at this point.  Been voiding quite nicely until she had Garcia catheter placed.  -1/2: No signs of heart failure at all.  I have not given back her usual Lasix dosage is not having any edema or other issues at this time        4.  Acute kidney injury:.  Creatinine are improved from yesterday 35/1.11.  Monitor closely.  -12/31: BUN/creatinine have  continued to improve as patient is sort of auto diuresing.  26 and 0.87 today.  -1/1: Her BUN/creatinine are stable 32/1.07.  -1/2: BUN/creatinine are 31/0.96 today baseline for her  -1/3: This has resolved.     5.  Disposition: We need to get the patient up mobilizing her once again today we will be adding the beta-blocker we will need to decide this in the next day or so she is really very alert at this time just whether or not she is able to handle a Garcia catheter she needs that upon discharge.  There is no signs of any bladder infection at all.  She had just urinary retention unable to void.  We will discuss further after PT sees her and make a plan for Tuesday either home may be with some help versus nursing home.  -1/2: Doing well in general we will try the Garcia out today but I think she would benefit from at least a short-term stay at rehab.  Urinary retention: Patient had a Garcia catheter placed temporarily.  Was able to void after removing.  There is some postvoid residual but she is able to clear if she has persistent problems will need to see urology.       Consultations This Hospital Stay   PHYSICAL THERAPY ADULT IP CONSULT  OCCUPATIONAL THERAPY ADULT IP CONSULT  PHYSICAL THERAPY ADULT IP CONSULT  OCCUPATIONAL THERAPY ADULT IP CONSULT    Code Status   No CPR- Do NOT Intubate    Time Spent on this Encounter   I, Markel Ricardo MD, personally saw the patient today and spent greater than 30 minutes discharging this patient.       Markel Ricardo MD  [facility]  [address] [place] [id number]  Phone: [phone]  Fax: [phone]  ______________________________________________________________________    Physical Exam   Vital Signs: Temp: 97.4  F (36.3  C) Temp src: Tympanic BP: 128/81 Pulse: 65   Resp: 16 SpO2: 95 % O2 Device: None (Room air)    Weight: 109 lbs 5.57 oz  Constitutional: awake, alert, cooperative, no apparent distress, and appears stated age  Respiratory: No increased  work of breathing, good air exchange, clear to auscultation bilaterally, no crackles or wheezing  Cardiovascular: Irregular regular.  Normal S1-S2 1 out of 6 to 2 out of 6 systolic murmur.  GI: No scars, normal bowel sounds, soft, non-distended, non-tender, no masses palpated, no hepatosplenomegally  Musculoskeletal: There is no redness, warmth, or swelling of the joints.  Full range of motion noted.  Motor strength is 5 out of 5 all extremities bilaterally.  Tone is normal.       Primary Care Physician   Gilda Mcgregor    Discharge Orders      General info for SNF    Length of Stay Estimate: Short Term Care: Estimated # of Days <30  Condition at Discharge: Improving  Level of care:skilled   Rehabilitation Potential: Good  Admission H&P remains valid and up-to-date: Yes  Recent Chemotherapy: N/A  Use Nursing Home Standing Orders: Yes     Reason for your hospital stay    Patient was brought in because of increasing weakness fatigue confusion and she had A. fib with RVR.  Rate was controlled adequately.  No obvious signs of infection.  Mental status did clear after time.  Still feels quite weak and is unable to really return home safely.  Is heading to a nursing home for rehab purposes.     Follow Up and recommended labs and tests    Follow up with Nursing home physician.  No follow up labs or test are needed.     Activity - Up with nursing assistance     No CPR- Do NOT Intubate     Physical Therapy Adult Consult    Evaluate and treat as clinically indicated.    Reason: Weakness     Occupational Therapy Adult Consult    Evaluate and treat as clinically indicated.    Reason: Weakness     Diet    Follow this diet upon discharge: Orders Placed This Encounter      Combination Diet Low Saturated Fat Na <2400mg Diet       Significant Results and Procedures   Most Recent 3 CBC's:Recent Labs   Lab Test 01/03/23  0532 01/02/23  0559 01/01/23  0526   WBC 10.8 10.0 9.8   HGB 16.1* 15.8* 16.1*   MCV 94 95 95    258  260     Most Recent 3 BMP's:Recent Labs   Lab Test 01/03/23  0532 01/02/23  0559 01/01/23  0526    141 137   POTASSIUM 4.0 4.1 4.1   CHLORIDE 101 101 98   CO2 28 29 29   BUN 28.5* 31.3* 32.5*   CR 0.95 0.96* 1.07*   ANIONGAP 11 11 10   EFFIE 9.1 9.0 9.0   GLC 92 93 133*     Most Recent 2 LFT's:Recent Labs   Lab Test 12/29/22 2106 11/27/21  2259   AST 20 20   ALT 17 29   ALKPHOS 46 57   BILITOTAL 0.6 0.6     Most Recent 3 BNP's:Recent Labs   Lab Test 12/29/22 2106 11/27/21 2259   NTBNPI 33,109* 4,886*     7-Day Micro Results     Collected Updated Procedure Result Status      01/03/2023 2039 01/03/2023 2116 Asymptomatic COVID-19 Virus (Coronavirus) by PCR Nasopharyngeal [[id number]]    Swab from Nasopharyngeal    Final result Component Value   SARS CoV2 PCR Negative   NEGATIVE: SARS-CoV-2 (COVID-19) RNA not detected, presumed negative.            12/29/2022 2131 12/29/2022 2215 Symptomatic Influenza A/B & SARS-CoV2 (COVID-19) Virus PCR Multiplex Nasopharyngeal [[id number]]    Swab from Nasopharyngeal    Final result Component Value   Influenza A PCR Negative   Influenza B PCR Negative   RSV PCR Negative   SARS CoV2 PCR Negative   NEGATIVE: SARS-CoV-2 (COVID-19) RNA not detected, presumed negative.                Most Recent TSH and T4:Recent Labs   Lab Test 12/29/22 2106   TSH 1.08     Most Recent Urinalysis:Recent Labs   Lab Test 12/30/22  0246   COLOR Yellow   APPEARANCE Clear   URINEGLC Negative   URINEBILI Negative   URINEKETONE Negative   SG 1.026   UBLD Negative   URINEPH 6.0   PROTEIN 100*   NITRITE Negative   LEUKEST Negative   RBCU 0   WBCU 3     Most Recent ESR & CRP:Recent Labs   Lab Test 12/29/22 2106   CRP 7.57*   ,   Results for orders placed or performed during the hospital encounter of 12/29/22   XR Chest Port 1 View    Narrative    Exam:  XR CHEST PORT 1 VIEW    HISTORY: Palpitations.    COMPARISON:  None.    FINDINGS:     The cardiomediastinal contours are mildly enlarged.      Mild  streaky bibasilar opacities.  No definite pleural effusion. No  pneumothorax.    No acute osseous abnormality.       Impression    IMPRESSION:      Mild streaky bibasilar opacities are most likely atelectasis or  scarring.      PAULO MARTINEZ MD         SYSTEM ID:  [id number]   CT Head w/o Contrast    Narrative    PROCEDURE: CT HEAD W/O CONTRAST   2022 10:01 PM    HISTORY:Female, age,  89 years, , , AMS    COMPARISON:No relevant prior imaging.    TECHNIQUE: CT of the brain without contrast. Axial; sagittal and  coronal MIP images were reviewed.    FINDINGS: Ventricles and sulci are enlarged consistent with atrophy.  Gray and white matter demonstrate scattered areas of decreased  density.    There is no evidence of mass, mass effect or midline shift. No  evidence of acute hemorrhage.    The bones are unremarkable. No fracture.       Impression    IMPRESSION:   No acute intracranial abnormality.  No acute fracture.     Nonspecific white matter changes suggesting small vessel disease.    This report is in agreement with the preliminary report.      This facility minimizes radiation dose by adjusting the mA and/or kV  according to each patient size.      This CT scan was performed using one or more the following dose  reduction techniques:    -Automated exposure control,  -Adjustment of the mA and/or kV according to patient's size, and/or,  -Use of iterative reconstruction technique.      RIK HUTCHINSON MD         SYSTEM ID:  [id number]   Echocardiogram Complete     Value    LVEF  35-40% (moderately reduced)    Narrative    [id number]  [id number]  [id number]  [id number]^DANY^[facility]  Echocardiography Laboratory  [address] [place] [id number]     Name: GONZALEZ BAUTISTA  MRN: [id number]  : 1933  Study Date: 2022 07:06 AM  Age: 89 yrs  Gender: Female  Patient Location: [facility]  Reason For Study: Atrial Fibrillation  History: Afib  Ordering Physician: DANY  NHA  Performed By: Cira Beaver RDCS     BSA: 1.6 m2  Height: 63 in  Weight: 121 lb  ______________________________________________________________________________  Procedure  Complete Portable Echo Adult. The patient's rhythm is atrial fibrillation.  ______________________________________________________________________________  Interpretation Summary  Left ventricular function is decreased. The ejection fraction is 35-40%  (moderately reduced).  Mild to moderate diffuse hypokinesis is present.  The right ventricle is normal size.  Global right ventricular function is normal.  Severe left atrial enlargement is present.  Mild mitral insufficiency is present.  Mild aortic valve calcification is present.  Mild aortic stenosis is present.  The mean gradient across the aortic valve is17 mmHg.  Trace tricuspid insufficiency is present.  Mild pulmonic insufficiency is present.  Previous study not available for comparison.  ______________________________________________________________________________  Left Ventricle  Left ventricular function is decreased. The ejection fraction is 35-40%  (moderately reduced). Mild to moderate diffuse hypokinesis is present.     Right Ventricle  The right ventricle is normal size. Global right ventricular function is  normal.     Atria  Severe left atrial enlargement is present.     Mitral Valve  Mild mitral insufficiency is present.     Aortic Valve  Mild aortic valve calcification is present. Mild aortic stenosis is present.  The mean AoV pressure gradient is 17.0 mmHg. The peak AoV pressure gradient is  32.5 mmHg. The peak aortic velocity is 2.85 m/sec. The mean gradient across  the aortic valve is17 mmHg.     Tricuspid Valve  Trace tricuspid insufficiency is present.     Pulmonic Valve  Mild pulmonic insufficiency is present.     Vessels  Ascending aorta 3.13 cm.     Pericardium  No pericardial effusion is present.     Compared to Previous Study  Previous study not available for  comparison.     ______________________________________________________________________________  MMode/2D Measurements & Calculations  IVSd: 1.1 cm  LVIDd: 4.7 cm  LVIDs: 4.0 cm  LVPWd: 1.1 cm     FS: 14.3 %  LV mass(C)d: 174.6 grams  LV mass(C)dI: 111.8 grams/m2  Ao root diam: 3.4 cm  LA dimension: 4.2 cm  asc Aorta Diam: 3.1 cm  LA/Ao: 1.3  LVOT diam: 2.0 cm  LVOT area: 3.2 cm2  LA Volume Indexed (AL/bp): 60.1 ml/m2  RWT: 0.45     Doppler Measurements & Calculations  Ao V2 max: 285.0 cm/sec  Ao max P.5 mmHg  Ao V2 mean: 193.0 cm/sec  Ao mean P.0 mmHg  Ao V2 VTI: 43.9 cm  KOJO(I,D): 0.88 cm2  KOJO(V,D): 1.1 cm2     LV V1 max PG: 3.9 mmHg  LV V1 max: 99.0 cm/sec  LV V1 VTI: 12.1 cm  SV(LVOT): 38.5 ml  SI(LVOT): 24.7 ml/m2  AV Tavo Ratio (DI): 0.35  KOJO Index (cm2/m2): 0.56     ______________________________________________________________________________  Report approved by: Michelle Corral 2022 11:17 AM               Discharge Medications   Discharge Medication List as of 2023 10:48 AM      START taking these medications    Details   metoprolol succinate ER (TOPROL XL) 50 MG 24 hr tablet Take 1 tablet (50 mg) by mouth daily, Transitional         CONTINUE these medications which have NOT CHANGED    Details   aspirin 81 MG EC tablet Take 81 mg by mouth daily, Historical      calcium carbonate (OS-EFFIE) 1500 (600 Ca) MG tablet Take 600 mg by mouth daily , Historical      digoxin (LANOXIN) 125 MCG tablet Take 62.5 mcg by mouth daily , Historical      furosemide (LASIX) 20 MG tablet Take 20 mg by mouth daily, Historical      LORazepam (ATIVAN) 0.5 MG tablet Take 0.5 mg by mouth At Bedtime , Historical      multivitamin, therapeutic (THERA-VIT) TABS tablet Take 1 tablet by mouth daily, Historical      NP THYROID 30 MG tablet Take 30 mg by mouth daily, ELI, Historical      omeprazole (PRILOSEC) 20 MG DR capsule Take 20 mg by mouth 2 times daily (before meals) , Historical      predniSONE (DELTASONE)  10 MG tablet Take 5 mg by mouth daily , Historical      Vitamin D, Cholecalciferol, 10 MCG (400 UNIT) TABS Take 1 tablet by mouth daily, Historical           Allergies   Allergies   Allergen Reactions     Levothyroxine Shortness Of Breath and Swelling     Nitrogen Swelling and Blisters     Liquid nitrogen

## 2023-01-04 NOTE — PROGRESS NOTES
"Reason for hospital stay:  AMS, Weak// Fatigue   Living situation PTA: Home  Most recent vitals: /81 (BP Location: Left arm)   Pulse 65   Temp 97.4  F (36.3  C) (Tympanic)   Resp 16   Ht 1.6 m (5' 3\")   Wt 49.6 kg (109 lb 5.6 oz)   SpO2 95%   BMI 19.37 kg/m      Pt has been awake, up in chair, mentation back to baseline, forgetful at times. Request PRN Tylenol for general aches and pains. No PIV present. Up with GB and SBA. Able to brush teeth and wash hands standing at sink.     Discharge care conference held.   Attendees: Nursing, , Physical Therapy, Physician and NP  Comments:    Patient discharged at 11:32 AM via wheel chair accompanied by daughter and staff. Prescriptions sent to patients preferred pharmacy. All belongings sent with patient.     Discharge instructions reviewed with pt and Sondra @ [facility]. Listed belongings gathered and returned to patient. YES    Patient discharged to [facility].   Report called to Nursing Home:  Yes, Sondra RN     Surgical Patient   Surgical Procedures during stay: NA  Did patient receive discharge instruction on wound care and recognition of infection symptoms? N/A    MISC  Follow up appointment made:  No  Home medications returned to patient: N/A  Patient reports pain was well managed at discharge: Yes        "

## 2023-01-04 NOTE — PLAN OF CARE
"  Most recent vitals: /94 (BP Location: Left arm, Patient Position: Chair, Cuff Size: Adult Small)   Pulse 68   Temp 97.8  F (36.6  C) (Tympanic)   Resp 18   Ht 1.6 m (5' 3\")   Wt 49.8 kg (109 lb 12.6 oz)   SpO2 96%   BMI 19.45 kg/m      Comments:     Alert, confused @ times.  VS stable, afebrile, slight discomfort (tylenol given w/ relief).  Patient daughter here and updated.  [facility].    Lungs clear and equal bilaterally, irregular pulse, trace edema, assist x 1 w/ generalized weakness.      Covid test to be done tonight per order.         Face to face report given with opportunity to observe patient.    Report given to LIZBET Field RN   1/3/2023  7:25 PM      "
"/69 (BP Location: Left arm)   Pulse 94   Temp 97  F (36.1  C) (Tympanic)   Resp 20   Ht 1.6 m (5' 3\")   Wt 52.6 kg (115 lb 15.4 oz)   SpO2 94%   BMI 20.54 kg/m       Pt is pleasant and cooperative.  Denies pain.  Disoriented to time.  Lungs clear to diminished bilaterally.  Tele on, no calls.  Afib.  Garcia patent, draining clear yellow urine.  Saline Locked.  Alarms on.  Pt did ambulate in [place] today.  Eating well.  No chest pain.  Neuro's intact.  CMS intact.  RN talked with daughter Sierra at length about pt current health challenges.  [place].  RN communicated that case management will be here Tuesday and there will be more information then regarding placement plan.  Sierra did talk to Shannon after our conversation. Daughter Mary Carmen will be here tomorrow.  A1. FWW. GB.    Bed in low position, skid free socks on, upper side rails engaged, bed and chair alarms on.      Face to face report given with opportunity to observe patient.    Report given to LIZBET Prince RN   1/1/2023  7:46 PM                                    "
"/83 (BP Location: Left arm, Patient Position: Semi-Burks's, Cuff Size: Adult Small)   Pulse 62   Temp 97.2  F (36.2  C) (Tympanic)   Resp 15   Ht 1.6 m (5' 3\")   Wt 49.6 kg (109 lb 5.6 oz)   SpO2 94%   BMI 19.37 kg/m      Pt is alert and disoriented to time and situation. VSS and remains on room air. Pt reported pain - rFLACC score 2. PRN tylenol given with relief. HR irregular. Bladder scan volume (prior to voiding) was 249 mL. MD aware, see previous note. Pt voided 125 mL later in the shift. Fluids encouraged throughout the night. Foam dressing to right upper back changed, CDI. Rest of assessment as charted. IV patent and SL. Call light within reach, bed alarm on, and room door open. Free of falls/injuries this shift.    Face to face report given with opportunity to observe patient.    Report given to LIZBET Loza RN   1/3/2023  "
"Most recent vitals: /73 (BP Location: Left arm, Patient Position: Semi-Burks's)   Pulse 51   Temp 98  F (36.7  C) (Tympanic)   Resp 18   Ht 1.6 m (5' 3\")   Wt 49.8 kg (109 lb 12.6 oz)   SpO2 94%   BMI 19.45 kg/m      Patient alert but disoriented to situation. Up with Ax1 to bedside commode. Denies pain, rFLACC score 0. [facility]. Bed alarms on for safety. IV saline locked. COVID test completed. Bed alarms on for safety during the night. Able to make needs known, has been calling appropriately this shift.     Face to face report given with opportunity to observe patient.    Report given to Sabas Raymundo RN   1/4/2023  7:16 AM      "
"Reason for hospital stay:  A-Fib / RVR    Most recent vitals: /77 (BP Location: Right arm)   Pulse 92   Temp 98.2  F (36.8  C) (Tympanic)   Resp 16   Ht 1.6 m (5' 3\")   Wt 52.6 kg (115 lb 15.4 oz)   SpO2 94%   BMI 20.54 kg/m      Assumed care of patient at approximately 11:30 PM. Patient A+O x3 - disoriented to situation and forgetful at times. Pleasant and cooperative with cares. Denied any chest pain or other pain. Denied any shortness of breath or dizziness. Tele reading A-Fib per ICU nurse. Garcia patent and draining clear yellow urine. IV saline locked. Alarms on.         Face to face report given with opportunity to observe patient.    Report given to Krissy Thomas RN   1/1/2023  7:27 AM          "
"Reason for hospital stay:  A-Fib / RVR    Most recent vitals: /92 (BP Location: Left arm, Patient Position: Supine)   Pulse 90   Temp 97.2  F (36.2  C) (Tympanic)   Resp 16   Ht 1.6 m (5' 3\")   Wt 52.6 kg (115 lb 15.4 oz)   SpO2 95%   BMI 20.54 kg/m      A+O x3 - disoriented to situation - forgetful at times. Pleasant and cooperative with cares. Denied any chest pain or other discomfort this shift. Denied any shortness of breath, dizziness, or lightheadedness. Tachy up to 150 when getting up to bedside scale this morning. Telemetry reading A-fib 70s-90s per ICU nurse. Garcia patent and draining adequate clear yellow urine - less urine output than yesterday (175mL on afternoon shift and 450mL on night shift).  IV saline locked and flushed easily. Alarms on.       Face to face report given with opportunity to observe patient.    Report given to Hans Thomas RN   1/2/2023  7:06 AM                  "
"Reason for hospital stay:  Altered Mental Status  Living situation PTA: Home alone w/ help-currently waiting for placement  Most recent vitals: /87 (BP Location: Left arm, Patient Position: Semi-Burks's, Cuff Size: Adult Small)   Pulse 67   Temp 97.7  F (36.5  C) (Tympanic)   Resp 16   Ht 1.6 m (5' 3\")   Wt 49.6 kg (109 lb 5.6 oz)   SpO2 95%   BMI 19.37 kg/m        Pain Management:  Some pain w/ movement, but will deny pain when asked.  LOC:  Alert and Confused intermittently.  Cardiac:  HR regular with irregular rhythm  Respiratory:  Lungs clear and equal bilaterally and diminished in the bases. 94% on room air.   GI:  Normoactive BS x 4  :  Garcia removed this shift, no urine output as of 1730. Patient has not consumed much liquids today, they have been encouraged. Bladder scan at 1730 showed 205 mL of urine. Continuing to monitor  Skin Issues:  Scattered bruising, dry/flaky skin.    IVF:  SL  ABX:  N/A    Nutrition: Combination Diet, Low sat fat and <2400 Na  Activity:Assist x 1 w/ gait belt and walker  Safety:  Sitting up in bed, wheels locked, and alarm in place. Call light and personal items within reach and makes needs known.  Face to face report given with opportunity to observe patient.    Report given to LIZBET Odonnell RN   1/2/2023  7:06 PM        "
Daughter, Abbie, called and given update.                         
Discharged @ 11:34 AM.   
Face to face report given with opportunity to observe patient.    Report given to Kasie Angel RN   12/30/2022  7:02 PM                          
Face to face report given with opportunity to observe patient.    Report given to Shazia Angel RN   12/31/2022  6:59 PM                          
Notified hospitalist of bladder scan 611mls. VO to straight cath patient.  
Occupational Therapy Discharge Summary    Reason for therapy discharge:    Discharged to transitional care facility.    Progress towards therapy goal(s). See goals on Care Plan in [facility] electronic health record for goal details.  Goals partially met.  Barriers to achieving goals:   discharge from facility.    Therapy recommendation(s):    Continued therapy is recommended.  Rationale/Recommendations:  to improve safety and independence in ADLs and IADLs.  
Patient is alert and oriented, forgetful/confused at times but easily redirected, makes needs known. Patient is more flat/withdrawn this shift, agitated at 0000. Straight cath x1, patient up to commode x2 with no results. Denies pain. Assessment as charted. HR 80's-110's at rest but does increase to 130's-150's with activity.    Face to face report given with opportunity to observe patient.    Report given to LIZBET Sanchez RN   12/31/2022  7:01 AM        
Physical Therapy Discharge Summary    Reason for therapy discharge:    Discharged to transitional care facility.    Progress towards therapy goal(s). See [facility] for goal details.  Goals partially met.  Barriers to achieving goals:   discharge from facility.    Therapy recommendation(s):    Continued therapy is recommended.  Rationale/Recommendations:  improve functional mobility and safety with ADLs.    Goal Outcome Evaluation:                        
Pt A&Ox4, with periods of confusion/forgetfulness.  Pt aware of recent confusion. Lung sounds clear diminished. HR A Fib 's, with activity HR up to 160's. Pt asymptomatic. Neuro's intact. Temp max T99.4 (T) Denies pain. SBA with gait belt and walker with activity. Foam dressing placed on back as pt has red/purple area approx 1cm circular area intact to upper back on spine. S.L.x2. BLE, encourage elevating legs when up.   Pt voiding frequently approx every 1/2, voiding 50cc-100cc. Bladder scanned over 550cc. Per Dr. Spencer ok to straight cath. Straight cathed 700cc kennedi urine. Pt became nauseated prior to straight cath, no vomiting. After straight cathed and pt sitting up nausea subsided.   1850 - Pt voided 100cc on commode, bladder scanned with 430cc. Spoke with Dr. Luo regarding bladder scan, order recived for straight cath if bladder scan over 350cc. Straight cathed with 500cc urine. Pt voices relief.   Pt c/o lower back discomfort, declined offer of prn analgesic. Aqua K pad ordered for comfort.                       
Pt alert with confusion. Afebrile. HR A Fib 's, ambulated and HR up to 130's but decreases when resting. Unable to void this AM and order placed for bailey d/t retention.  Urine clear kennedi. No odor. Encouraged fluids. Appetite poor. Decreased edema to BLE's. IV S.L.                         
Pt ambulating with PT HR AFib up to 170's. HR did not sustain >than 10 sec.s  Pt asymptomatic. MD notified.                          
Pt in chair eating lunch. HR sustained 160's pt asymptomatic. Vagal maneuvers preformed and successful. MD notified                          
[facility] Inpatient Admission Note:    Patient admitted to 3128/3128-1 at approximately 0045 via cart accompanied by nurse from emergency room . Report received from Dixie ANGELES RN in SBAR format at 0000 via telephone. Patient transferred to bed via slide board.. Patient is alert and oriented X 3, denies pain; rates at 0 on 0-10 scale.  Patient oriented to room, unit, hourly rounding, and plan of care. Explained admission packet and patient handbook with patient bill of rights brochure. Will continue to monitor and document as needed.     Inpatient Nursing criteria listed below was met:    Health care directives status obtained and documented: Yes    Patient identifies a surrogate decision maker: No If yes, who:NA Contact Information:NA     If initial lactic acid greater than 2.0, repeat lactic acid drawn within one hour of arrival to unit: NA. If no, state reason: NA    Clergy visit ordered if patient requests: N/A    Skin issues/needs documented: Yes    Isolation Patient: no Education given, correct sign in place and documentation row added to PCS:  No    Fall Prevention Yes: Care plan updated, education given and documented, sticker and magnet in place: Yes    Care Plan initiated: Yes    Education Documented (including assessment): Yes    Patient has discharge needs : No If yes, please explain: currently has help at home.    Patient tachycardiac throughout shift, Dr. Luo aware. Hospitalist called around 0200 and advised this nurse to call back after 2 hours if the HR was still in the 120's. Called MD at 0400, HR still above 120's, one time additional order for digoxin placed and given. HR 80's-110's after one time dose given. Assessments as charted.     Face to face report given with opportunity to observe patient.    Report given to LIZBET Sanchez RN   12/30/2022  7:08 AM          
70

## 2023-01-04 NOTE — PROGRESS NOTES
Name: Shannon Walls    MRN#: [id number]    Reason for Hospitalization: Atrial fibrillation with rapid ventricular response (H) [I48.91]  Altered mental status, unspecified altered mental status type [R41.82]    ENRIQUE: Low    Discharge Date: 1/4/2023    Patient / Family response to discharge plan: Patient and daughter are agreeable to patient discharging to [facility].     Follow-Up Appt: No future appointments.    Other Providers (Care Coordinator, County Services, PCA services etc): Yes: [facility], [facility].     Discharge Disposition: nursing home         PAS-RR    Per DHS regulation, CTS team completed and submitted PAS-RR to [place] [employer] via the [employer] [facility]. CTS team advised SNF and they are aware a PAS-RR has been submitted.     CTS team reviewed with pt or health care agent that they may be contacted for a follow up appointment within 10 days of hospital discharge if SNF stay is less than 30 days. Contact information for [employer] was also provided.     Pt or health care agent verbalized understanding.     PAS-RR # [id number]

## 2023-04-13 ENCOUNTER — HOSPITAL ENCOUNTER (EMERGENCY)
Facility: HOSPITAL | Age: 88
Discharge: ANOTHER HOSPITAL | End: 2023-04-13
Attending: EMERGENCY MEDICINE | Admitting: EMERGENCY MEDICINE
Payer: MEDICARE

## 2023-04-13 ENCOUNTER — APPOINTMENT (OUTPATIENT)
Dept: CT IMAGING | Facility: HOSPITAL | Age: 88
End: 2023-04-13
Attending: EMERGENCY MEDICINE
Payer: MEDICARE

## 2023-04-13 VITALS
DIASTOLIC BLOOD PRESSURE: 115 MMHG | SYSTOLIC BLOOD PRESSURE: 155 MMHG | BODY MASS INDEX: 21.36 KG/M2 | OXYGEN SATURATION: 92 % | HEART RATE: 116 BPM | TEMPERATURE: 97.8 F | RESPIRATION RATE: 28 BRPM | WEIGHT: 120.59 LBS

## 2023-04-13 DIAGNOSIS — I63.9 CEREBROVASCULAR ACCIDENT (CVA), UNSPECIFIED MECHANISM (H): ICD-10-CM

## 2023-04-13 LAB
ANION GAP SERPL CALCULATED.3IONS-SCNC: 7 MMOL/L (ref 7–15)
APTT PPP: 26 SECONDS (ref 22–38)
BASOPHILS # BLD AUTO: 0.1 10E3/UL (ref 0–0.2)
BASOPHILS NFR BLD AUTO: 1 %
BUN SERPL-MCNC: 19.8 MG/DL (ref 8–23)
CALCIUM SERPL-MCNC: 9.5 MG/DL (ref 8.2–9.6)
CHLORIDE SERPL-SCNC: 101 MMOL/L (ref 98–107)
CREAT SERPL-MCNC: 1.2 MG/DL (ref 0.51–0.95)
DEPRECATED HCO3 PLAS-SCNC: 31 MMOL/L (ref 22–29)
EOSINOPHIL # BLD AUTO: 0.1 10E3/UL (ref 0–0.7)
EOSINOPHIL NFR BLD AUTO: 1 %
ERYTHROCYTE [DISTWIDTH] IN BLOOD BY AUTOMATED COUNT: 13.7 % (ref 10–15)
GFR SERPL CREATININE-BSD FRML MDRD: 43 ML/MIN/1.73M2
GLUCOSE SERPL-MCNC: 115 MG/DL (ref 70–99)
HCT VFR BLD AUTO: 41.4 % (ref 35–47)
HGB BLD-MCNC: 13.5 G/DL (ref 11.7–15.7)
HOLD SPECIMEN: NORMAL
HOLD SPECIMEN: NORMAL
IMM GRANULOCYTES # BLD: 0 10E3/UL
IMM GRANULOCYTES NFR BLD: 0 %
INR PPP: 1.15 (ref 0.85–1.15)
LYMPHOCYTES # BLD AUTO: 1.1 10E3/UL (ref 0.8–5.3)
LYMPHOCYTES NFR BLD AUTO: 14 %
MCH RBC QN AUTO: 31.8 PG (ref 26.5–33)
MCHC RBC AUTO-ENTMCNC: 32.6 G/DL (ref 31.5–36.5)
MCV RBC AUTO: 98 FL (ref 78–100)
MONOCYTES # BLD AUTO: 0.6 10E3/UL (ref 0–1.3)
MONOCYTES NFR BLD AUTO: 7 %
NEUTROPHILS # BLD AUTO: 6.4 10E3/UL (ref 1.6–8.3)
NEUTROPHILS NFR BLD AUTO: 77 %
NRBC # BLD AUTO: 0 10E3/UL
NRBC BLD AUTO-RTO: 0 /100
PLATELET # BLD AUTO: 276 10E3/UL (ref 150–450)
POTASSIUM SERPL-SCNC: 4.8 MMOL/L (ref 3.4–5.3)
RBC # BLD AUTO: 4.24 10E6/UL (ref 3.8–5.2)
SODIUM SERPL-SCNC: 139 MMOL/L (ref 136–145)
TROPONIN T SERPL HS-MCNC: 20 NG/L
WBC # BLD AUTO: 8.3 10E3/UL (ref 4–11)

## 2023-04-13 PROCEDURE — 93005 ELECTROCARDIOGRAM TRACING: CPT

## 2023-04-13 PROCEDURE — 85610 PROTHROMBIN TIME: CPT | Performed by: EMERGENCY MEDICINE

## 2023-04-13 PROCEDURE — 250N000011 HC RX IP 250 OP 636: Performed by: RADIOLOGY

## 2023-04-13 PROCEDURE — 250N000009 HC RX 250: Performed by: EMERGENCY MEDICINE

## 2023-04-13 PROCEDURE — 70498 CT ANGIOGRAPHY NECK: CPT | Mod: MA

## 2023-04-13 PROCEDURE — 85730 THROMBOPLASTIN TIME PARTIAL: CPT | Performed by: EMERGENCY MEDICINE

## 2023-04-13 PROCEDURE — 85025 COMPLETE CBC W/AUTO DIFF WBC: CPT | Performed by: EMERGENCY MEDICINE

## 2023-04-13 PROCEDURE — 93010 ELECTROCARDIOGRAM REPORT: CPT | Performed by: INTERNAL MEDICINE

## 2023-04-13 PROCEDURE — 99291 CRITICAL CARE FIRST HOUR: CPT | Mod: 25

## 2023-04-13 PROCEDURE — 70450 CT HEAD/BRAIN W/O DYE: CPT | Mod: MA

## 2023-04-13 PROCEDURE — 36415 COLL VENOUS BLD VENIPUNCTURE: CPT | Performed by: EMERGENCY MEDICINE

## 2023-04-13 PROCEDURE — 70496 CT ANGIOGRAPHY HEAD: CPT | Mod: MA

## 2023-04-13 PROCEDURE — 96375 TX/PRO/DX INJ NEW DRUG ADDON: CPT | Mod: XU

## 2023-04-13 PROCEDURE — 80048 BASIC METABOLIC PNL TOTAL CA: CPT | Performed by: EMERGENCY MEDICINE

## 2023-04-13 PROCEDURE — 96374 THER/PROPH/DIAG INJ IV PUSH: CPT | Mod: XU

## 2023-04-13 PROCEDURE — 84484 ASSAY OF TROPONIN QUANT: CPT | Performed by: EMERGENCY MEDICINE

## 2023-04-13 PROCEDURE — 99291 CRITICAL CARE FIRST HOUR: CPT | Performed by: EMERGENCY MEDICINE

## 2023-04-13 PROCEDURE — 250N000011 HC RX IP 250 OP 636: Performed by: PSYCHIATRY & NEUROLOGY

## 2023-04-13 RX ORDER — METOPROLOL TARTRATE 1 MG/ML
5 INJECTION, SOLUTION INTRAVENOUS ONCE
Status: COMPLETED | OUTPATIENT
Start: 2023-04-13 | End: 2023-04-13

## 2023-04-13 RX ORDER — LABETALOL 20 MG/4 ML (5 MG/ML) INTRAVENOUS SYRINGE
10 EVERY 10 MIN PRN
Status: DISCONTINUED | OUTPATIENT
Start: 2023-04-13 | End: 2023-04-13 | Stop reason: HOSPADM

## 2023-04-13 RX ORDER — SODIUM CHLORIDE 9 MG/ML
INJECTION, SOLUTION INTRAVENOUS CONTINUOUS PRN
Status: DISCONTINUED | OUTPATIENT
Start: 2023-04-13 | End: 2023-04-13 | Stop reason: HOSPADM

## 2023-04-13 RX ORDER — HYDRALAZINE HYDROCHLORIDE 20 MG/ML
10 INJECTION INTRAMUSCULAR; INTRAVENOUS EVERY 10 MIN PRN
Status: DISCONTINUED | OUTPATIENT
Start: 2023-04-13 | End: 2023-04-13 | Stop reason: HOSPADM

## 2023-04-13 RX ORDER — IOPAMIDOL 755 MG/ML
45 INJECTION, SOLUTION INTRAVASCULAR ONCE
Status: COMPLETED | OUTPATIENT
Start: 2023-04-13 | End: 2023-04-13

## 2023-04-13 RX ADMIN — IOPAMIDOL 45 ML: 755 INJECTION, SOLUTION INTRAVENOUS at 10:31

## 2023-04-13 RX ADMIN — METOPROLOL TARTRATE 5 MG: 5 INJECTION INTRAVENOUS at 11:16

## 2023-04-13 RX ADMIN — Medication 13.5 MG: at 11:06

## 2023-04-13 ASSESSMENT — ENCOUNTER SYMPTOMS
CARDIOVASCULAR NEGATIVE: 1
GASTROINTESTINAL NEGATIVE: 1
RESPIRATORY NEGATIVE: 1
CONSTITUTIONAL NEGATIVE: 1
MUSCULOSKELETAL NEGATIVE: 1
WEAKNESS: 1
EYES NEGATIVE: 1

## 2023-04-13 ASSESSMENT — ACTIVITIES OF DAILY LIVING (ADL)
ADLS_ACUITY_SCORE: 35

## 2023-04-13 ASSESSMENT — VISUAL ACUITY: OU: BASELINE

## 2023-04-13 NOTE — ED NOTES
Assisted onto bedpan. Missed bedpan but did have an absorbant pad underneath, unable to document accurate output. Patient reports she takes Lasix and is concerned with needing to go to the bathroom during the flight. Usually wears underwear with small con-pad. Offered brief for flight which patient did want. Con-cares provided. Brief placed.

## 2023-04-13 NOTE — ED PROVIDER NOTES
History     Chief Complaint   Patient presents with     Stroke Symptoms     HPI  Shannon Walls is a 89 year old female who is brought to the emergency department by EMS after having sudden onset of weakness and incoordination in the right upper extremity.  She states that she was getting ready to be picked up by a friend that she was planning to go to a .  She had sudden onset of numbness and tingling in her right upper extremity and incoordination.  She states that the numbness in her hand have improved but she still feels some weakness in her right upper extremity.  She denies headache or dizziness.  She denies visual disturbance.  She denies difficulty with her speech.  She denies chest pain and she is not short of breath.  She is not had any recent abdominal pain.  She denies pain numbness or tingling in her other extremities.  She is made a level 1 stroke activation and taken directly to CT    Allergies:  Allergies   Allergen Reactions     Levothyroxine Shortness Of Breath and Swelling     Nitrogen Swelling and Blisters     Liquid nitrogen       Problem List:    Patient Active Problem List    Diagnosis Date Noted     Atrial fibrillation with rapid ventricular response (H) 2022     Priority: Medium     Altered mental status, unspecified altered mental status type 2022     Priority: Medium     Permanent atrial fibrillation (H) 2021     Priority: Medium     Pathological fracture L4 vertebrae 2021     Priority: Medium     Fracture of sacrum (H) 2021     Priority: Medium        Past Medical History:    No past medical history on file.    Past Surgical History:    Past Surgical History:   Procedure Laterality Date     EYE SURGERY       OPEN REDUCTION INTERNAL FIXATION WRIST Right 2018    Procedure: OPEN REDUCTION INTERNAL FIXATION WRIST;  OPEN REDUCTION INTERNAL FIXATION RIGHT DISTAL RADIUS;  Surgeon: Taqueria Edwards MD;  Location: [facility] OR       Family History:    No  family history on file.    Social History:  Marital Status:   [5]  Social History     Tobacco Use     Smoking status: Former     Smokeless tobacco: Never        Medications:    aspirin 81 MG EC tablet  calcium carbonate (OS-EFFIE) 1500 (600 Ca) MG tablet  digoxin (LANOXIN) 125 MCG tablet  furosemide (LASIX) 20 MG tablet  LORazepam (ATIVAN) 0.5 MG tablet  metoprolol succinate ER (TOPROL XL) 50 MG 24 hr tablet  multivitamin, therapeutic (THERA-VIT) TABS tablet  NP THYROID 30 MG tablet  omeprazole (PRILOSEC) 20 MG DR capsule  predniSONE (DELTASONE) 10 MG tablet  Vitamin D, Cholecalciferol, 10 MCG (400 UNIT) TABS          Review of Systems   Constitutional: Negative.    HENT: Negative.    Eyes: Negative.    Respiratory: Negative.    Cardiovascular: Negative.    Gastrointestinal: Negative.    Genitourinary: Negative.    Musculoskeletal: Negative.    Neurological: Positive for weakness.        Please see history of chief complaint     All other systems reviewed and found unremarkable    Physical Exam   BP: 110/92  Pulse: (!) 144  Temp: 97.8  F (36.6  C)  Resp: 16  Weight: 54.7 kg (120 lb 9.5 oz)  SpO2: 93 %      Physical Exam 89-year-old female who is awake alert oriented person place and time very pleasant and cooperative with my exam.  HEENT normocephalic extraocular muscles intact pupils equally reactive to light tongue midline palate intact oropharynx clear.  No facial asymmetry.  Neck is supple is full range of motion without pain.  Lungs are clear bilaterally.  Heart rate is tachycardic S1 and S2 sounds are appreciated.  Heart is irregularly irregular.  The abdomen is soft is nontender no mass no organomegaly no rebound.  Extremities lower extremities a full range of motion 5/5 strength.  Left upper extremity has full range of motion 5/5 strength.  Patient has a slight amount of incoordination and a bit of drift in the right upper extremity.  Muscle strength is 4/5.  Patient has brisk peripheral pulses brisk  capillary refill and no sensory deficit in her extremities.  Neurologic exam does reveal the previously described weakness and coordination the right upper extremity.  No focal cranial nerve deficit is appreciated.  Dermatologic exam no diffuse skin rashes or lesions are noted.    ED Course              ED Course as of 04/13/23 1117   Thu Apr 13, 2023   1025 Called by radiology CT is read as negative   1053 Was immediately contacted by Dr. Cosby from [facility] stroke neurology who will evaluate the patient   1059 Discussed the case with Dr. Vee, hospitalist, and Dr. Olguin, stroke neurologist at [facility] who graciously agreed to accept the patient in transfer.              {EKG done and interpreted by myself.  Shows atrial fibrillation.  Ventricular sponsor rate 143 bpm.  QRS duration 60 ms.  Corrected QT is 404 ms.  There is no ST segment elevation or depression.  No inappropriate T wave inversion.    Critical Care time:  30 minutes    The patient has stroke symptoms:         ED Stroke specific documentation           NIHSS PDF     Patient last known well time: 0927  ED Provider first to bedside at 1015  CT Results received at: 1025    Thrombolytics:   Risks (including potential for bleeding and death), benefits, and alternatives to thrombolytic therapy were discussed with Patient. Tenecteplase (TNK) administered without delay.    If treating with thrombolytics: Ensure SBP<180 and DBP<105 prior to treatment with thrombolytics.  Administering thrombolytics after treatment with IV labetalol, hydralazine, or nicardipine is reasonable once BP control is established.      National Institutes of [employer] Stroke Scale (Baseline)  Time Performed: 1015     Score    Level of consciousness: (0)   Alert, keenly responsive    LOC questions: (0)   Answers both questions correctly    LOC commands: (0)   Performs both tasks correctly    Best gaze: (0)   Normal    Visual: (0)   No visual loss    Facial palsy: (0)   Normal  symmetrical movements    Motor arm (left): (0)   No drift    Motor arm (right): (1)   Drift    Motor leg (left): (0)   No drift    Motor leg (right): (0)   No drift    Limb ataxia: (1)   Present in one limb    Sensory: (0)   Normal- no sensory loss    Best language: (0)   Normal- no aphasia    Dysarthria: (0)   Normal    Extinction and inattention: (0)   No abnormality        Total Score:  2        Stroke Mimics were considered (including migraine headache, seizure disorder, hypoglycemia (or hyperglycemia), head or spinal trauma, CNS infection, Toxin ingestion and shock state (e.g. sepsis) .               Results for orders placed or performed during the hospital encounter of 04/13/23 (from the past 24 hour(s))   CT Head w/o Contrast    Narrative    PROCEDURE: CT HEAD W/O CONTRAST     HISTORY: Code Stroke to evaluate for potential thrombolysis and  thrombectomy. PLEASE READ IMMEDIATELY..    COMPARISON: December 2022    TECHNIQUE:  Helical images of the head from the foramen magnum to the  vertex were obtained without contrast.    FINDINGS: The ventricles and sulci are normal in volume. No acute  intracranial hemorrhage, mass effect, midline shift, hydrocephalus or  basilar cystern effacement are present.    There is white matter low-density both hemispheres consistent with  small vessel changes.    The calvarium is intact. The mastoid air cells are clear.  The  visualized paranasal sinuses are clear.      Impression    IMPRESSION: No acute brain abnormality. Stable appearance of the brain  is compared to December 2022 .  Dr Cuevas was called at 10:26 AM     PRABHAKAR GASCA MD         SYSTEM ID:  [id number]   CTA Head Neck with Contrast    Narrative    PROCEDURE: CTA HEAD NECK W CONTRAST 4/13/2023 10:33 AM    HISTORY: Code Stroke to evaluate for potential thrombolysis and  thrombectomy. PLEASE READ IMMEDIATELY.    COMPARISONS: None.    Meds/Dose Given: ISOVUE 370  45mL    TECHNIQUE: CT angiogram of the neck and CT  angiogram of the brain with  sagittal and coronal MIPreconstructions.    FINDINGS: The brachiocephalic artery is widely patent. The right  subclavian artery is widely patent. There is some calcific plaquing in  the proximal right vertebral artery with less than 50% stenosis. In  the mid cervical spine and the right vertebral artery is markedly  tortuous.    The common carotid artery has some mild calcific plaquing with less  than 50% stenoses noted. In the proximal internal carotid artery on  the right there is some calcific plaquing with less than 50% stenosis.  Beyond the bifurcation the internal carotid artery is widely patent to  the skull base. There is some calcific plaquing in the distal common  carotid artery on the left. No hemodynamically significant stenoses  seen.. In the proximal internal carotid artery on the left there is an  approximately 50% stenosis noted. Beyond the bifurcation the internal  carotid artery is widely patent to the skull base. There is some  atherosclerotic plaquing at the origin of the left subclavian artery.  There is some mild calcific plaquing seen in the left vertebral artery  proximally with less than 50% stenosis.    CT angiogram of the brain: The distal vertebral, basilar, superior  cerebellar and posterior cerebral arteries are widely patent. There is  calcific plaquing seen in the carotid siphons. The supraclinoid  internal carotid artery anterior and middle cerebral arteries appear  normal. No intracranial stenoses or aneurysms are seen.    The muscles of mastication and the parapharyngeal spaces are normal.  The salivary glands are normal. The larynx and upper trachea are  normal. The thyroid gland is normal. The cervical and supraclavicular  lymph nodes appear normal. Upper mediastinal lymph nodes are normal.  Emphysematous changes are noted in the lung apices.         Impression    IMPRESSION: 50% stenosis proximal internal carotid artery on the left.    No  intracranial stenoses or occlusions are noted    PRABHAKAR GASCA MD         SYSTEM ID:  [id number]   CBC with Platelets & Differential    Narrative    The following orders were created for panel order CBC with Platelets & Differential.  Procedure                               Abnormality         Status                     ---------                               -----------         ------                     CBC with platelets and d...[[id number]]                      Final result                 Please view results for these tests on the individual orders.   Basic metabolic panel   Result Value Ref [place]    Sodium 139 136 - 145 mmol/L    Potassium 4.8 3.4 - 5.3 mmol/L    Chloride 101 98 - 107 mmol/L    Carbon Dioxide (CO2) 31 (H) 22 - 29 mmol/L    Anion Gap 7 7 - 15 mmol/L    Urea Nitrogen 19.8 8.0 - 23.0 mg/dL    Creatinine 1.20 (H) 0.51 - 0.95 mg/dL    Calcium 9.5 8.2 - 9.6 mg/dL    Glucose 115 (H) 70 - 99 mg/dL    GFR Estimate 43 (L) >60 mL/min/1.73m2   INR   Result Value Ref [place]    INR 1.15 0.85 - 1.15   Partial thromboplastin time   Result Value Ref [place]    aPTT 26 22 - 38 Seconds   Troponin T, High Sensitivity   Result Value Ref [place]    Troponin T, High Sensitivity 20 (H) <=14 ng/L   CBC with platelets and differential   Result Value Ref [place]    WBC Count 8.3 4.0 - 11.0 10e3/uL    RBC Count 4.24 3.80 - 5.20 10e6/uL    Hemoglobin 13.5 11.7 - 15.7 g/dL    Hematocrit 41.4 35.0 - 47.0 %    MCV 98 78 - 100 fL    MCH 31.8 26.5 - 33.0 pg    MCHC 32.6 31.5 - 36.5 g/dL    RDW 13.7 10.0 - 15.0 %    Platelet Count 276 150 - 450 10e3/uL    % Neutrophils 77 %    % Lymphocytes 14 %    % Monocytes 7 %    % Eosinophils 1 %    % Basophils 1 %    % Immature Granulocytes 0 %    NRBCs per 100 WBC 0 <1 /100    Absolute Neutrophils 6.4 1.6 - 8.3 10e3/uL    Absolute Lymphocytes 1.1 0.8 - 5.3 10e3/uL    Absolute Monocytes 0.6 0.0 - 1.3 10e3/uL    Absolute Eosinophils 0.1 0.0 - 0.7 10e3/uL    Absolute Basophils 0.1 0.0 - 0.2  10e3/uL    Absolute Immature Granulocytes 0.0 <=0.4 10e3/uL    Absolute NRBCs 0.0 10e3/uL   Extra Tube    Narrative    The following orders were created for panel order Extra Tube.  Procedure                               Abnormality         Status                     ---------                               -----------         ------                     Extra Red Top Tube[[id number]]                               In process                 Extra Heparinized Syringe[[id number]]                        In process                   Please view results for these tests on the individual orders.       Medications   sodium chloride 0.9% infusion (has no administration in time range)   labetalol (NORMODYNE/TRANDATE) syringe 10 mg (has no administration in time range)     Or   hydrALAZINE (APRESOLINE) injection 10 mg (has no administration in time range)   niCARdipine 40 mg in 200 mL NS (CARDENE) infusion (has no administration in time range)   metoprolol (LOPRESSOR) injection 5 mg (has no administration in time range)   sodium chloride (PF) 0.9% PF flush 100 mL (100 mLs Intravenous $Given 4/13/23 1028)   iopamidol (ISOVUE-370) solution 45 mL (45 mLs Intravenous $Given 4/13/23 1031)   tenecteplase (TNKase) injection 13.5 mg (13.5 mg Intravenous $Given 4/13/23 1106)       Assessments & Plan (with Medical Decision Making)     I have reviewed the nursing notes.    The plan is to transfer the patient to [facility] by [facility] for admission to a neuro ICU          Medical Decision Making  The patient's presentation was of high complexity (an acute health issue posing potential threat to life or bodily function).    The patient's evaluation involved:  ordering and/or review of 3+ test(s) in this encounter (see separate area of note for details)    The patient's management necessitated high risk (a decision regarding hospitalization).        New Prescriptions    No medications on file       Final diagnoses:   Cerebrovascular  accident (CVA), unspecified mechanism (H)       4/13/2023   [facility] EMERGENCY [facility]     Chester Cuevas,   04/13/23 1115

## 2023-04-13 NOTE — DISCHARGE INSTRUCTIONS
What to expect when you have contrast    During your exam, we will inject  contrast  into your vein or artery. (Contrast is a clear liquid with iodine in it. It shows up on X-rays.)    You may feel warm or hot. You may have a metal taste in your mouth and a slight upset stomach. You may also feel pressure near the kidneys and bladder. These effects will last about 1 to 3 minutes.    Please tell us if you have:   Sneezing    Itching   Hives    Swelling in the face   A hoarse voice   Breathing problems   Other new symptoms    Serious problems are rare.  They may include:   Irregular heartbeat    Seizures   Kidney failure             Tissue damage   Shock     Death    If you have any problems during the exam, we  will treat them right away.    When you get home    Call your hospital if you have any new symptoms in the next 2 days, like hives or swelling. (Phone numbers are at the bottom of this page.) Or call your family doctor.     If you have wheezing or trouble breathing, call 911.    Self-care  -Drink at least 4 extra glasses of water today.   This reduces the stress on your kidneys.  -Keep taking your regular medicines.    The contrast will pass out of your body in your  Urine(pee). This will happen in the next 24 hours. You  will not feel this. Your urine will not  change color.    If you have kidney problems or take metformin    Drink 4 to 8 large glasses of water for the next  2 days, if you are not on a fluid restriction.    ?If you take metformin (Glucophage or Glucovance) for diabetes, keep taking it.      ?Your kidney function tests are abnormal.  If you take Metformin, do not take it for 48 hours. Please go to your clinic for a blood test within 3 days after your exam before the restarting this medicine.     (Note to provider:please give patient prescription for lab tests.)    ?Special instructions: -    I have read and understand the above information.    Patient Sign  Here:______________________________________Date:________Time:______    Staff Sign Here:________________________________________Date:_______Time:______      Radiology Departments:     ?[facility]: [phone] ?[place]: [phone]     ?[place]: [phone] ?[place]:[phone]      ?[place]: [phone]  ?[place]: [phone]  ?[place]:[phone]    ?[facility]:[phone]  ?[facility]:[phone]

## 2023-04-13 NOTE — ED NOTES
STROKE CODE ARRIVAL NOTE  89 y/o Female that presents to ER via [place] ambulance with history of right side facial droop, right side arm weakness, and right arm numbness reported by patient. Dr. Cuevas at [facility] upon arrival in ER hallway for quick eval. Directly to CT at 1015.  Last known well 0915 this morning.   Tier 1 Stroke Code activated   Point of Care glucose 156 per EMS  See Neurological narrator for initial and ongoing 15 minute neurological checks

## 2023-04-13 NOTE — ED TRIAGE NOTES
Was unable to use right arm.hand this am. Med alert went off at 0927. Better upon arrival. Dr. Cuevas assessed patient in [place] prior to going to CT at 1016

## 2023-04-13 NOTE — ED NOTES
1013 Teir 1 code Stroke  1014 Here and MD at EMS cot   1015 Down to CT with Stroke team   1105 Air medical weather check   1106 Called STAT DOC   1107 transportation pending Per stat doc Bed #   1126 Flight is a go

## 2023-10-04 NOTE — ED NOTES
Patient given verbal and written discharge instructions. Patient verbalized understanding of discharge instructions. Pt denies pain. Pt able to transfer from chair to w/c without difficulty. Transferred to her friends car without difficulty.

## 2023-10-04 NOTE — ED NOTES
Reports increased back pain recently as well as increased stress due to estate sale and plan to move to a nursing home. Reports she lives at home on her own. Has not eaten since yesterday.

## 2023-10-04 NOTE — ED TRIAGE NOTES
"Generalized weakness for the last few days. Reports her friend who checks on her daily was concerned this morning when she \"did not make it to the bathroom for a bowel movement\". Reports 2 loose stools this morning. No nausea, vomiting, abdominal pain, fever/chills, urinary symptoms. Edema in lower extremities which is new. Got rid of her chair that elevates her legs so has been unable to elevate them on her own since the weekend. Denies SOB/ chest pain.         "

## 2023-10-04 NOTE — DISCHARGE INSTRUCTIONS
Your evaluated today for concern of generalized weakness.  Our evaluation was most consistent with dehydration secondary to your increased physical activity.  Please be sure to drink enough fluids over the course of the next few days.  Additionally, I advise he follow-up with your primary care within the next week for repeat laboratory evaluation.  If you have any significant worsening of the symptoms that brought you here today, please return immediately.

## 2023-10-04 NOTE — ED NOTES
Bed: ED01  Expected date:   Expected time:   Means of arrival:   Comments:  Farzana [facility]

## 2023-10-04 NOTE — ED PROVIDER NOTES
History     Chief Complaint   Patient presents with    Generalized Weakness     HPI  Shannon Walls is a 89 year old female who is with concern of weakness.  Patient states that she had a pretty active weekend because of an estate sale.  Today one of her friends checked on her and she had some difficulty getting up from the toilet.  This ultimately caused the patient to come and arrive for evaluation today.  Patient's only other complaint is some chronic back pain.  She otherwise states that she feels more or less at her baseline.  No headache or nausea.  No chest pain.  Patient only has shortness of breath with exertion.  No abdominal pain.  Patient has also been having some diarrhea today.  No changes to bladder.    Allergies:  Allergies   Allergen Reactions    Levothyroxine Shortness Of Breath and Swelling    Nitrogen Swelling and Blisters     Liquid nitrogen       Problem List:    Patient Active Problem List    Diagnosis Date Noted    Atrial fibrillation with rapid ventricular response (H) 12/29/2022     Priority: Medium    Altered mental status, unspecified altered mental status type 12/29/2022     Priority: Medium    Permanent atrial fibrillation (H) 11/29/2021     Priority: Medium    Pathological fracture L4 vertebrae 11/27/2021     Priority: Medium    Fracture of sacrum (H) 11/27/2021     Priority: Medium        Past Medical History:    History reviewed. No pertinent past medical history.    Past Surgical History:    Past Surgical History:   Procedure Laterality Date    EYE SURGERY      OPEN REDUCTION INTERNAL FIXATION WRIST Right 8/22/2018    Procedure: OPEN REDUCTION INTERNAL FIXATION WRIST;  OPEN REDUCTION INTERNAL FIXATION RIGHT DISTAL RADIUS;  Surgeon: Taqueria Edwards MD;  Location: [facility]       Family History:    No family history on file.    Social History:  Marital Status:   [5]  Social History     Tobacco Use    Smoking status: Former    Smokeless tobacco: Never        Medications:     calcium carbonate (OS-EFFIE) 1500 (600 Ca) MG tablet  digoxin (LANOXIN) 125 MCG tablet  furosemide (LASIX) 20 MG tablet  LORazepam (ATIVAN) 0.5 MG tablet  metoprolol succinate ER (TOPROL XL) 50 MG 24 hr tablet  NP THYROID 30 MG tablet  omeprazole (PRILOSEC) 20 MG DR capsule  predniSONE (DELTASONE) 10 MG tablet  Vitamin D, Cholecalciferol, 10 MCG (400 UNIT) TABS  warfarin ANTICOAGULANT (COUMADIN) 2 MG tablet  multivitamin, therapeutic (THERA-VIT) TABS tablet          Review of Systems  SEE HPI  Physical Exam   BP: 114/85  Pulse: 86  Temp: 97.2  F (36.2  C)  Resp: 16  Weight: 49.3 kg (108 lb 11.2 oz)  SpO2: 97 %      Physical Exam  Constitutional: Alert and conversant. NAD   HENT: Atraumatic  Eyes: Normal pupils   Neck: Normal ROM  CV: Normal rate, regular rhythm, no murmur   Pulmonary/Chest: Non-labored respirations, clear to auscultation bilaterally   Abdominal: Soft, non-tender, non-distended   MSK: PHILIP. Moderate LE edema pitting appreciated.   Neuro: Alert and appropriate. Cnx, Strength, and Sensation grossly intact  Skin: Warm and dry. No diaphoresis. No rashes on exposed skin    Psych: Appropriate mood and affect     ED Course   Impression and Plan: Patient presents with concern of weakness.  Vitals reviewed, within normal limits.  Patient with overall unremarkable exam at this time.  Resting comfortably.  No distress whatsoever.  Given the patient's weakness we will further work-up with extensive laboratory evaluation as well as urinalysis, EKG, and chest x-ray.  Will be closely monitored.  Final plan pending results.           ED Course as of 10/04/23 1421   Wed Oct 04, 2023   1146 EKG without any concerning ST segments or T wave abnormalities.  Patient is in atrial fibrillation which is consistent with her baseline.  She is on warfarin.   1219 Cbc wnl.    1233 Procalcitonin(!): 0.18   1248 IMPRESSION:  Cardiomegaly. Linear atelectasis or scarring in both lung  bases       1249 Patient with cardiomegaly noted  on chest x-ray.  Patient's proBNP is significantly improved from devious levels.  Considering the patient's pattern of her acute kidney injury I believe that she is volume down.  We will therefore give her a small fluid bolus.  No other obvious sign of infection within the patient having a mildly elevated Pro-Jeb.  Chest x-ray is clear.  No fevers.  No leukocytosis.  I will withhold antibiotics at this time.  Still awaiting urinalysis.   1331 Urinalysis inconsistent with infection.   1419 Patient ambulated around using a walker at her baseline.  Patient states that she feels like it went well and would like to go home.  I believe this is reasonable given her work-up.  I notified patient of her findings that her weakness is almost certainly secondary to dehydration considering her increased physical activity recently due to her estate sale.  Return precautions were discussed including any significant worsening of any of the symptoms that brought the patient here today.  Advise close primary care follow-up.  Patient discharged in good condition.     Procedures                Results for orders placed or performed during the hospital encounter of 10/04/23 (from the past 24 hour(s))   EKG 12-lead, tracing only   Result Value Ref Range    Systolic Blood Pressure  mmHg    Diastolic Blood Pressure  mmHg    Ventricular Rate 92 BPM    Atrial Rate  BPM    ND Interval  ms    QRS Duration 76 ms     ms    QTc 375 ms    P Axis  degrees    R AXIS -50 degrees    T Axis 244 degrees    Interpretation ECG       Atrial fibrillation  Left axis deviation  Inferior infarct , age undetermined  Abnormal ECG  No previous ECGs available     CBC with platelets differential    Narrative    The following orders were created for panel order CBC with platelets differential.  Procedure                               Abnormality         Status                     ---------                               -----------         ------                      CBC with platelets and d...[[id number]]                      Final result                 Please view results for these tests on the individual orders.   Basic metabolic panel   Result Value Ref Range    Sodium 141 135 - 145 mmol/L    Potassium 3.9 3.4 - 5.3 mmol/L    Chloride 98 98 - 107 mmol/L    Carbon Dioxide (CO2) 28 22 - 29 mmol/L    Anion Gap 15 7 - 15 mmol/L    Urea Nitrogen 58.4 (H) 8.0 - 23.0 mg/dL    Creatinine 1.42 (H) 0.51 - 0.95 mg/dL    GFR Estimate 35 (L) >60 mL/min/1.73m2    Calcium 9.5 8.2 - 9.6 mg/dL    Glucose 149 (H) 70 - 99 mg/dL   Procalcitonin   Result Value Ref Range    Procalcitonin 0.18 (H) <0.05 ng/mL   NT pro BNP   Result Value Ref Range    N terminal Pro BNP Inpatient 1,498 0 - 1,800 pg/mL   INR   Result Value Ref Range    INR 2.48 (H) 0.85 - 1.15   CBC with platelets and differential   Result Value Ref Range    WBC Count 7.4 4.0 - 11.0 10e3/uL    RBC Count 4.81 3.80 - 5.20 10e6/uL    Hemoglobin 14.6 11.7 - 15.7 g/dL    Hematocrit 44.8 35.0 - 47.0 %    MCV 93 78 - 100 fL    MCH 30.4 26.5 - 33.0 pg    MCHC 32.6 31.5 - 36.5 g/dL    RDW 14.2 10.0 - 15.0 %    Platelet Count 255 150 - 450 10e3/uL    % Neutrophils 75 %    % Lymphocytes 13 %    % Monocytes 11 %    Mids % (Monos, Eos, Basos)      % Eosinophils 0 %    % Basophils 0 %    % Immature Granulocytes 1 %    NRBCs per 100 WBC 0 <1 /100    Absolute Neutrophils 5.5 1.6 - 8.3 10e3/uL    Absolute Lymphocytes 0.9 0.8 - 5.3 10e3/uL    Absolute Monocytes 0.8 0.0 - 1.3 10e3/uL    Mids Abs (Monos, Eos, Basos)      Absolute Eosinophils 0.0 0.0 - 0.7 10e3/uL    Absolute Basophils 0.0 0.0 - 0.2 10e3/uL    Absolute Immature Granulocytes 0.0 <=0.4 10e3/uL    Absolute NRBCs 0.0 10e3/uL   [place] Draw    Narrative    The following orders were created for panel order [place] Draw.  Procedure                               Abnormality         Status                     ---------                               -----------         ------                      Extra Red Top Tube[[id number]]                               Final result               Extra Green Top (Lithium...[[id number]]                      Final result                 Please view results for these tests on the individual orders.   Extra Red Top Tube   Result Value Ref Range    Hold Specimen JIC    Extra Green Top (Lithium Heparin) Tube   Result Value Ref Range    Hold Specimen JIC    XR Chest Port 1 View    Narrative    PROCEDURE:  XR CHEST PORT 1 VIEW    HISTORY:  SOB.     COMPARISON:  2022    FINDINGS:   The heart is enlarged. The pulmonary vasculature is normal.  No  similar opacities in both lung bases represent atelectasis or  scarring. No pleural effusion or pneumothorax.      Impression    IMPRESSION:  Cardiomegaly. Linear atelectasis or scarring in both lung  bases      PRABHAKAR GASCA MD         SYSTEM ID:  [id number]   UA with Microscopic reflex to Culture    Specimen: Urine, Midstream   Result Value Ref Range    Color Urine Yellow Colorless, Straw, Light Yellow, Yellow    Appearance Urine Clear Clear    Glucose Urine Negative Negative mg/dL    Bilirubin Urine Negative Negative    Ketones Urine Negative Negative mg/dL    Specific Gravity Urine 1.022 1.003 - 1.035    Blood Urine Negative Negative    pH Urine 5.5 4.7 - 8.0    Protein Albumin Urine 70 (A) Negative mg/dL    Urobilinogen Urine Normal Normal, 2.0 mg/dL    Nitrite Urine Negative Negative    Leukocyte Esterase Urine Negative Negative    Bacteria Urine Few (A) None Seen /HPF    Mucus Urine Present (A) None Seen /LPF    RBC Urine 1 <=2 /HPF    WBC Urine 4 <=5 /HPF    Squamous Epithelials Urine 0 <=1 /HPF    Hyaline Casts Urine 11 (H) <=2 /LPF    Narrative    Urine Culture not indicated       Medications   sodium chloride 0.9% BOLUS 500 mL (500 mLs Intravenous $New Bag 10/4/23 2434)       Assessments & Plan (with Medical Decision Making)     I have reviewed the nursing notes.    I have reviewed the findings, diagnosis, plan and need for  follow up with the patient.        New Prescriptions    No medications on file       Final diagnoses:   Generalized weakness   Dehydration   Acute kidney injury (H24)       10/4/2023   [facility]       Heath Connors MD  10/04/23 3116

## 2023-10-13 PROBLEM — I63.9 ISCHEMIC STROKE (H): Status: ACTIVE | Noted: 2023-04-13

## 2023-10-13 PROBLEM — K21.9 GASTROESOPHAGEAL REFLUX DISEASE WITHOUT ESOPHAGITIS: Status: ACTIVE | Noted: 2023-04-18

## 2023-10-13 NOTE — ED PROVIDER NOTES
History     Chief Complaint   Patient presents with    Fall     HPI  Shannon Walls is a 89 year old individual with history of ischemic stroke, GERD, chronic systolic heart failure, anxiety, hypothyroidism, atrial fibrillation with anticoagulation on warfarin, lumbar compression fractures, comes in via EMS after fall.  Patient states was picking up napkins on the floor and fell backwards hitting the back of her head.  No loss of consciousness occurred with this.  No other injury occurred with this.  Due to patient being on anticoagulation is brought in.  Currently denies headache, visual disturbances, weakness, paresthesias.  No neck pain or back pain reported.  No shortness of breath.  No extremity pain reported.  Patient is ambulatory with assistance.    Allergies:  Allergies   Allergen Reactions    Levothyroxine Shortness Of Breath and Swelling    Nitrogen Swelling and Blisters     Liquid nitrogen       Problem List:    Patient Active Problem List    Diagnosis Date Noted    Anxiety disorder, unspecified 04/18/2023     Priority: Medium    Gastroesophageal reflux disease without esophagitis 04/18/2023     Priority: Medium    Acquired hypothyroidism 04/13/2023     Priority: Medium    Chronic systolic heart failure (H) 04/13/2023     Priority: Medium    Ischemic stroke (H) 04/13/2023     Priority: Medium    Atrial fibrillation with rapid ventricular response (H) 12/29/2022     Priority: Medium    Altered mental status, unspecified altered mental status type 12/29/2022     Priority: Medium    Permanent atrial fibrillation (H) 11/29/2021     Priority: Medium    Pathological fracture L4 vertebrae 11/27/2021     Priority: Medium    Fracture of sacrum (H) 11/27/2021     Priority: Medium        Past Medical History:    No past medical history on file.    Past Surgical History:    Past Surgical History:   Procedure Laterality Date    EYE SURGERY      OPEN REDUCTION INTERNAL FIXATION WRIST Right 8/22/2018     Procedure: OPEN REDUCTION INTERNAL FIXATION WRIST;  OPEN REDUCTION INTERNAL FIXATION RIGHT DISTAL RADIUS;  Surgeon: Taqueria Edwards MD;  Location: [facility]       Family History:    No family history on file.    Social History:  Marital Status:   [5]  Social History     Tobacco Use    Smoking status: Former    Smokeless tobacco: Never        Medications:    atorvastatin (LIPITOR) 40 MG tablet  calcium carbonate (OS-EFFIE) 1500 (600 Ca) MG tablet  digoxin (LANOXIN) 125 MCG tablet  furosemide (LASIX) 20 MG tablet  LORazepam (ATIVAN) 0.5 MG tablet  metoprolol succinate ER (TOPROL XL) 50 MG 24 hr tablet  multivitamin, therapeutic (THERA-VIT) TABS tablet  NP THYROID 30 MG tablet  omeprazole (PRILOSEC) 20 MG DR capsule  predniSONE (DELTASONE) 10 MG tablet  Vitamin D, Cholecalciferol, 10 MCG (400 UNIT) TABS  warfarin ANTICOAGULANT (COUMADIN) 2 MG tablet          Review of Systems   Constitutional: Negative.    HENT: Negative.     Eyes: Negative.    Respiratory: Negative.     Cardiovascular: Negative.    Gastrointestinal: Negative.    Endocrine: Negative.    Genitourinary: Negative.    Musculoskeletal:  Positive for back pain (Chronic back pain unchanged.).   Skin:  Positive for wound (Hematoma to posterior scalp.).   Allergic/Immunologic: Negative.    Neurological: Negative.    Hematological: Negative.    Psychiatric/Behavioral: Negative.         Physical Exam   BP: (!) 145/121  Pulse: 93  Temp: 97.6  F (36.4  C)  Resp: 20  SpO2: 97 %      GENERAL APPEARANCE:  The patient is a 89 year in no acute distress that appears as stated age.  HEENT:  Normocephalic and atraumatic.  Hematoma to left posterior scalp with no open areas.  Pupils are equal, round, and reactive to light.  Oropharynx is clear.  No avulsed teeth, buccal or tongue lacerations present.  Voice is clear and without muffling.   No otorrhea or rhinorrhea present.  Negative byrd sign.  Negative for hemotympanum bilaterally.  NECK:  Supple.  Trachea is midline.   No carotid bruits present on auscultation.  CHEST:  Symmetric.  Non-tender to palpation.  No crepitus or deformity.  LUNGS:  Breathing is easy.  Breath sounds are equal and clear bilaterally.  No wheezes, rhonchi, or rales.  HEART: Irregular rhythm with normal rate.  No murmurs, gallops, or rubs.  ABDOMEN:  Soft and rotund.  No mass, tenderness, guarding, or rebound.  No organomegaly or hernia.  Bowel sounds are present.  No CVA tenderness or flank mass.  No abdominal bruits or thrills present upon auscultation/palpation.  Negative Travis sign.  Negative Maldonado Fernandez sign.  EXTREMITIES:  No cyanosis, clubbing, or edema.  Pedal and post-tibial pulses are 2+ bilaterally.  Radial pulses are 2+ bilaterally.  MUSCULOSKELETAL:  No cervical/thoracic/lumbar spinal tenderness, step-offs, deformities noted to palpation.  No paraspinal tenderness noted to palpation.  Pelvis stable upon palpation.  No crepitus, deformities, tenderness noted to palpation to the upper or lower extremities to palpation.  Range of motion intact to all joints.  Strength equal bilaterally.  MENTAL STATUS:   The patient was alert and oriented to person, place, time and purpose. Registration and recall intact. No difficulty with concentration.  Spontaneous eye opening.  Follows commands.  GCS 15.   CRANIAL NERVES:  PERRL. EOMI; no nystagmus.  Full visual fields.  Trapezius and sternocleidomastoid are full strength. Tongue was midline and protrudes midline. Uvula was midline and raises midline. Facial sensation was intact to pain and light touch at all distributions. No speech disturbance. Hearing intact to conversation and whisper.  No facial asymmetry.  SENSORY:  Sensation intact.  PSYCHIATRIC:  The patient is awake, alert, and oriented x4.  Recent and remote memory is intact.  Appropriate mood and affect.  Calm and cooperative with history and physical exam.  SKIN:  Warm, dry, and well perfused.  Good turgor.  Hematoma to left posterior scalp.  No  open areas or active bleeding present.      Comment: Discrepancies between my note and notes on behalf of the nursing team or other care providers are secondary to my findings reflecting my physical examination and questioning of the patient.  Any conflicting information provided is not in line with my examination of the patient.       ED Course              ED Course as of 10/13/23 1348   Fri Oct 13, 2023   1232 In to see patient and history/physical completed.    1243 CT of head and cervical spine ordered.   1340 CT head negative for intracerebral bleed or skull fracture.  Advanced cervical degeneration present but no acute fractures.                 Results for orders placed or performed during the hospital encounter of 10/13/23 (from the past 24 hour(s))   CT Head w/o Contrast    Narrative    Exam: CT HEAD W/O CONTRAST    Clinical history:89 years Female Fall on blood thinner    Comparisons: 4/13/2023    Technique: Axial CT imaging of the head was performed Without  intervenous contrast.  This exam was performed using one or more of the following dose  reduction techniques:  Automated exposure control, adjustment of the KELLI and/or KV according  to patient's size, and/or use of iterative reconstruction technique.    FINDINGS: There is a moderate-sized hematoma of the posterior left  scalp.   Ventricles and sulci are symmetric. The gray-white matter  differentiation throughout the brain is well maintained. There is  advanced periventricular white matter change of chronic small vessel  ischemic disease. There is no evidence of intracranial mass or  hemorrhage. Visualized portions of the paranasal sinuses and mastoid  air cells are well aerated. There is no evidence of skull fracture.      Impression    IMPRESSION: Moderate sized posterior left scalp hematoma. No evidence  of intracranial hemorrhage or skull fracture.    DONNA AGUIRRE MD         SYSTEM ID:  [id number]   CT Cervical Spine w/o Contrast    Narrative     PROCEDURE: CT CERVICAL SPINE W/O CONTRAST 10/13/2023 1:23 PM    HISTORY: Fall with head injury    COMPARISONS: None.    Meds/Dose Given:    TECHNIQUE: CT scan of the cervical spine with sagittal and coronal  reconstructions    FINDINGS: There are degenerative changes at the middle atlantoaxial  joint. There is forward subluxation of C4 on C5 due to severe facet  joint degenerative change. There is decrease in height in the C4-C5,  C5-C6, C6-C7 and C7-T1. There is forward subluxation of C7 on T1 due  to facet joint degenerative change. There are no fractures of the  vertebral bodies or arches. The prevertebral soft tissues appear  normal         Impression    IMPRESSION: No cervical spine fracture.    Advanced cervical degenerative changes.    PRABHAKAR GASCA MD         SYSTEM ID:  [id number]       Medications - No data to display    Assessments & Plan (with Medical Decision Making)     I have reviewed the nursing notes.    I have reviewed the findings, diagnosis, plan and need for follow up with the patient.      Summary:  Patient presents to the ER today for fall with head injury.  Potential diagnosis which have been considered and evaluated include intracerebral bleed, skull fracture, cervical fracture, as well as others. Many of these have been excluded using the various modalities and assessment as noted on the chart. At the present time, the diagnosis given seems to be the most likely scalp hematoma with contusion.  Upon arrival, vitals signs show blood pressure 145/121 with a pulse of 93.  Temperature 36  C.  Respirations 20 with oxygenation 97% on room air.  The patient is alert and oriented.  Neurological examination normal.  No indentations on scalp upon palpation.  Does have large hematoma to left posterior scalp.  No cervical abnormalities noted.  Cervical range of motion intact.  No other trauma noted.  CT of head and cervical spine were obtained showing large hematoma but no intracerebral bleed or  skull fracture.  Chronic degenerative changes to the cervical spine but no acute fracture.  Patient has no acute abnormalities on imaging.  Vital signs nonconcerning and neurological examination normal.  For this reason we will discharge patient back to nursing home.  Follow-up with PCP.  Return if worsening.      Critical Care Time: None    Impression and plan discussed with patient. Questions answered, concerns addressed, indications for urgent re-evaluation reviewed, and  given. Patient/Parent/Caregiver agree with treatment plan and have no further questions at this time.  AVS provided at discharge.    This note was created by the Dragon Voice Dictation System. Inadvertent typographical errors, due to software recognition problems, may still exist.             New Prescriptions    No medications on file       Final diagnoses:   Scalp hematoma, initial encounter   Contusion of scalp, initial encounter       10/13/2023   [facility]       Javier Ferguson, SHAINA CNP  10/13/23 5878

## 2023-10-13 NOTE — ED NOTES
"Patient presents to the ED via [facility] after falling.   She reports she was \"crouching down to  a tissue, fell backwards onto my bottom, and hit my head\".   Hematoma on the left occipital area of her head.  She is A&Ox4, no apparent respiratory distress. Denies frequency or burning with urination.   Denies LOC, dizziness, lightheadedness, fever, chills, cough, SOB, chest pain, ABD pain.   She takes coumadin daily.        "

## 2023-10-13 NOTE — DISCHARGE INSTRUCTIONS
Follow-up with your primary care provider for reevaluation.  Contact your primary care provider if you have any questions or concerns.  Do not hesitate to return to the ER if any new or worsening symptoms.     Please read the attached instructions (if any).  They highlight more specific treatments and interventions for you at home.              Thank you for letting me participate in your care and wish you a fast and uneventful recovery,    Javier MERRITT CNP    Do not hesitate to contact me with questions or concerns.  [email or web address]  [email or web address]

## 2023-10-21 NOTE — DISCHARGE INSTRUCTIONS
Increase your lasix to 40mg daily for the next 4 days. Please call your primary care provider as soon as possible and schedule a follow-up visit for next week.  Return to the emergency department for worsening symptoms any concerning symptoms.  Continue dressing changes as often as the dressings get wet or on a daily basis

## 2023-10-21 NOTE — ED PROVIDER NOTES
History     Chief Complaint   Patient presents with    Leg Swelling     HPI  Shannon Walls is a 89 year old female presents to the emergency department today complaining of skin tears on her lower extremities and weeping in the area.  She has not noticed significant fluid accumulation.  She is unsure whether she has gained weight.  She does have swelling in the area.  She is not having fevers.  She notes there have been no skin discoloration she is not having fevers.  She has no other complaints at this time.    Allergies:  Allergies   Allergen Reactions    Levothyroxine Shortness Of Breath and Swelling    Nitrogen Swelling and Blisters     Liquid nitrogen       Problem List:    Patient Active Problem List    Diagnosis Date Noted    Anxiety disorder, unspecified 04/18/2023     Priority: Medium    Gastroesophageal reflux disease without esophagitis 04/18/2023     Priority: Medium    Acquired hypothyroidism 04/13/2023     Priority: Medium    Chronic systolic heart failure (H) 04/13/2023     Priority: Medium    Ischemic stroke (H) 04/13/2023     Priority: Medium    Atrial fibrillation with rapid ventricular response (H) 12/29/2022     Priority: Medium    Altered mental status, unspecified altered mental status type 12/29/2022     Priority: Medium    Permanent atrial fibrillation (H) 11/29/2021     Priority: Medium    Pathological fracture L4 vertebrae 11/27/2021     Priority: Medium    Fracture of sacrum (H) 11/27/2021     Priority: Medium        Past Medical History:    No past medical history on file.    Past Surgical History:    Past Surgical History:   Procedure Laterality Date    EYE SURGERY      OPEN REDUCTION INTERNAL FIXATION WRIST Right 8/22/2018    Procedure: OPEN REDUCTION INTERNAL FIXATION WRIST;  OPEN REDUCTION INTERNAL FIXATION RIGHT DISTAL RADIUS;  Surgeon: Taqueria Edwards MD;  Location: [facility]       Family History:    No family history on file.    Social History:  Marital Status:    [5]  Social History     Tobacco Use    Smoking status: Former    Smokeless tobacco: Never        Medications:    atorvastatin (LIPITOR) 40 MG tablet  calcium carbonate (OS-EFFIE) 1500 (600 Ca) MG tablet  digoxin (LANOXIN) 125 MCG tablet  furosemide (LASIX) 20 MG tablet  LORazepam (ATIVAN) 0.5 MG tablet  metoprolol succinate ER (TOPROL XL) 50 MG 24 hr tablet  multivitamin, therapeutic (THERA-VIT) TABS tablet  NP THYROID 30 MG tablet  omeprazole (PRILOSEC) 20 MG DR capsule  predniSONE (DELTASONE) 10 MG tablet  Vitamin D, Cholecalciferol, 10 MCG (400 UNIT) TABS  warfarin ANTICOAGULANT (COUMADIN) 2 MG tablet          Review of Systems  See hpi  Physical Exam   BP: 124/96  Pulse: 98  Temp: 98.1  F (36.7  C)  Resp: 19  Weight: 49.9 kg (110 lb 0.2 oz)  SpO2: 92 %      Physical Exam  Constitutional: Alert and conversant. NAD   HENT: NCAT   Eyes: Normal pupils   Neck: supple   CV: No pallor  Pulmonary/Chest: Non-labored respirations  Abdominal: non-distended   MSK: PHILIP.  Bilateral lower extremity edema.  A few small skin tears.  Chronic appearing discoloration on the left shin  Neuro: Alert and appropriate   Skin: Warm and dry. No diaphoresis. No rashes on exposed skin    Psych: Appropriate mood and affect     ED Course              ED Course as of 10/21/23 1912   Sat Oct 21, 2023   1730 89-year-old female with bilateral lower extremity swelling.  There are some skin tears.  Will recommend a brief increase in her diuresis   1744 Doubt DVT   1846 BNP 2000 is slightly elevated.  Patient maintaining acceptable oxygenation throughout.  We did ambulate her and her oxygen dipped as low as 92% and came back up with rest.  She will benefit from diuresis.  I have instructed her for the next 4 days to increase her diuretics to double what she currently takes.   1847 Recommending primary care follow-up.  Patient discharged in stable condition all question answered and return precautions given.  Patient given wound care supplies      Procedures            Results for orders placed or performed during the hospital encounter of 10/21/23 (from the past 24 hour(s))   EKG 12-lead, tracing only   Result Value Ref Range    Systolic Blood Pressure  mmHg    Diastolic Blood Pressure  mmHg    Ventricular Rate 105 BPM    Atrial Rate  BPM    WY Interval  ms    QRS Duration 72 ms     ms    QTc 385 ms    P Axis  degrees    R AXIS -49 degrees    T Axis 34 degrees    Interpretation ECG       Atrial fibrillation with rapid ventricular response  Left axis deviation  Inferior infarct (cited on or before 04-OCT-2023)  Anterior infarct , age undetermined  Abnormal ECG  When compared with ECG of 04-OCT-2023 11:45,  No significant change was found     CBC with platelets differential    Narrative    The following orders were created for panel order CBC with platelets differential.  Procedure                               Abnormality         Status                     ---------                               -----------         ------                     CBC with platelets and d...[[id number]]                      Final result                 Please view results for these tests on the individual orders.   Basic metabolic panel   Result Value Ref Range    Sodium 136 135 - 145 mmol/L    Potassium 4.3 3.4 - 5.3 mmol/L    Chloride 97 (L) 98 - 107 mmol/L    Carbon Dioxide (CO2) 29 22 - 29 mmol/L    Anion Gap 10 7 - 15 mmol/L    Urea Nitrogen 25.3 (H) 8.0 - 23.0 mg/dL    Creatinine 1.09 (H) 0.51 - 0.95 mg/dL    GFR Estimate 48 (L) >60 mL/min/1.73m2    Calcium 8.8 8.2 - 9.6 mg/dL    Glucose 96 70 - 99 mg/dL   Nt probnp inpatient (BNP)   Result Value Ref Range    N terminal Pro BNP Inpatient 2,113 (H) 0 - 1,800 pg/mL   CBC with platelets and differential   Result Value Ref Range    WBC Count 9.9 4.0 - 11.0 10e3/uL    RBC Count 4.55 3.80 - 5.20 10e6/uL    Hemoglobin 14.1 11.7 - 15.7 g/dL    Hematocrit 43.0 35.0 - 47.0 %    MCV 95 78 - 100 fL    MCH 31.0 26.5 - 33.0 pg     MCHC 32.8 31.5 - 36.5 g/dL    RDW 14.2 10.0 - 15.0 %    Platelet Count 239 150 - 450 10e3/uL    % Neutrophils 76 %    % Lymphocytes 15 %    % Monocytes 8 %    Mids % (Monos, Eos, Basos)      % Eosinophils 0 %    % Basophils 0 %    % Immature Granulocytes 1 %    NRBCs per 100 WBC 0 <1 /100    Absolute Neutrophils 7.5 1.6 - 8.3 10e3/uL    Absolute Lymphocytes 1.5 0.8 - 5.3 10e3/uL    Absolute Monocytes 0.8 0.0 - 1.3 10e3/uL    Mids Abs (Monos, Eos, Basos)      Absolute Eosinophils 0.0 0.0 - 0.7 10e3/uL    Absolute Basophils 0.0 0.0 - 0.2 10e3/uL    Absolute Immature Granulocytes 0.1 <=0.4 10e3/uL    Absolute NRBCs 0.0 10e3/uL   Extra Tube    Narrative    The following orders were created for panel order Extra Tube.  Procedure                               Abnormality         Status                     ---------                               -----------         ------                     Extra Blue Top Tube[[id number]]                              Final result               Extra Red Top Tube[[id number]]                               Final result               Extra Heparinized Syringe[[id number]]                        Final result                 Please view results for these tests on the individual orders.   Extra Blue Top Tube   Result Value Ref Range    Hold Specimen JIC    Extra Red Top Tube   Result Value Ref Range    Hold Specimen JIC    Extra Heparinized Syringe   Result Value Ref Range    Hold Specimen JIC    XR Chest 2 Views    Narrative    PROCEDURE:  XR CHEST 2 VIEWS    HISTORY: sob    COMPARISON:  Chest radiograph 10/4/2023, 12/29/2022; CT cervical spine  10/13/2023    FINDINGS: PA and lateral chest radiographs  There are chronic interstitial and emphysematous changes.  Cardiomediastinal silhouette is enlarged, stable. There is calcific  aortic atherosclerosis.  Stable blunting of left costophrenic angle. No consolidation or  significant pleural effusion. No pneumothorax. Stable bibasilar  streaky  atelectasis versus scarring.    No suspicious osseous lesion or subdiaphragmatic free air.      Impression    IMPRESSION:    Stable exam. No acute airspace opacities.    MCKENZIE MCCALL MD         SYSTEM ID:  [id number]       Medications - No data to display    Assessments & Plan (with Medical Decision Making)     I have reviewed the nursing notes.    I have reviewed the findings, diagnosis, plan and need for follow up with the patient.    New Prescriptions    No medications on file       Final diagnoses:   Bilateral lower extremity edema       10/21/2023   [facility]       Michele Blackwell MD  10/21/23 1912

## 2023-10-31 NOTE — PATIENT INSTRUCTIONS
Wash hands prior to dressing change.   Clean instruments as appropriate    Please discontinue previous wound care plans.      Left leg wounds:  Wash wound with mild soap and water, dry  Apply xeroform (okay to tuck it in the anterior/top leg wound) to both wounds  Cover with 2 pieces of dry gauze per wound (total: 4) and 1 abd pad (9x5)  Secure with rolled gauze (18 inches) and tape  Change every twice a day and/or as needed due to increase drainage  Please wear provided Tubigrip size F--base of toes to bend of knee    Forehead wound:  Wash with mild soap and water, dry   Apply silver foam (remove plastic backing first, sticky side down)  Secure with bandaid or tape  Change ever other day    Right leg:  Wash and dry  Apply moisturizer  Please wear provided Tubigrip size F--base of toes to bend of knee    Please call if any questions/concerns and/or problems ([phone])    Follow up   2 weeks     Please increase protein intake  If need, let me know if you need an order for protein shakes

## 2023-11-08 NOTE — PROGRESS NOTES
"SUBJECTIVE:  Shannon Walls, 89 year old, female presents with the following Chief Complaint(s) with HPI to follow:  Chief Complaint   Patient presents with    WOUND CARE        Wound Care    HPI:  Shannon is here today for the reassessment and treatment of wounds--LLE and right forehead.  Back story:  History of bilateral lower extremity swelling  It was \"cured\" by a pill this past spring  It's gotten worse since October 17th.    Wound started last Friday  Has had a lot of stress (and changes) in her life  Recently moved to an assisted living.    Had a lift chair at her house  Just recently got one.    Was wearing compression, but hard to put on.    Right forehead wound started in July  Had a scab  Received a skin biopsy on September 25th, 2023 at [facility]'s:  \"Ulcer exudate and scant keratin\"  Comment: sections reveal ulcer exudate and scant keratin consistent with an ulcerated keratosis.  There is no viable underlying epidermis to rule out dysplasia or malignancy.  Deeper levels are performed completely through the paraffin block and yield no additional diagnostic information.    Continues to scab, fall off and re-scab  10/31/23: 1st wound care appointment.    Denies any fevers, chills, and/or malodorous drainage.   Walking has been reduced since moving to facility  Unsure if she has claudication symptoms  PMH: as listed below.   Past smoker    Patient Active Problem List   Diagnosis    Pathological fracture L4 vertebrae    Fracture of sacrum (H)    Permanent atrial fibrillation (H)    Atrial fibrillation with rapid ventricular response (H)    Altered mental status, unspecified altered mental status type    Acquired hypothyroidism    Anxiety disorder, unspecified    Chronic systolic heart failure (H)    Gastroesophageal reflux disease without esophagitis    Ischemic stroke (H)       History reviewed. No pertinent past medical history.    Past Surgical History:   Procedure Laterality Date    EYE SURGERY      " OPEN REDUCTION INTERNAL FIXATION WRIST Right 8/22/2018    Procedure: OPEN REDUCTION INTERNAL FIXATION WRIST;  OPEN REDUCTION INTERNAL FIXATION RIGHT DISTAL RADIUS;  Surgeon: Taqueria Edwards MD;  Location: [facility]       History reviewed. No pertinent family history.    Social History     Tobacco Use    Smoking status: Former    Smokeless tobacco: Never   Substance Use Topics    Alcohol use: Not on file       Current Outpatient Medications   Medication Sig Dispense Refill    atorvastatin (LIPITOR) 40 MG tablet Take 1 tablet by mouth at bedtime      calcium carbonate (OS-EFFIE) 1500 (600 Ca) MG tablet Take 600 mg by mouth daily       digoxin (LANOXIN) 125 MCG tablet Take 62.5 mcg by mouth daily       furosemide (LASIX) 20 MG tablet Take 20 mg by mouth daily      GAVILAX 17 GM/SCOOP powder Take 17 g by mouth daily      LORazepam (ATIVAN) 0.5 MG tablet Take 0.5 mg by mouth At Bedtime       metoprolol succinate ER (TOPROL XL) 50 MG 24 hr tablet Take 1 tablet (50 mg) by mouth daily      multivitamin, therapeutic (THERA-VIT) TABS tablet Take 1 tablet by mouth daily      NP THYROID 30 MG tablet Take 30 mg by mouth daily      omeprazole (PRILOSEC) 20 MG DR capsule Take 20 mg by mouth 2 times daily (before meals)       predniSONE (DELTASONE) 5 MG tablet Take 5 mg by mouth daily      traMADol (ULTRAM) 50 MG tablet Take 50 mg by mouth 2 times daily      Vitamin D, Cholecalciferol, 10 MCG (400 UNIT) TABS Take 1 tablet by mouth daily      warfarin ANTICOAGULANT (COUMADIN) 2 MG tablet Take by mouth daily 4 mg Wednesday and Saturday  2 mg all other days         Allergies   Allergen Reactions    Levothyroxine Shortness Of Breath and Swelling    Nitrogen Swelling and Blisters     Liquid nitrogen       REVIEW OF SYSTEMS  Skin: as stated above  Eyes: glasses   Ears/Nose/Throat: negative  Respiratory: No shortness of breath, dyspnea on exertion, cough, or hemoptysis  Cardiovascular: hx of stroke in April; hx of afib; hx of  "HF  Gastrointestinal: \"nervous stomach\"  Genitourinary: negative  Musculoskeletal: wound pain  Neurologic: negative  Psychiatric: positive for excessive stress  Hematologic/Lymphatic/Immunologic: negative  Endocrine: negative    OBJECTIVE:  /70   Pulse 89   Temp 98.4  F (36.9  C)   SpO2 96%   Constitutional: alert and no distress  Respiratory:  Good diaphragmatic excursion.   Musculoskeletal: extremities normal- no gross deformities noted  Skin:   Wound description:   Type of Wound- venous  Location-left legs  Thickness: full  Measurements:   4.9 x 2 cm (lateral)  Anterior: 2 x 1.3 x 1 cm  Tunnel: yes, anterior: 0800: 1.2 cm  Undermining: no  Wound bed- see pictures  Surrounding skin- +3 to +4 pitting edema, slight erythema, no increase heat and/or lymphangitis; cool toes              Drainage amount- copious   Drainage color- serous  Odor- none  Dressing change: washed with soap and water, dried.   Wound debridement completed on: 10/31/23 debridement type: mechanical with dry gauze  Lower Extremity Perfusion Assessment (10/31/23):   Right lower extremity:  Warmth: cool toes  Dorsal pedal pulse: yes, faint via doppler              Posterior tibial pulse: yes, via doppler--mono              Skin color: pink              Edema: yes, +3/+4  Left lower extremity:  Warmth: cool toes  Dorsal pedal pulse: yes, via doppler--mono              Posterior tibial pulse: yes, faint via doppler              Skin color: pink              Edema: +3/+4  Intervention: MARÍA ordered     .  Wound description:   Type of Wound- ?  Location-right forehead  Thickness: full  Measurements: 1.2 x 1.5 cm  Tunnel: no  Undermining: no  Wound bed- no  Surrounding skin- see pictures    Drainage amount- scant  Drainage color- bloody  Odor- none  Dressing change: washed with soap and water, dried, scab removed; silver foam, tape  Wound debridement completed on: 10/31/23 debridement type: mechanical with dry gauze     Psychiatric: mentation " appears normal and affect normal/bright      LABS  Results for orders placed or performed during the hospital encounter of 10/31/23   US MARÍA Doppler No Exercise     Status: None    Narrative    PROCEDURE: US MARÍA DOPPLER NO EXERCISE, 1-2 LEVELS, BILAT 10/31/2023  5:00 PM    HISTORY: Open wound of left lower leg, initial encounter; 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    TECHNIQUE: Bilateral ankle-brachial indices were performed.    FINDINGS: No Doppler flow was identified in the left posterior tibial  artery. The right posterior tibial artery was noncompressible. The  dorsalis pedis ankle brachial indices were 0.52 on the right and 0.55  on the left.         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    PRABHAKAR GASCA MD         SYSTEM ID:  [id number]       ASSESSMENT / PLAN:  (S81.802A) Open wound of left lower leg, initial encounter  (primary encounter diagnosis)  Comment: noted  Plan: US MARÍA Doppler No Exercise          Order sent    Plan changed to the following:  Wash wound with mild soap and water, dry  Apply xeroform (okay to tuck it in the anterior/top leg wound) to both wounds  Cover with 2 pieces of dry gauze per wound (total: 4) and 1 abd pad (9x5)  Secure with rolled gauze (18 inches) and tape  Change every twice a day and/or as needed due to increase drainage  Please wear provided Tubigrip size F--base of toes to bend of knee (if this is too painful, please remove; if new symptoms develop--SOB, chest pain--please remove)    (U05.773) 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   Clean instruments as appropriate    Please discontinue previous wound care plans.      Left leg wounds:  Wash wound with mild soap and water, dry  Apply xeroform (okay to tuck it in the anterior/top leg wound) to both wounds  Cover with 2 pieces of dry gauze per wound (total: 4) and 1 abd pad (9x5)  Secure with rolled gauze (18 inches) and tape  Change every twice a day and/or as needed due to increase drainage  Please wear provided Tubigrip size F--base of toes to bend of knee    Forehead wound:  Wash with mild soap and water, dry   Apply silver foam (remove plastic backing first, sticky side down)  Secure with bandaid or tape  Change ever other day    Right leg:  Wash and dry  Apply moisturizer  Please wear provided Tubigrip size F--base of toes to bend of knee    Please call if any questions/concerns and/or problems ([phone])    Follow up   2 weeks     Please increase protein intake  If need, let me know if you need an order for protein shakes      Time: 25 min + 10 min chart review  Barrier: none  Willingness to learn: accepting    Maame MERRITT [facility]  Diabetes and Wound Care    Cc: Dr. Mcgregor    35 minutes was spent with patient.    All of this time was spent on counseling patient regarding illness, medication and/or treatment options, coordinating further cares and follow ups that are needed along with resource material that will be helpful in the treatment of these issues.     Addendum:'  MARÍA:   FINDINGS: No Doppler flow was identified in the left posterior tibial  artery. The right posterior tibial artery was noncompressible. The  dorsalis pedis ankle brachial indices were 0.52 on the right and 0.55  on the left.                                                                        IMPRESSION: Moderate  arterial occlusive disease in both lower  Extremities    I would recommend a referral to . [facility]'s vascular but will let her PCP, Dr. Mcgregor decide if that's appropriate.    It does restrict the use of compression wraps with this patient.    Maame MERRITT FNP-BC  Diabetes and Wound Care

## 2023-11-10 NOTE — PROGRESS NOTES
Wound care consultation    Subjective     Shannon Walls, 89 year old, female presents with the following Chief Complaint(s) with HPI to follow: wound care     HPI:  Shannon was seen in the ER on 10/21/23 with leg swelling. She also had noted skin tears.    She was seen in wound care for the first time on 10/31/23 by Maame Campbell.  Her forehead is noted as being suspicious for skin cancer.  A biopsy was done in the past, (9/25/23) not conclusive. This area scabs, falls off and re-develops.    She lives in assisted living and staff are changing her left leg dressing daily with xeroform, gauze, kerlix and tubigrip.  They are also changing her forehead dressing with silver foam.   She was scheduled to return  next Tuesday, but the staff called and requested an earlier visit due to wound deterioration    Wound History:  10/21/23- Wounds developed, visit to ER  10/31/23- First visit to wound care.  Her forehead was dressed with silver foam and xeroform, gauze, kerlix and tubigrip size F to her left leg and tubigrip only to the right leg.  Tubigrip size F only to right leg.   10/31/23- MARÍA done:   FINDINGS: No Doppler flow was identified in the left posterior tibial  artery. The right posterior tibial artery was noncompressible. The  dorsalis pedis ankle brachial indices were 0.52 on the right and 0.55  on the left.    REVIEW OF SYSTEMS  Skin: as above  Eyes: glasses  Ears/Nose/Throat: hearing loss  Respiratory: Shortness of breath- with exertion  Cardiovascular: palpitations, irregular heart beat, and lower extremity edema  Gastrointestinal: negative  Genitourinary: negative  Musculoskeletal: back pain, arthritis, joint pain, and kyphosis, walks with a walker  Neurologic: memory problems  Psychiatric: negative  Hematologic/Lymphatic/Immunologic: negative  Endocrine: thyroid disorder    Objective     Vital signs:  Temp: 97.9  F (36.6  C)   BP: 116/81 Pulse: 58     SpO2: 93 %          Estimated body mass index is 19.49  "kg/m  as calculated from the following:    Height as of 12/30/22: 1.6 m (5' 3\").    Weight as of 10/21/23: 49.9 kg (110 lb 0.2 oz).    Constitutional: healthy, alert, and no distress  Head: Normocephalic. No masses, lesions, tenderness or abnormalities  Cardiovascular: Heart rate irregular.  No clicks gallops or rub  Lower Extremity Perfusion Assessment:   Right lower extremity:  Warmth: cool toes  Dorsal pedal pulse: yes, faint via doppler              Posterior tibial pulse: yes via doppler, mono              Skin color: pink              Edema: 3-4+ pitting  Left lower extremity:  Warmth: cool toes  Dorsal pedal pulse: via doppler, mono              Posterior tibial pulse: yes, faint via doppler              Skin color: pink              Edema: 3-4+  Intervention: MARÍA completed. MARÍA done 10/31, FINDINGS: No Doppler flow was identified in the left posterior tibial  artery. The right posterior tibial artery was noncompressible. The  dorsalis pedis ankle brachial indices were 0.52 on the right and 0.55  on the left.    Respiratory:  Good diaphragmatic excursion. Lungs clear  Gastrointestinal: Abdomen soft, non-tender. BS normal. No masses, organomegaly  : Deferred  Musculoskeletal: extremities normal- no gross deformities noted, normal muscle tone, and has a severe kyphosis, she came in a wheelchair, uses a walker to ambulate which I did not observe.  She transferred to her wheelchair with minimal assist  Skin:    #1-    Wound description:  Type of Wound- stasis ulcer; Location- LLE/lateral, Drainage amount-moderate, Drainage color-tan serous,  Odor- none; Wound bed-pre debridement was 100% tan with some dark eschar as well; post debridement was mostly tan with some pink tissue showing through (see photo below), Surrounding skin-fragile/pink.       Measurements: 4.5 x 2.0 x 0.3 cm deep       Depth/stage: full thickness       Dressing change:  Wound cleansed with baby soap and water, dressed with silver foam.       "   Wound debridement completed on: November 10, 2023, debridement type: sharp      #2 Wound description:  Type of Wound- stasis ulcer; Location- left lower leg/anterior, Drainage amount-moderate, Drainage color-tan serous,  Odor- none; Wound bed-was 100% covered with soft spongy tan fibrin prior to debridement and after debridement was much deeper and undermined- still mostly tan (see photos below), Surrounding skin-pink and fragile.       Measurements: 3.0 x 1.5 cm x 0.6 cm deep, undermined from 6:00 to 9:00 with the deepest area at 9:00 measuring 1.1 cm       Depth/stage: full thickness       Dressing change:  Wound cleansed with baby soap and water, dressed with silver foam .         Wound debridement completed on: November 10, 2023, debridement type: sharp     #3-  Wound description:  Type of Wound- stasis ulcer; Location- LLE- anterior- wound medial to anterior wound, Drainage amount-moderate, Drainage color-tan serous,  Odor- none; Wound bed-100% pink, Surrounding skin-pink and fragile.       Measurements: 0.8 x 0.4 (see photo below)       Depth/stage: partial thickness       Dressing change:  Wound cleansed with baby soap and water, dressed with silver foam.         Wound debridement completed on: November 10, 2023, debridement type: mechanical     #4-  Wound description:  Type of Wound- stasis ulcer; Location- LLE/anterior but lateral to middle wound, Drainage amount-moderate, Drainage color-tan serous,  Odor- none; Wound bed-100% adherent tan fibrin, Surrounding skin-pink and fragile.       Measurements: 2.3 x 0.9 cm       Depth/stage: partial thickness       Dressing change:  Wound cleansed with baby soap and water, dressed with silver foam.         Wound debridement completed on: November 10, 2023, debridement type: mechanical (sharp attempted, fibrin was too adhered)         Wound photos post debridement:          Wound #5:   Wound description:  Type of Wound- ulcer, possibly malignant; Location-  "forehead; Drainage amount-moderate, Drainage color-pink serous,  Odor- none; Wound bed-100% pink, Surrounding skin-intact       Measurements: 1.5 x 1.4 cm       Depth/stage: partial thickness       Dressing change:  Wound cleansed with baby soap and water, dressed with silver foam.         Wound debridement completed on: November 10, 2023, debridement type: mechanical    Note: I used a total of two 4x4\" silver foam pads for all wounds, one   kerlix and 1/4 package of 1 cm silver hydrofiber per dressing change.   Neurologic: Gait abnormal due to back pain.  Sensation grossly WNL.  Psychiatric: mentation appears normal and affect normal/bright    Sharp Debridement -  Verified name,  and site as part of time out done prior to procedure. Patient was informed of the risks and benefits and consented to the procedure.  Two identifiers were used and a time out was completed.    Sharp excisional debridement was completed with at sterile sharp ring curette to remove spongy non viable tan tissue from wound #2 and with a sterile #15 scapel  to remove eschar from wound #1   Surface area: Wound #2- 3.0 square cm and wound #1 9 square cm.   Depth:  subcutaneous tissue  Patient response: good  Pain: none, lidocaine cream used  Bleeding:scant      THERAPY GOAL: Keep from getting infected, treat pain    Assessment   Wounds continue to deteriorate.  I discussed this at length with Shannon, as well as her MARÍA results and how her issues with poor circulation are affecting her healing.  She relates that she understands that her wounds may not heal but is declining a vascular referral at this time.  She states that she is 89 years old and does not want \"to be talked into anything I don't want to do\".    I also talked to her about her forehead and the fact that this wound may be a malignancy, but again, she does not want to see dermatology.   I used a size G tubigrip this time, to help dressings to stay on, but no real compression.  I " wrote instructions for the facility and asked them to throw away all the other tubigrips.    I ordered supplies to be shipped to patient and gave her enough for a few dressings.   Follow-up in one week or as needed for acute concerns.    Time spent on today's visit was 45 min including review of outside records, labs, face to face with patient discussing options, providing wound care, documentation and writing instructions for facility.  Time spent on procedure done was 15 min which is not included in visit time. Total time spent on date of encounter was 60 minutes.

## 2023-11-17 NOTE — PROGRESS NOTES
Wound care consultation    Subjective     Shannon Walls, 89 year old, female presents with the following Chief Complaint(s) with HPI to follow: wound care     HPI:  Shannon is here for a follow up evaluation of her left lower leg and forehead wound.  Since I last saw her a week ago she bumped her right foot and now has an open area on her right foot as well. She states that she is generally feeling ok, and the left leg ulcers are a little less tender than last week.     Wound history:   Shannon was seen in the ER on 10/21/23 with leg swelling. She also had noted skin tears.    She was seen in wound care for the first time on 10/31/23 by aMame Campbell.  Her forehead is noted as being suspicious for skin cancer.  A biopsy was done in the past, (9/25/23) not conclusive. This area scabs, falls off and re-develops.    She lives in assisted living and staff are changing her left leg dressing daily with xeroform, gauze, kerlix and tubigrip.  They are also changing her forehead dressing with silver foam.     Wound History:  10/21/23- Wounds developed, visit to ER  10/31/23- First visit to wound care.  Her forehead was dressed with silver foam and xeroform, gauze, kerlix and tubigrip size F to her left leg and tubigrip only to the right leg.  Tubigrip size F only to right leg.   10/31/23- MARÍA done:   FINDINGS: No Doppler flow was identified in the left posterior tibial  artery. The right posterior tibial artery was noncompressible. The  dorsalis pedis ankle brachial indices were 0.52 on the right and 0.55  on the left.  11/10/23- Switch to size G tubigrip, which are loose, no really compression but can help to hold dressings in place.  Add silver foam to right 2nd toe also.  Wound culture done on anterior left leg wound.     REVIEW OF SYSTEMS  Skin: as above  Eyes: glasses  Ears/Nose/Throat: hearing loss  Respiratory: Shortness of breath- with exertion  Cardiovascular: palpitations, irregular heart beat, and lower  "extremity edema  Gastrointestinal: negative  Genitourinary: negative  Musculoskeletal: back pain, arthritis, joint pain, and kyphosis, walks with a walker  Neurologic: memory problems  Psychiatric: negative  Hematologic/Lymphatic/Immunologic: negative  Endocrine: thyroid disorder    Objective     Vital signs:  Temp: 97.8  F (36.6  C) Temp src: Tympanic BP: 116/77 Pulse: 76             Weight: 49.9 kg (110 lb)  Estimated body mass index is 19.49 kg/m  as calculated from the following:    Height as of 12/30/22: 1.6 m (5' 3\").    Weight as of this encounter: 49.9 kg (110 lb).    Constitutional: healthy, alert, and no distress  Head: Normocephalic. No masses, lesions, tenderness or abnormalities  Cardiovascular: Heart rate irregular.  No clicks gallops or rub  Lower Extremity Perfusion Assessment (10/31/23): done by Maame Campbell NP  Right lower extremity:  Warmth: cool toes  Dorsal pedal pulse: yes, faint via doppler              Posterior tibial pulse: yes, via doppler--mono              Skin color: pink              Edema: yes, +3/+4  Left lower extremity:  Warmth: cool toes  Dorsal pedal pulse: yes, via doppler--mono              Posterior tibial pulse: yes, faint via doppler              Skin color: pink              Edema: +3/+4  Intervention: MARÍA done 10/31, FINDINGS: No Doppler flow was identified in the left posterior tibial  artery. The right posterior tibial artery was noncompressible. The  dorsalis pedis ankle brachial indices were 0.52 on the right and 0.55  on the left.     Respiratory:  Good diaphragmatic excursion. Lungs clear  Gastrointestinal: Abdomen soft, non-tender. BS normal. No masses, organomegaly  : Deferred  Musculoskeletal: extremities normal- no gross deformities noted, normal muscle tone, and has a severe kyphosis, she came in a wheelchair, uses a walker to ambulate which I did not observe.  She transferred to her wheelchair with minimal assist  Skin:    #1-    Wound description:  Type of " Wound- stasis ulcer; Location- LLE/lateral, Drainage amount-moderate, Drainage color-tan serous,  Odor- none; Wound bed- approx 50% pink and 50% tan (see photo below), Surrounding skin-fragile/pink.       Measurements: 4.4 x 2.3 x 0.3 cm deep (about the same)       Depth/stage: full thickness       Dressing change:  Wound cleansed with baby soap and water, dressed with silver foam.         Wound debridement completed on: November 17, 2023, debridement type: sharp      #2 Wound description:  Type of Wound- stasis ulcer; Location- left lower leg/anterior, Drainage amount-moderate to large;  Drainage color-tan serous,  Odor- none; Wound bed- mostly loose tan tissue with some pink showing- (see photos below), Surrounding skin-pink and fragile.       Measurements: 3.1 x 1.7 cm x 0.8 cm deep, undermined from 6:00 to 9:00 with the deepest area at 9:00 measuring 1.2 cm (about the same)       Depth/stage: full thickness       Dressing change:  Wound cleansed with baby soap and water, dressed with silver hydrofiber rope and silver foam over.         Wound debridement completed on: November 17, 2023, debridement type: sharp     #3-  Wound description:  Type of Wound- stasis ulcer; Location- LLE- anterior- wound medial to anterior wound, Drainage amount-moderate, Drainage color-tan serous,  Odor- none; Wound bed-50% pink and 50% tan, Surrounding skin-pink and fragile.       Measurements: 2.4 x 0.8 cm (see photo below)       Depth/stage: partial thickness       Dressing change:  Wound cleansed with baby soap and water, dressed with silver foam.         Wound debridement completed on: November 17, 2023, debridement type: sharp    #4-  Wound description:  Type of Wound- stasis ulcer; Location- LLE/anterior but lateral to middle wound, Now healed over with a scab            Wound #5:   Wound description:  Type of Wound- ulcer, possibly malignant; Location- forehead; Drainage amount-moderate, Drainage color-pink serous,  Odor-  none; Wound bed-100% pink (see photo below) , Surrounding skin-intact       Measurements: 1.6 x 1.6 cm       Depth/stage: partial thickness       Dressing change:  Wound cleansed with baby soap and water, dressed with silver foam.         Wound debridement completed on: 2023, debridement type: mechanical      Wound #6:   Wound description:  Type of Wound- trauma; Location- right 2nd toe; Drainage amount-moderate, Drainage color-pink serous,  Odor- none; Wound bed-100% pink, Surrounding skin-intact, proximal edge is macerated (see photo below)       Measurements: 0.6 x 0.3 cm       Depth/stage: partial thickness       Dressing change:  Wound cleansed with baby soap and water, dressed with silver foam.         Wound debridement completed on: 2023,  debridement type: mechanical              Neurologic: Gait abnormal due to back pain.  Sensation grossly WNL.  Psychiatric: mentation appears normal and affect normal/bright    Sharp Debridement -  Verified name,  and site as part of time out done prior to procedure. Patient was informed of the risks and benefits and consented to the procedure.  Two identifiers were used and a time out was completed.    Sharp excisional debridement was completed with at sterile sharp ring curette to remove spongy non viable tan tissue from wound #2 and also from wound #1   Surface area: Total square cm debrided for both wounds is 15.39  Depth:  subcutaneous tissue  Patient response: good  Pain: none, lidocaine cream used  Bleeding:scant      THERAPY GOAL: Keep from getting infected, treat pain    Assessment   I cultured her anterior leg wound today.  Her anterior wound continues to evolve, with dead tissue sloughing and I want to make sure it is not complicated with infection as well.    We will continue dressings with silver hydrofiber rope to the deeper anterior leg wound and silver foam over, and silver foam only to the lateral, medial and toe wounds.  She  has one area that healed, which is encouraging.    Follow-up in two weeks or as needed for acute concerns.    Time spent on today's visit was 30 min including review of outside records, labs, face to face with patient discussing options, providing wound care, documentation and writing instructions for facility.  Time spent on procedure done was 10 min which is not included in visit time. Total time spent on date of encounter was 40 minutes.

## 2023-11-22 NOTE — PROGRESS NOTES
Writer called facility and spoke with Rosanna the nurse and gave results of wound culture and informed of the new medication that was ordered by Ar Martin. Rosanna has no questions or concerns at this time.     Jahaira Mir RN on 11/22/2023 at 1:09 PM

## 2023-12-01 NOTE — Clinical Note
Carisa- I referred Shannon to you.  Sweet older lady with several leg ulcers and now is noted to have a wound on her right 2nd toe (which is a hammer toe).  Two weeks ago it presented as superficial so we treated it with silver foam.  Today I probed to bone.   I don't have any more wound care appointments, I am hoping that you can determine when you can next see her at your appointment and have wound care piggy back to your appointment.  Thanks, Ar

## 2023-12-01 NOTE — PROGRESS NOTES
Wound care consultation    Subjective     Shannon Walls, 89 year old, female presents with the following Chief Complaint(s) with HPI to follow: wound care     HPI:  Shannon is here for a follow up evaluation of her left lower leg and forehead wound and an area on her right foot.    I last saw her on 11/17/23.  I did a wound culture at her last visit, which was positive for 4+ staph aureus and she was treated with Clindamycin.   Today she feels ok, but is complaining of some tenderness of her left heel.  She also has noted bruising on her right anterior lower leg that is significant and her left elbow.       Wound history:   Shannon was seen in the ER on 10/21/23 with leg swelling. She also had noted skin tears.    She was seen in wound care for the first time on 10/31/23 by Maame Campbell.  Her forehead is noted as being suspicious for skin cancer.  A biopsy was done in the past, (9/25/23) not conclusive. This area scabs, falls off and re-develops.    She lives in assisted living and staff are changing her left leg dressing daily with xeroform, gauze, kerlix and tubigrip.  They are also changing her forehead dressing with silver foam.     Wound History:  10/21/23- Wounds developed, visit to ER  10/31/23- First visit to wound care.  Her forehead was dressed with silver foam and xeroform, gauze, kerlix and tubigrip size F to her left leg and tubigrip only to the right leg.  Tubigrip size F only to right leg.   10/31/23- MARÍA done:   FINDINGS: No Doppler flow was identified in the left posterior tibial  artery. The right posterior tibial artery was noncompressible. The  dorsalis pedis ankle brachial indices were 0.52 on the right and 0.55  on the left.  11/17/23- Switch to size G tubigrip, which are loose, this is not really any compression but can help to hold dressings in place.  Add silver foam to right 2nd toe also.  Wound culture done on anterior left leg wound.  Positive for 4+ staph aureus, treated with  "Clindamycin.     REVIEW OF SYSTEMS  Skin: as above  Eyes: glasses  Ears/Nose/Throat: hearing loss  Respiratory: Shortness of breath- with exertion  Cardiovascular: palpitations, irregular heart beat, and lower extremity edema  Gastrointestinal: negative  Genitourinary: negative  Musculoskeletal: back pain, arthritis, joint pain, and kyphosis, walks with a walker  Neurologic: memory problems  Psychiatric: negative  Hematologic/Lymphatic/Immunologic: negative  Endocrine: thyroid disorder    Objective   Vital signs:                         Estimated body mass index is 19.49 kg/m  as calculated from the following:    Height as of 12/30/22: 1.6 m (5' 3\").    Weight as of 11/17/23: 49.9 kg (110 lb).              Constitutional: healthy, alert, and no distress  Head: Normocephalic. No masses, lesions, tenderness or abnormalities  Cardiovascular: Heart rate irregular.  No clicks gallops or rub  Lower Extremity Perfusion Assessment (10/31/23): done by Maame Campbell NP  Right lower extremity:  Warmth: cool toes  Dorsal pedal pulse: yes, faint via doppler              Posterior tibial pulse: yes, via doppler--mono              Skin color: pink              Edema: yes, +3/+4  Left lower extremity:  Warmth: cool toes  Dorsal pedal pulse: yes, via doppler--mono              Posterior tibial pulse: yes, faint via doppler              Skin color: pink              Edema: +3/+4  Intervention: MARÍA done 10/31, FINDINGS: No Doppler flow was identified in the left posterior tibial  artery. The right posterior tibial artery was noncompressible. The  dorsalis pedis ankle brachial indices were 0.52 on the right and 0.55  on the left.     Respiratory:  Good diaphragmatic excursion. Lungs clear  Gastrointestinal: Abdomen soft, non-tender. BS normal. No masses, organomegaly  : Deferred  Musculoskeletal: extremities normal- no gross deformities noted, normal muscle tone, and has a severe kyphosis, she came in a wheelchair, uses a walker to " ambulate which I did not observe.  She transferred to her wheelchair with minimal assist  Skin:    #1-    Wound description:  Type of Wound- stasis ulcer; Location- LLE/lateral, Drainage amount-moderate, Drainage color-tan serous,  Odor- none; Wound bed- approx 50% pink and 50% tan (see photo below), Surrounding skin-fragile/pink.       Measurements: 3.5x 1.7 x 0.3 cm deep (smaller)       Depth/stage: full thickness       Dressing change:  Wound cleansed with baby soap and water, dressed with silver foam.         Wound debridement completed on: December 1, 2023, debridement type: sharp        #2  and #3, Wound description:  Type of Wound- stasis ulcer; Location- left lower leg/anterior, Drainage amount-moderate to large;  Drainage color-tan serous,  Odor- none; Wound bed- in photo below the wound to the left was all loose tan tissue and is mostly red after debridement- the wound to the right was also tan and is now pink post debridement, Surrounding skin-pink and fragile.       Measurements: wound to the left is 2.2 x 2.0 cm x 0.8 cm deep, undermined from 6:00 to 9:00 with the deepest area at 9:00 measuring 0.7cm (smaller), the second area to the right is new and is 0.2 x 0.2 cm x 0.2 cm deep       Depth/stage: full thickness       Dressing change:  Wound cleansed with baby soap and water, dressed with silver hydrofiber rope and silver foam over.  Wound to the right dressed with silver foam alone.        Wound debridement completed on: December 1, 2023, debridement type: sharp     #4-  Wound description:  Type of Wound- stasis ulcer; Location- LLE- anterior- wound medial to anterior wound, Drainage amount-moderate, Drainage color-tan serous,  Odor- none; Wound bed- mostly tan, Surrounding skin-pink and fragile.       Measurements: 1.7 x 0.5  cm (smaller)       Depth/stage: partial thickness       Dressing change:  Wound cleansed with baby soap and water, dressed with silver foam.         Wound debridement  completed on: 2023, debridement type: mechanical      Wound #5:   Wound description:  Type of Wound- ulcer, possibly malignant; Location- forehead; Drainage amount-moderate, Drainage color-pink serous,  Odor- none; Wound bed-100% pink (see photo below) , Surrounding skin-intact       Measurements: 1.5 x 1.7 cm (about the same)       Depth/stage: partial thickness       Dressing change:  Wound cleansed with baby soap and water, dressed with silver foam.         Wound debridement completed on: 2023, debridement type: mechanical      Wound #6:   Wound description:  Type of Wound- trauma; Location- right 2nd toe (noted hammer toe)  Drainage amount-moderate, Drainage color-pink serous,  Odor- none; Wound bed- mostly tan- able to probe to bone, Surrounding skin- pink;  proximal edge is macerated (see photo below)       Measurements: 0.5 x 0.3 cm x 0.3 cm deep       Depth/stage: full thickness       Dressing change:  Wound cleansed with baby soap and water, dressed with silver foam.         Wound debridement completed on: 2023,  debridement type: mechanical      Additional photos of noted bruising below:          Neurologic: Gait abnormal due to back pain.  Sensation grossly WNL.  Psychiatric: mentation appears normal and affect normal/bright    Sharp Debridement -  Verified name,  and site as part of time out done prior to procedure. Patient was informed of the risks and benefits and consented to the procedure.  Two identifiers were used and a time out was completed.    Sharp excisional debridement was completed with a sterile ring curette  to remove non viable tan tissue from wounds #1, 2 & 3   Surface area: 10.39 square cm  Depth: subcutaneous tissue  Patient response: good  Pain: none- lidocaine cream used prior  Bleeding:small      THERAPY GOAL: Keep from getting infected, treat pain    Assessment   Wounds on legs are slowly getting better and we will continue with the  dressings noted above.   She has a new area on her leg that we area now treating as well.   The wound on her right 2nd hammer toe probed to the bone and is pink surrounding (which is new), so I have referred her to Dr. Ray and she will be seen next week.    Foot xray ordered, no osteomyelitis detected.    Further wound care visits to be scheduled after she sees Dr. Ray, so we can try to coordinate the visits.

## 2023-12-05 NOTE — PATIENT INSTRUCTIONS
[facility] Instructions as of 12/05/2023:  -Please continue with same dressing instructions for the LEFT leg from  Ar Martin.  -Please gently clean the wound on the RIGHT second toe every day you are changing the LEFT leg dressings. Apply Mepilex Ag and tape over the wound when changing the LEFT leg dressings.    -Please look for signs of infection (redness, swelling, pain, purulence, fever, chills, nausea, vomiting) and return to podiatry or wound care or the Emergency Department immediately if there are any signs of infection.

## 2023-12-05 NOTE — PROGRESS NOTES
"Chief complaint: Patient presents with:  Wound Check: Right toe      History of Present Illness: This 89 year old female is seen at the request of No ref. provider found for evaluation and suggestions of management of an open wounds on the RIGHT dorsal second toe. She has been seeing wound care for leg ulcerations. She has the dressings on her LEFT leg changed three times a week. The nurse at [facility] is changing the dressings on her leg  with Mepilex Ag. She thinks the wounds started on her legs around October, 2023, and her RIGHT dorsal second toe wound started \"over a month ago.\" She thinks the toe wound developed from rubbing in he shoes. She is wearing Crocs today because she says they are the only types of shoes that don't rub on her toe. Her leg wounds cause her some discomfort, but her LEFT dorsal second toe does not cause her consistent pain.    Additionally, she has thickened toenails and she says they are too difficult for her to trim. She would like them trimmed today.    No further pedal complaints today.       /73 (BP Location: Left arm, Patient Position: Sitting, Cuff Size: Adult Regular)   Pulse 100   Temp 97.9  F (36.6  C) (Tympanic)   SpO2 90%     Patient Active Problem List   Diagnosis    Pathological fracture L4 vertebrae    Fracture of sacrum (H)    Permanent atrial fibrillation (H)    Atrial fibrillation with rapid ventricular response (H)    Altered mental status, unspecified altered mental status type    Acquired hypothyroidism    Anxiety disorder, unspecified    Chronic systolic heart failure (H)    Gastroesophageal reflux disease without esophagitis    Ischemic stroke (H)       Past Surgical History:   Procedure Laterality Date    EYE SURGERY      OPEN REDUCTION INTERNAL FIXATION WRIST Right 8/22/2018    Procedure: OPEN REDUCTION INTERNAL FIXATION WRIST;  OPEN REDUCTION INTERNAL FIXATION RIGHT DISTAL RADIUS;  Surgeon: Taqueria Edwards MD;  Location: [facility]       Current Outpatient " Medications   Medication    acetaminophen (TYLENOL) 650 MG CR tablet    atorvastatin (LIPITOR) 40 MG tablet    calcium carbonate (OS-EFFIE) 1500 (600 Ca) MG tablet    digoxin (LANOXIN) 125 MCG tablet    furosemide (LASIX) 20 MG tablet    GAVILAX 17 GM/SCOOP powder    LORazepam (ATIVAN) 0.5 MG tablet    metoprolol succinate ER (TOPROL XL) 50 MG 24 hr tablet    Multiple Vitamins-Minerals (CERTAVITE SENIOR/ANTIOXIDANT) TABS    multivitamin, therapeutic (THERA-VIT) TABS tablet    NP THYROID 30 MG tablet    omeprazole (PRILOSEC) 20 MG DR capsule    predniSONE (DELTASONE) 5 MG tablet    traMADol (ULTRAM) 50 MG tablet    Vitamin D, Cholecalciferol, 10 MCG (400 UNIT) TABS    warfarin ANTICOAGULANT (COUMADIN) 2 MG tablet    warfarin ANTICOAGULANT (COUMADIN) 4 MG tablet     No current facility-administered medications for this visit.          Allergies   Allergen Reactions    Levothyroxine Shortness Of Breath and Swelling    Nitrogen Swelling and Blisters     Liquid nitrogen       History reviewed. No pertinent family history.    Social History     Socioeconomic History    Marital status:      Spouse name: None    Number of children: None    Years of education: None    Highest education level: None   Tobacco Use    Smoking status: Former    Smokeless tobacco: Never       ROS: 10 point ROS neg other than the symptoms noted above in the HPI.  EXAM  Constitutional: healthy, alert, and no distress    Psychiatric: mentation appears normal and affect normal/bright    VASCULAR:  -Dorsalis pedis pulse minimally palpable +1/4 bilaterally   -Posterior tibial pulse +0/4 b/l  -Capillary refill time < 3 seconds to b/l hallux  -Hair growth Absent to b/l anterior legs and ankles  -Pitting edema 2+ to the bilateral foot and leg  NEURO:  -Light touch sensation intact to b/l plantar forefoot  DERM:  -Skin temperature, texture and turgor WNL b/l  -Toenails elongated, thickened, dystrophic and discolored x 10  Wound Location:  RIGHT dorsal  second toe PIPJ  2023  Measurement:  0.2cm x 0.2cm x exposed bone  Drainage:  Minimal serous  Odor:  None  Undermining:  None  Edges:  Intact  Base:  100% bone  Surrounding Skin: Intact  No severe erythema, no ascending erythema, no calor, no purulence, no malodor, no other signs of infection.     -------------------------------    Wound Location:  LEFT anterior leg  2023  Measurement:    LEFT anterior le.8cm x 1.7cm x 1.1cm tunneling in a proximal medial direction to subcutaneous tissue with 100% fibrotic base  Anterior central lateral wound: 0.3cm x 0.3cm x 0.2cm to subcutaneous tissue with 100% fibrotic base  Lateral central distal leg: 3.6cm x 1.5cm x 0.1cm to subcutaneous tissue with 90% viable and 10% fibrotic base  Medial central le.3cm x 1.7cm x 0.1cm to subcutaneous tissue with 50% granular and 50% fibrotic base  Drainage:  Moderate serous  Odor:  None  Undermining:  None  Edges:  Intact  Base:  100% viable  Surrounding Skin: Intact  No severe erythema, no ascending erythema, no calor, no purulence, no malodor, no other signs of infection.     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    PRABHAKAR GASCA MD   ============================================================    ASSESSMENT:  (L92.506) Ulcer of right foot with bone involvement without evidence of necrosis (H)  (primary encounter diagnosis)    (U48.492) Ulcer of left lower leg, with fat layer exposed (H)    (L60.3) Onychodystrophy    (I73.9) Peripheral arterial disease (H24)    (I70.235) Atherosclerosis of native artery of right lower extremity  with ulceration of other part of foot (H)        PLAN:  -Patient evaluated and examined. Treatment options discussed with no educational barriers noted.    -High risk toenail debridement x 10 toenails without incident on 12/05/2023    PAD:  -Reviewed patient's radiographs (no concerning changes of hte bone) from 12/01/2023.   -Reviewed previous wound care notes within the last two months regarding previous care of patient's bilateral ulcerations.  -Discussed patient's MARÍA results with her from 10/31/2023. The ABIs are abnormal and indicates moderate arterial occlusive disease. This can prevent the patient's wounds from healing.  -The patient is in a wheelchair for transfers to the cafeteria from her bedroom, but she says she does walk with her walker.   -Discussed the possibility of the patient's wounds not healing because of inadequate blood flow. Additionally, an infection of her RIGHT second toe could require a toe amputation or more proximal amputation which may not heal if there is inadequate blood flow. The patient may consider a vascular referral. Sthis does not mean she is required to have surgery. She is questioning if she would consider vascular surgery. A referral is encouraged to at least determine if there is a procedure that would greatly improve her wound healing versus minimal change in wound healing. She does not have to proceed with vascular intervention, but she could be provided with options. Her alternative option is to decline the vascular referral and continue with regular wound care to keep the wounds stable without signs of infection. The wounds may potentially not heal on the RIGHT foot or the LEFT leg.   -Extensive time was spent discussing risks and benefits of vascular intervention versus palliative care for the wounds. Stressed to patient that if she does decide to proceed with vascular intervention, this does not mean she is required to proceed with a surgical intervention.  The  patient would like a referral through the [facility] for a virtual visit to discuss options. She understands the risks and benefits of discussing her options with vascular, but she would like to at least discuss her treatment options.  ---Vascular referral placed on 12/05/2023 through [facility]    RIGHT dorsal second toe ulcer:  -The ulceration probes directly to the bone which is fully exposed in the wound base. The toe has no acute signs of infection (redness, swelling, pain, purulence, fever, chills, nausea, vomiting).  -Radiographs reviewed from 12/01/2023. No concerning changes to the bone at this time.  -Patient is advised to monitor her toe daily for acute signs of infection.  ----Patient was instructed to look for signs of infection (redness, swelling, pain, purulence, fever, chills, nausea, vomiting) and to return to podiatry or the emergency department immediately if there are any signs of infection.   -Patient's toe ulcer may potentially not heal. She has abnormal ABIs which may also indicate she had microvascular disease. There is no pain from the toe.  -Outer callus cared for and mechanical debridement of wound performed with gauze on the RIGHT dorsal second toe.  -Applied a dressing to the wound with Mepilex Ag and Medipore tape.  -Patient 's caretakers to change the dressing three times a week  -Patient was instructed to look for signs of infection (redness, swelling, pain, purulence, fever, chills, nausea, vomiting) and to return to podiatry or the emergency department immediately if there are any signs of infection.   -Offloading: Keep pressure off toe and avoid wearing tight shoes.    LEFT leg ulcerations:  -Outer callus cared for and mechanical debridement of wound performed with gauze on the LEFT leg  -Applied a dressing to the wounds with Aquacel tucked into the anterior central wound with tunneling and covered all wounds with Mepilex Ag, conform and tape.  -Patient's caretakers to change  the dressings every 2-3 days  Size G Tubigrips applied to patient's legs. The Tubigrips are loose without compression and serve more as a dressing breaux than compression device. She understands not to wear compression socks on her legs.    -Patient's current goal is to keep the wounds clean and minimize risks of infection. She understands the wounds may potentially not heal. She has agreed to a vascular referral to discuss her treatment options.    -Will discuss the above information with Ar Martin, who has most recently been treating patient's wounds.    Total time spent preparing to see the patient, review of chart, obtaining history and physical examination, review of x-rays, review of MARÍA results, treatment options, education on arterial blood flow and healing, discussion with patient and documenting in Epic / EMR was 60 minutes. This total time did not include the 5 minutes spent performing the separately reportable procedure. All time involved was spent on the day of service for the patient (the same day as the patient's appointment).    -Patient in agreement with the above treatment plan and all of patient's questions were answered.        Return to clinic two weeks with wound care and 63+ days for high risk nail debridement and to evaluate the RIGHT second toe and LEFT leg ulcerations        Carisa Guerin DPM

## 2023-12-06 NOTE — TELEPHONE ENCOUNTER
Received a call from Jahaira from [facility] stating they do not have a nurse on the weekends to do dressing changes and she feels it should be a nurse to do the change. Per Debbi Bernard- okay to do dressing changes every 2-3 days a week or 3 times a week. Last note from Ar signed by Debbi stating this change is okay was faxed over to [place] at [phone]

## 2023-12-14 PROBLEM — I48.91 ATRIAL FIBRILLATION WITH RVR (H): Status: ACTIVE | Noted: 2023-01-01
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
## 2023-12-14 PROBLEM — I48.91 ATRIAL FIBRILLATION WITH RAPID VENTRICULAR RESPONSE (H): Status: RESOLVED | Noted: 2022-12-29 | Resolved: 2023-01-01
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
## 2023-12-14 NOTE — H&P
"[facility]    History and Physical - Hospitalist Service       Date of Admission:  12/14/2023    Assessment & Plan      Shannon Walls is a 89 year old female admitted on 12/14/2023 for atrial fibrillation with RVR.  RVR likely resulted in lightheadedness and a fall at her assisted living today.    Principal Problem:    Atrial fibrillation with RVR (H)    Permanent atrial fibrillation (H)  Rate in the 130s in the ED.  No clear EKG changes.  No chest discomfort or shortness of breath.  Has chronic atrial fibrillation.  Has been on metoprolol and digoxin for a year after previous episode of RVR treated in [place].  Patient reports taking her medications consistently.  Due to reduced EF, avoid diltiazem  Continue metoprolol  Started on a digoxin drip.  If effective and rate control, transition to oral.  If not effective, consider amiodarone or EP consult    Active Problems:    Chronic systolic heart failure (H)  EF 35 to 40% on echocardiogram December 2022.  Continue home metoprolol  Received IVF in the ED.  Hold on furosemide today, resume tomorrow        Pathological fracture L4 vertebrae    Fracture of sacrum (H)  CT scan in ED showed \"Osteoporotic pathologic fractures of T4, T5, T9, T10, T12, L2, L4 and L5. Additional osteoporotic fractures of the sacrum. \"  Known previous L1 and L4 fractures, sacral insufficiency fractures seen in November 2021.  No previous thoracic imaging.  She has not discretely tender over the thoracic spine, although this may be referring pain to her left ribs.  History of chronic steroid use for polymyalgia likely contributing to current fractures  Acetaminophen as needed  Home tramadol as needed  IV hydromorphone for severe pain  PT and OT assessment        Acquired hypothyroidism  Continue home supplement          Diet: Combination Diet 2 gm NA Diet    DVT Prophylaxis: Warfarin  Garcia Catheter: Not present  Lines: None     Cardiac Monitoring: None  Code Status: " No CPR- Do NOT Intubate      Clinically Significant Risk Factors Present on Admission               # Drug Induced Coagulation Defect: home medication list includes an anticoagulant medication     # Chronic heart failure with reduced ejection fraction: last echo with EF <40%                Disposition Plan      Expected Discharge Date: 12/16/2023      Destination: home;assisted living            Seth King MD  Hospitalist Service  [facility]  Securely message with [employer] (more info)  Text page via [facility] Paging/Directory     ______________________________________________________________________    Chief Complaint   fall    History is obtained from the patient and ED provider    History of Present Illness   Shannon Walls is a 89 year old female who was brought in by EMS from [facility] after falling.  She fell out dizzy on standing, slipped and fell when getting out of the shower.  She is unclear what she landed on.  Has some left rib pain.  Has some right-sided weakness due to previous stroke.  She has some word finding difficulties from previous stroke, but is cognizant enough to recognize these limitations.  Found to be in A-fib with RVR in the ED.  She does not notice the tachycardia.  Has noticed her legs are little more swollen.  Denies any chest pain or dyspnea.  Evaluation in the ED with CT scans reveals 8 vertebral fractures, unknown age.  Patient recalls having 7 vertebral fractures in the past.      Past Medical History    Past Medical History:   Diagnosis Date    Atrial fibrillation with rapid ventricular response (H)      History of polymyalgia rheumatica on chronic steroids  Permanent A-fib  Hypothyroidism  Chronic systolic heart failure  History of stroke  History of previous vertebral fractures    Past Surgical History   Past Surgical History:   Procedure Laterality Date    EYE SURGERY      OPEN REDUCTION INTERNAL FIXATION WRIST Right 8/22/2018     Procedure: OPEN REDUCTION INTERNAL FIXATION WRIST;  OPEN REDUCTION INTERNAL FIXATION RIGHT DISTAL RADIUS;  Surgeon: Taqueria Edwards MD;  Location: [facility] OR       Prior to Admission Medications   Prior to Admission Medications   Prescriptions Last Dose Informant Patient Reported? Taking?   GAVILAX 17 GM/SCOOP powder   Yes No   Sig: Take 17 g by mouth daily   LORazepam (ATIVAN) 0.5 MG tablet   Yes No   Sig: Take 0.5 mg by mouth At Bedtime    Multiple Vitamins-Minerals (CERTAVITE SENIOR/ANTIOXIDANT) TABS   Yes No   NP THYROID 30 MG tablet   Yes No   Sig: Take 30 mg by mouth daily   Vitamin D, Cholecalciferol, 10 MCG (400 UNIT) TABS   Yes No   Sig: Take 1 tablet by mouth daily   acetaminophen (TYLENOL) 650 MG CR tablet   Yes No   Sig: Take 650 mg by mouth every 4 hours as needed for mild pain or fever   atorvastatin (LIPITOR) 40 MG tablet   Yes No   Sig: Take 1 tablet by mouth at bedtime   calcium carbonate (OS-EFFIE) 1500 (600 Ca) MG tablet   Yes No   Sig: Take 600 mg by mouth daily    digoxin (LANOXIN) 125 MCG tablet   Yes No   Sig: Take 62.5 mcg by mouth daily    furosemide (LASIX) 20 MG tablet   Yes No   Sig: Take 20 mg by mouth daily   metoprolol succinate ER (TOPROL XL) 50 MG 24 hr tablet   No No   Sig: Take 1 tablet (50 mg) by mouth daily   multivitamin, therapeutic (THERA-VIT) TABS tablet   Yes No   Sig: Take 1 tablet by mouth daily   omeprazole (PRILOSEC) 20 MG DR capsule   Yes No   Sig: Take 20 mg by mouth 2 times daily (before meals)    predniSONE (DELTASONE) 5 MG tablet   Yes No   Sig: Take 5 mg by mouth daily   traMADol (ULTRAM) 50 MG tablet   Yes No   Sig: Take 50 mg by mouth 2 times daily   warfarin ANTICOAGULANT (COUMADIN) 2 MG tablet   Yes No   Sig: Take by mouth daily 4 mg Wednesday and Saturday  2 mg all other days   warfarin ANTICOAGULANT (COUMADIN) 4 MG tablet   Yes No   Sig: Take 4 mg by mouth every other day      Facility-Administered Medications: None        Review of Systems    As above     Physical  Exam   Vital Signs:     BP: 133/73 Pulse: 86   Resp: 23 SpO2: 95 %      Weight: 0 lbs 0 oz    General Appearance: Pleasant, alert, appropriate appearance for age. No acute distress  Eyes: EOMI, PERRL, no conjunctival injection  OroPharynx Exam: Normal pharynx.  Neck Exam: Supple, no masses or nodes.  Chest/Respiratory Exam: Normal chest wall and respirations. Clear to auscultation.  Cardiovascular Exam: Regular rate and rhythm. S1, S2, no murmur, click, gallop, or rubs.  Gastrointestinal Exam: Soft, nontender, no abnormal masses or organomegaly.  Extremities: Trace pitting right>left lower extremity edema.  Neuro Exam: Alert, oriented x 3. CN II-XI intact. Strength symmetric upper and lower extremities  Musculoskeletal: Tender to palpation over left inferior ribs, more anteriorly.  Less tender near thoracic spine.  No significant tenderness in the inferior lumbar region or upper sacral region.  No clear SI tenderness.  Psychiatric: Normal affect and mentation      Medical Decision Making             Data     I have personally reviewed the following data over the past 24 hrs:    12.6 (H)  \   12.9   / 296     136 98 24.3 (H) /  106 (H)   4.5 29 0.99 (H) \     ALT: 24 AST: 29 AP: 65 TBILI: 0.8   ALB: 3.7 TOT PROTEIN: 5.9 (L) LIPASE: N/A     INR:  2.85 (H) PTT:  37   D-dimer:  N/A Fibrinogen:  N/A       Imaging results reviewed over the past 24 hrs:   Recent Results (from the past 24 hour(s))   CT Head w/o Contrast    Narrative    PROCEDURE: CT HEAD W/O CONTRAST     HISTORY: fall, hit head, on coumadin.    COMPARISON: 10/13/2023    TECHNIQUE:  Helical images of the head from the foramen magnum to the  vertex were obtained without contrast. This CT exam was performed  using one or more the following dose reduction techniques: automated  exposure control, adjustment of the mA and/or kV according to patient  size, and/or iterative reconstruction technique.    FINDINGS: The ventricles and sulci are prominent, compatible  with  advanced, generalized volume loss. No acute intracranial hemorrhage,  mass effect, midline shift, hydrocephalus or basilar cystern  effacement are present.    No acute transcortical ischemia is identified. There is a chronic  lacunar injury in the left cerebellum. Advanced microvascular disease  is redemonstrated.    The calvarium is intact.  The visualized paranasal sinuses are clear.      Impression    IMPRESSION: No acute intracranial hemorrhage or calvarial fracture.      AKOSUA ROJO MD         SYSTEM ID:  [id number]   CT Cervical Spine w/o Contrast    Narrative    PROCEDURE: CT CERVICAL SPINE W/O CONTRAST     HISTORY: fall, >65.    TECHNIQUE: Helical noncontrast CT images of the cervical spine. This  CT exam was performed using one or more the following dose reduction  techniques: automated exposure control, adjustment of the mA and/or kV  according to patient size, and/or iterative reconstruction technique.    COMPARISON: 10/13/2023.    FINDINGS:     No acute fracture is identified. The vertebral bodies are normal in  height. The cervical lordosis is notable for anterolisthesis of C4 on  C5 C7 on T1 on the basis of facet hypertrophy. The C1-2 articulation  and the craniocervical junction are intact.     Degenerative changes are chronic.     The paravertebral soft tissues are unremarkable. The lung apices  demonstrate emphysema.      Impression    IMPRESSION: No evidence of acute cervical spine fracture.    AKOSUA ROJO MD         SYSTEM ID:  [id number]   CT Chest/Abdomen/Pelvis w Contrast    Narrative    PROCEDURE:  CT CHEST/ABDOMEN/PELVIS W CONTRAST.      HISTORY:  89 years Female fall rib, abddomen and hip pain      TECHNIQUE:  Helical CT of the chest, abdomen and pelvis was performed  using non-ionic intravenous contrast. This CT exam was performed using  one or more the following dose reduction techniques: automated  exposure control, adjustment of the mA and/or kV according to  patient  size, and/or iterative reconstruction technique.    COMPARISON:  Chest x-ray 10/21/2023.    MEDS/CONTRAST: 64 ml isovue 370    FINDINGS:      Cardiomegaly. Coronary artery calcification. There is no pericardial  or pleural effusion. No abnormal thoracic adenopathy is identified.    The central airways are patent. Moderate to advanced centrilobular  emphysema. Mild probable dependent atelectasis.    A superficial cyst is suggested in the liver. The gallbladder is  unremarkable. The spleen, pancreas and adrenal glands are  unremarkable. The gallbladder is unremarkable. Symmetric nephrograms  are present without hydronephrosis. The aorta is normal in size with  scattered atherosclerotic calcifications.    No abnormal bowel dilatation is present. .     Osteoporosis. There are osteoporotic pathologic fractures of T4, T5,  T9, T10, T12, L2, L4 and L5. There is also an age indeterminant  fracture of S4 sacral segment and possibly both sacral alae. The lack  of prior comparison cross-sectional imaging suggests that any of these  may be acute. Healed posterior left 11th rib fracture.      Impression    IMPRESSION:      Osteoporotic pathologic fractures of T4, T5, T9, T10, T12, L2, L4 and  L5. Additional osteoporotic fractures of the sacrum. The lack of prior  comparison cross-sectional imaging suggests that any of these may be  painful, acute to subacute.     The number of fractures and the severe height loss at multiple levels  precludes kyphoplasty.    AKOSUA ROJO MD         SYSTEM ID:  [id number]

## 2023-12-14 NOTE — ED PROVIDER NOTES
History     Chief Complaint   Patient presents with    Fall     HPI  Shannon Walls is a 89 year old female with history of atrial fibrillation on Coumadin who presents to the emergency department via EMS from [facility] after a fall.  Patient said she felt dizzy and when she got up her left hip hurts so she slipped again.  She does not believe she hit her head.  Patient has history of falls, had skin surgery on her forehead and that wound is still healing.  Patient has history of right-sided weakness from previous stroke.  Patient does not endorse head or neck pain, she refused a c-collar en route by EMS.    Reviewed nurses notes below, similar history is related to me.  Pt arrives via [facility] from [facility].  Pt states she felt dizzy when she got up, but she slipped and fell when getting out of shower per pt. Pt c/o left hip pain only when standing and some abdominal pain.  Pt does hava a hx of falls along with some healing skin abrasions from the prior falls.  Pt does have some right sided weakness due to a previous stroke.  Pelvis is stable and no shortening noted.   Allergies:  Allergies   Allergen Reactions    Levothyroxine Shortness Of Breath and Swelling    Nitrogen Swelling and Blisters     Liquid nitrogen       Problem List:    Patient Active Problem List    Diagnosis Date Noted    Atrial fibrillation with RVR (H) 12/14/2023     Priority: Medium    Fall at home, initial encounter 12/14/2023     Priority: Medium    Anxiety disorder, unspecified 04/18/2023     Priority: Medium    Gastroesophageal reflux disease without esophagitis 04/18/2023     Priority: Medium    Acquired hypothyroidism 04/13/2023     Priority: Medium    Chronic systolic heart failure (H) 04/13/2023     Priority: Medium    Ischemic stroke (H) 04/13/2023     Priority: Medium    Altered mental status, unspecified altered mental status type 12/29/2022     Priority: Medium    Permanent atrial  fibrillation (H) 11/29/2021     Priority: Medium    Pathological fracture L4 vertebrae 11/27/2021     Priority: Medium    Fracture of sacrum (H) 11/27/2021     Priority: Medium        Past Medical History:    Past Medical History:   Diagnosis Date    Atrial fibrillation with rapid ventricular response (H)        Past Surgical History:    Past Surgical History:   Procedure Laterality Date    EYE SURGERY      OPEN REDUCTION INTERNAL FIXATION WRIST Right 8/22/2018    Procedure: OPEN REDUCTION INTERNAL FIXATION WRIST;  OPEN REDUCTION INTERNAL FIXATION RIGHT DISTAL RADIUS;  Surgeon: Taqueria Edwards MD;  Location: [facility]       Family History:    No family history on file.    Social History:  Marital Status:   [5]  Social History     Tobacco Use    Smoking status: Former    Smokeless tobacco: Never        Medications:    No current outpatient medications on file.        Review of Systems   Constitutional:  Negative for fever.   HENT:  Negative for congestion.    Respiratory:  Negative for cough and chest tightness.    Musculoskeletal:  Negative for back pain and gait problem.   Neurological:  Negative for light-headedness and headaches.       Physical Exam   BP: (!) 130/108  Pulse: (!) 138  Resp: (!) 31  SpO2: 96 %      Physical Exam  Vitals and nursing note reviewed.   Constitutional:       Appearance: Normal appearance.   HENT:      Head: Atraumatic.      Nose: Nose normal.   Cardiovascular:      Rate and Rhythm: Tachycardia present. Rhythm irregular.   Pulmonary:      Effort: Pulmonary effort is normal. No respiratory distress.      Breath sounds: Normal breath sounds. No rales.   Abdominal:      Tenderness: There is no abdominal tenderness.   Musculoskeletal:         General: Tenderness present.      Cervical back: No tenderness.      Right lower leg: Edema present.      Left lower leg: Edema present.   Skin:     Capillary Refill: Capillary refill takes less than 2 seconds.   Neurological:      Mental Status: She  is alert. Mental status is at baseline.       EKG: A-fib, rate 132      Results for orders placed or performed during the hospital encounter of 12/14/23 (from the past 24 hour(s))   CBC with platelets differential    Narrative    The following orders were created for panel order CBC with platelets differential.  Procedure                               Abnormality         Status                     ---------                               -----------         ------                     CBC with platelets and d...[[id number]]  Abnormal            Final result                 Please view results for these tests on the individual orders.   Comprehensive metabolic panel   Result Value Ref Range    Sodium 136 135 - 145 mmol/L    Potassium 4.5 3.4 - 5.3 mmol/L    Carbon Dioxide (CO2) 29 22 - 29 mmol/L    Anion Gap 9 7 - 15 mmol/L    Urea Nitrogen 24.3 (H) 8.0 - 23.0 mg/dL    Creatinine 0.99 (H) 0.51 - 0.95 mg/dL    GFR Estimate 54 (L) >60 mL/min/1.73m2    Calcium 9.4 8.2 - 9.6 mg/dL    Chloride 98 98 - 107 mmol/L    Glucose 106 (H) 70 - 99 mg/dL    Alkaline Phosphatase 65 40 - 150 U/L    AST 29 0 - 45 U/L    ALT 24 0 - 50 U/L    Protein Total 5.9 (L) 6.4 - 8.3 g/dL    Albumin 3.7 3.5 - 5.2 g/dL    Bilirubin Total 0.8 <=1.2 mg/dL   INR   Result Value Ref Range    INR 2.85 (H) 0.85 - 1.15   Partial thromboplastin time   Result Value Ref Range    aPTT 37 22 - 38 Seconds   CBC with platelets and differential   Result Value Ref Range    WBC Count 12.6 (H) 4.0 - 11.0 10e3/uL    RBC Count 4.12 3.80 - 5.20 10e6/uL    Hemoglobin 12.9 11.7 - 15.7 g/dL    Hematocrit 39.6 35.0 - 47.0 %    MCV 96 78 - 100 fL    MCH 31.3 26.5 - 33.0 pg    MCHC 32.6 31.5 - 36.5 g/dL    RDW 15.3 (H) 10.0 - 15.0 %    Platelet Count 296 150 - 450 10e3/uL    % Neutrophils 84 %    % Lymphocytes 6 %    % Monocytes 8 %    % Eosinophils 0 %    % Basophils 1 %    % Immature Granulocytes 1 %    NRBCs per 100 WBC 0 <1 /100    Absolute Neutrophils 10.6 (H) 1.6 - 8.3  10e3/uL    Absolute Lymphocytes 0.8 0.8 - 5.3 10e3/uL    Absolute Monocytes 1.0 0.0 - 1.3 10e3/uL    Absolute Eosinophils 0.0 0.0 - 0.7 10e3/uL    Absolute Basophils 0.1 0.0 - 0.2 10e3/uL    Absolute Immature Granulocytes 0.1 <=0.4 10e3/uL    Absolute NRBCs 0.0 10e3/uL   Extra Tube    Narrative    The following orders were created for panel order Extra Tube.  Procedure                               Abnormality         Status                     ---------                               -----------         ------                     Extra Blue Top Tube[[id number]]                                                         Extra Red Top Tube[[id number]]                               Final result                 Please view results for these tests on the individual orders.   Extra Red Top Tube   Result Value Ref Range    Hold Specimen JIC    Extra Tube    Narrative    The following orders were created for panel order Extra Tube.  Procedure                               Abnormality         Status                     ---------                               -----------         ------                     Extra Green Top (Lithium...[[id number]]                      Final result                 Please view results for these tests on the individual orders.   Extra Green Top (Lithium Heparin) ON ICE   Result Value Ref Range    Hold Specimen JIC    Digoxin level   Result Value Ref Range    Digoxin 1.0 0.6 - 2.0 ng/mL   CT Head w/o Contrast    Narrative    PROCEDURE: CT HEAD W/O CONTRAST     HISTORY: fall, hit head, on coumadin.    COMPARISON: 10/13/2023    TECHNIQUE:  Helical images of the head from the foramen magnum to the  vertex were obtained without contrast. This CT exam was performed  using one or more the following dose reduction techniques: automated  exposure control, adjustment of the mA and/or kV according to patient  size, and/or iterative reconstruction technique.    FINDINGS: The ventricles and sulci are prominent,  compatible with  advanced, generalized volume loss. No acute intracranial hemorrhage,  mass effect, midline shift, hydrocephalus or basilar cystern  effacement are present.    No acute transcortical ischemia is identified. There is a chronic  lacunar injury in the left cerebellum. Advanced microvascular disease  is redemonstrated.    The calvarium is intact.  The visualized paranasal sinuses are clear.      Impression    IMPRESSION: No acute intracranial hemorrhage or calvarial fracture.      AKOSUA ROJO MD         SYSTEM ID:  [id number]   CT Cervical Spine w/o Contrast    Narrative    PROCEDURE: CT CERVICAL SPINE W/O CONTRAST     HISTORY: fall, >65.    TECHNIQUE: Helical noncontrast CT images of the cervical spine. This  CT exam was performed using one or more the following dose reduction  techniques: automated exposure control, adjustment of the mA and/or kV  according to patient size, and/or iterative reconstruction technique.    COMPARISON: 10/13/2023.    FINDINGS:     No acute fracture is identified. The vertebral bodies are normal in  height. The cervical lordosis is notable for anterolisthesis of C4 on  C5 C7 on T1 on the basis of facet hypertrophy. The C1-2 articulation  and the craniocervical junction are intact.     Degenerative changes are chronic.     The paravertebral soft tissues are unremarkable. The lung apices  demonstrate emphysema.      Impression    IMPRESSION: No evidence of acute cervical spine fracture.    AKOSUA ROJO MD         SYSTEM ID:  [id number]   CT Chest/Abdomen/Pelvis w Contrast    Narrative    PROCEDURE:  CT CHEST/ABDOMEN/PELVIS W CONTRAST.      HISTORY:  89 years Female fall rib, abddomen and hip pain      TECHNIQUE:  Helical CT of the chest, abdomen and pelvis was performed  using non-ionic intravenous contrast. This CT exam was performed using  one or more the following dose reduction techniques: automated  exposure control, adjustment of the mA and/or kV according to  patient  size, and/or iterative reconstruction technique.    COMPARISON:  Chest x-ray 10/21/2023.    MEDS/CONTRAST: 64 ml isovue 370    FINDINGS:      Cardiomegaly. Coronary artery calcification. There is no pericardial  or pleural effusion. No abnormal thoracic adenopathy is identified.    The central airways are patent. Moderate to advanced centrilobular  emphysema. Mild probable dependent atelectasis.    A superficial cyst is suggested in the liver. The gallbladder is  unremarkable. The spleen, pancreas and adrenal glands are  unremarkable. The gallbladder is unremarkable. Symmetric nephrograms  are present without hydronephrosis. The aorta is normal in size with  scattered atherosclerotic calcifications.    No abnormal bowel dilatation is present. .     Osteoporosis. There are osteoporotic pathologic fractures of T4, T5,  T9, T10, T12, L2, L4 and L5. There is also an age indeterminant  fracture of S4 sacral segment and possibly both sacral alae. The lack  of prior comparison cross-sectional imaging suggests that any of these  may be acute. Healed posterior left 11th rib fracture.      Impression    IMPRESSION:      Osteoporotic pathologic fractures of T4, T5, T9, T10, T12, L2, L4 and  L5. Additional osteoporotic fractures of the sacrum. The lack of prior  comparison cross-sectional imaging suggests that any of these may be  painful, acute to subacute.     The number of fractures and the severe height loss at multiple levels  precludes kyphoplasty.    AKOSUA ROJO MD         SYSTEM ID:  [id number]       Medications   digoxin (LANOXIN) injection 250 mcg (250 mcg Intravenous $Given 12/14/23 1542)     Followed by   digoxin (LANOXIN) injection 125 mcg (has no administration in time range)   lidocaine 1 % injection 5 mL ( Subcutaneous Canceled Entry 12/14/23 1726)   metoprolol succinate ER (TOPROL XL) 24 hr tablet 50 mg (has no administration in time range)   traMADol (ULTRAM) tablet 50 mg (has no administration  in time range)   LORazepam (ATIVAN) tablet 0.5 mg (has no administration in time range)   lidocaine 1 % 0.1-1 mL (has no administration in time range)   lidocaine (LMX4) cream (has no administration in time range)   sodium chloride (PF) 0.9% PF flush 3 mL (has no administration in time range)   sodium chloride (PF) 0.9% PF flush 3 mL (has no administration in time range)   calcium carbonate (TUMS) chewable tablet 1,000 mg (has no administration in time range)   Warfarin Dose Required Daily - Pharmacist Managed (has no administration in time range)   acetaminophen (TYLENOL) tablet 650 mg (has no administration in time range)     Or   acetaminophen (TYLENOL) Suppository 650 mg (has no administration in time range)   ondansetron (ZOFRAN ODT) ODT tab 4 mg (has no administration in time range)     Or   ondansetron (ZOFRAN) injection 4 mg (has no administration in time range)   HYDROmorphone (DILAUDID) injection 0.2 mg (has no administration in time range)   naloxone (NARCAN) injection 0.2 mg (has no administration in time range)     Or   naloxone (NARCAN) injection 0.4 mg (has no administration in time range)     Or   naloxone (NARCAN) injection 0.2 mg (has no administration in time range)     Or   naloxone (NARCAN) injection 0.4 mg (has no administration in time range)   lactated ringers BOLUS 250 mL (0 mLs Intravenous Stopped 12/14/23 1254)   iopamidol (ISOVUE-370) solution 64 mL (64 mLs Intravenous $Given 12/14/23 1305)       Procedure/Surgery Information    [facility]     Midline right basilic vein procedure Note   Date of Service (when I performed the procedure): 12/14/2023    Diagnosis or Indication: A-fib RVR digoxin infusion.    Position confirmation: Yes   Informed consent: Verbal informed consent only.   Procedure safety checklist: Not completed   Catheter lumen: Single   Catheter size: 5.0   Sedative medication: none   Prep solution: chlorhexidine   Comments: None      This procedure was  performed without difficulty and she tolerated the procedure well with no immediate complications.      Javier Escoto MD   Assessments & Plan (with Medical Decision Making)     I have reviewed the nursing notes.    I have reviewed the findings, diagnosis, plan and need for follow up with the patient.    Medical Decision Making  The patient's presentation was of high complexity (a chronic illness severe exacerbation, progression, or side effect of treatment).    The patient's evaluation involved:  history and exam without other MDM data elements    The patient's management necessitated high risk (a decision regarding hospitalization).    Discussed admission with Dr. King, he graciously accepted admission to ICU.  Indications for ICU admission are A-fib with RVR and needing to be on digoxin infusion.  Patient was on digoxin in the past, patient has depressed ejection fraction and thus we chose a strategy of Lopressor and digoxin parenterally to control her rate here in the ED.  We only had a small peripheral IV on the left, patient in need of maintenance infusion, pain control for her fall and multiple fractures as well as a digoxin infusion so a midline catheter was placed in the right basilic vein.  See procedure note above.  Patient hemodynamically stable at the point of moving her to the ICU.  Patient is in agreement with plan, she is DNR/DNI but she is not comfort care.  Patient is awake alert and has healthcare decision-making capacity.    Current Discharge Medication List          Final diagnoses:   Fall at home, initial encounter   Atrial fibrillation with RVR (H)       12/14/2023   [facility]       Javier Escoto MD  12/14/23 4975

## 2023-12-14 NOTE — ED TRIAGE NOTES
Pt arrives via [facility] from [facility].  Pt states she felt dizzy when she got up, but she slipped and fell when getting out of shower per pt. Pt c/o left hip pain only when standing and some abdominal pain.  Pt does hava a hx of falls along with some healing skin abrasions from the prior falls.  Pt does have some right sided weakness due to a previous stroke.  Pelvis is stable and no shortening noted.  EMS VS-BP-141/88, O2-97% 2L NC, 89% RA, BG-161, NO c-collar applied.       Triage Assessment (Adult)       Row Name 12/14/23 1145          Triage Assessment    Airway WDL WDL        Cardiac WDL    Cardiac WDL X;all     Cardiac Rhythm Atrial fibrillation        Peripheral/Neurovascular WDL    Peripheral Neurovascular WDL pulse assessment

## 2023-12-14 NOTE — ED NOTES
Returned call from daughter Sierra T  #[phone]. Sierra would like to be called if she's being released from the ER or being admitted into the hospital.

## 2023-12-15 NOTE — PROGRESS NOTES
"[facility]    Medicine Progress Note - Hospitalist Service    Date of Admission:  12/14/2023    Assessment & Plan      Shannon Walls is a 89 year old female admitted on 12/14/2023 for atrial fibrillation with RVR.  RVR likely resulted in lightheadedness and a fall at her assisted living day of admission.    Principal Problem:    Atrial fibrillation with RVR (H)    Permanent atrial fibrillation (H)  Rate in the 130s in the ED.  No clear EKG changes.  No chest discomfort or shortness of breath.  Has chronic atrial fibrillation.  Has been on metoprolol and digoxin for a year after previous episode of RVR treated in [place].  Patient reports taking her medications consistently.  Digoxin level checked and is 1.0  Due to reduced EF, avoid diltiazem  Continue home metoprolol  Started on a digoxin drip.  Rate controlled  by morning, so transitioned to oral.  Continue to monitor rate  Needs transportation back to assisted living, social work involved    Active Problems:    Chronic systolic heart failure (H)  EF 35 to 40% on echocardiogram December 2022.  Continue home metoprolol  Received IVF in the ED.  Resume furosemide        Pathological fracture L4 vertebrae    Fracture of sacrum (H)  CT scan in ED showed \"Osteoporotic pathologic fractures of T4, T5, T9, T10, T12, L2, L4 and L5. Additional osteoporotic fractures of the sacrum. \"  Known previous L1 and L4 fractures, sacral insufficiency fractures seen in November 2021.  No previous thoracic imaging.  She has not discretely tender over the thoracic spine, although this may be referring pain to her left ribs.  History of chronic steroid use for polymyalgia likely contributing to current fractures  Acetaminophen as needed  Home tramadol as needed  IV hydromorphone for severe pain  PT and OT assessment      Acquired hypothyroidism  Continue home supplement    Spent over 58 minutes on care of patient          Diet: Combination Diet 2 gm NA Diet  "   DVT Prophylaxis: Warfarin  Garcia Catheter: Not present  Lines: None     Cardiac Monitoring: None  Code Status: No CPR- Do NOT Intubate      Clinically Significant Risk Factors Present on Admission               # Drug Induced Coagulation Defect: home medication list includes an anticoagulant medication     # Chronic heart failure with reduced ejection fraction: last echo with EF <40%                Disposition Plan     Expected Discharge Date: 12/16/2023      Destination: home;assisted living              Seth King MD  Hospitalist Service  [facility]  Securely message with [employer] (more info)  Text page via [employer] Paging/Directory   ______________________________________________________________________    Interval History   Rate controlled.  She has been up to the bathroom with the nurse today and rate has remained controlled.  Denies dyspnea, but still on a small amount of oxygen.  No chest discomfort.  She has multiple leg wounds and has been receiving wound care.  She is scheduled for vascular evaluation on 12/18.  Called daughter Sierra and reviewed status.     Physical Exam   Vital Signs: Temp: 98.3  F (36.8  C) Temp src: Temporal BP: 111/63 Pulse: 99   Resp: 19 SpO2: 97 % O2 Device: Nasal cannula Oxygen Delivery: 1 LPM  Weight: 113 lbs 5.06 oz    General Appearance: Alert. No acute distress  Chest/Respiratory Exam: Clear to auscultation bilaterally  Cardiovascular Exam: Irregular rate and rhythm. S1, S2, no murmur, gallop, or rubs.  Extremities: No lower extremity edema.  Skin: Chronic shallow leg wounds, POA.  Psychiatric: Normal affect and mentation      Medical Decision Making             Data     I have personally reviewed the following data over the past 24 hrs:    10.2  \   12.9   / 281     137 97 (L) 18.9 /  97   4.4 30 (H) 0.91 \     INR:  2.30 (H) PTT:  N/A   D-dimer:  N/A Fibrinogen:  N/A       Imaging results reviewed over the past 24 hrs:   No results found for this or  any previous visit (from the past 24 hour(s)).

## 2023-12-15 NOTE — PLAN OF CARE
Pt is A&Ox4 and vitally stable on 1L NC. I was able to wean her down to RA but she does require a little bit of oxygen with activity. Pt was still in a.fib the whole day but controlled in the 70-80s. Pt was able to work with PT/OT as an assist of 1 w/ her walker. Pt did get tramadol once during my shift due to back pain. I also did her dressing change to her two ulcers on L. Leg. Will continue to monitor. Report was given to LIZBET Rivero      Problem: Dysrhythmia  Goal: Normalized Cardiac Rhythm  Outcome: Progressing  Intervention: Monitor and Manage Cardiac Rhythm Effect  Recent Flowsheet Documentation  Taken 12/15/2023 1200 by Isaak Hernandez, LIZBET  VTE Prevention/Management: compression stockings on  Taken 12/15/2023 0800 by Isaak Hernandez, RN  VTE Prevention/Management: compression stockings on

## 2023-12-15 NOTE — PHARMACY-ADMISSION MEDICATION HISTORY
Pharmacist Admission Medication History    Admission medication history is complete. The information provided in this note is only as accurate as the sources available at the time of the update.    Information Source(s): Patient, Caregiver, and CareEverywhere/SureScripts via in-person and phone    Pertinent Information: Patient is a poor historian, but patient lives at [facility] who manages her medication. Called them and verified patient present medication list.    Changes made to PTA medication list:  Added: None  Deleted: None  Changed: Warfarin: 4 mg Mon, Tues, Wed, Fri, and Sun; 2 mg AOD    Medication Affordability:  Not including over the counter (OTC) medications, was there a time in the past 3 months when you did not take your medications as prescribed because of cost?: No    Allergies reviewed with patient and updates made in EHR: yes    Medication History Completed By: Jarocho Bear [facility] 12/14/2023 6:22 PM    Prior to Admission medications    Medication Sig Last Dose Taking? Auth Provider Long Term End Date   acetaminophen (TYLENOL) 650 MG CR tablet Take 650 mg by mouth every 4 hours as needed for mild pain or fever Past Week Yes Reported, Patient     atorvastatin (LIPITOR) 40 MG tablet Take 1 tablet by mouth at bedtime 12/13/2023 at PM Yes Reported, Patient     calcium carbonate (OS-EFFIE) 1500 (600 Ca) MG tablet Take 600 mg by mouth daily  12/13/2023 at PM Yes Reported, Patient     digoxin (LANOXIN) 125 MCG tablet Take 62.5 mcg by mouth daily  12/14/2023 at AM Yes Reported, Patient Yes    furosemide (LASIX) 20 MG tablet Take 20 mg by mouth daily 12/14/2023 at AM Yes Reported, Patient Yes    GAVILAX 17 GM/SCOOP powder Take 17 g by mouth daily 12/14/2023 at AM Yes Reported, Patient     LORazepam (ATIVAN) 0.5 MG tablet Take 0.5 mg by mouth At Bedtime  12/13/2023 at PM Yes Reported, Patient     metoprolol succinate ER (TOPROL XL) 50 MG 24 hr tablet Take 1 tablet (50 mg) by mouth daily  12/13/2023 at PM Yes Markel Ricardo MD Yes    multivitamin, therapeutic (THERA-VIT) TABS tablet Take 1 tablet by mouth daily 12/14/2023 at AM Yes Reported, Patient     NP THYROID 30 MG tablet Take 30 mg by mouth daily 12/14/2023 at AM Yes Reported, Patient Yes    omeprazole (PRILOSEC) 20 MG DR capsule Take 20 mg by mouth daily 12/14/2023 at AM Yes Unknown, Entered By History     predniSONE (DELTASONE) 5 MG tablet Take 5 mg by mouth daily 12/14/2023 at AM Yes Reported, Patient No    traMADol (ULTRAM) 50 MG tablet Take 50 mg by mouth 2 times daily 12/14/2023 at AM Yes Reported, Patient     Vitamin D, Cholecalciferol, 10 MCG (400 UNIT) TABS Take 1 tablet by mouth daily 12/14/2023 at AM Yes Reported, Patient     warfarin ANTICOAGULANT (COUMADIN) 2 MG tablet 4 mg: Monday, Tuesday, Wednesday, Friday, Sunday  2 mg: Thrusday and Saturday  Yes Unknown, Entered By History

## 2023-12-15 NOTE — PROGRESS NOTES
12/15/23 2739   Appointment Info   Signing Clinician's Name / Credentials (PT) Albino Bartonheidisher MPT   Living Environment   People in Home facility resident   Current Living Arrangements assisted living   Home Accessibility no concerns   Transportation Anticipated family or friend will provide   Self-Care   Usual Activity Tolerance moderate   Current Activity Tolerance fair   Equipment Currently Used at Home walker, standard;wheelchair, manual   Fall history within last six months yes   Number of times patient has fallen within last six months 1   General Information   Referring Physician Fernando   Patient/Family Therapy Goals Statement (PT) return home   Existing Precautions/Restrictions fall;oxygen therapy device and L/min  (multiple acute on chronic vertebral fxs)   Weight-Bearing Status - LLE full weight-bearing   Weight-Bearing Status - RLE full weight-bearing   Cognition   Affect/Mental Status (Cognition) WFL   Orientation Status (Cognition) oriented x 4   Follows Commands (Cognition) WFL   Pain Assessment   Patient Currently in Pain Yes, see Vital Sign flowsheet   Posture    Posture Forward head position;Protracted shoulders;Kyphosis   Range of Motion (ROM)   Range of Motion ROM is WFL   Strength (Manual Muscle Testing)   Strength (Manual Muscle Testing) strength is WFL   Strength Comments however, patient fatgiues with activity   Bed Mobility   Impairments Contributing to Impaired Bed Mobility pain   Comment, (Bed Mobility) supine<>sit with maximal assist   Transfers   Impairments Contributing to Impaired Transfers pain;decreased strength   Comment, (Transfers) minimal assist with Fww   Gait/Stairs (Locomotion)   [place] Level (Gait) contact guard   Assistive Device (Gait) walker, front-wheeled   Distance in Feet (Gait) 10   Pattern (Gait) step-to   Balance   Balance Comments good with Fww   Sensory Examination   Sensory Perception WFL   Coordination   Coordination no deficits were identified   Muscle  Tone   Muscle Tone no deficits were identified   Clinical Impression   Criteria for Skilled Therapeutic Intervention Yes, treatment indicated   PT Diagnosis (PT) impaired mobility   Influenced by the following impairments pain and fatigue   Functional limitations due to impairments activity/gait tolerance   Clinical Presentation (PT Evaluation Complexity) evolving   Clinical Decision Making (Complexity) low complexity   Planned Therapy Interventions (PT) bed mobility training;gait training;transfer training   Risk & Benefits of therapy have been explained evaluation/treatment results reviewed;patient   PT Total Evaluation Time   PT Jarek, Low Complexity Minutes (90064) 15   Physical Therapy Goals   PT Frequency Daily   PT Predicted Duration/Target Date for Goal Attainment 12/19/23   PT Goals Bed Mobility;Transfers;Gait   PT: Bed Mobility Supervision/stand-by assist   PT: Transfers Supervision/stand-by assist;Assistive device   PT: Gait Supervision/stand-by assist;Rolling walker;50 feet   PT Discharge Planning   PT Plan Continue PT   PT Discharge Recommendation (DC Rec) home with assist;home with home care physical therapy   PT Rationale for DC Rec to promote strength, stability and safe mobility   PT Brief overview of current status pleasant patient requiring increased assistance with mobilities due to patient c/o back pain; she will benefit from continued PT through home care services;  patient's O2 sats decreased to 88% on room air returning above 90% on 1(L)   PT Equipment Needed at Discharge walker, rolling

## 2023-12-15 NOTE — PROGRESS NOTES
Patient admitted into room 915, she is alert and oriented X4. She is in an a-fib on arrival with a rate ranging 90 - 140 bpm,  the higher end of HR is non-sustaining and she is asymptomatic. Blood pressures are consistently stable. Patient has some mild pain arrival which will be treated with tramadol when verified by pharmacy, otherwise she has no complaints.   Her daughter has been updated on her status.

## 2023-12-15 NOTE — PROGRESS NOTES
:     Patient comes from [facility]. Called and spoke to staff at the facility and they state they are able to admit patient back to their facility on Saturday. Aksed how patient usually transports and she states her friend usually do. She provided Ina and her number ([phone]).     Called Ina and she states she will be out of town but to call patient's daughter. Called Sierra and spoke with her. Sierra states her and Mary Carmen live out of state.    Spoke with patient in her room about discharge planning. Requested that I call Sarah or Merlyn. Sarah's number doesn't work and left voicemail with Merlyn.     BERHANE Herrera on 12/15/2023 at 12:48 PM    Got Sarah's number from patient. [phone]. Called and left a voicemail.     BERHANE Herrera on 12/15/2023 at 12:56 PM    Called and spoke with Sarah and she does not feel comfortable to transport patient home.     Called [facility], [facility], [facility] and [facility] and they do not have opening for anytime this weekend. [facility] is also not able to do it Monday.     BERHANE Herrera on 12/15/2023 at 2:03 PM     Called and [facility] and the soonest they would be able to transport patient is on Wed 12/20 at 11:30. Faxed over patient's face sheet.     BERHANE Herrera on 12/15/2023 at 3:03 PM    Spoke with Merlyn and she is able to transport patient on Sunday and will be here around 10:30. Call Merlyn if there is any change.     BERHANE Herrera on 12/15/2023 at 3:32 PM

## 2023-12-15 NOTE — PROGRESS NOTES
Nutrition review due to MST screen, weight loss reported.  Current weight= 51.4 kg (113 lb) BMI= 22.89 kg/m2.  Per chart review, weight on 10/21/23= 110 lb, weight on 12/29/22= 109 lb.  Patient receives a 2 gm Na diet.  Patient resides at [facility].  Continue with current diet.

## 2023-12-15 NOTE — PHARMACY-ANTICOAGULATION SERVICE
Pharmacy Consult- Coumadin Follow-Up    Shannon Walls is a 89 year old female admitted on 2023 with Atrial fibrillation with RVR (H)    Primary Indication(s) for Anticoagulation: Atrial Fibrillation    Goal INR: 2-3    , patient is followed by the Anticoagulation/Protime Clinic at: [facility] per Jahaira at [place], INR is checked by staff and dose adjustments are made by Gilda Bowen from [facility].    Patient Active Problem List   Diagnosis    Pathological fracture L4 vertebrae    Fracture of sacrum (H)    Permanent atrial fibrillation (H)    Altered mental status, unspecified altered mental status type    Acquired hypothyroidism    Anxiety disorder, unspecified    Chronic systolic heart failure (H)    Gastroesophageal reflux disease without esophagitis    Ischemic stroke (H)    Atrial fibrillation with RVR (H)    Fall at home, initial encounter     Home warfarin dosin mg Mon/Tues/Wed/Fri/Sun, 2 mg Thurs/Sat    New factors that may increase patient's sensitivity to warfarin (Coumadin) include: Digoxin and hospitalization    New factors that may decrease patient's sensitivity to warfarin (Coumadin) include: None      Recent Labs   Lab Test 12/15/23  0459 23  1211 10/21/23  1635 10/04/23  1158 23  1037   HGB 12.9 12.9 14.1 14.6 13.5   INR 2.30* 2.85*  --  2.48* 1.15    296 239 255 276        Recent Dosing:    Date INR Dose Given   12/15 2.30 See below    2.85 2 mg     Plan: Continue home dose. Give warfarin 4 mg by mouth one time 12/15 for one dose. Recheck INR .    Thank You for the Consult. Will continue to follow.    Samuel Hong ....................  12/15/2023   8:14 AM

## 2023-12-15 NOTE — PROGRESS NOTES
12/15/23 8522   Appointment Info   Signing Clinician's Name / Credentials (OT) Barbara Parisi, OTR/L   Living Environment   People in Home facility resident   Current Living Arrangements assisted living   Home Accessibility no concerns   Transportation Anticipated family or friend will provide   Self-Care   Usual Activity Tolerance moderate   Current Activity Tolerance fair   Equipment Currently Used at Home walker, standard;wheelchair, manual   Fall history within last six months yes   Cognitive Status Examination   Orientation Status orientation to person, place and time   Affect/Mental Status (Cognitive) WFL   Follows Commands WFL   Visual Perception   Visual Impairment/Limitations WFL   Pain Assessment   Patient Currently in Pain Yes, see Vital Sign flowsheet  (pt has pain in her back with any functional movement)   Range of Motion Comprehensive   General Range of Motion   (Pt has full AROM with left shoulder, limited motion on right due to past injuries)   Coordination   Upper Extremity Coordination No deficits were identified   Bed Mobility   Bed Mobility supine-sit   Supine-Sit [place] (Bed Mobility) maximum assist (25% patient effort);2 person assist   Transfers   Transfers sit-stand transfer   Sit-Stand Transfer   Sit-Stand [place] (Transfers) contact guard;1 person to manage equipment   Sit/Stand Transfer Comments Pt took a few steps in room with FWW and CGA of 1. Pt dropped to mid 80's on room air with activity, recovered to 90% on .5 liters   Activities of Daily Living   BADL Assessment/Intervention   (TBA further as needed)   Clinical Impression   Criteria for Skilled Therapeutic Interventions Met (OT) Yes, treatment indicated   OT Diagnosis A-fib, falls with multiple vertebrae fx's   Influenced by the following impairments pain   OT Problem List-Impairments impacting ADL activity tolerance impaired;pain   Assessment of Occupational Performance 1-3 Performance Deficits   Identified  Performance Deficits mobility and self care   Planned Therapy Interventions (OT) ADL retraining;progressive activity/exercise   Clinical Decision Making Complexity (OT) problem focused assessment/low complexity   Risk & Benefits of therapy have been explained risks/benefits reviewed   OT Total Evaluation Time   OT Eval, Low Complexity Minutes (13058) 15   OT Goals   Therapy Frequency (OT) Daily   OT Predicted Duration/Target Date for Goal Attainment 12/17/23   OT Goals Transfers;Toilet Transfer/Toileting;Hygiene/Grooming   OT: Hygiene/Grooming supervision/stand-by assist   OT: Transfer Supervision/stand-by assist   OT: Toilet Transfer/Toileting Supervision/stand-by assist   Interventions   Interventions Quick Adds Therapeutic Activity   Therapeutic Activities   Therapeutic Activity Minutes (36775) 25   Symptoms noted during/after treatment fatigue;increased pain   Treatment Detail/Skilled Intervention Pt moves sit to stand with CGA using FWW and took a few steps around room   OT Discharge Planning   OT Plan cont OT   OT Discharge Recommendation (DC Rec) home with assist;home with home care occupational therapy   OT Rationale for DC Rec Pt is interseted in and would benefit from home care therapies   OT Brief overview of current status see above for details   OT Equipment Needed at Discharge   (has all necessary equipment)   Total Session Time   Timed Code Treatment Minutes 25   Total Session Time (sum of timed and untimed services) 40

## 2023-12-15 NOTE — PLAN OF CARE
Problem: Dysrhythmia  Goal: Normalized Cardiac Rhythm  Outcome: Not Progressing   Goal Outcome Evaluation:

## 2023-12-15 NOTE — PROGRESS NOTES
Patient in a-fib and rate controlled overnight. Patient complained of pain when being repositioned and was given one dose of Tramadol, see MAR. Patient has no concerns at this time. Josh Raymundo RN on 12/15/2023 at 6:46 AM

## 2023-12-15 NOTE — PROGRESS NOTES
Wound care instructions per the notes from the wound care nurse mention packing the ulcer on her left shin with silver micro fiber rope which we do not have here. Dr. King is aware and okayed to pack the wound with xeroform and wrap with Kerlix while she is here.

## 2023-12-15 NOTE — PHARMACY-CONSULT NOTE
Pharmacy Consult- Initial Coumadin Assessment    Shannon Walls is a 89 year old female admitted on 12/14/2023 with Atrial fibrillation with RVR (H)    Primary Indication(s) for Anticoagulation: Atrial Fibrillation with no mechanical Valve    Goal INR :2-3    FYI, patient is followed by the Anticoagulation/Protime Clinic at: [facility]    Patient Active Problem List   Diagnosis    Pathological fracture L4 vertebrae    Fracture of sacrum (H)    Permanent atrial fibrillation (H)    Altered mental status, unspecified altered mental status type    Acquired hypothyroidism    Anxiety disorder, unspecified    Chronic systolic heart failure (H)    Gastroesophageal reflux disease without esophagitis    Ischemic stroke (H)    Atrial fibrillation with RVR (H)    Fall at home, initial encounter       Patient previously anticoagulated on Coumadin at a dose of 24mg/week; dosed as:    4 mg Monday, Tuesday, Wednesday, Friday, and Sunday   2 mg Thurday and Saturday    Factors that may increase patient's sensitivity to warfarin (Coumadin) include: Digoxin     Factors that may decrease patient's sensitivity to warfarin (Coumadin) include: None    Recent Labs   Lab Test 12/14/23  1211 10/21/23  1635 10/04/23  1158 04/13/23  1037 12/30/22  0657 12/29/22  2106   HGB 12.9 14.1 14.6 13.5   < > 14.6   INR 2.85*  --  2.48* 1.15  --  1.31*    239 255 276   < > 232    < > = values in this interval not displayed.        Plan: Continue home dose; Patient is within goal and has had no changes that would indication a change in her sensitivity.    Thank You for the Consult. Will continue to follow.    Jarocho Bear [facility] ....................  12/14/2023   6:35 PM

## 2023-12-15 NOTE — PROGRESS NOTES
In chart reviewing, patient to transfer to [facility]. Received report from Isaak SOMERS.   Josefa Rivas RN on 12/15/2023 at 3:28 PM

## 2023-12-16 NOTE — PLAN OF CARE
Goal Outcome Evaluation:      Plan of Care Reviewed With: patient    Overall Patient Progress: improving    Pt vitally stable, A&O, and pleasant. Pt has pain upon movement, slow moving, but able to get up to the bedside commode. Adequate urine output this shift. Pts bandages to left leg are clean, dry, and intact. All questions and concerns addressed.     Maria Esther Salgado RN on 12/16/2023 at 2:17 AM

## 2023-12-16 NOTE — PHARMACY-ANTICOAGULATION SERVICE
Pharmacy Consult- Coumadin Follow-Up    Shannon Walls is a 89 year old female admitted on 12/14/2023 with Atrial fibrillation with RVR (H)    Primary Indication(s) for Anticoagulation: 2-3    Goal INR:2-3    FYI, patient is followed by the Anticoagulation[facility] Clinic at: [facility] tino [facility] at [place], INR is checked by staff and dose adjustments are made by Gilda Bowen from [facility].     Patient Active Problem List   Diagnosis    Pathological fracture L4 vertebrae    Fracture of sacrum (H)    Permanent atrial fibrillation (H)    Altered mental status, unspecified altered mental status type    Acquired hypothyroidism    Anxiety disorder, unspecified    Chronic systolic heart failure (H)    Gastroesophageal reflux disease without esophagitis    Ischemic stroke (H)    Atrial fibrillation with RVR (H)    Fall at home, initial encounter       New factors that may increase patient's sensitivity to warfarin (Coumadin) include: Digoxin load, hospitalization    New factors that may decrease patient's sensitivity to warfarin (Coumadin) include: None noted    Dietary Intake: Unable to assess    Recent Labs   Lab Test 12/16/23  0632 12/15/23  0459 12/14/23  1211 10/21/23  1635 10/04/23  1158   HGB 14.7 12.9 12.9 14.1 14.6   INR 2.63* 2.30* 2.85*  --  2.48*    281 296 239 255        Recent Dosing:    Date INR Dose Given   12/16 2.63 See below   12/15 2.30 4 mg   12/14 2.85 2 mg       Plan: Continue home dose. Give warfarin 2 mg by mouth one time 12/16 for one dose. Recheck INR 12/17.     Thank You for the Consult. Will continue to follow.    Alva Young [facility] ....................  12/16/2023   8:26 AM

## 2023-12-16 NOTE — PROGRESS NOTES
Assumed care 0330AM    SAFETY CHECKLIST  ID Bands and Risk clasps correct and in place (DNR, Fall risk, Allergy, Latex, Limb):  Yes  All Lines Reconciled and labeled correctly: Yes  Whiteboard updated:Yes  Environmental interventions: Yes  Verify Tele #: 2

## 2023-12-16 NOTE — PROGRESS NOTES
12/16/23 1200   Appointment Info   Signing Clinician's Name / Credentials (OT) Mirta Whittakerfide OTR/L   Living Environment   People in Home facility resident   Current Living Arrangements assisted living   Home Accessibility no concerns   Transportation Anticipated family or friend will provide   Self-Care   Usual Activity Tolerance moderate   Current Activity Tolerance fair   Equipment Currently Used at Home walker, standard;wheelchair, manual   Fall history within last six months yes   Number of times patient has fallen within last six months 1   General Information   Patient/Family Therapy Goal Statement (OT) return to [facility]   Cognitive Status Examination   Orientation Status orientation to person, place and time   Affect/Mental Status (Cognitive) WNL   Follows Commands WNL   Safety Deficit minimal deficit   Memory Deficit minimal deficit   Attention Deficit minimal deficit   Executive Function Deficit minimal deficit   Pain Assessment   Patient Currently in Pain Yes, see Vital Sign flowsheet   Posture   Posture protracted shoulders;forward head position   Range of Motion Comprehensive   General Range of Motion no range of motion deficits identified   Coordination   Upper Extremity Coordination No deficits were identified   Transfers   Transfers No deficits identified   Sit-Stand Transfer   Sit-Stand [place] (Transfers) modified independence;set up;contact guard   Activities of Daily Living   BADL Assessment/Intervention toileting   Clinical Impression   Criteria for Skilled Therapeutic Interventions Met (OT) Yes, treatment indicated   OT Diagnosis impaired self cares and functional mobility   Influenced by the following impairments pain, stiffness   OT Problem List-Impairments impacting ADL activity tolerance impaired;pain   Assessment of Occupational Performance 1-3 Performance Deficits   Identified Performance Deficits tiolet transfer, shoulering   Planned Therapy Interventions (OT) ADL retraining;IADL  retraining   Clinical Decision Making Complexity (OT) problem focused assessment/low complexity   Risk & Benefits of therapy have been explained evaluation/treatment results reviewed;care plan/treatment goals reviewed;risks/benefits reviewed   Clinical Impression Comments Pt is limited  by chronic pain   OT Total Evaluation Time   OT Eval, Low Complexity Minutes (81054) 15   Therapeutic Activities   Therapeutic Activity Minutes (19982) 25   Symptoms noted during/after treatment fatigue   Treatment Detail/Skilled Intervention Pt ambulated into bathroom with FWW and CGA   OT Discharge Planning   OT Plan OT will follow Pt to improve activitiy tolerance   OT Discharge Recommendation (DC Rec) home with assist   OT Rationale for DC Rec Pt is from [facility] and is able to recieve more services as needed.   OT Brief overview of current status Pt ambulated into bathroom with FWW and CGA   Total Session Time   Timed Code Treatment Minutes 25   Total Session Time (sum of timed and untimed services) 40   Psychosocial Support   Trust Relationship/Rapport care explained;questions answered;thoughts/feelings acknowledged

## 2023-12-16 NOTE — PROGRESS NOTES
12/15/23 1944   Appointment Info   Signing Clinician's Name / Credentials (PT) Liza Zavaleta DPT   Living Environment   People in Home facility resident   Current Living Arrangements assisted living   Home Accessibility no concerns   Transportation Anticipated family or friend will provide   Self-Care   Usual Activity Tolerance moderate   Current Activity Tolerance fair   Equipment Currently Used at Home walker, standard;wheelchair, manual   Gait Training   Gait Training Minutes (40868) 15   Symptoms Noted During/After Treatment (Gait Training) fatigue   Treatment Detail/Skilled Intervention CGA, verbal cues for step and walker movement   Distance in Feet 40ft   [place] Level (Gait Training) contact guard   Physical Assistance Level (Gait Training) set-up required;verbal cues;supervision   Weight Bearing (Gait Training) full weight-bearing   Assistive Device (Gait Training) rolling walker   Pattern Analysis (Gait Training) 4-point gait   Gait Analysis Deviations decreased step length   PT Discharge Planning   PT Plan Continue PT   PT Discharge Recommendation (DC Rec) home with assist;home with home care physical therapy   PT Rationale for DC Rec to promote strength, stability and safe mobility   PT Brief overview of current status patient requiring Min A x 1-2 sit to stand, c/o pain at back,  CGA x1 for gait with FWW;will benefit from continued PT and home care upon return to home   PT Equipment Needed at Discharge walker, rolling   Total Session Time   Timed Code Treatment Minutes 15   Total Session Time (sum of timed and untimed services) 15   Psychosocial Support   Trust Relationship/Rapport care explained

## 2023-12-16 NOTE — PROGRESS NOTES
No concerns at this time. Patient VSS, alert and oriented.   Josefa Rivas RN on 12/15/2023 at 6:38 PM

## 2023-12-16 NOTE — PROGRESS NOTES
SAFETY CHECKLIST  ID Bands and Risk clasps correct and in place (DNR, Fall risk, Allergy, Latex, Limb):  Yes  All Lines Reconciled and labeled correctly: Yes  Whiteboard updated:Yes  Environmental interventions: Yes  Verify Tele #: [id number]    Maria Esther Salgado RN on 12/15/2023 at 7:32 PM

## 2023-12-16 NOTE — PROGRESS NOTES
SAFETY CHECKLIST  ID Bands and Risk clasps correct and in place (DNR, Fall risk, Allergy, Latex, Limb):  Yes  All Lines Reconciled and labeled correctly: Yes  Whiteboard updated:Yes  Environmental interventions: Yes  Verify Tele #:  RUI Pickett RN on 12/16/2023 at 7:21 AM

## 2023-12-16 NOTE — PROGRESS NOTES
"[facility]    Medicine Progress Note - Hospitalist Service    Date of Admission:  12/14/2023    Assessment & Plan      Shannon Walls is a 89 year old female admitted on 12/14/2023 for atrial fibrillation with RVR.  RVR likely resulted in lightheadedness and a fall at her assisted living day of admission.    Principal Problem:    Atrial fibrillation with RVR (H)    Permanent atrial fibrillation (H)  Rate in the 130s in the ED.  No clear EKG changes.  No chest discomfort or shortness of breath.  Has chronic atrial fibrillation.  Has been on metoprolol and digoxin for a year after previous episode of RVR treated in [place].  Patient reports taking her medications consistently.  Digoxin level checked and is 1.0  Due to reduced EF, avoid diltiazem  Started on a digoxin drip.  Rate controlled  by morning, so transitioned to oral.  Continue home metoprolol  Continue to monitor rate. Has been 70s at rest and up to 110 with exertion.  Recovers with rest.  No transportation companies available to return patient to her assisted living.  A friend was going to provide transportation today but cannot until tomorrow.  Will continue hospitalization and monitoring.  She is improving in strength and would benefit from another night of monitoring and round of PT before discharge.    Active Problems:    Chronic systolic heart failure (H)  EF 35 to 40% on echocardiogram December 2022.  Continue home metoprolol  Received IVF in the ED.  Resume furosemide        Pathological fracture L4 vertebrae    Fracture of sacrum (H)  CT scan in ED showed \"Osteoporotic pathologic fractures of T4, T5, T9, T10, T12, L2, L4 and L5. Additional osteoporotic fractures of the sacrum. \"  Known previous L1 and L4 fractures, sacral insufficiency fractures seen in November 2021.  No previous thoracic imaging.  She has not discretely tender over the thoracic spine, although this may be referring pain to her left ribs.  History of " chronic steroid use for polymyalgia likely contributing to current fractures  Acetaminophen as needed  Home tramadol as needed  IV hydromorphone for severe pain  PT and OT assessment      Acquired hypothyroidism  Continue home supplement          Diet: Combination Diet 2 gm NA Diet    DVT Prophylaxis: Warfarin  Garcia Catheter: Not present  Lines: None     Cardiac Monitoring: None  Code Status: No CPR- Do NOT Intubate      Clinically Significant Risk Factors                   # Chronic heart failure with reduced ejection fraction: last echo with EF <40%                  Disposition Plan     Expected Discharge Date: 12/16/2023      Destination: home;assisted living              Seth King MD  Hospitalist Service  [facility]  Securely message with [employer] (more info)  Text page via [employer] Paging/Directory   ______________________________________________________________________    Interval History   Up walking with PT. No SOB. Still has left side rib pain, but that has improved.    Physical Exam   Vital Signs: Temp: 98.3  F (36.8  C) Temp src: Tympanic BP: 119/68 Pulse: 94   Resp: 24 SpO2: 95 % O2 Device: None (Room air) Oxygen Delivery: 1 LPM  Weight: 113 lbs 3.2 oz    General Appearance: Alert. No acute distress  Chest/Respiratory Exam: Clear to auscultation bilaterally  Cardiovascular Exam: Irregular rate and rhythm. S1, S2, no murmur, gallop, or rubs.  Extremities: No lower extremity edema.  Skin: Chronic shallow leg wounds, POA.  Psychiatric: Normal affect and mentation      Medical Decision Making             Data     I have personally reviewed the following data over the past 24 hrs:    9.6  \   14.7   / 310     137 98 16.8 /  101 (H)   4.2 30 (H) 0.98 (H) \     INR:  2.63 (H) PTT:  N/A   D-dimer:  N/A Fibrinogen:  N/A       Imaging results reviewed over the past 24 hrs:   No results found for this or any previous visit (from the past 24 hour(s)).

## 2023-12-16 NOTE — PHARMACY-CONSULT NOTE
Pharmacy - Transfer Medication Reconciliation     The patient's transfer medication orders have been compared to the medication administration record and to the Prior to Admissions Medications list - any noted discrepancies were resolved with the MD. Patient from ICU to [facility].    Thank you. Pharmacy will continue to monitor.     Alva Young [facility] ....................  12/15/2023   7:26 PM

## 2023-12-17 NOTE — PROGRESS NOTES
12/17/23 1300   Appointment Info   Signing Clinician's Name / Credentials (PT) Liza Zavaleta DPT   Gait Training   Gait Training Minutes (68357) 25   Symptoms Noted During/After Treatment (Gait Training) fatigue   Treatment Detail/Skilled Intervention Ambulated from chair to out in hallways with FWW CGA with gait belt, sit-stand min A x2   Distance in Feet 160   [place] Level (Gait Training) contact guard   Physical Assistance Level (Gait Training) set-up required;supervision;verbal cues;1 person assist   Weight Bearing (Gait Training) full weight-bearing   Assistive Device (Gait Training) rolling walker   Gait Analysis Deviations decreased step length   Impairments (Gait Analysis/Training) strength decreased   PT Discharge Planning   PT Plan Continue PT   PT Discharge Recommendation (DC Rec) home with assist;home with home care physical therapy   PT Rationale for DC Rec to promote strength, stability and safe mobility   PT Brief overview of current status patient requiring Min A x 1-2 sit to stand,   CGA x1 for gait with FWW; will benefit from continued PT and home care Physical Therapy upon return to home   PT Equipment Needed at Discharge walker, rolling   Total Session Time   Timed Code Treatment Minutes 25   Total Session Time (sum of timed and untimed services) 25

## 2023-12-17 NOTE — PLAN OF CARE
Goal Outcome Evaluation:      Patient is alert and oriented. Afebrile. VSS. Dressing CDI on right lower leg. No new concerns this shift.     Plan of Care Reviewed With: patient    Overall Patient Progress: improvingOverall Patient Progress: improving

## 2023-12-17 NOTE — PROGRESS NOTES
"[facility]    Medicine Progress Note - Hospitalist Service    Date of Admission:  12/14/2023    Assessment & Plan      Shannon Walls is a 89 year old female admitted on 12/14/2023 for atrial fibrillation with RVR.  RVR likely resulted in lightheadedness and a fall at her assisted living day of admission.    Principal Problem:    Atrial fibrillation with RVR (H)    Permanent atrial fibrillation (H)  Rate in the 130s in the ED.  No clear EKG changes.  No chest discomfort or shortness of breath.  Has chronic atrial fibrillation.  Has been on metoprolol and digoxin for a year after previous episode of RVR treated in [place].  Patient reports taking her medications consistently.  Digoxin level checked and is 1.0  Due to reduced EF, avoid diltiazem  Started on a digoxin drip.  Rate controlled  by morning, so transitioned to oral.  Continue home metoprolol  Continue to monitor rate. Has been 70s at rest and up to 110 with exertion.  Recovers with rest.  No transportation companies available to return patient to her assisted living.  A friend was going to provide transportation today but her assisted living will not take her back today as there is not staff available. She will need  to help with disposition tomorrow.    Active Problems:    Chronic systolic heart failure (H)  EF 35 to 40% on echocardiogram December 2022.  Continue home metoprolol  Received IVF in the ED.  Resume furosemide        Pathological fracture L4 vertebrae    Fracture of sacrum (H)  CT scan in ED showed \"Osteoporotic pathologic fractures of T4, T5, T9, T10, T12, L2, L4 and L5. Additional osteoporotic fractures of the sacrum. \"  Known previous L1 and L4 fractures, sacral insufficiency fractures seen in November 2021.  No previous thoracic imaging.  She has not discretely tender over the thoracic spine, although this may be referring pain to her left ribs.  History of chronic steroid use for polymyalgia likely " contributing to current fractures  Acetaminophen as needed  Home tramadol as needed  IV hydromorphone for severe pain  PT and OT assessment      Acquired hypothyroidism  Continue home supplement    Called daughter with update on situation and spoke for 18 minutes.        Diet: Combination Diet 2 gm NA Diet    DVT Prophylaxis: Warfarin  Garcia Catheter: Not present  Lines: None     Cardiac Monitoring: None  Code Status: No CPR- Do NOT Intubate      Clinically Significant Risk Factors                   # Chronic heart failure with reduced ejection fraction: last echo with EF <40%                  Disposition Plan      Expected Discharge Date: 12/18/2023      Destination: home;assisted living              Seth King MD  Hospitalist Service  [facility]  Securely message with [employer] (more info)  Text page via [facility] Paging/Directory   ______________________________________________________________________    Interval History   Left rib pain still present, but improving.  She is worried about getting around her apartment at assisted living.  There is a further distance from her living room to the bathroom and in the hospital.  She has been able to ambulate with a walker over 10 feet, but would need to go further in her apartment.  A commode would be useful.  However, her assisted living was contacted about taking the patient back today and does not have appropriate staff.  Therefore, she will need to stay another day.  Otherwise she seems stable.  No shortness of breath.  Rate controlled on telemetry between 70 and 110.      Physical Exam   Vital Signs: Temp: 98  F (36.7  C) Temp src: Tympanic BP: (!) 127/90 Pulse: 86   Resp: 16 SpO2: 92 % O2 Device: None (Room air)    Weight: 109 lbs 14.4 oz    General Appearance: Alert. No acute distress  Chest/Respiratory Exam: Clear to auscultation bilaterally  Cardiovascular Exam: Irregular rate and rhythm. S1, S2, no murmur, gallop, or rubs.  Extremities:  No lower extremity edema.  Skin: Chronic shallow leg wounds, POA.  Psychiatric: Normal affect and mentation      Medical Decision Making             Data     I have personally reviewed the following data over the past 24 hrs:    INR:  3.43 (H) PTT:  N/A   D-dimer:  N/A Fibrinogen:  N/A       Imaging results reviewed over the past 24 hrs:   No results found for this or any previous visit (from the past 24 hour(s)).

## 2023-12-17 NOTE — PROGRESS NOTES
Patient up and ambulated to the bathroom from the recliner with an assist of 1, gait belt and walker. Patient did very well with this, was not unsteady or weak.

## 2023-12-17 NOTE — PROGRESS NOTES
12/17/23 1200   Appointment Info   Signing Clinician's Name / Credentials (OT) Mirta Roxy OTR/L   Living Environment   People in Home facility resident   Current Living Arrangements assisted living   Home Accessibility no concerns   Transportation Anticipated family or friend will provide   Self-Care   Usual Activity Tolerance moderate   Current Activity Tolerance fair   Equipment Currently Used at Home walker, standard;wheelchair, manual   Fall history within last six months yes   Number of times patient has fallen within last six months 1   Cognitive Status Examination   Orientation Status orientation to person, place and time   Affect/Mental Status (Cognitive) WNL   Follows Commands WNL   Safety Deficit minimal deficit   Memory Deficit minimal deficit   Attention Deficit minimal deficit   Executive Function Deficit minimal deficit   Posture   Posture protracted shoulders;forward head position   Range of Motion Comprehensive   General Range of Motion no range of motion deficits identified   Coordination   Upper Extremity Coordination No deficits were identified   Transfers   Transfers No deficits identified   Sit-Stand Transfer   Sit-Stand [place] (Transfers) modified independence;set up;contact guard   Activities of Daily Living   BADL Assessment/Intervention toileting   Clinical Impression   Criteria for Skilled Therapeutic Interventions Met (OT) Yes, treatment indicated   OT Problem List-Impairments impacting ADL activity tolerance impaired;pain   Assessment of Occupational Performance 1-3 Performance Deficits   Identified Performance Deficits tiolet transfer, shoulering   Planned Therapy Interventions (OT) ADL retraining;IADL retraining   Clinical Decision Making Complexity (OT) problem focused assessment/low complexity   Risk & Benefits of therapy have been explained evaluation/treatment results reviewed;care plan/treatment goals reviewed;risks/benefits reviewed   Therapeutic Activities    Therapeutic Activity Minutes (84768) 25   Symptoms noted during/after treatment fatigue   Treatment Detail/Skilled Intervention declined shower. Has supervision and assist as needed at [facility]. Pt performed g/h with nursing prior to arrival. Ambulatedin the hallway with FWW. min a to guide walker on right side.   OT Discharge Planning   OT Plan OT will follow Pt to improve activitiy tolerance   OT Discharge Recommendation (DC Rec) home with assist   OT Rationale for DC Rec Pt is from [facility] and is able to recieve more services as needed.   OT Brief overview of current status P tis at baseline functioning. she will not have OT needs after discharge.   Total Session Time   Timed Code Treatment Minutes 25   Total Session Time (sum of timed and untimed services) 25   Psychosocial Support   Trust Relationship/Rapport care explained;questions answered;thoughts/feelings acknowledged

## 2023-12-17 NOTE — PROVIDER NOTIFICATION
12/17/23 0937   Valuables   Patient Belongings remains with patient   Patient Belongings Remaining with Patient clothing;jewelry;vision aids   Did you bring any home meds/supplements to the hospital?  No     [facility] will make every effort per our policy to help keep your items safe while in the hospital.  If you choose to keep any items at the bedside, we cannot be held responsible for any items that are lost or broken.      List items sent to safe:none    I have reviewed my belongings list on admission and verify that it is correct.     Patient signature_______________________________  Date/Time_____________________    2nd Staff person if patient unable to sign __________________________  Date/Time ______________________      I have received all my belongings noted above at discharge.    Patient signature________________________________  Date/Time  __________________________

## 2023-12-17 NOTE — PLAN OF CARE
"Goal Outcome Evaluation:       Patients VSS BP (!) 127/90 (BP Location: Left arm, Patient Position: Semi-Burks's, Cuff Size: Adult Small)   Pulse 86   Temp 98  F (36.7  C) (Tympanic)   Resp 16   Ht 1.499 m (4' 11\")   Wt 49.9 kg (109 lb 14.4 oz)   SpO2 92%   BMI 22.20 kg/m     Patients heels are dry and cracked, lotion applied. Patient declined any pain so far this morning. Patient transferred to the bedside commode this morning with an assist of 1 using gait belt and walker. Patient did really well with this. Patient voiced concerns about discharging back to facility, patient worries she will fall again due to the facility being short staffed per patient. Educated patient on the need to call for help while at the facility and wait for help before transferring. Let patient know that she transferred very well this morning.  Leg wraps are still intact, no drainage noted. Dressings were changed yesterday and get changed Q48 hours.                  "

## 2023-12-17 NOTE — PROGRESS NOTES
SAFETY CHECKLIST  ID Bands and Risk clasps correct and in place (DNR, Fall risk, Allergy, Latex, Limb):  Yes  All Lines Reconciled and labeled correctly: Yes  Whiteboard updated:Yes  Environmental interventions: Yes bedrails x 2, call light within reach.  Verify Tele #:

## 2023-12-17 NOTE — PROGRESS NOTES
Call placed to nurse on call for [facility] assisted living. They have no nurse at facility today. Plan for admit back on Monday. Will have social work call in am.

## 2023-12-17 NOTE — PLAN OF CARE
"Patient admitted with Atrial Fibrillation with RVR, fall.  Pain to left side chest/abdominal area, likely related to fall prior to admission.  Tramadol given x 2 with relief.  Up to bedside commode with 1-2 staff, very weak at times, fearful of falling.  Bowel sounds hypoactive, patient states her last BM was prior to admission on 12/13/23.  Miralax given PRN per MAR.  VSS.  Is supposed to discharge back to [facility] Sunday 12/17/23.  Nurse to call facility nurse on call to give report prior to discharge.      /80 (BP Location: Left arm, Patient Position: Semi-Burks's, Cuff Size: Adult Small)   Pulse 76   Temp 98.8  F (37.1  C) (Tympanic)   Resp 18   Ht 1.499 m (4' 11\")   Wt 49.9 kg (109 lb 14.4 oz)   SpO2 92%   BMI 22.20 kg/m        Goal Outcome Evaluation:           Overall Patient Progress: improvingOverall Patient Progress: improving    Outcome Evaluation: Ax1 to bedside commode      "

## 2023-12-17 NOTE — PHARMACY-ANTICOAGULATION SERVICE
Pharmacy Consult- Coumadin Follow-Up    Shannon Walls is a 89 year old female admitted on 12/14/2023 with Atrial fibrillation with RVR (H)    Primary Indication(s) for Anticoagulation: Afib    Goal INR:2-3    FYI, patient is followed by the Anticoagulation/Protime Clinic at: [facility] tino Campo at [place], INR is checked by staff and dose adjustments are made by Gilda Bowen from [facility].      Patient Active Problem List   Diagnosis    Pathological fracture L4 vertebrae    Fracture of sacrum (H)    Permanent atrial fibrillation (H)    Altered mental status, unspecified altered mental status type    Acquired hypothyroidism    Anxiety disorder, unspecified    Chronic systolic heart failure (H)    Gastroesophageal reflux disease without esophagitis    Ischemic stroke (H)    Atrial fibrillation with RVR (H)    Fall at home, initial encounter       New factors that may increase patient's sensitivity to warfarin (Coumadin) include: Digoxin load, hospitalization, decreased appetite    New factors that may decrease patient's sensitivity to warfarin (Coumadin) include: None noted    Dietary Intake: Spoke with Dr. King to confirm diet (nothing charted in EPIC), as INR increase today slightly concerning. Dr. Cantrell stated patient's diet has been decreased.    Recent Labs   Lab Test 12/17/23  0541 12/16/23  0632 12/15/23  0459 12/14/23  1211 10/21/23  1635   HGB  --  14.7 12.9 12.9 14.1   INR 3.43* 2.63* 2.30* 2.85*  --    PLT  --  310 281 296 239        Recent Dosing:    Date INR Dose Given   12/17 3.43 See below   12/16 2.63 2 mg   12/15 2.30 4 mg   12/14 2.85 2 mg      Plan: HOLD warfarin x1 dose 12/17/23. Suspect digoxin load and decreased diet may be contributing to supratherpeutic INR as there have been no changes to patient's regimen during admission. Check INR in the AM and adjust dose as needed      Thank You for the Consult. Will continue to follow.    Alva Young  RPH ....................  12/17/2023   9:02 AM

## 2023-12-18 NOTE — PLAN OF CARE
Goal Outcome Evaluation:    Discharge instruction packet given to Merlyn Santana, family friend.  Wheelchair ride to Merlyn's car at 1240 for ride back to [place] assisted living [facility].  Patient refused several stool softeners today as she was worried the ride back to   [place].    I tried to give nurse to nurse at [facility] at 2 different numbers but got no answer.

## 2023-12-18 NOTE — PHARMACY-ANTICOAGULATION SERVICE
Pharmacy Consult- Coumadin Follow-Up    Shannon Walls is a 89 year old female admitted on 12/14/2023 with Atrial fibrillation with RVR (H)    Primary Indication(s) for Anticoagulation: Atrial fibrillation    Goal INR: 2-3    FYI, patient is followed by the Anticoagulation/Protime Clinic at: [facility] per Jahaira at [place], INR is checked by staff and dose adjustments are made by Gilda Bowen from [facility].       Patient Active Problem List   Diagnosis    Pathological fracture L4 vertebrae    Fracture of sacrum (H)    Permanent atrial fibrillation (H)    Altered mental status, unspecified altered mental status type    Acquired hypothyroidism    Anxiety disorder, unspecified    Chronic systolic heart failure (H)    Gastroesophageal reflux disease without esophagitis    Ischemic stroke (H)    Atrial fibrillation with RVR (H)    Fall at home, initial encounter       New factors that may increase patient's sensitivity to warfarin (Coumadin) include: decreased appetite/oral intake, Digoxin load    New factors that may decrease patient's sensitivity to warfarin (Coumadin) include: dose HELD 12/17    Dietary Intake: 25-50% per nutrition flowsheet/discussion with Dr. King    Recent Labs   Lab Test 12/18/23  0646 12/17/23  0541 12/16/23  0632 12/15/23  0459 12/14/23  1211 10/21/23  1635   HGB  --   --  14.7 12.9 12.9 14.1   INR 3.03* 3.43* 2.63* 2.30* 2.85*  --    PLT  --   --  310 281 296 239        Recent Dosing:    Date INR Dose Given   12/14 2.85 2 mg   12/15 2.30 4 mg   12/16 2.63 2 mg   12/17 3.43 Dose HELD   12/18 3.03 See below       Plan: Patient discharging back to assisted living facility today. Will plan for patient to take Warfarin 2 mg tonight, tomorrow, and Wednesday, with INR recheck on Thursday (12/21). Proceeding with lower dosing per discussion with Dr. King due to decreased appetite.    Thank You for the Consult. Will continue to follow.    Leon Doe [facility]  ....................  12/18/2023   9:03 AM

## 2023-12-18 NOTE — PHARMACY-ANTICOAGULATION SERVICE
Pharmacy:  Discharge Counseling and Medication Reconciliation    Shannon Walls  [address]  [facility] [address]  [phone] (home)   89 year old female  PCP: Gilda Mcgregor    Allergies: Levothyroxine and Nitrogen    Discharge Counseling:    Pharmacist did not meet with patient prior to discharge, as patient is being discharged back to [facility] in [place], where facility staff will manage and administer her medications.      Materials Provided:  -Pink Warfarin dosing card outlining this hospital stay's dosing/INR's, as well as plan to recheck INR on Thursday, 12/21.    Discharge Medication Reconciliation:    It has been determined that the patient has an adequate supply of medications available or which can be obtained from [facility]'s preferred pharmacy, which has been confirmed as: [facility] ([facility]).    Thank you for the consult.    Leon Doe [facility]........December 18, 2023 9:00 AM

## 2023-12-18 NOTE — PHARMACY - DISCHARGE MEDICATION RECONCILIATION AND EDUCATION
Pharmacy:  Discharge Counseling and Medication Reconciliation    Shannon Walls  [address]  [facility] [place], [place] [address]  [phone] (home)   89 year old female  PCP: Gilda Mcgregor    Allergies: Levothyroxine and Nitrogen    Discharge Counseling:    Pharmacist did not meet with patient prior to discharge, as patient is being discharged back to [facility] in [place], where facility staff will manage and administer her medications.      Materials Provided:  -Pink Warfarin dosing card outlining this hospital stay's dosing/INR's, as well as plan to recheck INR on Thursday, 12/21.    Discharge Medication Reconciliation:  Confirmed with Jahaira at [facility] that facility manages and administers medications. Also spoke with her about plan in regard to Warfarin and that patient is due for INR recheck on 12/21.    It has been determined that the patient has an adequate supply of medications available or which can be obtained from [facility]'s preferred pharmacy, which has been confirmed as: [facility]    Thank you for the consult.    Leon Doe RPH on 12/18/2023 at 9:36 AM    Leon Doe RPH on 12/18/2023 at 10:02 AM

## 2023-12-18 NOTE — DISCHARGE SUMMARY
[facility]  Hospitalist Discharge Summary      Date of Admission:  12/14/2023  Date of Discharge:  12/18/2023  Discharging Provider: Seth King MD  Discharge Service: Hospitalist Service    Discharge Diagnoses   Principal Problem:    Atrial fibrillation with RVR (H)  Active Problems:    Pathological fracture L4 vertebrae    Fracture of sacrum (H)    Permanent atrial fibrillation (H)    Acquired hypothyroidism    Chronic systolic heart failure (H)    Fall at home, initial encounter      Clinically Significant Risk Factors          Follow-ups Needed After Discharge   Follow-up Appointments     Follow-up and recommended labs and tests       Follow up with primary care provider, Gilda Mcgregor, within 7 days   for hospital follow- up.  The following labs/tests are recommended:   digoxin level.  Continue with INR monitoring. Have INR level checked by 12/21 and adjust   dose accordingly.            Unresulted Labs Ordered in the Past 30 Days of this Admission       No orders found from 11/14/2023 to 12/15/2023.        These results will be followed up by NA    Discharge Disposition   Discharged back to [facility] living  Condition at discharge: Stable    Hospital Course   Shannon Walls is a 89 year old female admitted on 12/14/2023 for atrial fibrillation with RVR.  RVR likely resulted in lightheadedness and a fall at her assisted living day of admission.    Principal Problem:    Atrial fibrillation with RVR (H)    Permanent atrial fibrillation (H)  Rate in the 130s in the ED.  No clear EKG changes.  No chest discomfort or shortness of breath.  Has chronic atrial fibrillation.  Has been on metoprolol and digoxin for a year after previous episode of RVR treated in [place].  Patient reports taking her medications consistently.  Digoxin level checked and is 1.0  Due to reduced EF, avoided diltiazem. Continue home metoprolol  Started on a digoxin drip.  Rate controlled  by  "morning, so transitioned to oral.  Pulse 70s at rest and up to 110 with exertion on cardiac monitor for another day. Rate recovers with rest.  No transportation companies available to return patient to her assisted living until Monday.  A friend was going to provide transportation Sunday but her assisted living would not take her back as there was not staff available until Monday.    Active Problems:    Chronic systolic heart failure (H)  EF 35 to 40% on echocardiogram December 2022.  Continued home metoprolol and furosemide        Pathological fracture L4 vertebrae    Fracture of sacrum (H)  CT scan in ED showed \"Osteoporotic pathologic fractures of T4, T5, T9, T10, T12, L2, L4 and L5. Additional osteoporotic fractures of the sacrum. \"  Known previous L1 and L4 fractures, sacral insufficiency fractures seen in November 2021.  No previous thoracic imaging.  She has not discretely tender over the thoracic spine, although this may be referring pain to her left ribs.  History of chronic steroid use for polymyalgia likely contributing to current fractures  Acetaminophen as needed  Home tramadol as needed  Ordered home care PT to help with mobility      Acquired hypothyroidism  Continue home supplement                Documentation of a Face-to-Face Physician Encounter December 18, 2023    Shannon Walls  1/12/1933  [id number]    I certify that this patient is under my care and that I, or a nurse practitioner or physician's assistant working with me, had a face-to-face encounter that meets the physician face-to-face encounter requirements with this patient on: 12/18/2023.  The encounter with the patient was in whole, or in part, for the following medical condition, which is the primary reason for home health care:  Atrial fibrillation with RVR (H)   Fall at home, initial encounter   I certify that, based on my findings, the following services are medically necessary home health services: Physical Therapy  My clinical " findings support the need for the above services because: need for strengthening due to weakness from a-fib leading to fall and potentially a new thoracic compression fracture  Further, I certify that my clinical findings support that this patient is homebound (i.e. absences from home require considerable and taxing effort and are for medical reasons or [employer] services or infrequently or of short duration when for other reasons) because: limited mobility, ambulates less than 25 feet with a walker  Physician signature ___________________________________   December 18, 2023  Physician name: Seth King MD    Consultations This Hospital Stay   PHARMACY TO DOSE WARFARIN  PHYSICAL THERAPY ADULT IP CONSULT  OCCUPATIONAL THERAPY ADULT IP CONSULT  SOCIAL WORK IP CONSULT  SOCIAL WORK IP CONSULT    Code Status   No CPR- Do NOT Intubate    Time Spent on this Encounter   I, Seth King MD, personally saw the patient today and spent greater than 30 minutes discharging this patient.       Seth King MD  [facility]  [address]  [place] [address]  Phone: [phone]  Fax: [phone]  ______________________________________________________________________    Physical Exam   Vital Signs: Temp: 98.6  F (37  C) Temp src: Tympanic BP: (!) 154/67 Pulse: 80   Resp: 18 SpO2: 91 % O2 Device: None (Room air)    Weight: 106 lbs 11.2 oz  General Appearance: Alert. No acute distress  Chest/Respiratory Exam: Clear to auscultation bilaterally  Cardiovascular Exam: Irregular rate and rhythm. No murmur, gallop, or rubs.  Extremities: No lower extremity edema.  Skin: Shallow ulcers on left LE, POA. Dressing in place  Psychiatric: Normal affect and mentation         Primary Care Physician   Gilda Mcgregor    Discharge Orders      Adult Cardiology Eval [facility] Referral      Home Care Referral      Reason for your hospital stay    Atrial fibrillation with rapid rate. When your  pulse is too fast this can lead to dizziness and weakness. When your heart rate improved, those symptoms improved. A referral was made to cardiology to make sure we are giving you the correct medications to help control your rate.     Activity    Your activity upon discharge: activity as tolerated     Follow-up and recommended labs and tests     Follow up with primary care provider, Gilda Mcgregor, within 7 days for hospital follow- up.  The following labs/tests are recommended: digoxin level.  Continue with INR monitoring. Have INR level checked by 12/21 and adjust dose accordingly.     Diet    Follow this diet upon discharge: less than 2 gram sodium diet       Significant Results and Procedures   Most Recent 3 CBC's:  Recent Labs   Lab Test 12/16/23  0632 12/15/23  0459 12/14/23  1211   WBC 9.6 10.2 12.6*   HGB 14.7 12.9 12.9   MCV 96 96 96    281 296     Most Recent 3 BMP's:  Recent Labs   Lab Test 12/16/23  0632 12/15/23  0459 12/14/23  1211    137 136   POTASSIUM 4.2 4.4 4.5   CHLORIDE 98 97* 98   CO2 30* 30* 29   BUN 16.8 18.9 24.3*   CR 0.98* 0.91 0.99*   ANIONGAP 9 10 9   EFFIE 9.4 9.3 9.4   * 97 106*     Most Recent 2 LFT's:  Recent Labs   Lab Test 12/14/23  1211 12/29/22  2106   AST 29 20   ALT 24 17   ALKPHOS 65 46   BILITOTAL 0.8 0.6     Most Recent 3 INR's:  Recent Labs   Lab Test 12/18/23  0646 12/17/23  0541 12/16/23  0632   INR 3.03* 3.43* 2.63*   ,   Results for orders placed or performed during the hospital encounter of 12/14/23   CT Head w/o Contrast    Narrative    PROCEDURE: CT HEAD W/O CONTRAST     HISTORY: fall, hit head, on coumadin.    COMPARISON: 10/13/2023    TECHNIQUE:  Helical images of the head from the foramen magnum to the  vertex were obtained without contrast. This CT exam was performed  using one or more the following dose reduction techniques: automated  exposure control, adjustment of the mA and/or kV according to patient  size, and/or iterative  reconstruction technique.    FINDINGS: The ventricles and sulci are prominent, compatible with  advanced, generalized volume loss. No acute intracranial hemorrhage,  mass effect, midline shift, hydrocephalus or basilar cystern  effacement are present.    No acute transcortical ischemia is identified. There is a chronic  lacunar injury in the left cerebellum. Advanced microvascular disease  is redemonstrated.    The calvarium is intact.  The visualized paranasal sinuses are clear.      Impression    IMPRESSION: No acute intracranial hemorrhage or calvarial fracture.      AKOSUA ROJO MD         SYSTEM ID:  [id number]   CT Cervical Spine w/o Contrast    Narrative    PROCEDURE: CT CERVICAL SPINE W/O CONTRAST     HISTORY: fall, >65.    TECHNIQUE: Helical noncontrast CT images of the cervical spine. This  CT exam was performed using one or more the following dose reduction  techniques: automated exposure control, adjustment of the mA and/or kV  according to patient size, and/or iterative reconstruction technique.    COMPARISON: 10/13/2023.    FINDINGS:     No acute fracture is identified. The vertebral bodies are normal in  height. The cervical lordosis is notable for anterolisthesis of C4 on  C5 C7 on T1 on the basis of facet hypertrophy. The C1-2 articulation  and the craniocervical junction are intact.     Degenerative changes are chronic.     The paravertebral soft tissues are unremarkable. The lung apices  demonstrate emphysema.      Impression    IMPRESSION: No evidence of acute cervical spine fracture.    AKOSUA ROJO MD         SYSTEM ID:  [id number]   CT Chest/Abdomen/Pelvis w Contrast    Narrative    PROCEDURE:  CT CHEST/ABDOMEN/PELVIS W CONTRAST.      HISTORY:  89 years Female fall rib, abddomen and hip pain      TECHNIQUE:  Helical CT of the chest, abdomen and pelvis was performed  using non-ionic intravenous contrast. This CT exam was performed using  one or more the following dose reduction  techniques: automated  exposure control, adjustment of the mA and/or kV according to patient  size, and/or iterative reconstruction technique.    COMPARISON:  Chest x-ray 10/21/2023.    MEDS/CONTRAST: 64 ml isovue 370    FINDINGS:      Cardiomegaly. Coronary artery calcification. There is no pericardial  or pleural effusion. No abnormal thoracic adenopathy is identified.    The central airways are patent. Moderate to advanced centrilobular  emphysema. Mild probable dependent atelectasis.    A superficial cyst is suggested in the liver. The gallbladder is  unremarkable. The spleen, pancreas and adrenal glands are  unremarkable. The gallbladder is unremarkable. Symmetric nephrograms  are present without hydronephrosis. The aorta is normal in size with  scattered atherosclerotic calcifications.    No abnormal bowel dilatation is present. .     Osteoporosis. There are osteoporotic pathologic fractures of T4, T5,  T9, T10, T12, L2, L4 and L5. There is also an age indeterminant  fracture of S4 sacral segment and possibly both sacral alae. The lack  of prior comparison cross-sectional imaging suggests that any of these  may be acute. Healed posterior left 11th rib fracture.      Impression    IMPRESSION:      Osteoporotic pathologic fractures of T4, T5, T9, T10, T12, L2, L4 and  L5. Additional osteoporotic fractures of the sacrum. The lack of prior  comparison cross-sectional imaging suggests that any of these may be  painful, acute to subacute.     The number of fractures and the severe height loss at multiple levels  precludes kyphoplasty.    AKOSUA ROJO MD         SYSTEM ID:  [id number]       Discharge Medications   Current Discharge Medication List        CONTINUE these medications which have CHANGED    Details   warfarin ANTICOAGULANT (COUMADIN) 2 MG tablet Take 1 tablet (2 mg) by mouth daily Check INR to adjust dose by 12/21    Associated Diagnoses: Atrial fibrillation with RVR (H); Ischemic stroke (H)            CONTINUE these medications which have NOT CHANGED    Details   acetaminophen (TYLENOL) 650 MG CR tablet Take 650 mg by mouth every 4 hours as needed for mild pain or fever      atorvastatin (LIPITOR) 40 MG tablet Take 1 tablet by mouth at bedtime      calcium carbonate (OS-EFFIE) 1500 (600 Ca) MG tablet Take 600 mg by mouth daily       digoxin (LANOXIN) 125 MCG tablet Take 62.5 mcg by mouth daily       furosemide (LASIX) 20 MG tablet Take 20 mg by mouth daily      GAVILAX 17 GM/SCOOP powder Take 17 g by mouth daily      LORazepam (ATIVAN) 0.5 MG tablet Take 0.5 mg by mouth At Bedtime       metoprolol succinate ER (TOPROL XL) 50 MG 24 hr tablet Take 1 tablet (50 mg) by mouth daily    Associated Diagnoses: Atrial fibrillation with rapid ventricular response (H)      multivitamin, therapeutic (THERA-VIT) TABS tablet Take 1 tablet by mouth daily      NP THYROID 30 MG tablet Take 30 mg by mouth daily      omeprazole (PRILOSEC) 20 MG DR capsule Take 20 mg by mouth daily      predniSONE (DELTASONE) 5 MG tablet Take 5 mg by mouth daily      traMADol (ULTRAM) 50 MG tablet Take 50 mg by mouth 2 times daily      Vitamin D, Cholecalciferol, 10 MCG (400 UNIT) TABS Take 1 tablet by mouth daily           Allergies   Allergies   Allergen Reactions    Levothyroxine Shortness Of Breath and Swelling    Nitrogen Swelling and Blisters     Liquid nitrogen

## 2023-12-18 NOTE — PLAN OF CARE
"Pt unable to have bm gave prn medications awaiting results, transfers with walker/gait belt one person assist in room, changed leg dsg (minimal purulent drainage) and coccyx dsg @ 0400, has abrasion on forehead from skin biopsy, VSS. Will be discharging this morning back to assisted living          Problem: Adult Inpatient Plan of Care  Goal: Plan of Care Review  Description: The Plan of Care Review/Shift note should be completed every shift.  The Outcome Evaluation is a brief statement about your assessment that the patient is improving, declining, or no change.  This information will be displayed automatically on your shift  note.  Outcome: Progressing  Flowsheets (Taken 12/18/2023 0420)  Plan of Care Reviewed With: patient  Overall Patient Progress: improving  Goal: Patient-Specific Goal (Individual)       Description: You can add care plan individualizations to a care plan. Examples of Individualization might be:  \"Parent requests to be called daily at 9am for status\", \"I have a hard time hearing out of my right ear\", or \"Do not touch me to wake me up as it startles  me\".  Outcome: Progressing  Goal: Absence of Hospital-Acquired Illness or Injury  Outcome: Progressing  Intervention: Identify and Manage Fall Risk  Recent Flowsheet Documentation  Taken 12/18/2023 0418 by Ariane Raymundo RN  Safety Promotion/Fall Prevention: safety round/check completed  Taken 12/17/2023 2025 by Ariane Raymundo RN  Safety Promotion/Fall Prevention: safety round/check completed  Intervention: Prevent Skin Injury  Recent Flowsheet Documentation  Taken 12/17/2023 2025 by Ariane Raymundo RN  Body Position: position changed independently  Intervention: Prevent and Manage VTE (Venous Thromboembolism) Risk  Recent Flowsheet Documentation  Taken 12/17/2023 2025 by Ariane Raymundo RN  VTE Prevention/Management: (ambulates in room) patient refused intervention  Goal: Optimal Comfort and Wellbeing  Outcome: Progressing  Goal: " Readiness for Transition of Care  Outcome: Progressing   Goal Outcome Evaluation:      Plan of Care Reviewed With: patient    Overall Patient Progress: improvingOverall Patient Progress: improving

## 2023-12-18 NOTE — PROGRESS NOTES
:     Patient didn't discharge over weekend due to [facility] stating they were not able to admit. Called and updated them today that patient was discharging and they were agreeable. Called patient's friend Merlyn about transportation. She will be here between 11:00-12:00 to transport patient home.     Called and left message updating patient's daughter, Sierra, on patient's discharge plan.     Cancelled transportation for Wednesday through [facility].     No further needs from  at this time.     BERHANE Herrera on 12/18/2023 at 8:27 AM    Spoke with patient about home care services and she was agreeable. Patient was agreeable to [facility]. Sent referral and orders to [facility].     BERHANE Herrera on 12/18/2023 at 10:24 AM

## 2023-12-19 NOTE — TELEPHONE ENCOUNTER
Patient noted and discharged back to [facility] with Home Care.    Called [facility] , they will check on orders and call back.  Ellie Acuna RN on 12/19/2023 at 9:11 AM

## 2023-12-19 NOTE — TELEPHONE ENCOUNTER
Patient discharged to home with skilled nursing services. [facility] [place] [facility] admitting to service 12/19/23 at noon   No TCM call required per policy.     Ellie Acuna RN on 12/19/2023 at 11:17 AM

## 2023-12-19 NOTE — TELEPHONE ENCOUNTER
Reason for call: Patient wanting a work in appointment.    Is the appointment for a Hospital Follow up?  yes     Was an appointment offered for this call? Yes    If Yes, what is the date of the appointment?  12/28/23     LIZBET Liao called to request a Hospital follow up for this patient within 7-10 days of discharge. Date of discharge: 12/18/23.  An appointment was scheduled for 12/28 @ 10:20 with DMO.     When writer attempted to pre register patient, it was noted that this appointment time is overlapping another hospital follow up appt.   If appropriate, please find another available work in time for patient.    Preferred method for responding to this message: Telephone Call    Phone number patient can be reached at? Cell number on file:    [phone]       If we can't reach you directly, may we leave a detailed response at the number you provided? Yes    Can this message wait until your PCP/provider returns if unavailable today? Yes     Delmi Cool on 12/19/2023 at 11:01 AM

## 2023-12-22 NOTE — ED PROVIDER NOTES
[facility]  ED Provider Note    Chief Complaint   Patient presents with    Fall     History:  Shannon Walls is a 89 year old female with afib on apixaban who presents to the ED after a suspected fall from ground-level fall.  She has no complaints but also she notes that she suffers from chronic pain.  She does not remember falling.  No other complaints    Review of Systems   Performed; see HPI for pertinent positives and negatives.     Medical history, surgical history, and social history was reviewed.  Nursing documentation, triage note, and vitals were reviewed.    Vitals:  BP: 126/91  Pulse: 92  Temp: 97.6  F (36.4  C)  Resp: 18  SpO2: 94 %    Physical Exam:  Constitutional: Alert and conversant. NAD   HENT: old bruising on right forehead and small skin  tear right forehead  Eyes: Normal pupils   Neck: supple   CV: No pallor  Pulmonary/Chest: Non-labored respirations  Abdominal: non-distended   MSK: PHILIP.   Neuro: Alert and appropriate   Skin: Warm and dry. No diaphoresis. Skin tear right knee  Psych: Appropriate mood and affect     MDM:      ED Course as of 12/22/23 1439   Fri Dec 22, 2023   1435 Shannon Walls is a 89 year old female presenting with fall. Differential includes but is not limited to syncope, mechanical fall, cerebrovascular accident, intracranial hemorrhage, skull fracture, spinal column fracture, muscular strain, fracture/dislocation of extremity. History is consistent with mechanical fall and patient does not require a syncope workup. Based on the mechanism of injury, patient age and comorbidities, symptoms, and exam findings, intracranial hemorrhage or fracture/dislocation cannot be ruled out by history and exam and this patient requires diagnostic imaging. Imaging revealed no acute process. All superficial injuries were washed thoroughly and without e/o foreign body on exam and treated with appropriate wound care. Tetanus given. Patient ambulates without ataxia  and feels comfortable and safe at current place of residence and is stable for discharge with further outpatient evaluation and management. Patient given instructions on follow-up and warning signs for which to return to ED. All questions were answered and the patient is comfortable with plan for discharge. The patient was discharged in stable condition.        Impression:  Final diagnoses:   Fall, initial encounter   Knee abrasion, right, initial encounter   Bruising          Michele Blackwell MD  12/22/23 [id number]

## 2023-12-22 NOTE — ED NOTES
Patient discharged from ED via wheelchair escorted by [facility]. Nurse to nurse called and given to LIZBET Paredes at 1545. AVS reviewed and discussed with patient and facility RN. Both verbalize understanding and denies additional questions. All personal belongings with patient upon discharge. Denies pain. Transferred into w/c without difficulty.

## 2023-12-22 NOTE — TELEPHONE ENCOUNTER
[facility] returns call stating 01/03/2024 telemed appt with [facility] arriving at 345 for the 4 pm appt works for them.   PAC's please schedule  Noris PULIDO

## 2023-12-22 NOTE — DISCHARGE INSTRUCTIONS
Return the emergency room if worsening symptoms or concerning symptoms.  Daily dressing changes until your abrasions are healed.  Follow-up department provider as needed for ongoing symptoms or

## 2023-12-22 NOTE — ED NOTES
Pt is coming in via [facility] ambulance for an unwitnessed fall where pt obviously hit her head, with two lumps noted on the back of her head. Pt is on warfarin, last INR was yesterday INR 2.5. Pt is normally completely alert and oriented, and is now slightly confused, but it alert to person,  place and generalized time. Pt is normally an assist of 1 with a gait belt. VS after being found were /77 HR 65 RR 28. Pt has chronic wounds to her left leg, and has a new wound to her right knee from the fall. Per Nurse Beckham pt is DNR / DNI and they will be contacting family.

## 2023-12-22 NOTE — TELEPHONE ENCOUNTER
----- Message from Noris Raymundo RN sent at 12/21/2023 10:03 AM CST -----  Regarding: RE: reschedule appt  12/27: 4pm  1/3: 1, 3, 4    Let me know if I need to look furhter out!   ----- Message -----  From: Renetta Kramer LPN  Sent: 12/20/2023  11:53 AM CST  To: Noris Raymundo RN  Subject: FW: reschedule appt                              Do you have any dates or times I can offer her?  ----- Message -----  From: Dana Tobar  Sent: 12/20/2023  11:44 AM CST  To: Renetta De Jesus RN; Renetta Kramer LPN  Subject: RE: reschedule appt                              Looks like they are calling to reschedule a tele med visit.  ----- Message -----  From: Renetta De Jesus RN  Sent: 12/20/2023  11:38 AM CST  To: [facility]  Subject: reschedule appt                                  Denny Arevalo called cardiology stating they needed to reschedule her appointment.  She does not have an appt with cardiology.   Call back number was [phone] ex [phone]

## 2023-12-22 NOTE — ED NOTES
"Dr. Blackwell, performed trauma assessment at bedside upon arrival, per MD \"no trauma activation required\".  "

## 2023-12-27 NOTE — PATIENT INSTRUCTIONS
Wound Care Instructions bilateral lower extremities except left lower extremity medial wound:  1) Wash your hands and work space  2) Gather all your supplies: clean wash cloths, mild soap (baby soap), Mepilex Ag foam,  white tape  3) Cleanse all wounds with mild soap, rinse, pat dry  4) Dress all wounds with Mepilex Ag foam secure with white tape    5) Change dressing Monday, Wednesday, and Friday  6) Dispose of all dirty supplies  7) Wash hands and equipment    Wound Care Instructions left lower extremity medial wound:  1) Wash your hands and work space  2) Gather all your supplies: clean wash cloths, mild soap (baby soap), Aquacel Ag 2 cm hydrofiber rope, normal saline, 4x4 gauze, white tape  3) Cleanse wound with mild soap, rinse, pat dry  4) Dress wound with Aquacel Ag hydrofiber 2 cm rope lightly packed into wound, dampen with normal saline, cover with 4x4 gauze folded in half, secure with white tape    5) Change dressing Monday, Wednesday, and Friday  6) Dispose of all dirty supplies  7) Wash hands and equipment    Please report any increase in pain, fevers, chills, changes in the drainage odor.   Please call [phone] if you have any questions or concerns or if any problems develop.     Follow up in two weeks in wound care.

## 2023-12-28 NOTE — TELEPHONE ENCOUNTER
Patient was scheduled for a follow up with [facility] today but was a no show. Writer called patient and obtained a verbal okay to speak with facility staff. LIZBET Campo, stated that the patient does have a follow up with her PCP on 1/3/24. Writer explained that patient should have already had her INR and Digoxin levels checked at this point. Jahaira stated that she is waiting to hear back from the PCP as patient's INR was 6.6 this morning. She will ask PCP about the dig levels when she talks to her.     WARNER ABDULLAHI RN on 12/28/2023 at 11:05 AM

## 2023-12-29 NOTE — ED TRIAGE NOTES
Slid out of recliner chair and onto the floor. Fall was unwitnessed. Patient is unsure if she fell asleep or passed out as the cause. Falling asleep when arrived to ER when not having conversation. Denies pain. Denies hitting head. Is on blood thinners. Healing bruising on face and extremities from past falls. Presented via [facility]. Patient did not want to come to ER, no complaints.

## 2023-12-29 NOTE — DISCHARGE INSTRUCTIONS
It was a pleasure taking care of you today. We saw you for an episode of collapsing. We recommend that you hold your warfarin for the next 2 days to help prevent any bleeding risk.    Please follow up with your primary care provider in 1 to 2 weeks for a recheck.  Please get your INR checked on 1/2 as previously scheduled.  Please return to the emergency department if you develop symptoms like nosebleeds, black or bloody stools, new bruising, bleeding from your mouth or gums, vomiting blood, or if your symptoms persist or get worse. Take care. Feel better.

## 2023-12-29 NOTE — PROGRESS NOTES
SUBJECTIVE:  Shannon Walls, 89 year old, female presents with the following Chief Complaint(s) with HPI to follow:   Chief Complaint   Patient presents with    WOUND CARE     Skin tear          HPI:  Shannon is here for the re-assessment and treatment of multiple bilateral lower extremity wounds to her legs and feet.  She was in the Emergency department five days ago due to a fall at the assisted living facility     MED REC REQUIRED  Post Medication Reconciliation Status:  Unable to reconcile discharge medications     Wound history:   Shannon was seen in the ER on 10/21/23 with leg swelling. She also had noted skin tears.    She was seen in wound care for the first time on 10/31/23 by Maame Campbell.  Her forehead is noted as being suspicious for skin cancer.  A biopsy was done in the past, (9/25/23) not conclusive. This area scabs, falls off and re-develops.    She lives in assisted living and staff are changing her left leg dressing daily with xeroform, gauze, kerlix and tubigrip.  They are also changing her forehead dressing with silver foam.      Wound History:  10/21/23- Wounds developed, visit to ER  10/31/23- First visit to wound care.  Her forehead was dressed with silver foam and xeroform, gauze, kerlix and tubigrip size F to her left leg and tubigrip only to the right leg.  Tubigrip size F only to right leg.   10/31/23- MARÍA done:   FINDINGS: No Doppler flow was identified in the left posterior tibial  artery. The right posterior tibial artery was noncompressible. The  dorsalis pedis ankle brachial indices were 0.52 on the right and 0.55  on the left.  11/17/23- Switch to size G tubigrip, which are loose, this is not really any compression but can help to hold dressings in place.  Add silver foam to right 2nd toe also.  Wound culture done on anterior left leg wound.  Positive for 4+ staph aureus, treated with Clindamycin.     Patient Active Problem List   Diagnosis    Pathological fracture L4 vertebrae     Fracture of sacrum (H)    Permanent atrial fibrillation (H)    Altered mental status, unspecified altered mental status type    Acquired hypothyroidism    Anxiety disorder, unspecified    Chronic systolic heart failure (H)    Gastroesophageal reflux disease without esophagitis    Ischemic stroke (H)    Atrial fibrillation with RVR (H)    Fall at home, initial encounter       Past Medical History:   Diagnosis Date    Atrial fibrillation with rapid ventricular response (H)        Past Surgical History:   Procedure Laterality Date    EYE SURGERY      OPEN REDUCTION INTERNAL FIXATION WRIST Right 8/22/2018    Procedure: OPEN REDUCTION INTERNAL FIXATION WRIST;  OPEN REDUCTION INTERNAL FIXATION RIGHT DISTAL RADIUS;  Surgeon: Taqueria Edwards MD;  Location: [facility]       History reviewed. No pertinent family history.    Social History     Tobacco Use    Smoking status: Former    Smokeless tobacco: Never   Substance Use Topics    Alcohol use: Not on file       Current Outpatient Medications   Medication Sig Dispense Refill    acetaminophen (TYLENOL) 650 MG CR tablet Take 650 mg by mouth every 4 hours as needed for mild pain or fever      atorvastatin (LIPITOR) 40 MG tablet Take 1 tablet by mouth at bedtime      calcium carbonate (OS-EFFIE) 1500 (600 Ca) MG tablet Take 600 mg by mouth daily       digoxin (LANOXIN) 125 MCG tablet Take 62.5 mcg by mouth daily       furosemide (LASIX) 20 MG tablet Take 20 mg by mouth daily      GAVILAX 17 GM/SCOOP powder Take 17 g by mouth daily      LORazepam (ATIVAN) 0.5 MG tablet Take 0.5 mg by mouth At Bedtime       metoprolol succinate ER (TOPROL XL) 50 MG 24 hr tablet Take 1 tablet (50 mg) by mouth daily      multivitamin, therapeutic (THERA-VIT) TABS tablet Take 1 tablet by mouth daily      NP THYROID 30 MG tablet Take 30 mg by mouth daily      omeprazole (PRILOSEC) 20 MG DR capsule Take 20 mg by mouth daily      predniSONE (DELTASONE) 5 MG tablet Take 5 mg by mouth daily      traMADol (ULTRAM)  50 MG tablet Take 50 mg by mouth 2 times daily      Vitamin D, Cholecalciferol, 10 MCG (400 UNIT) TABS Take 1 tablet by mouth daily      warfarin ANTICOAGULANT (COUMADIN) 2 MG tablet Take 1 tablet (2 mg) by mouth daily Check INR to adjust dose by 12/21         Allergies   Allergen Reactions    Levothyroxine Shortness Of Breath and Swelling    Nitrogen Swelling and Blisters     Liquid nitrogen       REVIEW OF SYSTEMS  Skin: as above  Eyes: glasses  Ears/Nose/Throat: hearing loss  Respiratory: Shortness of breath- with exertion  Cardiovascular: palpitations, irregular heart beat, and lower extremity edema  Gastrointestinal: negative  Genitourinary: negative  Musculoskeletal: back pain, arthritis, joint pain, and kyphosis  Neurologic: memory problems  Psychiatric: negative  Hematologic/Lymphatic/Immunologic: negative  Endocrine: thyroid disorder    OBJECTIVE:    B/P: 100/69, T: 97.6, P: 66, R: Data Unavailable, W: 0 lbs 0 oz, BMI: There is no height or weight on file to calculate BMI.  Constitutional: alert and no distress  Head: Normocephalic. No masses, lesions, tenderness or abnormalities; Ecchymosis to the forehead  Cardiovascular:   Lower Extremity Perfusion Assessment (10/31/23): done by Maame Campbell NP  Right lower extremity:  Warmth: cool toes  Dorsal pedal pulse: yes, faint via doppler              Posterior tibial pulse: yes, via doppler--mono              Skin color: pink              Edema: yes, +3/+4  Left lower extremity:  Warmth: cool toes  Dorsal pedal pulse: yes, via doppler--mono              Posterior tibial pulse: yes, faint via doppler              Skin color: pink              Edema: +3/+4  Intervention: MARÍA done 10/31, FINDINGS: No Doppler flow was identified in the left posterior tibial  artery. The right posterior tibial artery was noncompressible. The  dorsalis pedis ankle brachial indices were 0.52 on the right and 0.55  on the left.  Respiratory:  Respirations even and  unlabored  Gastrointestinal: Abdomen soft, non-tender.   : Deferred  Musculoskeletal: sever kyphosis; arrived in a wheelchair; transfers with assist of one to exam chair  Skin: ecchymoses - head       Wound description:     Type of Wound:  stasis ulcers   Location:  right lower extremity    Drainage amount:  moderate   Drainage color:  serous - tan   Odor:  none   Wound bed:  please see pictures below   Depth:  full thickness   Surrounding skin:  fragile/intact        Measurements:      Right knee - 1.5 x 0.5 cm    Right 2nd toe - 0.5 x 0.3 cm (probes to bone - not a new finding)   Pain:  denies   Wound debridement completed on: 12/27/2023, debridement type: Instrument        Dressing change:      Wounds cleansed with mild soap, rinse, dried.  Verbal consent obtained for debridement. Sharp excisional debridement performed with ring curette to remove non-viable tissue (2 sq cm) to the level of the subcutaneous.  Dressed with Mepilex Ag foam, cut to fit each wound, secured each with medipore tape.          Wound description:     Type of Wound:  stasis ulcers   Location:  left lower extremity    Drainage amount:  moderate   Drainage color:  serous - tan   Odor:  none   Wound bed:  please see pictures below   Depth:  full thickness   Surrounding skin:  fragile, intact        Measurements:      Left 5th toe - 0.3 x 0.3 x 0.3 cm    Left shin medial - 2.5 x 1.5 x 1.7 cm    Left shin lateral - 0.5 x 0.2 x 0.2 cm    Left lateral leg  - 3 x 1 cm   Pain:  Denies   Wound debridement completed on: 1/27/2023, debridement type: sharp        Dressing change:      Wound cleansed with mild soap, rinse, dried.  Sharp excisional debridement performed with ring curette to remove non-viable tissue (6 sq cm) into the level of the subcutaneous.  Patient was informed of the risks and benefits and consented to the procedure.  Dressed with Mepilex Ag foam to each wound(except left lateral leg), secured with medipore tape.  Left lateral leg  dressed with Aquacel Ag 2 cm hydrofiber rope, dampened with normal saline, covered with dry gauze folded in half, secured with medipore tape.         Neurologic: Sensation grossly WNL.  Psychiatric: affect normal/bright and sits with eyes closed unless she is spoken to    THERAPY GOAL:    Complete healing    ASSESSMENT / PLAN:  Comments:  She is following with Dr. Guerin, podiatrist, for the toe wound which probes to bone  Plan:  Dressing changes as outlined in the after visit summary    Follow-up in two weeks or as needed for acute concerns.    Mei Bernard CNS  Surgery and Wound Care  [facility]

## 2024-01-01 ENCOUNTER — LAB REQUISITION (OUTPATIENT)
Dept: LAB | Facility: HOSPITAL | Age: 89
End: 2024-01-01
Payer: MEDICARE

## 2024-01-01 ENCOUNTER — APPOINTMENT (OUTPATIENT)
Dept: GENERAL RADIOLOGY | Facility: HOSPITAL | Age: 89
DRG: 603 | End: 2024-01-01
Attending: STUDENT IN AN ORGANIZED HEALTH CARE EDUCATION/TRAINING PROGRAM
Payer: MEDICARE

## 2024-01-01 ENCOUNTER — APPOINTMENT (OUTPATIENT)
Dept: PHYSICAL THERAPY | Facility: HOSPITAL | Age: 89
DRG: 308 | End: 2024-01-01
Payer: MEDICARE

## 2024-01-01 ENCOUNTER — HOSPITAL ENCOUNTER (INPATIENT)
Facility: HOSPITAL | Age: 89
LOS: 8 days | Discharge: NURSING, REHAB OR LONG-TERM CARE | DRG: 603 | End: 2024-03-19
Attending: STUDENT IN AN ORGANIZED HEALTH CARE EDUCATION/TRAINING PROGRAM | Admitting: HOSPITALIST
Payer: MEDICARE

## 2024-01-01 ENCOUNTER — APPOINTMENT (OUTPATIENT)
Dept: OCCUPATIONAL THERAPY | Facility: HOSPITAL | Age: 89
DRG: 308 | End: 2024-01-01
Attending: NURSE PRACTITIONER
Payer: MEDICARE

## 2024-01-01 ENCOUNTER — APPOINTMENT (OUTPATIENT)
Dept: PHYSICAL THERAPY | Facility: HOSPITAL | Age: 89
DRG: 308 | End: 2024-01-01
Attending: NURSE PRACTITIONER
Payer: MEDICARE

## 2024-01-01 ENCOUNTER — MEDICAL CORRESPONDENCE (OUTPATIENT)
Dept: HEALTH INFORMATION MANAGEMENT | Facility: HOSPITAL | Age: 89
End: 2024-01-01

## 2024-01-01 ENCOUNTER — APPOINTMENT (OUTPATIENT)
Dept: CT IMAGING | Facility: HOSPITAL | Age: 89
End: 2024-01-01
Attending: EMERGENCY MEDICINE
Payer: MEDICARE

## 2024-01-01 ENCOUNTER — VIRTUAL VISIT (OUTPATIENT)
Dept: CARDIOLOGY | Facility: OTHER | Age: 89
End: 2024-01-01
Attending: SURGERY
Payer: MEDICARE

## 2024-01-01 ENCOUNTER — APPOINTMENT (OUTPATIENT)
Dept: PHYSICAL THERAPY | Facility: HOSPITAL | Age: 89
DRG: 603 | End: 2024-01-01
Payer: MEDICARE

## 2024-01-01 ENCOUNTER — OFFICE VISIT (OUTPATIENT)
Dept: WOUND CARE | Facility: OTHER | Age: 89
End: 2024-01-01
Attending: CLINICAL NURSE SPECIALIST
Payer: MEDICARE

## 2024-01-01 ENCOUNTER — TELEPHONE (OUTPATIENT)
Dept: WOUND CARE | Facility: OTHER | Age: 89
End: 2024-01-01

## 2024-01-01 ENCOUNTER — OFFICE VISIT (OUTPATIENT)
Dept: CARDIOLOGY | Facility: OTHER | Age: 89
End: 2024-01-01
Attending: INTERNAL MEDICINE
Payer: MEDICARE

## 2024-01-01 ENCOUNTER — OFFICE VISIT (OUTPATIENT)
Dept: WOUND CARE | Facility: OTHER | Age: 89
DRG: 308 | End: 2024-01-01
Attending: CLINICAL NURSE SPECIALIST
Payer: MEDICARE

## 2024-01-01 ENCOUNTER — APPOINTMENT (OUTPATIENT)
Dept: CT IMAGING | Facility: HOSPITAL | Age: 89
End: 2024-01-01
Attending: STUDENT IN AN ORGANIZED HEALTH CARE EDUCATION/TRAINING PROGRAM
Payer: MEDICARE

## 2024-01-01 ENCOUNTER — HOSPITAL ENCOUNTER (EMERGENCY)
Facility: HOSPITAL | Age: 89
Discharge: ANOTHER HOSPITAL | End: 2024-04-27
Attending: EMERGENCY MEDICINE | Admitting: EMERGENCY MEDICINE
Payer: MEDICARE

## 2024-01-01 ENCOUNTER — APPOINTMENT (OUTPATIENT)
Dept: GENERAL RADIOLOGY | Facility: HOSPITAL | Age: 89
DRG: 308 | End: 2024-01-01
Attending: STUDENT IN AN ORGANIZED HEALTH CARE EDUCATION/TRAINING PROGRAM
Payer: MEDICARE

## 2024-01-01 ENCOUNTER — APPOINTMENT (OUTPATIENT)
Dept: ULTRASOUND IMAGING | Facility: HOSPITAL | Age: 89
DRG: 603 | End: 2024-01-01
Attending: STUDENT IN AN ORGANIZED HEALTH CARE EDUCATION/TRAINING PROGRAM
Payer: MEDICARE

## 2024-01-01 ENCOUNTER — VIRTUAL VISIT (OUTPATIENT)
Dept: VASCULAR SURGERY | Facility: CLINIC | Age: 89
End: 2024-01-01
Payer: MEDICARE

## 2024-01-01 ENCOUNTER — APPOINTMENT (OUTPATIENT)
Dept: CARDIOLOGY | Facility: HOSPITAL | Age: 89
DRG: 308 | End: 2024-01-01
Attending: NURSE PRACTITIONER
Payer: MEDICARE

## 2024-01-01 ENCOUNTER — TELEPHONE (OUTPATIENT)
Dept: CARDIOLOGY | Facility: OTHER | Age: 89
End: 2024-01-01

## 2024-01-01 ENCOUNTER — APPOINTMENT (OUTPATIENT)
Dept: GENERAL RADIOLOGY | Facility: HOSPITAL | Age: 89
End: 2024-01-01
Attending: STUDENT IN AN ORGANIZED HEALTH CARE EDUCATION/TRAINING PROGRAM
Payer: MEDICARE

## 2024-01-01 ENCOUNTER — OFFICE VISIT (OUTPATIENT)
Dept: WOUND CARE | Facility: OTHER | Age: 89
End: 2024-01-01
Attending: FAMILY MEDICINE
Payer: MEDICARE

## 2024-01-01 ENCOUNTER — OFFICE VISIT (OUTPATIENT)
Dept: CARDIOLOGY | Facility: OTHER | Age: 89
End: 2024-01-01
Attending: NURSE PRACTITIONER
Payer: MEDICARE

## 2024-01-01 ENCOUNTER — MEDICAL CORRESPONDENCE (OUTPATIENT)
Dept: HEALTH INFORMATION MANAGEMENT | Facility: CLINIC | Age: 89
End: 2024-01-01
Payer: MEDICARE

## 2024-01-01 ENCOUNTER — APPOINTMENT (OUTPATIENT)
Dept: PHYSICAL THERAPY | Facility: HOSPITAL | Age: 89
DRG: 603 | End: 2024-01-01
Attending: INTERNAL MEDICINE
Payer: MEDICARE

## 2024-01-01 ENCOUNTER — OFFICE VISIT (OUTPATIENT)
Dept: PODIATRY | Facility: OTHER | Age: 89
DRG: 308 | End: 2024-01-01
Attending: PODIATRIST
Payer: MEDICARE

## 2024-01-01 ENCOUNTER — APPOINTMENT (OUTPATIENT)
Dept: ULTRASOUND IMAGING | Facility: HOSPITAL | Age: 89
End: 2024-01-01
Attending: EMERGENCY MEDICINE
Payer: MEDICARE

## 2024-01-01 ENCOUNTER — HOSPITAL ENCOUNTER (EMERGENCY)
Facility: HOSPITAL | Age: 89
Discharge: HOME | End: 2024-03-29
Attending: STUDENT IN AN ORGANIZED HEALTH CARE EDUCATION/TRAINING PROGRAM | Admitting: STUDENT IN AN ORGANIZED HEALTH CARE EDUCATION/TRAINING PROGRAM
Payer: MEDICARE

## 2024-01-01 ENCOUNTER — HOSPITAL ENCOUNTER (INPATIENT)
Facility: HOSPITAL | Age: 89
LOS: 3 days | Discharge: NURSING, REHAB OR LONG-TERM CARE | DRG: 308 | End: 2024-02-18
Attending: STUDENT IN AN ORGANIZED HEALTH CARE EDUCATION/TRAINING PROGRAM | Admitting: NURSE PRACTITIONER
Payer: MEDICARE

## 2024-01-01 VITALS
OXYGEN SATURATION: 95 % | RESPIRATION RATE: 16 BRPM | SYSTOLIC BLOOD PRESSURE: 115 MMHG | TEMPERATURE: 97.4 F | HEART RATE: 80 BPM | DIASTOLIC BLOOD PRESSURE: 91 MMHG

## 2024-01-01 VITALS
DIASTOLIC BLOOD PRESSURE: 68 MMHG | OXYGEN SATURATION: 93 % | WEIGHT: 106.7 LBS | SYSTOLIC BLOOD PRESSURE: 112 MMHG | HEART RATE: 87 BPM | HEIGHT: 59 IN | RESPIRATION RATE: 16 BRPM | BODY MASS INDEX: 21.51 KG/M2 | TEMPERATURE: 97.8 F

## 2024-01-01 VITALS
WEIGHT: 106.7 LBS | SYSTOLIC BLOOD PRESSURE: 112 MMHG | DIASTOLIC BLOOD PRESSURE: 68 MMHG | HEIGHT: 59 IN | HEART RATE: 87 BPM | OXYGEN SATURATION: 93 % | BODY MASS INDEX: 21.51 KG/M2 | TEMPERATURE: 97.8 F | RESPIRATION RATE: 16 BRPM

## 2024-01-01 VITALS
OXYGEN SATURATION: 96 % | BODY MASS INDEX: 21.79 KG/M2 | SYSTOLIC BLOOD PRESSURE: 110 MMHG | HEART RATE: 68 BPM | HEIGHT: 59 IN | DIASTOLIC BLOOD PRESSURE: 68 MMHG | TEMPERATURE: 97.6 F | WEIGHT: 108.1 LBS

## 2024-01-01 VITALS
SYSTOLIC BLOOD PRESSURE: 128 MMHG | TEMPERATURE: 97.4 F | DIASTOLIC BLOOD PRESSURE: 88 MMHG | RESPIRATION RATE: 16 BRPM | OXYGEN SATURATION: 93 % | HEART RATE: 100 BPM

## 2024-01-01 VITALS
WEIGHT: 102 LBS | RESPIRATION RATE: 16 BRPM | HEIGHT: 59 IN | SYSTOLIC BLOOD PRESSURE: 122 MMHG | BODY MASS INDEX: 20.56 KG/M2 | OXYGEN SATURATION: 97 % | DIASTOLIC BLOOD PRESSURE: 88 MMHG | HEART RATE: 72 BPM

## 2024-01-01 VITALS
TEMPERATURE: 98.4 F | RESPIRATION RATE: 18 BRPM | DIASTOLIC BLOOD PRESSURE: 74 MMHG | OXYGEN SATURATION: 95 % | HEART RATE: 97 BPM | SYSTOLIC BLOOD PRESSURE: 108 MMHG

## 2024-01-01 VITALS
BODY MASS INDEX: 22.09 KG/M2 | SYSTOLIC BLOOD PRESSURE: 114 MMHG | WEIGHT: 109.57 LBS | OXYGEN SATURATION: 92 % | TEMPERATURE: 96.5 F | RESPIRATION RATE: 18 BRPM | HEIGHT: 59 IN | HEART RATE: 68 BPM | DIASTOLIC BLOOD PRESSURE: 71 MMHG

## 2024-01-01 VITALS
SYSTOLIC BLOOD PRESSURE: 126 MMHG | HEART RATE: 95 BPM | HEIGHT: 59 IN | WEIGHT: 95.9 LBS | BODY MASS INDEX: 19.33 KG/M2 | TEMPERATURE: 96.8 F | DIASTOLIC BLOOD PRESSURE: 92 MMHG | OXYGEN SATURATION: 91 % | RESPIRATION RATE: 16 BRPM

## 2024-01-01 VITALS
RESPIRATION RATE: 20 BRPM | OXYGEN SATURATION: 91 % | HEART RATE: 88 BPM | DIASTOLIC BLOOD PRESSURE: 58 MMHG | TEMPERATURE: 96.5 F | SYSTOLIC BLOOD PRESSURE: 96 MMHG

## 2024-01-01 VITALS
TEMPERATURE: 97.7 F | SYSTOLIC BLOOD PRESSURE: 139 MMHG | RESPIRATION RATE: 22 BRPM | DIASTOLIC BLOOD PRESSURE: 100 MMHG | HEART RATE: 125 BPM

## 2024-01-01 VITALS
TEMPERATURE: 98.6 F | SYSTOLIC BLOOD PRESSURE: 120 MMHG | HEART RATE: 93 BPM | DIASTOLIC BLOOD PRESSURE: 68 MMHG | OXYGEN SATURATION: 96 %

## 2024-01-01 VITALS
DIASTOLIC BLOOD PRESSURE: 88 MMHG | WEIGHT: 99.6 LBS | HEART RATE: 89 BPM | BODY MASS INDEX: 20.08 KG/M2 | OXYGEN SATURATION: 92 % | SYSTOLIC BLOOD PRESSURE: 122 MMHG | HEIGHT: 59 IN

## 2024-01-01 VITALS
DIASTOLIC BLOOD PRESSURE: 92 MMHG | SYSTOLIC BLOOD PRESSURE: 138 MMHG | TEMPERATURE: 97.7 F | HEART RATE: 160 BPM | RESPIRATION RATE: 22 BRPM

## 2024-01-01 VITALS — TEMPERATURE: 97.6 F | DIASTOLIC BLOOD PRESSURE: 88 MMHG | SYSTOLIC BLOOD PRESSURE: 122 MMHG

## 2024-01-01 VITALS
HEART RATE: 92 BPM | SYSTOLIC BLOOD PRESSURE: 97 MMHG | TEMPERATURE: 96.1 F | DIASTOLIC BLOOD PRESSURE: 60 MMHG | OXYGEN SATURATION: 95 %

## 2024-01-01 DIAGNOSIS — S41.111A SKIN TEAR OF RIGHT UPPER ARM WITHOUT COMPLICATION, INITIAL ENCOUNTER: ICD-10-CM

## 2024-01-01 DIAGNOSIS — I50.22 CHRONIC SYSTOLIC HEART FAILURE (H): ICD-10-CM

## 2024-01-01 DIAGNOSIS — S91.105A OPEN WOUND OF LESSER TOE OF LEFT FOOT: ICD-10-CM

## 2024-01-01 DIAGNOSIS — I73.9 PAD (PERIPHERAL ARTERY DISEASE) (H): ICD-10-CM
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
## 2024-01-01 DIAGNOSIS — I48.21 PERMANENT ATRIAL FIBRILLATION (H): ICD-10-CM

## 2024-01-01 DIAGNOSIS — L03.031 CELLULITIS OF TOE OF RIGHT FOOT: Primary | ICD-10-CM

## 2024-01-01 DIAGNOSIS — R60.0 PERIPHERAL EDEMA: ICD-10-CM

## 2024-01-01 DIAGNOSIS — S32.050A COMPRESSION FRACTURE OF L5 VERTEBRA, INITIAL ENCOUNTER (H): ICD-10-CM

## 2024-01-01 DIAGNOSIS — S91.104A UNSPECIFIED OPEN WOUND OF RIGHT LESSER TOE(S) WITHOUT DAMAGE TO NAIL, INITIAL ENCOUNTER: ICD-10-CM

## 2024-01-01 DIAGNOSIS — S81.802A OPEN WOUND OF LEFT LOWER LEG, INITIAL ENCOUNTER: ICD-10-CM

## 2024-01-01 DIAGNOSIS — S22.000A COMPRESSION FRACTURE OF THORACIC VERTEBRA, UNSPECIFIED THORACIC VERTEBRAL LEVEL, INITIAL ENCOUNTER (H): ICD-10-CM

## 2024-01-01 DIAGNOSIS — I50.42 CHRONIC COMBINED SYSTOLIC AND DIASTOLIC CONGESTIVE HEART FAILURE (H): ICD-10-CM

## 2024-01-01 DIAGNOSIS — L97.319 STASIS ULCER OF ANKLE, RIGHT (H): ICD-10-CM

## 2024-01-01 DIAGNOSIS — S41.112A SKIN TEAR OF LEFT UPPER ARM WITHOUT COMPLICATION, INITIAL ENCOUNTER: ICD-10-CM

## 2024-01-01 DIAGNOSIS — I35.0 MILD AORTIC STENOSIS: ICD-10-CM

## 2024-01-01 DIAGNOSIS — I48.91 ATRIAL FIBRILLATION WITH RVR (H): Primary | ICD-10-CM

## 2024-01-01 DIAGNOSIS — E78.2 MIXED HYPERLIPIDEMIA: ICD-10-CM

## 2024-01-01 DIAGNOSIS — S81.802A OPEN WOUND OF LEFT LOWER LEG, INITIAL ENCOUNTER: Primary | ICD-10-CM

## 2024-01-01 DIAGNOSIS — L97.222 NON-PRESSURE CHRONIC ULCER OF LEFT CALF WITH FAT LAYER EXPOSED (H): ICD-10-CM

## 2024-01-01 DIAGNOSIS — S22.060A COMPRESSION FRACTURE OF T7 VERTEBRA, INITIAL ENCOUNTER (H): ICD-10-CM

## 2024-01-01 DIAGNOSIS — Z86.73 HISTORY OF CVA (CEREBROVASCULAR ACCIDENT): ICD-10-CM

## 2024-01-01 DIAGNOSIS — S81.801D OPEN WOUND OF BOTH LOWER EXTREMITIES, SUBSEQUENT ENCOUNTER: ICD-10-CM

## 2024-01-01 DIAGNOSIS — I83.029 STASIS ULCER OF LEFT LOWER EXTREMITY (H): ICD-10-CM

## 2024-01-01 DIAGNOSIS — R79.9 ELEVATED BUN: ICD-10-CM

## 2024-01-01 DIAGNOSIS — G89.29 CHRONIC RIGHT SHOULDER PAIN: ICD-10-CM

## 2024-01-01 DIAGNOSIS — S22.31XA CLOSED FRACTURE OF ONE RIB OF RIGHT SIDE, INITIAL ENCOUNTER: ICD-10-CM

## 2024-01-01 DIAGNOSIS — L60.3 ONYCHODYSTROPHY: Primary | ICD-10-CM

## 2024-01-01 DIAGNOSIS — S32.110A CLOSED NONDISPLACED ZONE I FRACTURE OF SACRUM, INITIAL ENCOUNTER (H): ICD-10-CM

## 2024-01-01 DIAGNOSIS — Z79.01 CHRONIC ANTICOAGULATION: Primary | ICD-10-CM

## 2024-01-01 DIAGNOSIS — Z87.891 HISTORY OF TOBACCO ABUSE: ICD-10-CM

## 2024-01-01 DIAGNOSIS — I51.3 APICAL MURAL THROMBUS: ICD-10-CM

## 2024-01-01 DIAGNOSIS — S22.060A COMPRESSION FRACTURE OF T8 VERTEBRA, INITIAL ENCOUNTER (H): ICD-10-CM

## 2024-01-01 DIAGNOSIS — I83.019 STASIS LEG ULCER, RIGHT (H): ICD-10-CM

## 2024-01-01 DIAGNOSIS — N18.31 STAGE 3A CHRONIC KIDNEY DISEASE (H): ICD-10-CM

## 2024-01-01 DIAGNOSIS — I48.91 ATRIAL FIBRILLATION WITH RAPID VENTRICULAR RESPONSE (H): ICD-10-CM

## 2024-01-01 DIAGNOSIS — S81.802D OPEN WOUND OF BOTH LOWER EXTREMITIES, SUBSEQUENT ENCOUNTER: ICD-10-CM

## 2024-01-01 DIAGNOSIS — K21.9 GASTROESOPHAGEAL REFLUX DISEASE WITHOUT ESOPHAGITIS: ICD-10-CM

## 2024-01-01 DIAGNOSIS — L97.922 NONHEALING ULCER OF LEFT LOWER LEG WITH FAT LAYER EXPOSED (H): ICD-10-CM

## 2024-01-01 DIAGNOSIS — A49.9 BACTERIAL INFECTION, UNSPECIFIED: ICD-10-CM

## 2024-01-01 DIAGNOSIS — R79.89 ELEVATED BRAIN NATRIURETIC PEPTIDE (BNP) LEVEL: ICD-10-CM

## 2024-01-01 DIAGNOSIS — L97.919 STASIS LEG ULCER, RIGHT (H): ICD-10-CM

## 2024-01-01 DIAGNOSIS — Z79.01 CHRONIC ANTICOAGULATION: ICD-10-CM

## 2024-01-01 DIAGNOSIS — L89.622 PRESSURE INJURY OF LEFT HEEL, STAGE 2 (H): ICD-10-CM

## 2024-01-01 DIAGNOSIS — R11.0 NAUSEA: ICD-10-CM

## 2024-01-01 DIAGNOSIS — I83.013 STASIS ULCER OF ANKLE, RIGHT (H): ICD-10-CM

## 2024-01-01 DIAGNOSIS — I73.9 PAD (PERIPHERAL ARTERY DISEASE) (H): Primary | ICD-10-CM

## 2024-01-01 DIAGNOSIS — L97.929 STASIS ULCER OF LEFT LOWER EXTREMITY (H): ICD-10-CM

## 2024-01-01 DIAGNOSIS — I48.91 ATRIAL FIBRILLATION WITH RAPID VENTRICULAR RESPONSE (H): Primary | ICD-10-CM

## 2024-01-01 DIAGNOSIS — M25.511 CHRONIC RIGHT SHOULDER PAIN: ICD-10-CM

## 2024-01-01 DIAGNOSIS — S22.050A COMPRESSION FRACTURE OF T5 VERTEBRA, INITIAL ENCOUNTER (H): ICD-10-CM

## 2024-01-01 DIAGNOSIS — L03.115 CELLULITIS OF RIGHT LEG: ICD-10-CM

## 2024-01-01 DIAGNOSIS — Z09 HOSPITAL DISCHARGE FOLLOW-UP: ICD-10-CM

## 2024-01-01 LAB
ABO/RH(D): NORMAL
ALBUMIN SERPL BCG-MCNC: 2.8 G/DL (ref 3.5–5.2)
ALBUMIN SERPL BCG-MCNC: 2.8 G/DL (ref 3.5–5.2)
ALBUMIN SERPL BCG-MCNC: 2.9 G/DL (ref 3.5–5.2)
ALBUMIN SERPL BCG-MCNC: 3.5 G/DL (ref 3.5–5.2)
ALBUMIN UR-MCNC: 30 MG/DL
ALP SERPL-CCNC: 109 U/L (ref 40–150)
ALP SERPL-CCNC: 115 U/L (ref 40–150)
ALP SERPL-CCNC: 116 U/L (ref 40–150)
ALP SERPL-CCNC: 160 U/L (ref 40–150)
ALT SERPL W P-5'-P-CCNC: 10 U/L (ref 0–50)
ALT SERPL W P-5'-P-CCNC: 17 U/L (ref 0–50)
ALT SERPL W P-5'-P-CCNC: 20 U/L (ref 0–50)
ALT SERPL W P-5'-P-CCNC: 31 U/L (ref 0–50)
ANION GAP SERPL CALCULATED.3IONS-SCNC: 10 MMOL/L (ref 7–15)
ANION GAP SERPL CALCULATED.3IONS-SCNC: 10 MMOL/L (ref 7–15)
ANION GAP SERPL CALCULATED.3IONS-SCNC: 11 MMOL/L (ref 7–15)
ANION GAP SERPL CALCULATED.3IONS-SCNC: 12 MMOL/L (ref 7–15)
ANION GAP SERPL CALCULATED.3IONS-SCNC: 16 MMOL/L (ref 7–15)
ANION GAP SERPL CALCULATED.3IONS-SCNC: 6 MMOL/L (ref 7–15)
ANION GAP SERPL CALCULATED.3IONS-SCNC: 7 MMOL/L (ref 7–15)
ANION GAP SERPL CALCULATED.3IONS-SCNC: 9 MMOL/L (ref 7–15)
ANTIBODY SCREEN: NEGATIVE
APPEARANCE UR: CLEAR
AST SERPL W P-5'-P-CCNC: 21 U/L (ref 0–45)
AST SERPL W P-5'-P-CCNC: 24 U/L (ref 0–45)
AST SERPL W P-5'-P-CCNC: 24 U/L (ref 0–45)
AST SERPL W P-5'-P-CCNC: 34 U/L (ref 0–45)
ATRIAL RATE - MUSE: NORMAL BPM
BACTERIA BLD CULT: NO GROWTH
BACTERIA BLD CULT: NO GROWTH
BACTERIA WND CULT: ABNORMAL
BASE EXCESS BLDV CALC-SCNC: 4.3 MMOL/L (ref -3–3)
BASE EXCESS BLDV CALC-SCNC: 8.6 MMOL/L (ref -3–3)
BASOPHILS # BLD AUTO: 0 10E3/UL (ref 0–0.2)
BASOPHILS # BLD AUTO: 0 10E3/UL (ref 0–0.2)
BASOPHILS # BLD AUTO: 0.1 10E3/UL (ref 0–0.2)
BASOPHILS # BLD MANUAL: 0 10E3/UL (ref 0–0.2)
BASOPHILS NFR BLD AUTO: 0 %
BASOPHILS NFR BLD AUTO: 1 %
BASOPHILS NFR BLD AUTO: 1 %
BASOPHILS NFR BLD MANUAL: 0 %
BILIRUB SERPL-MCNC: 0.3 MG/DL
BILIRUB SERPL-MCNC: 0.3 MG/DL
BILIRUB SERPL-MCNC: 0.5 MG/DL
BILIRUB SERPL-MCNC: 0.7 MG/DL
BILIRUB UR QL STRIP: NEGATIVE
BUN SERPL-MCNC: 21 MG/DL (ref 8–23)
BUN SERPL-MCNC: 22.6 MG/DL (ref 8–23)
BUN SERPL-MCNC: 23.2 MG/DL (ref 8–23)
BUN SERPL-MCNC: 23.4 MG/DL (ref 8–23)
BUN SERPL-MCNC: 24.5 MG/DL (ref 8–23)
BUN SERPL-MCNC: 25.8 MG/DL (ref 8–23)
BUN SERPL-MCNC: 26.7 MG/DL (ref 8–23)
BUN SERPL-MCNC: 26.8 MG/DL (ref 8–23)
BUN SERPL-MCNC: 27.3 MG/DL (ref 8–23)
BUN SERPL-MCNC: 27.8 MG/DL (ref 8–23)
BUN SERPL-MCNC: 29.1 MG/DL (ref 8–23)
BUN SERPL-MCNC: 35.9 MG/DL (ref 8–23)
BUN SERPL-MCNC: 36 MG/DL (ref 8–23)
CALCIUM SERPL-MCNC: 8.4 MG/DL (ref 8.2–9.6)
CALCIUM SERPL-MCNC: 8.4 MG/DL (ref 8.2–9.6)
CALCIUM SERPL-MCNC: 8.6 MG/DL (ref 8.2–9.6)
CALCIUM SERPL-MCNC: 8.7 MG/DL (ref 8.2–9.6)
CALCIUM SERPL-MCNC: 8.8 MG/DL (ref 8.2–9.6)
CALCIUM SERPL-MCNC: 8.8 MG/DL (ref 8.2–9.6)
CALCIUM SERPL-MCNC: 8.9 MG/DL (ref 8.2–9.6)
CALCIUM SERPL-MCNC: 9 MG/DL (ref 8.2–9.6)
CALCIUM SERPL-MCNC: 9.2 MG/DL (ref 8.2–9.6)
CALCIUM SERPL-MCNC: 9.4 MG/DL (ref 8.2–9.6)
CALCIUM SERPL-MCNC: 9.4 MG/DL (ref 8.2–9.6)
CALCIUM SERPL-MCNC: 9.5 MG/DL (ref 8.2–9.6)
CALCIUM SERPL-MCNC: 9.8 MG/DL (ref 8.2–9.6)
CHLORIDE SERPL-SCNC: 100 MMOL/L (ref 98–107)
CHLORIDE SERPL-SCNC: 100 MMOL/L (ref 98–107)
CHLORIDE SERPL-SCNC: 101 MMOL/L (ref 98–107)
CHLORIDE SERPL-SCNC: 102 MMOL/L (ref 98–107)
CHLORIDE SERPL-SCNC: 103 MMOL/L (ref 98–107)
CHLORIDE SERPL-SCNC: 104 MMOL/L (ref 98–107)
CHLORIDE SERPL-SCNC: 96 MMOL/L (ref 98–107)
CHLORIDE SERPL-SCNC: 97 MMOL/L (ref 98–107)
CHLORIDE SERPL-SCNC: 97 MMOL/L (ref 98–107)
CHLORIDE SERPL-SCNC: 98 MMOL/L (ref 98–107)
CHLORIDE SERPL-SCNC: 99 MMOL/L (ref 98–107)
COLOR UR AUTO: ABNORMAL
CREAT SERPL-MCNC: 1 MG/DL (ref 0.51–0.95)
CREAT SERPL-MCNC: 1.01 MG/DL (ref 0.51–0.95)
CREAT SERPL-MCNC: 1.05 MG/DL (ref 0.51–0.95)
CREAT SERPL-MCNC: 1.07 MG/DL (ref 0.51–0.95)
CREAT SERPL-MCNC: 1.1 MG/DL (ref 0.51–0.95)
CREAT SERPL-MCNC: 1.12 MG/DL (ref 0.51–0.95)
CREAT SERPL-MCNC: 1.12 MG/DL (ref 0.51–0.95)
CREAT SERPL-MCNC: 1.14 MG/DL (ref 0.51–0.95)
CREAT SERPL-MCNC: 1.16 MG/DL (ref 0.51–0.95)
CREAT SERPL-MCNC: 1.26 MG/DL (ref 0.51–0.95)
CREAT SERPL-MCNC: 1.27 MG/DL (ref 0.51–0.95)
CREAT SERPL-MCNC: 1.28 MG/DL (ref 0.51–0.95)
CREAT SERPL-MCNC: 1.42 MG/DL (ref 0.51–0.95)
CRP SERPL-MCNC: 103.55 MG/L
CRP SERPL-MCNC: 13.89 MG/L
CRP SERPL-MCNC: 22.62 MG/L
CRP SERPL-MCNC: 30.46 MG/L
CRP SERPL-MCNC: 84.6 MG/L
CRP SERPL-MCNC: 9.36 MG/L
DEPRECATED HCO3 PLAS-SCNC: 24 MMOL/L (ref 22–29)
DEPRECATED HCO3 PLAS-SCNC: 27 MMOL/L (ref 22–29)
DEPRECATED HCO3 PLAS-SCNC: 28 MMOL/L (ref 22–29)
DEPRECATED HCO3 PLAS-SCNC: 29 MMOL/L (ref 22–29)
DEPRECATED HCO3 PLAS-SCNC: 29 MMOL/L (ref 22–29)
DEPRECATED HCO3 PLAS-SCNC: 31 MMOL/L (ref 22–29)
DEPRECATED HCO3 PLAS-SCNC: 32 MMOL/L (ref 22–29)
DEPRECATED HCO3 PLAS-SCNC: 33 MMOL/L (ref 22–29)
DEPRECATED HCO3 PLAS-SCNC: 36 MMOL/L (ref 22–29)
DIASTOLIC BLOOD PRESSURE - MUSE: NORMAL MMHG
DIGOXIN SERPL-MCNC: 1.1 NG/ML (ref 0.6–2)
DIGOXIN SERPL-MCNC: 1.7 NG/ML (ref 0.6–2)
DIGOXIN SERPL-MCNC: <0.4 NG/ML (ref 0.6–2)
EGFRCR SERPLBLD CKD-EPI 2021: 35 ML/MIN/1.73M2
EGFRCR SERPLBLD CKD-EPI 2021: 39 ML/MIN/1.73M2
EGFRCR SERPLBLD CKD-EPI 2021: 40 ML/MIN/1.73M2
EGFRCR SERPLBLD CKD-EPI 2021: 40 ML/MIN/1.73M2
EGFRCR SERPLBLD CKD-EPI 2021: 44 ML/MIN/1.73M2
EGFRCR SERPLBLD CKD-EPI 2021: 45 ML/MIN/1.73M2
EGFRCR SERPLBLD CKD-EPI 2021: 46 ML/MIN/1.73M2
EGFRCR SERPLBLD CKD-EPI 2021: 46 ML/MIN/1.73M2
EGFRCR SERPLBLD CKD-EPI 2021: 47 ML/MIN/1.73M2
EGFRCR SERPLBLD CKD-EPI 2021: 49 ML/MIN/1.73M2
EGFRCR SERPLBLD CKD-EPI 2021: 50 ML/MIN/1.73M2
EGFRCR SERPLBLD CKD-EPI 2021: 52 ML/MIN/1.73M2
EGFRCR SERPLBLD CKD-EPI 2021: 53 ML/MIN/1.73M2
EOSINOPHIL # BLD AUTO: 0 10E3/UL (ref 0–0.7)
EOSINOPHIL # BLD AUTO: 0.1 10E3/UL (ref 0–0.7)
EOSINOPHIL # BLD AUTO: 0.1 10E3/UL (ref 0–0.7)
EOSINOPHIL # BLD MANUAL: 0.2 10E3/UL (ref 0–0.7)
EOSINOPHIL NFR BLD AUTO: 0 %
EOSINOPHIL NFR BLD AUTO: 0 %
EOSINOPHIL NFR BLD AUTO: 1 %
EOSINOPHIL NFR BLD MANUAL: 1 %
ERYTHROCYTE [DISTWIDTH] IN BLOOD BY AUTOMATED COUNT: 13.9 % (ref 10–15)
ERYTHROCYTE [DISTWIDTH] IN BLOOD BY AUTOMATED COUNT: 13.9 % (ref 10–15)
ERYTHROCYTE [DISTWIDTH] IN BLOOD BY AUTOMATED COUNT: 14.2 % (ref 10–15)
ERYTHROCYTE [DISTWIDTH] IN BLOOD BY AUTOMATED COUNT: 14.6 % (ref 10–15)
ERYTHROCYTE [DISTWIDTH] IN BLOOD BY AUTOMATED COUNT: 14.7 % (ref 10–15)
ERYTHROCYTE [DISTWIDTH] IN BLOOD BY AUTOMATED COUNT: 14.7 % (ref 10–15)
ERYTHROCYTE [DISTWIDTH] IN BLOOD BY AUTOMATED COUNT: 14.8 % (ref 10–15)
ERYTHROCYTE [DISTWIDTH] IN BLOOD BY AUTOMATED COUNT: 14.9 % (ref 10–15)
ERYTHROCYTE [DISTWIDTH] IN BLOOD BY AUTOMATED COUNT: 15.5 % (ref 10–15)
FLUAV RNA SPEC QL NAA+PROBE: NEGATIVE
FLUBV RNA RESP QL NAA+PROBE: NEGATIVE
GLUCOSE BLDC GLUCOMTR-MCNC: 58 MG/DL (ref 70–99)
GLUCOSE BLDC GLUCOMTR-MCNC: 82 MG/DL (ref 70–99)
GLUCOSE SERPL-MCNC: 103 MG/DL (ref 70–99)
GLUCOSE SERPL-MCNC: 103 MG/DL (ref 70–99)
GLUCOSE SERPL-MCNC: 104 MG/DL (ref 70–99)
GLUCOSE SERPL-MCNC: 110 MG/DL (ref 70–99)
GLUCOSE SERPL-MCNC: 119 MG/DL (ref 70–99)
GLUCOSE SERPL-MCNC: 124 MG/DL (ref 70–99)
GLUCOSE SERPL-MCNC: 132 MG/DL (ref 70–99)
GLUCOSE SERPL-MCNC: 73 MG/DL (ref 70–99)
GLUCOSE SERPL-MCNC: 84 MG/DL (ref 70–99)
GLUCOSE SERPL-MCNC: 86 MG/DL (ref 70–99)
GLUCOSE SERPL-MCNC: 93 MG/DL (ref 70–99)
GLUCOSE SERPL-MCNC: 95 MG/DL (ref 70–99)
GLUCOSE SERPL-MCNC: 98 MG/DL (ref 70–99)
GLUCOSE UR STRIP-MCNC: NEGATIVE MG/DL
H PYLORI AG STL QL IA: NEGATIVE
HCO3 BLDV-SCNC: 31 MMOL/L (ref 21–28)
HCO3 BLDV-SCNC: 34 MMOL/L (ref 21–28)
HCT VFR BLD AUTO: 36.1 % (ref 35–47)
HCT VFR BLD AUTO: 39.7 % (ref 35–47)
HCT VFR BLD AUTO: 40.8 % (ref 35–47)
HCT VFR BLD AUTO: 40.9 % (ref 35–47)
HCT VFR BLD AUTO: 41.2 % (ref 35–47)
HCT VFR BLD AUTO: 41.4 % (ref 35–47)
HCT VFR BLD AUTO: 42 % (ref 35–47)
HCT VFR BLD AUTO: 43.2 % (ref 35–47)
HCT VFR BLD AUTO: 43.2 % (ref 35–47)
HCT VFR BLD AUTO: 43.5 % (ref 35–47)
HCT VFR BLD AUTO: 44.7 % (ref 35–47)
HGB BLD-MCNC: 11.4 G/DL (ref 11.7–15.7)
HGB BLD-MCNC: 12.7 G/DL (ref 11.7–15.7)
HGB BLD-MCNC: 13 G/DL (ref 11.7–15.7)
HGB BLD-MCNC: 13.3 G/DL (ref 11.7–15.7)
HGB BLD-MCNC: 13.4 G/DL (ref 11.7–15.7)
HGB BLD-MCNC: 13.5 G/DL (ref 11.7–15.7)
HGB BLD-MCNC: 13.8 G/DL (ref 11.7–15.7)
HGB BLD-MCNC: 14.3 G/DL (ref 11.7–15.7)
HGB BLD-MCNC: 14.4 G/DL (ref 11.7–15.7)
HGB UR QL STRIP: NEGATIVE
HOLD SPECIMEN: NORMAL
HYALINE CASTS: 1 /LPF
IMM GRANULOCYTES # BLD: 0.1 10E3/UL
IMM GRANULOCYTES NFR BLD: 1 %
INR PPP: 1.74 (ref 0.85–1.15)
INR PPP: 2.39 (ref 0.85–1.15)
INR PPP: 2.71 (ref 0.85–1.15)
INR PPP: 2.87 (ref 0.85–1.15)
INR PPP: 3.12 (ref 0.85–1.15)
INR PPP: 3.81 (ref 0.85–1.15)
INR PPP: 3.94 (ref 0.85–1.15)
INR PPP: 4.66 (ref 0.85–1.15)
INR PPP: 4.69 (ref 0.85–1.15)
INR PPP: 5.14 (ref 0.85–1.15)
INR PPP: 5.44 (ref 0.85–1.15)
INR PPP: 5.71 (ref 0.85–1.15)
INR PPP: 6.29 (ref 0.85–1.15)
INR PPP: 7.57 (ref 0.85–1.15)
INTERPRETATION ECG - MUSE: NORMAL
KETONES UR STRIP-MCNC: NEGATIVE MG/DL
LACTATE SERPL-SCNC: 2.5 MMOL/L (ref 0.7–2)
LACTATE SERPL-SCNC: 2.7 MMOL/L (ref 0.7–2)
LACTATE SERPL-SCNC: 2.7 MMOL/L (ref 0.7–2)
LACTATE SERPL-SCNC: 3.2 MMOL/L (ref 0.7–2)
LACTATE SERPL-SCNC: 4 MMOL/L (ref 0.7–2)
LACTATE SERPL-SCNC: 4.4 MMOL/L (ref 0.7–2)
LEUKOCYTE ESTERASE UR QL STRIP: NEGATIVE
LVEF ECHO: NORMAL
LYMPHOCYTES # BLD AUTO: 0.8 10E3/UL (ref 0.8–5.3)
LYMPHOCYTES # BLD AUTO: 1.1 10E3/UL (ref 0.8–5.3)
LYMPHOCYTES # BLD AUTO: 1.9 10E3/UL (ref 0.8–5.3)
LYMPHOCYTES # BLD MANUAL: 1.7 10E3/UL (ref 0.8–5.3)
LYMPHOCYTES NFR BLD AUTO: 21 %
LYMPHOCYTES NFR BLD AUTO: 7 %
LYMPHOCYTES NFR BLD AUTO: 8 %
LYMPHOCYTES NFR BLD MANUAL: 8 %
MAGNESIUM SERPL-MCNC: 1.4 MG/DL (ref 1.7–2.3)
MAGNESIUM SERPL-MCNC: 1.6 MG/DL (ref 1.7–2.3)
MAGNESIUM SERPL-MCNC: 1.8 MG/DL (ref 1.7–2.3)
MCH RBC QN AUTO: 30.4 PG (ref 26.5–33)
MCH RBC QN AUTO: 30.5 PG (ref 26.5–33)
MCH RBC QN AUTO: 30.7 PG (ref 26.5–33)
MCH RBC QN AUTO: 30.9 PG (ref 26.5–33)
MCH RBC QN AUTO: 31 PG (ref 26.5–33)
MCH RBC QN AUTO: 31.1 PG (ref 26.5–33)
MCH RBC QN AUTO: 31.2 PG (ref 26.5–33)
MCHC RBC AUTO-ENTMCNC: 31.3 G/DL (ref 31.5–36.5)
MCHC RBC AUTO-ENTMCNC: 31.6 G/DL (ref 31.5–36.5)
MCHC RBC AUTO-ENTMCNC: 31.6 G/DL (ref 31.5–36.5)
MCHC RBC AUTO-ENTMCNC: 31.9 G/DL (ref 31.5–36.5)
MCHC RBC AUTO-ENTMCNC: 31.9 G/DL (ref 31.5–36.5)
MCHC RBC AUTO-ENTMCNC: 32 G/DL (ref 31.5–36.5)
MCHC RBC AUTO-ENTMCNC: 32 G/DL (ref 31.5–36.5)
MCHC RBC AUTO-ENTMCNC: 32.1 G/DL (ref 31.5–36.5)
MCHC RBC AUTO-ENTMCNC: 32.8 G/DL (ref 31.5–36.5)
MCHC RBC AUTO-ENTMCNC: 32.8 G/DL (ref 31.5–36.5)
MCHC RBC AUTO-ENTMCNC: 33.1 G/DL (ref 31.5–36.5)
MCV RBC AUTO: 94 FL (ref 78–100)
MCV RBC AUTO: 95 FL (ref 78–100)
MCV RBC AUTO: 96 FL (ref 78–100)
MCV RBC AUTO: 97 FL (ref 78–100)
MCV RBC AUTO: 97 FL (ref 78–100)
MCV RBC AUTO: 98 FL (ref 78–100)
MONOCYTES # BLD AUTO: 0.7 10E3/UL (ref 0–1.3)
MONOCYTES # BLD AUTO: 0.9 10E3/UL (ref 0–1.3)
MONOCYTES # BLD AUTO: 1.2 10E3/UL (ref 0–1.3)
MONOCYTES # BLD MANUAL: 0.8 10E3/UL (ref 0–1.3)
MONOCYTES NFR BLD AUTO: 10 %
MONOCYTES NFR BLD AUTO: 7 %
MONOCYTES NFR BLD AUTO: 9 %
MONOCYTES NFR BLD MANUAL: 4 %
MRSA DNA SPEC QL NAA+PROBE: NEGATIVE
NEUTROPHILS # BLD AUTO: 11.8 10E3/UL (ref 1.6–8.3)
NEUTROPHILS # BLD AUTO: 6 10E3/UL (ref 1.6–8.3)
NEUTROPHILS # BLD AUTO: 8.6 10E3/UL (ref 1.6–8.3)
NEUTROPHILS # BLD MANUAL: 18.4 10E3/UL (ref 1.6–8.3)
NEUTROPHILS NFR BLD AUTO: 67 %
NEUTROPHILS NFR BLD AUTO: 83 %
NEUTROPHILS NFR BLD AUTO: 83 %
NEUTROPHILS NFR BLD MANUAL: 87 %
NITRATE UR QL: NEGATIVE
NRBC # BLD AUTO: 0 10E3/UL
NRBC BLD AUTO-RTO: 0 /100
NT-PROBNP SERPL-MCNC: 4104 PG/ML (ref 0–1800)
O2/TOTAL GAS SETTING VFR VENT: 21 %
O2/TOTAL GAS SETTING VFR VENT: 21 %
OXYHGB MFR BLDV: 40 % (ref 70–75)
OXYHGB MFR BLDV: 54 % (ref 70–75)
P AXIS - MUSE: NORMAL DEGREES
PCO2 BLDV: 49 MM HG (ref 40–50)
PCO2 BLDV: 54 MM HG (ref 40–50)
PH BLDV: 7.37 [PH] (ref 7.32–7.43)
PH BLDV: 7.45 [PH] (ref 7.32–7.43)
PH UR STRIP: 7 [PH] (ref 4.7–8)
PLAT MORPH BLD: ABNORMAL
PLATELET # BLD AUTO: 195 10E3/UL (ref 150–450)
PLATELET # BLD AUTO: 199 10E3/UL (ref 150–450)
PLATELET # BLD AUTO: 227 10E3/UL (ref 150–450)
PLATELET # BLD AUTO: 227 10E3/UL (ref 150–450)
PLATELET # BLD AUTO: 238 10E3/UL (ref 150–450)
PLATELET # BLD AUTO: 240 10E3/UL (ref 150–450)
PLATELET # BLD AUTO: 241 10E3/UL (ref 150–450)
PLATELET # BLD AUTO: 263 10E3/UL (ref 150–450)
PLATELET # BLD AUTO: 264 10E3/UL (ref 150–450)
PLATELET # BLD AUTO: 284 10E3/UL (ref 150–450)
PLATELET # BLD AUTO: 291 10E3/UL (ref 150–450)
PO2 BLDV: 25 MM HG (ref 25–47)
PO2 BLDV: 29 MM HG (ref 25–47)
POTASSIUM SERPL-SCNC: 3.5 MMOL/L (ref 3.4–5.3)
POTASSIUM SERPL-SCNC: 3.6 MMOL/L (ref 3.4–5.3)
POTASSIUM SERPL-SCNC: 3.6 MMOL/L (ref 3.4–5.3)
POTASSIUM SERPL-SCNC: 3.9 MMOL/L (ref 3.4–5.3)
POTASSIUM SERPL-SCNC: 4 MMOL/L (ref 3.4–5.3)
POTASSIUM SERPL-SCNC: 4.1 MMOL/L (ref 3.4–5.3)
POTASSIUM SERPL-SCNC: 4.2 MMOL/L (ref 3.4–5.3)
POTASSIUM SERPL-SCNC: 4.2 MMOL/L (ref 3.4–5.3)
POTASSIUM SERPL-SCNC: 4.3 MMOL/L (ref 3.4–5.3)
POTASSIUM SERPL-SCNC: 4.4 MMOL/L (ref 3.4–5.3)
POTASSIUM SERPL-SCNC: 4.7 MMOL/L (ref 3.4–5.3)
POTASSIUM SERPL-SCNC: 4.8 MMOL/L (ref 3.4–5.3)
POTASSIUM SERPL-SCNC: 5 MMOL/L (ref 3.4–5.3)
PR INTERVAL - MUSE: NORMAL MS
PROCALCITONIN SERPL IA-MCNC: 0.25 NG/ML
PROT SERPL-MCNC: 5.2 G/DL (ref 6.4–8.3)
PROT SERPL-MCNC: 5.2 G/DL (ref 6.4–8.3)
PROT SERPL-MCNC: 5.5 G/DL (ref 6.4–8.3)
PROT SERPL-MCNC: 6 G/DL (ref 6.4–8.3)
QRS DURATION - MUSE: 64 MS
QRS DURATION - MUSE: 70 MS
QRS DURATION - MUSE: 72 MS
QT - MUSE: 270 MS
QT - MUSE: 276 MS
QT - MUSE: 286 MS
QT - MUSE: 298 MS
QT - MUSE: 308 MS
QTC - MUSE: 326 MS
QTC - MUSE: 397 MS
QTC - MUSE: 402 MS
QTC - MUSE: 411 MS
QTC - MUSE: 434 MS
R AXIS - MUSE: -39 DEGREES
R AXIS - MUSE: -45 DEGREES
R AXIS - MUSE: -47 DEGREES
R AXIS - MUSE: -47 DEGREES
R AXIS - MUSE: -55 DEGREES
RBC # BLD AUTO: 3.74 10E6/UL (ref 3.8–5.2)
RBC # BLD AUTO: 4.11 10E6/UL (ref 3.8–5.2)
RBC # BLD AUTO: 4.28 10E6/UL (ref 3.8–5.2)
RBC # BLD AUTO: 4.3 10E6/UL (ref 3.8–5.2)
RBC # BLD AUTO: 4.31 10E6/UL (ref 3.8–5.2)
RBC # BLD AUTO: 4.37 10E6/UL (ref 3.8–5.2)
RBC # BLD AUTO: 4.4 10E6/UL (ref 3.8–5.2)
RBC # BLD AUTO: 4.41 10E6/UL (ref 3.8–5.2)
RBC # BLD AUTO: 4.49 10E6/UL (ref 3.8–5.2)
RBC # BLD AUTO: 4.65 10E6/UL (ref 3.8–5.2)
RBC # BLD AUTO: 4.66 10E6/UL (ref 3.8–5.2)
RBC MORPH BLD: ABNORMAL
RBC URINE: 2 /HPF
RSV RNA SPEC NAA+PROBE: NEGATIVE
SA TARGET DNA: POSITIVE
SAO2 % BLDV: 41.3 % (ref 70–75)
SAO2 % BLDV: 55 % (ref 70–75)
SARS-COV-2 RNA RESP QL NAA+PROBE: NEGATIVE
SARS-COV-2 RNA RESP QL NAA+PROBE: NEGATIVE
SODIUM SERPL-SCNC: 137 MMOL/L (ref 135–145)
SODIUM SERPL-SCNC: 138 MMOL/L (ref 135–145)
SODIUM SERPL-SCNC: 138 MMOL/L (ref 135–145)
SODIUM SERPL-SCNC: 139 MMOL/L (ref 135–145)
SODIUM SERPL-SCNC: 140 MMOL/L (ref 135–145)
SODIUM SERPL-SCNC: 140 MMOL/L (ref 135–145)
SODIUM SERPL-SCNC: 141 MMOL/L (ref 135–145)
SODIUM SERPL-SCNC: 142 MMOL/L (ref 135–145)
SODIUM SERPL-SCNC: 142 MMOL/L (ref 135–145)
SP GR UR STRIP: 1.01 (ref 1–1.03)
SPECIMEN EXPIRATION DATE: NORMAL
SQUAMOUS EPITHELIAL: 0 /HPF
SYSTOLIC BLOOD PRESSURE - MUSE: NORMAL MMHG
T AXIS - MUSE: -30 DEGREES
T AXIS - MUSE: -67 DEGREES
T AXIS - MUSE: 133 DEGREES
T AXIS - MUSE: 134 DEGREES
T AXIS - MUSE: 134 DEGREES
TROPONIN T SERPL HS-MCNC: 21 NG/L
TROPONIN T SERPL HS-MCNC: 24 NG/L
TSH SERPL DL<=0.005 MIU/L-ACNC: 1.57 UIU/ML (ref 0.3–4.2)
UROBILINOGEN UR STRIP-MCNC: NORMAL MG/DL
VENTRICULAR RATE- MUSE: 107 BPM
VENTRICULAR RATE- MUSE: 119 BPM
VENTRICULAR RATE- MUSE: 130 BPM
VENTRICULAR RATE- MUSE: 149 BPM
VENTRICULAR RATE- MUSE: 72 BPM
WBC # BLD AUTO: 10.2 10E3/UL (ref 4–11)
WBC # BLD AUTO: 12 10E3/UL (ref 4–11)
WBC # BLD AUTO: 12.8 10E3/UL (ref 4–11)
WBC # BLD AUTO: 14.3 10E3/UL (ref 4–11)
WBC # BLD AUTO: 19.8 10E3/UL (ref 4–11)
WBC # BLD AUTO: 21.2 10E3/UL (ref 4–11)
WBC # BLD AUTO: 24.2 10E3/UL (ref 4–11)
WBC # BLD AUTO: 25.3 10E3/UL (ref 4–11)
WBC # BLD AUTO: 8.7 10E3/UL (ref 4–11)
WBC # BLD AUTO: 8.8 10E3/UL (ref 4–11)
WBC # BLD AUTO: 9.6 10E3/UL (ref 4–11)
WBC URINE: <1 /HPF

## 2024-01-01 PROCEDURE — 85610 PROTHROMBIN TIME: CPT | Performed by: HOSPITALIST

## 2024-01-01 PROCEDURE — 87040 BLOOD CULTURE FOR BACTERIA: CPT | Performed by: HOSPITALIST

## 2024-01-01 PROCEDURE — 250N000013 HC RX MED GY IP 250 OP 250 PS 637: Performed by: INTERNAL MEDICINE

## 2024-01-01 PROCEDURE — 99291 CRITICAL CARE FIRST HOUR: CPT | Performed by: STUDENT IN AN ORGANIZED HEALTH CARE EDUCATION/TRAINING PROGRAM

## 2024-01-01 PROCEDURE — 84484 ASSAY OF TROPONIN QUANT: CPT | Performed by: STUDENT IN AN ORGANIZED HEALTH CARE EDUCATION/TRAINING PROGRAM

## 2024-01-01 PROCEDURE — 87635 SARS-COV-2 COVID-19 AMP PRB: CPT | Performed by: INTERNAL MEDICINE

## 2024-01-01 PROCEDURE — 200N000001 HC R&B ICU

## 2024-01-01 PROCEDURE — 36415 COLL VENOUS BLD VENIPUNCTURE: CPT | Performed by: EMERGENCY MEDICINE

## 2024-01-01 PROCEDURE — 250N000009 HC RX 250: Performed by: STUDENT IN AN ORGANIZED HEALTH CARE EDUCATION/TRAINING PROGRAM

## 2024-01-01 PROCEDURE — 80053 COMPREHEN METABOLIC PANEL: CPT | Performed by: EMERGENCY MEDICINE

## 2024-01-01 PROCEDURE — 80048 BASIC METABOLIC PNL TOTAL CA: CPT | Performed by: INTERNAL MEDICINE

## 2024-01-01 PROCEDURE — 97530 THERAPEUTIC ACTIVITIES: CPT | Mod: GP

## 2024-01-01 PROCEDURE — 83880 ASSAY OF NATRIURETIC PEPTIDE: CPT | Performed by: STUDENT IN AN ORGANIZED HEALTH CARE EDUCATION/TRAINING PROGRAM

## 2024-01-01 PROCEDURE — 72125 CT NECK SPINE W/O DYE: CPT | Mod: ME

## 2024-01-01 PROCEDURE — 96374 THER/PROPH/DIAG INJ IV PUSH: CPT

## 2024-01-01 PROCEDURE — 82374 ASSAY BLOOD CARBON DIOXIDE: CPT | Performed by: STUDENT IN AN ORGANIZED HEALTH CARE EDUCATION/TRAINING PROGRAM

## 2024-01-01 PROCEDURE — 250N000011 HC RX IP 250 OP 636: Performed by: INTERNAL MEDICINE

## 2024-01-01 PROCEDURE — 72170 X-RAY EXAM OF PELVIS: CPT

## 2024-01-01 PROCEDURE — 86140 C-REACTIVE PROTEIN: CPT | Performed by: INTERNAL MEDICINE

## 2024-01-01 PROCEDURE — 258N000003 HC RX IP 258 OP 636: Performed by: INTERNAL MEDICINE

## 2024-01-01 PROCEDURE — 82805 BLOOD GASES W/O2 SATURATION: CPT | Performed by: INTERNAL MEDICINE

## 2024-01-01 PROCEDURE — 85027 COMPLETE CBC AUTOMATED: CPT | Performed by: INTERNAL MEDICINE

## 2024-01-01 PROCEDURE — 99213 OFFICE O/P EST LOW 20 MIN: CPT | Performed by: CLINICAL NURSE SPECIALIST

## 2024-01-01 PROCEDURE — 73590 X-RAY EXAM OF LOWER LEG: CPT | Mod: 50

## 2024-01-01 PROCEDURE — 99285 EMERGENCY DEPT VISIT HI MDM: CPT | Mod: 25

## 2024-01-01 PROCEDURE — 80162 ASSAY OF DIGOXIN TOTAL: CPT | Performed by: STUDENT IN AN ORGANIZED HEALTH CARE EDUCATION/TRAINING PROGRAM

## 2024-01-01 PROCEDURE — 83735 ASSAY OF MAGNESIUM: CPT | Performed by: INTERNAL MEDICINE

## 2024-01-01 PROCEDURE — 120N000001 HC R&B MED SURG/OB

## 2024-01-01 PROCEDURE — 250N000013 HC RX MED GY IP 250 OP 250 PS 637: Performed by: HOSPITALIST

## 2024-01-01 PROCEDURE — 99213 OFFICE O/P EST LOW 20 MIN: CPT | Mod: 25 | Performed by: PODIATRIST

## 2024-01-01 PROCEDURE — 97530 THERAPEUTIC ACTIVITIES: CPT | Mod: GP | Performed by: PHYSICAL THERAPIST

## 2024-01-01 PROCEDURE — 250N000013 HC RX MED GY IP 250 OP 250 PS 637: Performed by: NURSE PRACTITIONER

## 2024-01-01 PROCEDURE — 36415 COLL VENOUS BLD VENIPUNCTURE: CPT | Performed by: HOSPITALIST

## 2024-01-01 PROCEDURE — G0463 HOSPITAL OUTPT CLINIC VISIT: HCPCS | Performed by: CLINICAL NURSE SPECIALIST

## 2024-01-01 PROCEDURE — 86900 BLOOD TYPING SEROLOGIC ABO: CPT | Performed by: EMERGENCY MEDICINE

## 2024-01-01 PROCEDURE — 80053 COMPREHEN METABOLIC PANEL: CPT | Performed by: INTERNAL MEDICINE

## 2024-01-01 PROCEDURE — G0463 HOSPITAL OUTPT CLINIC VISIT: HCPCS | Mod: 25,27 | Performed by: CLINICAL NURSE SPECIALIST

## 2024-01-01 PROCEDURE — 97597 DBRDMT OPN WND 1ST 20 CM/<: CPT | Mod: XS | Performed by: CLINICAL NURSE SPECIALIST

## 2024-01-01 PROCEDURE — 99232 SBSQ HOSP IP/OBS MODERATE 35: CPT | Performed by: INTERNAL MEDICINE

## 2024-01-01 PROCEDURE — G0463 HOSPITAL OUTPT CLINIC VISIT: HCPCS | Mod: 25

## 2024-01-01 PROCEDURE — 80048 BASIC METABOLIC PNL TOTAL CA: CPT | Performed by: HOSPITALIST

## 2024-01-01 PROCEDURE — 36415 COLL VENOUS BLD VENIPUNCTURE: CPT | Performed by: NURSE PRACTITIONER

## 2024-01-01 PROCEDURE — 80162 ASSAY OF DIGOXIN TOTAL: CPT | Performed by: HOSPITALIST

## 2024-01-01 PROCEDURE — 84132 ASSAY OF SERUM POTASSIUM: CPT | Performed by: NURSE PRACTITIONER

## 2024-01-01 PROCEDURE — 93010 ELECTROCARDIOGRAM REPORT: CPT | Performed by: INTERNAL MEDICINE

## 2024-01-01 PROCEDURE — 99285 EMERGENCY DEPT VISIT HI MDM: CPT | Mod: 25 | Performed by: EMERGENCY MEDICINE

## 2024-01-01 PROCEDURE — 250N000012 HC RX MED GY IP 250 OP 636 PS 637: Performed by: HOSPITALIST

## 2024-01-01 PROCEDURE — 250N000012 HC RX MED GY IP 250 OP 636 PS 637: Performed by: NURSE PRACTITIONER

## 2024-01-01 PROCEDURE — 83605 ASSAY OF LACTIC ACID: CPT | Performed by: STUDENT IN AN ORGANIZED HEALTH CARE EDUCATION/TRAINING PROGRAM

## 2024-01-01 PROCEDURE — 93005 ELECTROCARDIOGRAM TRACING: CPT

## 2024-01-01 PROCEDURE — 250N000011 HC RX IP 250 OP 636: Performed by: HOSPITALIST

## 2024-01-01 PROCEDURE — 93005 ELECTROCARDIOGRAM TRACING: CPT | Performed by: NURSE PRACTITIONER

## 2024-01-01 PROCEDURE — G0463 HOSPITAL OUTPT CLINIC VISIT: HCPCS

## 2024-01-01 PROCEDURE — 99222 1ST HOSP IP/OBS MODERATE 55: CPT | Mod: AI | Performed by: HOSPITALIST

## 2024-01-01 PROCEDURE — 99233 SBSQ HOSP IP/OBS HIGH 50: CPT | Performed by: HOSPITALIST

## 2024-01-01 PROCEDURE — 97165 OT EVAL LOW COMPLEX 30 MIN: CPT | Mod: GO

## 2024-01-01 PROCEDURE — 80162 ASSAY OF DIGOXIN TOTAL: CPT | Performed by: INTERNAL MEDICINE

## 2024-01-01 PROCEDURE — 70450 CT HEAD/BRAIN W/O DYE: CPT | Mod: ME

## 2024-01-01 PROCEDURE — 99203 OFFICE O/P NEW LOW 30 MIN: CPT | Mod: GT | Performed by: SURGERY

## 2024-01-01 PROCEDURE — 85025 COMPLETE CBC W/AUTO DIFF WBC: CPT | Performed by: EMERGENCY MEDICINE

## 2024-01-01 PROCEDURE — 36415 COLL VENOUS BLD VENIPUNCTURE: CPT | Performed by: INTERNAL MEDICINE

## 2024-01-01 PROCEDURE — 250N000011 HC RX IP 250 OP 636: Performed by: NURSE PRACTITIONER

## 2024-01-01 PROCEDURE — 85610 PROTHROMBIN TIME: CPT | Performed by: NURSE PRACTITIONER

## 2024-01-01 PROCEDURE — 80048 BASIC METABOLIC PNL TOTAL CA: CPT | Performed by: NURSE PRACTITIONER

## 2024-01-01 PROCEDURE — 97598 DBRDMT OPN WND ADDL 20CM/<: CPT | Performed by: CLINICAL NURSE SPECIALIST

## 2024-01-01 PROCEDURE — 96376 TX/PRO/DX INJ SAME DRUG ADON: CPT

## 2024-01-01 PROCEDURE — 258N000003 HC RX IP 258 OP 636: Performed by: STUDENT IN AN ORGANIZED HEALTH CARE EDUCATION/TRAINING PROGRAM

## 2024-01-01 PROCEDURE — 99239 HOSP IP/OBS DSCHRG MGMT >30: CPT | Performed by: INTERNAL MEDICINE

## 2024-01-01 PROCEDURE — 250N000011 HC RX IP 250 OP 636: Performed by: EMERGENCY MEDICINE

## 2024-01-01 PROCEDURE — 36415 COLL VENOUS BLD VENIPUNCTURE: CPT | Performed by: STUDENT IN AN ORGANIZED HEALTH CARE EDUCATION/TRAINING PROGRAM

## 2024-01-01 PROCEDURE — 11042 DBRDMT SUBQ TIS 1ST 20SQCM/<: CPT | Performed by: CLINICAL NURSE SPECIALIST

## 2024-01-01 PROCEDURE — 76604 US EXAM CHEST: CPT | Mod: TC

## 2024-01-01 PROCEDURE — 99285 EMERGENCY DEPT VISIT HI MDM: CPT | Mod: 25 | Performed by: STUDENT IN AN ORGANIZED HEALTH CARE EDUCATION/TRAINING PROGRAM

## 2024-01-01 PROCEDURE — 97597 DBRDMT OPN WND 1ST 20 CM/<: CPT | Performed by: CLINICAL NURSE SPECIALIST

## 2024-01-01 PROCEDURE — G1010 CDSM STANSON: HCPCS

## 2024-01-01 PROCEDURE — 99222 1ST HOSP IP/OBS MODERATE 55: CPT | Performed by: CLINICAL NURSE SPECIALIST

## 2024-01-01 PROCEDURE — 250N000013 HC RX MED GY IP 250 OP 250 PS 637: Performed by: STUDENT IN AN ORGANIZED HEALTH CARE EDUCATION/TRAINING PROGRAM

## 2024-01-01 PROCEDURE — 11721 DEBRIDE NAIL 6 OR MORE: CPT | Performed by: PODIATRIST

## 2024-01-01 PROCEDURE — 73030 X-RAY EXAM OF SHOULDER: CPT | Mod: RT

## 2024-01-01 PROCEDURE — 85007 BL SMEAR W/DIFF WBC COUNT: CPT | Performed by: STUDENT IN AN ORGANIZED HEALTH CARE EDUCATION/TRAINING PROGRAM

## 2024-01-01 PROCEDURE — 85027 COMPLETE CBC AUTOMATED: CPT | Performed by: HOSPITALIST

## 2024-01-01 PROCEDURE — 96375 TX/PRO/DX INJ NEW DRUG ADDON: CPT

## 2024-01-01 PROCEDURE — 96361 HYDRATE IV INFUSION ADD-ON: CPT

## 2024-01-01 PROCEDURE — 93970 EXTREMITY STUDY: CPT

## 2024-01-01 PROCEDURE — 85014 HEMATOCRIT: CPT | Performed by: NURSE PRACTITIONER

## 2024-01-01 PROCEDURE — 87637 SARSCOV2&INF A&B&RSV AMP PRB: CPT | Performed by: STUDENT IN AN ORGANIZED HEALTH CARE EDUCATION/TRAINING PROGRAM

## 2024-01-01 PROCEDURE — 97162 PT EVAL MOD COMPLEX 30 MIN: CPT | Mod: GP | Performed by: PHYSICAL THERAPIST

## 2024-01-01 PROCEDURE — 71046 X-RAY EXAM CHEST 2 VIEWS: CPT

## 2024-01-01 PROCEDURE — 80048 BASIC METABOLIC PNL TOTAL CA: CPT | Performed by: STUDENT IN AN ORGANIZED HEALTH CARE EDUCATION/TRAINING PROGRAM

## 2024-01-01 PROCEDURE — 83735 ASSAY OF MAGNESIUM: CPT | Performed by: STUDENT IN AN ORGANIZED HEALTH CARE EDUCATION/TRAINING PROGRAM

## 2024-01-01 PROCEDURE — 83605 ASSAY OF LACTIC ACID: CPT | Performed by: INTERNAL MEDICINE

## 2024-01-01 PROCEDURE — 93308 TTE F-UP OR LMTD: CPT | Mod: 26 | Performed by: STUDENT IN AN ORGANIZED HEALTH CARE EDUCATION/TRAINING PROGRAM

## 2024-01-01 PROCEDURE — 96374 THER/PROPH/DIAG INJ IV PUSH: CPT | Mod: XU

## 2024-01-01 PROCEDURE — 99291 CRITICAL CARE FIRST HOUR: CPT | Mod: 25

## 2024-01-01 PROCEDURE — 96376 TX/PRO/DX INJ SAME DRUG ADON: CPT | Mod: XU

## 2024-01-01 PROCEDURE — 93306 TTE W/DOPPLER COMPLETE: CPT

## 2024-01-01 PROCEDURE — 87641 MR-STAPH DNA AMP PROBE: CPT | Performed by: STUDENT IN AN ORGANIZED HEALTH CARE EDUCATION/TRAINING PROGRAM

## 2024-01-01 PROCEDURE — 99214 OFFICE O/P EST MOD 30 MIN: CPT | Performed by: NURSE PRACTITIONER

## 2024-01-01 PROCEDURE — 11045 DBRDMT SUBQ TISS EACH ADDL: CPT | Performed by: CLINICAL NURSE SPECIALIST

## 2024-01-01 PROCEDURE — 82805 BLOOD GASES W/O2 SATURATION: CPT | Performed by: EMERGENCY MEDICINE

## 2024-01-01 PROCEDURE — 71260 CT THORAX DX C+: CPT | Mod: MG

## 2024-01-01 PROCEDURE — 258N000003 HC RX IP 258 OP 636: Performed by: HOSPITALIST

## 2024-01-01 PROCEDURE — G0463 HOSPITAL OUTPT CLINIC VISIT: HCPCS | Mod: 25 | Performed by: CLINICAL NURSE SPECIALIST

## 2024-01-01 PROCEDURE — 85049 AUTOMATED PLATELET COUNT: CPT | Performed by: STUDENT IN AN ORGANIZED HEALTH CARE EDUCATION/TRAINING PROGRAM

## 2024-01-01 PROCEDURE — 99222 1ST HOSP IP/OBS MODERATE 55: CPT | Mod: AI | Performed by: NURSE PRACTITIONER

## 2024-01-01 PROCEDURE — 11045 DBRDMT SUBQ TISS EACH ADDL: CPT

## 2024-01-01 PROCEDURE — 76705 ECHO EXAM OF ABDOMEN: CPT | Mod: 26 | Performed by: EMERGENCY MEDICINE

## 2024-01-01 PROCEDURE — 85610 PROTHROMBIN TIME: CPT | Performed by: STUDENT IN AN ORGANIZED HEALTH CARE EDUCATION/TRAINING PROGRAM

## 2024-01-01 PROCEDURE — 99284 EMERGENCY DEPT VISIT MOD MDM: CPT | Performed by: STUDENT IN AN ORGANIZED HEALTH CARE EDUCATION/TRAINING PROGRAM

## 2024-01-01 PROCEDURE — 87186 SC STD MICRODIL/AGAR DIL: CPT | Performed by: FAMILY MEDICINE

## 2024-01-01 PROCEDURE — 93308 TTE F-UP OR LMTD: CPT | Mod: TC

## 2024-01-01 PROCEDURE — 73070 X-RAY EXAM OF ELBOW: CPT | Mod: RT

## 2024-01-01 PROCEDURE — 83605 ASSAY OF LACTIC ACID: CPT | Performed by: HOSPITALIST

## 2024-01-01 PROCEDURE — 84443 ASSAY THYROID STIM HORMONE: CPT | Performed by: STUDENT IN AN ORGANIZED HEALTH CARE EDUCATION/TRAINING PROGRAM

## 2024-01-01 PROCEDURE — 250N000011 HC RX IP 250 OP 636: Performed by: STUDENT IN AN ORGANIZED HEALTH CARE EDUCATION/TRAINING PROGRAM

## 2024-01-01 PROCEDURE — 71045 X-RAY EXAM CHEST 1 VIEW: CPT

## 2024-01-01 PROCEDURE — 99205 OFFICE O/P NEW HI 60 MIN: CPT | Performed by: INTERNAL MEDICINE

## 2024-01-01 PROCEDURE — 99232 SBSQ HOSP IP/OBS MODERATE 35: CPT | Performed by: NURSE PRACTITIONER

## 2024-01-01 PROCEDURE — 99213 OFFICE O/P EST LOW 20 MIN: CPT | Mod: 25 | Performed by: CLINICAL NURSE SPECIALIST

## 2024-01-01 PROCEDURE — 250N000009 HC RX 250: Performed by: INTERNAL MEDICINE

## 2024-01-01 PROCEDURE — 70450 CT HEAD/BRAIN W/O DYE: CPT | Mod: MG

## 2024-01-01 PROCEDURE — 250N000011 HC RX IP 250 OP 636: Mod: JZ | Performed by: INTERNAL MEDICINE

## 2024-01-01 PROCEDURE — 87338 HPYLORI STOOL AG IA: CPT | Performed by: FAMILY MEDICINE

## 2024-01-01 PROCEDURE — 81001 URINALYSIS AUTO W/SCOPE: CPT | Performed by: STUDENT IN AN ORGANIZED HEALTH CARE EDUCATION/TRAINING PROGRAM

## 2024-01-01 PROCEDURE — 85025 COMPLETE CBC W/AUTO DIFF WBC: CPT | Performed by: STUDENT IN AN ORGANIZED HEALTH CARE EDUCATION/TRAINING PROGRAM

## 2024-01-01 PROCEDURE — 83605 ASSAY OF LACTIC ACID: CPT | Performed by: EMERGENCY MEDICINE

## 2024-01-01 PROCEDURE — 93306 TTE W/DOPPLER COMPLETE: CPT | Mod: 26 | Performed by: INTERNAL MEDICINE

## 2024-01-01 PROCEDURE — 82565 ASSAY OF CREATININE: CPT | Performed by: STUDENT IN AN ORGANIZED HEALTH CARE EDUCATION/TRAINING PROGRAM

## 2024-01-01 PROCEDURE — 82962 GLUCOSE BLOOD TEST: CPT

## 2024-01-01 PROCEDURE — 76705 ECHO EXAM OF ABDOMEN: CPT | Mod: TC | Performed by: EMERGENCY MEDICINE

## 2024-01-01 PROCEDURE — 250N000011 HC RX IP 250 OP 636: Mod: JZ | Performed by: STUDENT IN AN ORGANIZED HEALTH CARE EDUCATION/TRAINING PROGRAM

## 2024-01-01 PROCEDURE — 84145 PROCALCITONIN (PCT): CPT | Performed by: INTERNAL MEDICINE

## 2024-01-01 PROCEDURE — 99239 HOSP IP/OBS DSCHRG MGMT >30: CPT | Performed by: NURSE PRACTITIONER

## 2024-01-01 PROCEDURE — 85610 PROTHROMBIN TIME: CPT | Performed by: EMERGENCY MEDICINE

## 2024-01-01 PROCEDURE — 97110 THERAPEUTIC EXERCISES: CPT | Mod: GP | Performed by: PHYSICAL THERAPIST

## 2024-01-01 PROCEDURE — 76604 US EXAM CHEST: CPT | Mod: 26 | Performed by: EMERGENCY MEDICINE

## 2024-01-01 PROCEDURE — 250N000011 HC RX IP 250 OP 636: Mod: JZ | Performed by: HOSPITALIST

## 2024-01-01 PROCEDURE — 97597 DBRDMT OPN WND 1ST 20 CM/<: CPT | Mod: 59 | Performed by: CLINICAL NURSE SPECIALIST

## 2024-01-01 PROCEDURE — 99212 OFFICE O/P EST SF 10 MIN: CPT | Performed by: CLINICAL NURSE SPECIALIST

## 2024-01-01 RX ORDER — METOPROLOL TARTRATE 1 MG/ML
2.5 INJECTION, SOLUTION INTRAVENOUS ONCE
Status: COMPLETED | OUTPATIENT
Start: 2024-01-01 | End: 2024-01-01

## 2024-01-01 RX ORDER — FUROSEMIDE 40 MG
40 TABLET ORAL EVERY MORNING
Qty: 90 TABLET | Refills: 3 | Status: SHIPPED | OUTPATIENT
Start: 2024-01-01

## 2024-01-01 RX ORDER — NALOXONE HYDROCHLORIDE 0.4 MG/ML
0.2 INJECTION, SOLUTION INTRAMUSCULAR; INTRAVENOUS; SUBCUTANEOUS
Status: DISCONTINUED | OUTPATIENT
Start: 2024-01-01 | End: 2024-01-01 | Stop reason: HOSPADM

## 2024-01-01 RX ORDER — LIDOCAINE 40 MG/G
CREAM TOPICAL
Status: DISCONTINUED | OUTPATIENT
Start: 2024-01-01 | End: 2024-01-01 | Stop reason: HOSPADM

## 2024-01-01 RX ORDER — AMOXICILLIN 250 MG
1 CAPSULE ORAL 2 TIMES DAILY
Status: DISCONTINUED | OUTPATIENT
Start: 2024-01-01 | End: 2024-01-01 | Stop reason: HOSPADM

## 2024-01-01 RX ORDER — BACITRACIN ZINC 500 [USP'U]/G
OINTMENT TOPICAL DAILY PRN
Status: ON HOLD | COMMUNITY
End: 2024-01-01

## 2024-01-01 RX ORDER — MAGNESIUM HYDROXIDE/ALUMINUM HYDROXICE/SIMETHICONE 120; 1200; 1200 MG/30ML; MG/30ML; MG/30ML
30 SUSPENSION ORAL EVERY 4 HOURS PRN
Status: DISCONTINUED | OUTPATIENT
Start: 2024-01-01 | End: 2024-01-01 | Stop reason: HOSPADM

## 2024-01-01 RX ORDER — PANTOPRAZOLE SODIUM 40 MG/1
40 TABLET, DELAYED RELEASE ORAL 2 TIMES DAILY
Status: DISCONTINUED | OUTPATIENT
Start: 2024-01-01 | End: 2024-01-01 | Stop reason: HOSPADM

## 2024-01-01 RX ORDER — CALCIUM CARBONATE 500 MG/1
1500 TABLET, CHEWABLE ORAL EVERY EVENING
Status: DISCONTINUED | OUTPATIENT
Start: 2024-01-01 | End: 2024-01-01 | Stop reason: HOSPADM

## 2024-01-01 RX ORDER — BISACODYL 5 MG
10 TABLET, DELAYED RELEASE (ENTERIC COATED) ORAL DAILY PRN
Status: DISCONTINUED | OUTPATIENT
Start: 2024-01-01 | End: 2024-01-01 | Stop reason: HOSPADM

## 2024-01-01 RX ORDER — ONDANSETRON 2 MG/ML
4 INJECTION INTRAMUSCULAR; INTRAVENOUS EVERY 6 HOURS PRN
Status: DISCONTINUED | OUTPATIENT
Start: 2024-01-01 | End: 2024-01-01 | Stop reason: HOSPADM

## 2024-01-01 RX ORDER — BISACODYL 10 MG
10 SUPPOSITORY, RECTAL RECTAL DAILY PRN
Status: DISCONTINUED | OUTPATIENT
Start: 2024-01-01 | End: 2024-01-01 | Stop reason: HOSPADM

## 2024-01-01 RX ORDER — MAGNESIUM HYDROXIDE/ALUMINUM HYDROXICE/SIMETHICONE 120; 1200; 1200 MG/30ML; MG/30ML; MG/30ML
30 SUSPENSION ORAL EVERY 4 HOURS PRN
Status: ON HOLD | COMMUNITY
End: 2024-01-01

## 2024-01-01 RX ORDER — DIGOXIN 125 MCG
125 TABLET ORAL DAILY
Status: DISCONTINUED | OUTPATIENT
Start: 2024-01-01 | End: 2024-01-01

## 2024-01-01 RX ORDER — CEFEPIME HYDROCHLORIDE 2 G/1
2 INJECTION, POWDER, FOR SOLUTION INTRAVENOUS ONCE
Status: COMPLETED | OUTPATIENT
Start: 2024-01-01 | End: 2024-01-01

## 2024-01-01 RX ORDER — WARFARIN SODIUM 2.5 MG/1
2.5 TABLET ORAL
Status: COMPLETED | OUTPATIENT
Start: 2024-01-01 | End: 2024-01-01

## 2024-01-01 RX ORDER — WARFARIN SODIUM 2 MG/1
TABLET ORAL
DISCHARGE
Start: 2024-01-01

## 2024-01-01 RX ORDER — LORAZEPAM 0.5 MG/1
0.25 TABLET ORAL
Status: DISCONTINUED | OUTPATIENT
Start: 2024-01-01 | End: 2024-01-01 | Stop reason: HOSPADM

## 2024-01-01 RX ORDER — LORAZEPAM 0.5 MG/1
0.5 TABLET ORAL AT BEDTIME
COMMUNITY
Start: 2024-01-01

## 2024-01-01 RX ORDER — DIGOXIN 0.25 MG/ML
125 INJECTION INTRAMUSCULAR; INTRAVENOUS ONCE
Status: COMPLETED | OUTPATIENT
Start: 2024-01-01 | End: 2024-01-01

## 2024-01-01 RX ORDER — LORAZEPAM 0.5 MG/1
0.5 TABLET ORAL AT BEDTIME
Status: DISCONTINUED | OUTPATIENT
Start: 2024-01-01 | End: 2024-01-01 | Stop reason: HOSPADM

## 2024-01-01 RX ORDER — ACETAMINOPHEN 325 MG/1
975 TABLET ORAL 3 TIMES DAILY
Status: DISCONTINUED | OUTPATIENT
Start: 2024-01-01 | End: 2024-01-01 | Stop reason: HOSPADM

## 2024-01-01 RX ORDER — CALCIUM CARBONATE 500 MG/1
1000 TABLET, CHEWABLE ORAL 4 TIMES DAILY PRN
Status: DISCONTINUED | OUTPATIENT
Start: 2024-01-01 | End: 2024-01-01 | Stop reason: HOSPADM

## 2024-01-01 RX ORDER — WARFARIN SODIUM 2.5 MG/1
TABLET ORAL
Status: ON HOLD | COMMUNITY
Start: 2024-01-01 | End: 2024-01-01

## 2024-01-01 RX ORDER — LOPERAMIDE HCL 2 MG
2 CAPSULE ORAL PRN
Status: ON HOLD | COMMUNITY
End: 2024-01-01

## 2024-01-01 RX ORDER — WARFARIN SODIUM 5 MG/1
5 TABLET ORAL WEEKLY
Status: ON HOLD | COMMUNITY
End: 2024-01-01

## 2024-01-01 RX ORDER — POTASSIUM CHLORIDE 750 MG/1
40 CAPSULE, EXTENDED RELEASE ORAL ONCE
Status: COMPLETED | OUTPATIENT
Start: 2024-01-01 | End: 2024-01-01

## 2024-01-01 RX ORDER — CEFTRIAXONE SODIUM 1 G/50ML
1 INJECTION, SOLUTION INTRAVENOUS EVERY 24 HOURS
Status: DISCONTINUED | OUTPATIENT
Start: 2024-01-01 | End: 2024-01-01

## 2024-01-01 RX ORDER — AMOXICILLIN 250 MG
1 CAPSULE ORAL 2 TIMES DAILY PRN
Status: DISCONTINUED | OUTPATIENT
Start: 2024-01-01 | End: 2024-01-01 | Stop reason: HOSPADM

## 2024-01-01 RX ORDER — TRAMADOL HYDROCHLORIDE 50 MG/1
50 TABLET ORAL DAILY PRN
Status: DISCONTINUED | OUTPATIENT
Start: 2024-01-01 | End: 2024-01-01

## 2024-01-01 RX ORDER — FUROSEMIDE 10 MG/ML
40 INJECTION INTRAMUSCULAR; INTRAVENOUS EVERY 8 HOURS
Status: DISCONTINUED | OUTPATIENT
Start: 2024-01-01 | End: 2024-01-01

## 2024-01-01 RX ORDER — FUROSEMIDE 10 MG/ML
40 INJECTION INTRAMUSCULAR; INTRAVENOUS
Status: DISCONTINUED | OUTPATIENT
Start: 2024-01-01 | End: 2024-01-01

## 2024-01-01 RX ORDER — FUROSEMIDE 40 MG
40 TABLET ORAL EVERY MORNING
Status: DISCONTINUED | OUTPATIENT
Start: 2024-01-01 | End: 2024-01-01 | Stop reason: HOSPADM

## 2024-01-01 RX ORDER — NALOXONE HYDROCHLORIDE 0.4 MG/ML
0.4 INJECTION, SOLUTION INTRAMUSCULAR; INTRAVENOUS; SUBCUTANEOUS
Status: DISCONTINUED | OUTPATIENT
Start: 2024-01-01 | End: 2024-01-01 | Stop reason: HOSPADM

## 2024-01-01 RX ORDER — HYDROMORPHONE HYDROCHLORIDE 2 MG/1
2 TABLET ORAL
Status: DISCONTINUED | OUTPATIENT
Start: 2024-01-01 | End: 2024-01-01 | Stop reason: HOSPADM

## 2024-01-01 RX ORDER — ACETAMINOPHEN 325 MG/1
650 TABLET ORAL EVERY 6 HOURS PRN
DISCHARGE
Start: 2024-01-01

## 2024-01-01 RX ORDER — SODIUM CHLORIDE 9 MG/ML
INJECTION, SOLUTION INTRAVENOUS CONTINUOUS
Status: DISCONTINUED | OUTPATIENT
Start: 2024-01-01 | End: 2024-01-01

## 2024-01-01 RX ORDER — MULTIVIT-MIN/FA/LYCOPEN/LUTEIN .4-300-25
1 TABLET ORAL EVERY MORNING
COMMUNITY
Start: 2023-01-01

## 2024-01-01 RX ORDER — METOPROLOL SUCCINATE 25 MG/1
25 TABLET, EXTENDED RELEASE ORAL AT BEDTIME
Status: DISCONTINUED | OUTPATIENT
Start: 2024-01-01 | End: 2024-01-01

## 2024-01-01 RX ORDER — DOXYCYCLINE 100 MG/1
100 CAPSULE ORAL 2 TIMES DAILY
Qty: 4 CAPSULE | Refills: 0 | Status: SHIPPED | OUTPATIENT
Start: 2024-01-01 | End: 2024-01-01

## 2024-01-01 RX ORDER — BISACODYL 5 MG
5 TABLET, DELAYED RELEASE (ENTERIC COATED) ORAL DAILY PRN
Status: DISCONTINUED | OUTPATIENT
Start: 2024-01-01 | End: 2024-01-01 | Stop reason: HOSPADM

## 2024-01-01 RX ORDER — CLINDAMYCIN HCL 300 MG
300 CAPSULE ORAL 3 TIMES DAILY
Qty: 21 CAPSULE | Refills: 0 | Status: SHIPPED | OUTPATIENT
Start: 2024-01-01 | End: 2024-01-01

## 2024-01-01 RX ORDER — DOXYCYCLINE 100 MG/1
100 CAPSULE ORAL 2 TIMES DAILY
Status: DISCONTINUED | OUTPATIENT
Start: 2024-01-01 | End: 2024-01-01 | Stop reason: HOSPADM

## 2024-01-01 RX ORDER — WARFARIN SODIUM 5 MG/1
TABLET ORAL
Status: ON HOLD | COMMUNITY
Start: 2024-01-01 | End: 2024-01-01

## 2024-01-01 RX ORDER — ACETAMINOPHEN 325 MG/1
650 TABLET ORAL ONCE
Status: COMPLETED | OUTPATIENT
Start: 2024-01-01 | End: 2024-01-01

## 2024-01-01 RX ORDER — TRAMADOL HYDROCHLORIDE 50 MG/1
50 TABLET ORAL DAILY PRN
Status: ON HOLD | COMMUNITY
End: 2024-01-01

## 2024-01-01 RX ORDER — BISACODYL 10 MG
10 SUPPOSITORY, RECTAL RECTAL DAILY PRN
Status: ON HOLD | COMMUNITY
End: 2024-01-01

## 2024-01-01 RX ORDER — METOPROLOL TARTRATE 1 MG/ML
5 INJECTION, SOLUTION INTRAVENOUS ONCE
Status: COMPLETED | OUTPATIENT
Start: 2024-01-01 | End: 2024-01-01

## 2024-01-01 RX ORDER — ACETAMINOPHEN 325 MG/1
650 TABLET ORAL EVERY 6 HOURS PRN
Status: DISCONTINUED | OUTPATIENT
Start: 2024-01-01 | End: 2024-01-01 | Stop reason: HOSPADM

## 2024-01-01 RX ORDER — WARFARIN SODIUM 2.5 MG/1
TABLET ORAL
Status: ON HOLD
Start: 2024-01-01 | End: 2024-01-01

## 2024-01-01 RX ORDER — CALCIUM CARBONATE 500 MG/1
1 TABLET, CHEWABLE ORAL 4 TIMES DAILY PRN
DISCHARGE
Start: 2024-01-01

## 2024-01-01 RX ORDER — LOPERAMIDE HCL 2 MG
4 CAPSULE ORAL PRN
Status: ON HOLD | COMMUNITY
End: 2024-01-01

## 2024-01-01 RX ORDER — CEFTRIAXONE SODIUM 1 G/50ML
1 INJECTION, SOLUTION INTRAVENOUS ONCE
Status: COMPLETED | OUTPATIENT
Start: 2024-01-01 | End: 2024-01-01

## 2024-01-01 RX ORDER — LORAZEPAM 0.5 MG/1
0.5 TABLET ORAL AT BEDTIME
Status: DISCONTINUED | OUTPATIENT
Start: 2024-01-01 | End: 2024-01-01

## 2024-01-01 RX ORDER — CEFAZOLIN SODIUM 1 G/50ML
1 INJECTION, SOLUTION INTRAVENOUS EVERY 8 HOURS
Status: DISCONTINUED | OUTPATIENT
Start: 2024-01-01 | End: 2024-01-01

## 2024-01-01 RX ORDER — LORAZEPAM 0.5 MG/1
0.5 TABLET ORAL AT BEDTIME
Status: ON HOLD | COMMUNITY
End: 2024-01-01

## 2024-01-01 RX ORDER — IOPAMIDOL 755 MG/ML
53 INJECTION, SOLUTION INTRAVASCULAR ONCE
Status: COMPLETED | OUTPATIENT
Start: 2024-01-01 | End: 2024-01-01

## 2024-01-01 RX ORDER — THYROID 30 MG/1
30 TABLET ORAL
Status: DISCONTINUED | OUTPATIENT
Start: 2024-01-01 | End: 2024-01-01 | Stop reason: HOSPADM

## 2024-01-01 RX ORDER — ATORVASTATIN CALCIUM 40 MG/1
40 TABLET, FILM COATED ORAL AT BEDTIME
Status: DISCONTINUED | OUTPATIENT
Start: 2024-01-01 | End: 2024-01-01 | Stop reason: HOSPADM

## 2024-01-01 RX ORDER — METOPROLOL SUCCINATE 50 MG/1
50 TABLET, EXTENDED RELEASE ORAL EVERY MORNING
Qty: 90 TABLET | Refills: 3 | Status: SHIPPED | OUTPATIENT
Start: 2024-01-01 | End: 2024-01-01

## 2024-01-01 RX ORDER — VANCOMYCIN HYDROCHLORIDE 750 MG/150ML
750 INJECTION, SOLUTION INTRAVENOUS EVERY 24 HOURS
Status: DISCONTINUED | OUTPATIENT
Start: 2024-01-01 | End: 2024-01-01

## 2024-01-01 RX ORDER — VANCOMYCIN HYDROCHLORIDE 1 G/200ML
1000 INJECTION, SOLUTION INTRAVENOUS ONCE
Status: COMPLETED | OUTPATIENT
Start: 2024-01-01 | End: 2024-01-01

## 2024-01-01 RX ORDER — POLYETHYLENE GLYCOL 3350 17 G/17G
17 POWDER, FOR SOLUTION ORAL DAILY
Status: DISCONTINUED | OUTPATIENT
Start: 2024-01-01 | End: 2024-01-01 | Stop reason: HOSPADM

## 2024-01-01 RX ORDER — CEFEPIME HYDROCHLORIDE 2 G/1
2 INJECTION, POWDER, FOR SOLUTION INTRAVENOUS EVERY 8 HOURS
Status: DISCONTINUED | OUTPATIENT
Start: 2024-01-01 | End: 2024-01-01

## 2024-01-01 RX ORDER — MAGNESIUM HYDROXIDE/ALUMINUM HYDROXICE/SIMETHICONE 120; 1200; 1200 MG/30ML; MG/30ML; MG/30ML
30 SUSPENSION ORAL EVERY 4 HOURS PRN
DISCHARGE
Start: 2024-01-01

## 2024-01-01 RX ORDER — METOPROLOL SUCCINATE 100 MG/1
100 TABLET, EXTENDED RELEASE ORAL AT BEDTIME
Qty: 30 TABLET | Refills: 0 | Status: ON HOLD | OUTPATIENT
Start: 2024-01-01 | End: 2024-01-01

## 2024-01-01 RX ORDER — HYDROMORPHONE HYDROCHLORIDE 1 MG/ML
0.3 INJECTION, SOLUTION INTRAMUSCULAR; INTRAVENOUS; SUBCUTANEOUS
Status: DISCONTINUED | OUTPATIENT
Start: 2024-01-01 | End: 2024-01-01 | Stop reason: HOSPADM

## 2024-01-01 RX ORDER — ONDANSETRON 4 MG/1
4 TABLET, ORALLY DISINTEGRATING ORAL EVERY 6 HOURS PRN
Status: DISCONTINUED | OUTPATIENT
Start: 2024-01-01 | End: 2024-01-01 | Stop reason: HOSPADM

## 2024-01-01 RX ORDER — HYDROMORPHONE HYDROCHLORIDE 1 MG/ML
0.2 INJECTION, SOLUTION INTRAMUSCULAR; INTRAVENOUS; SUBCUTANEOUS ONCE
Status: COMPLETED | OUTPATIENT
Start: 2024-01-01 | End: 2024-01-01

## 2024-01-01 RX ORDER — METOPROLOL SUCCINATE 100 MG/1
100 TABLET, EXTENDED RELEASE ORAL AT BEDTIME
Status: DISCONTINUED | OUTPATIENT
Start: 2024-01-01 | End: 2024-01-01 | Stop reason: HOSPADM

## 2024-01-01 RX ORDER — WARFARIN SODIUM 2.5 MG/1
TABLET ORAL
Status: ON HOLD | COMMUNITY
End: 2024-01-01

## 2024-01-01 RX ORDER — MAGNESIUM OXIDE 400 MG/1
400 TABLET ORAL DAILY
Status: DISCONTINUED | OUTPATIENT
Start: 2024-01-01 | End: 2024-01-01 | Stop reason: HOSPADM

## 2024-01-01 RX ORDER — PREDNISONE 5 MG/1
5 TABLET ORAL EVERY MORNING
Status: DISCONTINUED | OUTPATIENT
Start: 2024-01-01 | End: 2024-01-01 | Stop reason: HOSPADM

## 2024-01-01 RX ORDER — FUROSEMIDE 20 MG
20 TABLET ORAL EVERY MORNING
Status: DISCONTINUED | OUTPATIENT
Start: 2024-01-01 | End: 2024-01-01 | Stop reason: HOSPADM

## 2024-01-01 RX ORDER — TRAMADOL HYDROCHLORIDE 50 MG/1
50 TABLET ORAL 2 TIMES DAILY
Status: DISCONTINUED | OUTPATIENT
Start: 2024-01-01 | End: 2024-01-01 | Stop reason: HOSPADM

## 2024-01-01 RX ORDER — METOPROLOL SUCCINATE 50 MG/1
50 TABLET, EXTENDED RELEASE ORAL AT BEDTIME
Status: DISCONTINUED | OUTPATIENT
Start: 2024-01-01 | End: 2024-01-01

## 2024-01-01 RX ORDER — METOPROLOL SUCCINATE 100 MG/1
100 TABLET, EXTENDED RELEASE ORAL EVERY MORNING
Qty: 90 TABLET | Refills: 3 | Status: ON HOLD | OUTPATIENT
Start: 2024-01-01 | End: 2024-01-01

## 2024-01-01 RX ORDER — DIGOXIN 0.25 MG/ML
125 INJECTION INTRAMUSCULAR; INTRAVENOUS ONCE
Status: DISCONTINUED | OUTPATIENT
Start: 2024-01-01 | End: 2024-01-01

## 2024-01-01 RX ORDER — SIMETHICONE 80 MG
80 TABLET,CHEWABLE ORAL EVERY 6 HOURS PRN
Status: DISCONTINUED | OUTPATIENT
Start: 2024-01-01 | End: 2024-01-01

## 2024-01-01 RX ORDER — TRAMADOL HYDROCHLORIDE 50 MG/1
50 TABLET ORAL EVERY 6 HOURS PRN
Status: DISCONTINUED | OUTPATIENT
Start: 2024-01-01 | End: 2024-01-01 | Stop reason: HOSPADM

## 2024-01-01 RX ORDER — METOPROLOL TARTRATE 1 MG/ML
5 INJECTION, SOLUTION INTRAVENOUS EVERY 5 MIN PRN
Status: DISCONTINUED | OUTPATIENT
Start: 2024-01-01 | End: 2024-01-01

## 2024-01-01 RX ORDER — POLYETHYLENE GLYCOL 3350 17 G/17G
17 POWDER, FOR SOLUTION ORAL EVERY MORNING
Status: DISCONTINUED | OUTPATIENT
Start: 2024-01-01 | End: 2024-01-01 | Stop reason: HOSPADM

## 2024-01-01 RX ORDER — AMOXICILLIN 250 MG
2 CAPSULE ORAL 2 TIMES DAILY PRN
Status: DISCONTINUED | OUTPATIENT
Start: 2024-01-01 | End: 2024-01-01 | Stop reason: HOSPADM

## 2024-01-01 RX ORDER — METOPROLOL SUCCINATE 50 MG/1
100 TABLET, EXTENDED RELEASE ORAL ONCE
Status: COMPLETED | OUTPATIENT
Start: 2024-01-01 | End: 2024-01-01

## 2024-01-01 RX ORDER — TRAMADOL HYDROCHLORIDE 50 MG/1
50 TABLET ORAL EVERY 6 HOURS PRN
Qty: 20 TABLET | Refills: 0 | Status: SHIPPED | OUTPATIENT
Start: 2024-01-01

## 2024-01-01 RX ORDER — METOPROLOL SUCCINATE 100 MG/1
100 TABLET, EXTENDED RELEASE ORAL EVERY MORNING
Status: DISCONTINUED | OUTPATIENT
Start: 2024-01-01 | End: 2024-01-01 | Stop reason: HOSPADM

## 2024-01-01 RX ORDER — METOPROLOL SUCCINATE 100 MG/1
100 TABLET, EXTENDED RELEASE ORAL 2 TIMES DAILY
COMMUNITY

## 2024-01-01 RX ORDER — CEFEPIME HYDROCHLORIDE 2 G/1
2 INJECTION, POWDER, FOR SOLUTION INTRAVENOUS EVERY 24 HOURS
Status: DISCONTINUED | OUTPATIENT
Start: 2024-01-01 | End: 2024-01-01

## 2024-01-01 RX ORDER — CEFAZOLIN SODIUM 1 G/50ML
1 INJECTION, SOLUTION INTRAVENOUS EVERY 12 HOURS
Status: DISCONTINUED | OUTPATIENT
Start: 2024-01-01 | End: 2024-01-01

## 2024-01-01 RX ORDER — SODIUM CHLORIDE 9 MG/ML
INJECTION, SOLUTION INTRAVENOUS CONTINUOUS
Status: ACTIVE | OUTPATIENT
Start: 2024-01-01 | End: 2024-01-01

## 2024-01-01 RX ORDER — METOPROLOL TARTRATE 25 MG/1
25 TABLET, FILM COATED ORAL ONCE
Status: COMPLETED | OUTPATIENT
Start: 2024-01-01 | End: 2024-01-01

## 2024-01-01 RX ORDER — DIGOXIN 0.25 MG/ML
500 INJECTION INTRAMUSCULAR; INTRAVENOUS ONCE
Status: COMPLETED | OUTPATIENT
Start: 2024-01-01 | End: 2024-01-01

## 2024-01-01 RX ORDER — SUCRALFATE 1 G/1
1 TABLET ORAL 4 TIMES DAILY
COMMUNITY
Start: 2024-01-01

## 2024-01-01 RX ORDER — PANTOPRAZOLE SODIUM 40 MG/1
40 TABLET, DELAYED RELEASE ORAL
Status: DISCONTINUED | OUTPATIENT
Start: 2024-01-01 | End: 2024-01-01 | Stop reason: HOSPADM

## 2024-01-01 RX ORDER — METOPROLOL SUCCINATE 25 MG/1
25 TABLET, EXTENDED RELEASE ORAL AT BEDTIME
Status: ON HOLD | COMMUNITY
Start: 2024-01-01 | End: 2024-01-01

## 2024-01-01 RX ORDER — AMOXICILLIN 250 MG
2 CAPSULE ORAL 2 TIMES DAILY
Status: DISCONTINUED | OUTPATIENT
Start: 2024-01-01 | End: 2024-01-01 | Stop reason: HOSPADM

## 2024-01-01 RX ORDER — METOPROLOL SUCCINATE 100 MG/1
50 TABLET, EXTENDED RELEASE ORAL EVERY MORNING
Qty: 90 TABLET | Refills: 3 | Status: SHIPPED | OUTPATIENT
Start: 2024-01-01 | End: 2024-01-01

## 2024-01-01 RX ORDER — WARFARIN SODIUM 5 MG/1
TABLET ORAL
Status: ON HOLD
Start: 2024-01-01 | End: 2024-01-01

## 2024-01-01 RX ORDER — DIGOXIN 0.06 MG/1
62.5 TABLET ORAL DAILY
DISCHARGE
Start: 2024-01-01

## 2024-01-01 RX ORDER — DOXYCYCLINE 100 MG/10ML
100 INJECTION, POWDER, LYOPHILIZED, FOR SOLUTION INTRAVENOUS EVERY 12 HOURS
Status: DISCONTINUED | OUTPATIENT
Start: 2024-01-01 | End: 2024-01-01

## 2024-01-01 RX ADMIN — DOXYCYCLINE HYCLATE 100 MG: 100 CAPSULE ORAL at 11:00

## 2024-01-01 RX ADMIN — PANTOPRAZOLE SODIUM 40 MG: 40 TABLET, DELAYED RELEASE ORAL at 21:48

## 2024-01-01 RX ADMIN — ACETAMINOPHEN 650 MG: 325 TABLET, FILM COATED ORAL at 01:04

## 2024-01-01 RX ADMIN — WARFARIN SODIUM 2.5 MG: 2.5 TABLET ORAL at 18:06

## 2024-01-01 RX ADMIN — FUROSEMIDE 40 MG: 40 TABLET ORAL at 08:54

## 2024-01-01 RX ADMIN — ACETAMINOPHEN 975 MG: 325 TABLET, FILM COATED ORAL at 21:16

## 2024-01-01 RX ADMIN — FUROSEMIDE 40 MG: 40 TABLET ORAL at 09:03

## 2024-01-01 RX ADMIN — PANTOPRAZOLE SODIUM 40 MG: 40 TABLET, DELAYED RELEASE ORAL at 08:27

## 2024-01-01 RX ADMIN — PANTOPRAZOLE SODIUM 40 MG: 40 TABLET, DELAYED RELEASE ORAL at 20:59

## 2024-01-01 RX ADMIN — PANTOPRAZOLE SODIUM 40 MG: 40 TABLET, DELAYED RELEASE ORAL at 20:35

## 2024-01-01 RX ADMIN — PREDNISONE 5 MG: 5 TABLET ORAL at 08:24

## 2024-01-01 RX ADMIN — FUROSEMIDE 20 MG: 20 TABLET ORAL at 08:27

## 2024-01-01 RX ADMIN — DOXYCYCLINE 100 MG: 100 INJECTION, POWDER, LYOPHILIZED, FOR SOLUTION INTRAVENOUS at 10:43

## 2024-01-01 RX ADMIN — PANTOPRAZOLE SODIUM 40 MG: 40 TABLET, DELAYED RELEASE ORAL at 21:06

## 2024-01-01 RX ADMIN — CEPHALEXIN 250 MG: 250 CAPSULE ORAL at 21:12

## 2024-01-01 RX ADMIN — TRAMADOL HYDROCHLORIDE 50 MG: 50 TABLET, COATED ORAL at 09:03

## 2024-01-01 RX ADMIN — PANTOPRAZOLE SODIUM 40 MG: 40 TABLET, DELAYED RELEASE ORAL at 09:27

## 2024-01-01 RX ADMIN — METOPROLOL SUCCINATE 100 MG: 100 TABLET, EXTENDED RELEASE ORAL at 21:07

## 2024-01-01 RX ADMIN — TRAMADOL HYDROCHLORIDE 50 MG: 50 TABLET, COATED ORAL at 20:30

## 2024-01-01 RX ADMIN — DOXYCYCLINE 100 MG: 100 INJECTION, POWDER, LYOPHILIZED, FOR SOLUTION INTRAVENOUS at 21:06

## 2024-01-01 RX ADMIN — THYROID 30 MG: 30 TABLET ORAL at 08:08

## 2024-01-01 RX ADMIN — CEFAZOLIN SODIUM 1 G: 1 INJECTION, SOLUTION INTRAVENOUS at 12:36

## 2024-01-01 RX ADMIN — CALCIUM CARBONATE (ANTACID) CHEW TAB 500 MG 1000 MG: 500 CHEW TAB at 22:20

## 2024-01-01 RX ADMIN — CALCIUM CARBONATE (ANTACID) CHEW TAB 500 MG 1000 MG: 500 CHEW TAB at 12:38

## 2024-01-01 RX ADMIN — METOPROLOL SUCCINATE 25 MG: 25 TABLET, EXTENDED RELEASE ORAL at 18:16

## 2024-01-01 RX ADMIN — CEFAZOLIN SODIUM 1 G: 1 INJECTION, SOLUTION INTRAVENOUS at 20:56

## 2024-01-01 RX ADMIN — TRAMADOL HYDROCHLORIDE 50 MG: 50 TABLET, COATED ORAL at 08:20

## 2024-01-01 RX ADMIN — ACETAMINOPHEN 975 MG: 325 TABLET, FILM COATED ORAL at 08:24

## 2024-01-01 RX ADMIN — CALCIUM CARBONATE (ANTACID) CHEW TAB 500 MG 1000 MG: 500 CHEW TAB at 12:26

## 2024-01-01 RX ADMIN — TRAMADOL HYDROCHLORIDE 50 MG: 50 TABLET, COATED ORAL at 21:03

## 2024-01-01 RX ADMIN — ACETAMINOPHEN 650 MG: 325 TABLET, FILM COATED ORAL at 05:02

## 2024-01-01 RX ADMIN — ACETAMINOPHEN 975 MG: 325 TABLET, FILM COATED ORAL at 15:09

## 2024-01-01 RX ADMIN — TRAMADOL HYDROCHLORIDE 50 MG: 50 TABLET, COATED ORAL at 10:08

## 2024-01-01 RX ADMIN — TRAMADOL HYDROCHLORIDE 50 MG: 50 TABLET, COATED ORAL at 21:06

## 2024-01-01 RX ADMIN — DIGOXIN 62.5 MCG: 125 TABLET ORAL at 08:54

## 2024-01-01 RX ADMIN — ACETAMINOPHEN 975 MG: 325 TABLET, FILM COATED ORAL at 14:06

## 2024-01-01 RX ADMIN — ACETAMINOPHEN 650 MG: 325 TABLET, FILM COATED ORAL at 23:26

## 2024-01-01 RX ADMIN — TRAMADOL HYDROCHLORIDE 50 MG: 50 TABLET, COATED ORAL at 22:22

## 2024-01-01 RX ADMIN — ONDANSETRON 4 MG: 2 INJECTION INTRAMUSCULAR; INTRAVENOUS at 22:10

## 2024-01-01 RX ADMIN — PANTOPRAZOLE SODIUM 40 MG: 40 TABLET, DELAYED RELEASE ORAL at 10:08

## 2024-01-01 RX ADMIN — DOXYCYCLINE 100 MG: 100 INJECTION, POWDER, LYOPHILIZED, FOR SOLUTION INTRAVENOUS at 09:27

## 2024-01-01 RX ADMIN — ATORVASTATIN CALCIUM 40 MG: 40 TABLET, FILM COATED ORAL at 21:03

## 2024-01-01 RX ADMIN — PANTOPRAZOLE SODIUM 40 MG: 40 TABLET, DELAYED RELEASE ORAL at 08:08

## 2024-01-01 RX ADMIN — DOXYCYCLINE HYCLATE 100 MG: 100 CAPSULE ORAL at 06:37

## 2024-01-01 RX ADMIN — MAGNESIUM OXIDE TAB 400 MG (241.3 MG ELEMENTAL MG) 400 MG: 400 (241.3 MG) TAB at 09:03

## 2024-01-01 RX ADMIN — PREDNISONE 5 MG: 5 TABLET ORAL at 08:32

## 2024-01-01 RX ADMIN — POTASSIUM CHLORIDE 40 MEQ: 10 CAPSULE, COATED, EXTENDED RELEASE ORAL at 08:47

## 2024-01-01 RX ADMIN — CEFEPIME 2 G: 2 INJECTION, POWDER, FOR SOLUTION INTRAVENOUS at 05:47

## 2024-01-01 RX ADMIN — SIMETHICONE 80 MG: 80 TABLET, CHEWABLE ORAL at 12:27

## 2024-01-01 RX ADMIN — DOXYCYCLINE HYCLATE 100 MG: 100 CAPSULE ORAL at 06:13

## 2024-01-01 RX ADMIN — ONDANSETRON 4 MG: 2 INJECTION INTRAMUSCULAR; INTRAVENOUS at 08:09

## 2024-01-01 RX ADMIN — DOXYCYCLINE 100 MG: 100 INJECTION, POWDER, LYOPHILIZED, FOR SOLUTION INTRAVENOUS at 22:21

## 2024-01-01 RX ADMIN — ATORVASTATIN CALCIUM 40 MG: 40 TABLET, FILM COATED ORAL at 21:48

## 2024-01-01 RX ADMIN — METOPROLOL SUCCINATE 100 MG: 100 TABLET, EXTENDED RELEASE ORAL at 21:16

## 2024-01-01 RX ADMIN — DIGOXIN 62.5 MCG: 125 TABLET ORAL at 08:52

## 2024-01-01 RX ADMIN — TRAMADOL HYDROCHLORIDE 50 MG: 50 TABLET, COATED ORAL at 17:56

## 2024-01-01 RX ADMIN — CALCIUM CARBONATE (ANTACID) CHEW TAB 500 MG 1500 MG: 500 CHEW TAB at 21:14

## 2024-01-01 RX ADMIN — METOPROLOL SUCCINATE 100 MG: 100 TABLET, EXTENDED RELEASE ORAL at 22:28

## 2024-01-01 RX ADMIN — ATORVASTATIN CALCIUM 40 MG: 40 TABLET, FILM COATED ORAL at 22:22

## 2024-01-01 RX ADMIN — CEPHALEXIN 250 MG: 250 CAPSULE ORAL at 08:54

## 2024-01-01 RX ADMIN — CEFAZOLIN SODIUM 1 G: 1 INJECTION, SOLUTION INTRAVENOUS at 22:39

## 2024-01-01 RX ADMIN — CEFAZOLIN SODIUM 1 G: 1 INJECTION, SOLUTION INTRAVENOUS at 22:10

## 2024-01-01 RX ADMIN — TRAMADOL HYDROCHLORIDE 50 MG: 50 TABLET, COATED ORAL at 09:28

## 2024-01-01 RX ADMIN — TRAMADOL HYDROCHLORIDE 50 MG: 50 TABLET, COATED ORAL at 23:57

## 2024-01-01 RX ADMIN — PANTOPRAZOLE SODIUM 40 MG: 40 TABLET, DELAYED RELEASE ORAL at 08:54

## 2024-01-01 RX ADMIN — METOPROLOL TARTRATE 2.5 MG: 1 INJECTION, SOLUTION INTRAVENOUS at 12:02

## 2024-01-01 RX ADMIN — MAGNESIUM OXIDE TAB 400 MG (241.3 MG ELEMENTAL MG) 400 MG: 400 (241.3 MG) TAB at 15:44

## 2024-01-01 RX ADMIN — SENNOSIDES AND DOCUSATE SODIUM 2 TABLET: 8.6; 5 TABLET ORAL at 11:20

## 2024-01-01 RX ADMIN — DIGOXIN 125 MCG: 125 TABLET ORAL at 09:28

## 2024-01-01 RX ADMIN — METOPROLOL SUCCINATE 100 MG: 100 TABLET, EXTENDED RELEASE ORAL at 20:40

## 2024-01-01 RX ADMIN — DOXYCYCLINE HYCLATE 100 MG: 100 CAPSULE ORAL at 18:57

## 2024-01-01 RX ADMIN — LORAZEPAM 0.5 MG: 0.5 TABLET ORAL at 21:17

## 2024-01-01 RX ADMIN — MAGNESIUM OXIDE TAB 400 MG (241.3 MG ELEMENTAL MG) 400 MG: 400 (241.3 MG) TAB at 10:08

## 2024-01-01 RX ADMIN — THYROID 30 MG: 30 TABLET ORAL at 07:54

## 2024-01-01 RX ADMIN — SENNOSIDES AND DOCUSATE SODIUM 1 TABLET: 8.6; 5 TABLET ORAL at 08:27

## 2024-01-01 RX ADMIN — METOPROLOL SUCCINATE 100 MG: 100 TABLET, EXTENDED RELEASE ORAL at 11:05

## 2024-01-01 RX ADMIN — CEPHALEXIN 250 MG: 250 CAPSULE ORAL at 12:38

## 2024-01-01 RX ADMIN — ATORVASTATIN CALCIUM 40 MG: 40 TABLET, FILM COATED ORAL at 21:16

## 2024-01-01 RX ADMIN — METOPROLOL TARTRATE 5 MG: 5 INJECTION INTRAVENOUS at 11:25

## 2024-01-01 RX ADMIN — SODIUM CHLORIDE: 9 INJECTION, SOLUTION INTRAVENOUS at 08:31

## 2024-01-01 RX ADMIN — PANTOPRAZOLE SODIUM 40 MG: 40 TABLET, DELAYED RELEASE ORAL at 10:51

## 2024-01-01 RX ADMIN — PANTOPRAZOLE SODIUM 40 MG: 40 TABLET, DELAYED RELEASE ORAL at 09:03

## 2024-01-01 RX ADMIN — PREDNISONE 5 MG: 5 TABLET ORAL at 08:27

## 2024-01-01 RX ADMIN — DOXYCYCLINE 100 MG: 100 INJECTION, POWDER, LYOPHILIZED, FOR SOLUTION INTRAVENOUS at 10:32

## 2024-01-01 RX ADMIN — PANTOPRAZOLE SODIUM 40 MG: 40 TABLET, DELAYED RELEASE ORAL at 16:57

## 2024-01-01 RX ADMIN — ACETAMINOPHEN 650 MG: 325 TABLET, FILM COATED ORAL at 16:23

## 2024-01-01 RX ADMIN — CEPHALEXIN 250 MG: 250 CAPSULE ORAL at 11:00

## 2024-01-01 RX ADMIN — SODIUM CHLORIDE 1000 ML: 9 INJECTION, SOLUTION INTRAVENOUS at 11:23

## 2024-01-01 RX ADMIN — CEFAZOLIN SODIUM 1 G: 1 INJECTION, SOLUTION INTRAVENOUS at 11:35

## 2024-01-01 RX ADMIN — ACETAMINOPHEN 975 MG: 325 TABLET, FILM COATED ORAL at 16:11

## 2024-01-01 RX ADMIN — FUROSEMIDE 40 MG: 40 TABLET ORAL at 09:50

## 2024-01-01 RX ADMIN — PANTOPRAZOLE SODIUM 40 MG: 40 TABLET, DELAYED RELEASE ORAL at 18:16

## 2024-01-01 RX ADMIN — PREDNISONE 5 MG: 5 TABLET ORAL at 09:27

## 2024-01-01 RX ADMIN — PREDNISONE 5 MG: 5 TABLET ORAL at 09:03

## 2024-01-01 RX ADMIN — CEFTRIAXONE SODIUM 1 G: 1 INJECTION, SOLUTION INTRAVENOUS at 12:45

## 2024-01-01 RX ADMIN — TRAMADOL HYDROCHLORIDE 50 MG: 50 TABLET, COATED ORAL at 08:54

## 2024-01-01 RX ADMIN — SODIUM CHLORIDE: 9 INJECTION, SOLUTION INTRAVENOUS at 14:33

## 2024-01-01 RX ADMIN — ACETAMINOPHEN 975 MG: 325 TABLET, FILM COATED ORAL at 08:27

## 2024-01-01 RX ADMIN — VANCOMYCIN HYDROCHLORIDE 750 MG: 750 INJECTION, SOLUTION INTRAVENOUS at 09:37

## 2024-01-01 RX ADMIN — CALCIUM CARBONATE (ANTACID) CHEW TAB 500 MG 1000 MG: 500 CHEW TAB at 16:24

## 2024-01-01 RX ADMIN — CALCIUM CARBONATE (ANTACID) CHEW TAB 500 MG 1000 MG: 500 CHEW TAB at 09:49

## 2024-01-01 RX ADMIN — PANTOPRAZOLE SODIUM 40 MG: 40 TABLET, DELAYED RELEASE ORAL at 09:50

## 2024-01-01 RX ADMIN — ACETAMINOPHEN 650 MG: 325 TABLET, FILM COATED ORAL at 22:19

## 2024-01-01 RX ADMIN — METOPROLOL SUCCINATE 100 MG: 100 TABLET, EXTENDED RELEASE ORAL at 21:03

## 2024-01-01 RX ADMIN — THYROID 30 MG: 30 TABLET ORAL at 06:13

## 2024-01-01 RX ADMIN — PREDNISONE 5 MG: 5 TABLET ORAL at 10:52

## 2024-01-01 RX ADMIN — ACETAMINOPHEN 975 MG: 325 TABLET, FILM COATED ORAL at 20:27

## 2024-01-01 RX ADMIN — DIGOXIN 125 MCG: 0.25 INJECTION INTRAMUSCULAR; INTRAVENOUS at 13:20

## 2024-01-01 RX ADMIN — PREDNISONE 5 MG: 5 TABLET ORAL at 09:04

## 2024-01-01 RX ADMIN — CALCIUM CARBONATE (ANTACID) CHEW TAB 500 MG 1000 MG: 500 CHEW TAB at 15:44

## 2024-01-01 RX ADMIN — METOPROLOL SUCCINATE 100 MG: 100 TABLET, EXTENDED RELEASE ORAL at 08:54

## 2024-01-01 RX ADMIN — CEFAZOLIN SODIUM 1 G: 1 INJECTION, SOLUTION INTRAVENOUS at 11:58

## 2024-01-01 RX ADMIN — ATORVASTATIN CALCIUM 40 MG: 40 TABLET, FILM COATED ORAL at 21:12

## 2024-01-01 RX ADMIN — FUROSEMIDE 40 MG: 40 TABLET ORAL at 11:08

## 2024-01-01 RX ADMIN — TRAMADOL HYDROCHLORIDE 50 MG: 50 TABLET, COATED ORAL at 21:47

## 2024-01-01 RX ADMIN — PREDNISONE 5 MG: 5 TABLET ORAL at 09:50

## 2024-01-01 RX ADMIN — METOPROLOL SUCCINATE 100 MG: 100 TABLET, EXTENDED RELEASE ORAL at 09:03

## 2024-01-01 RX ADMIN — CALCIUM CARBONATE (ANTACID) CHEW TAB 500 MG 1500 MG: 500 CHEW TAB at 20:31

## 2024-01-01 RX ADMIN — METOPROLOL SUCCINATE 100 MG: 100 TABLET, EXTENDED RELEASE ORAL at 20:35

## 2024-01-01 RX ADMIN — DOXYCYCLINE 100 MG: 100 INJECTION, POWDER, LYOPHILIZED, FOR SOLUTION INTRAVENOUS at 10:33

## 2024-01-01 RX ADMIN — LORAZEPAM 0.5 MG: 0.5 TABLET ORAL at 21:36

## 2024-01-01 RX ADMIN — PREDNISONE 5 MG: 5 TABLET ORAL at 08:54

## 2024-01-01 RX ADMIN — ACETAMINOPHEN 975 MG: 325 TABLET, FILM COATED ORAL at 21:13

## 2024-01-01 RX ADMIN — CALCIUM CARBONATE (ANTACID) CHEW TAB 500 MG 1000 MG: 500 CHEW TAB at 22:08

## 2024-01-01 RX ADMIN — METOPROLOL SUCCINATE 100 MG: 100 TABLET, EXTENDED RELEASE ORAL at 10:51

## 2024-01-01 RX ADMIN — THYROID 30 MG: 30 TABLET ORAL at 06:32

## 2024-01-01 RX ADMIN — ACETAMINOPHEN 650 MG: 325 TABLET, FILM COATED ORAL at 20:35

## 2024-01-01 RX ADMIN — LORAZEPAM 0.25 MG: 0.5 TABLET ORAL at 23:20

## 2024-01-01 RX ADMIN — ATORVASTATIN CALCIUM 40 MG: 40 TABLET, FILM COATED ORAL at 20:35

## 2024-01-01 RX ADMIN — ONDANSETRON 4 MG: 2 INJECTION INTRAMUSCULAR; INTRAVENOUS at 11:25

## 2024-01-01 RX ADMIN — PANTOPRAZOLE SODIUM 40 MG: 40 TABLET, DELAYED RELEASE ORAL at 21:12

## 2024-01-01 RX ADMIN — ACETAMINOPHEN 975 MG: 325 TABLET, FILM COATED ORAL at 08:38

## 2024-01-01 RX ADMIN — HYDROMORPHONE HYDROCHLORIDE 0.2 MG: 1 INJECTION, SOLUTION INTRAMUSCULAR; INTRAVENOUS; SUBCUTANEOUS at 23:33

## 2024-01-01 RX ADMIN — DIGOXIN 62.5 MCG: 125 TABLET ORAL at 08:30

## 2024-01-01 RX ADMIN — METOPROLOL SUCCINATE 100 MG: 100 TABLET, EXTENDED RELEASE ORAL at 08:32

## 2024-01-01 RX ADMIN — PREDNISONE 5 MG: 5 TABLET ORAL at 08:38

## 2024-01-01 RX ADMIN — PANTOPRAZOLE SODIUM 40 MG: 40 TABLET, DELAYED RELEASE ORAL at 16:45

## 2024-01-01 RX ADMIN — TRAMADOL HYDROCHLORIDE 50 MG: 50 TABLET, COATED ORAL at 21:12

## 2024-01-01 RX ADMIN — POLYETHYLENE GLYCOL 3350 17 G: 17 POWDER, FOR SOLUTION ORAL at 17:30

## 2024-01-01 RX ADMIN — CALCIUM CARBONATE (ANTACID) CHEW TAB 500 MG 1000 MG: 500 CHEW TAB at 09:26

## 2024-01-01 RX ADMIN — POTASSIUM CHLORIDE 40 MEQ: 10 CAPSULE, COATED, EXTENDED RELEASE ORAL at 08:26

## 2024-01-01 RX ADMIN — THYROID 30 MG: 30 TABLET ORAL at 07:40

## 2024-01-01 RX ADMIN — DOXYCYCLINE HYCLATE 100 MG: 100 CAPSULE ORAL at 18:17

## 2024-01-01 RX ADMIN — HYDROMORPHONE HYDROCHLORIDE 2 MG: 2 TABLET ORAL at 01:45

## 2024-01-01 RX ADMIN — METOPROLOL SUCCINATE 100 MG: 100 TABLET, EXTENDED RELEASE ORAL at 21:48

## 2024-01-01 RX ADMIN — PANTOPRAZOLE SODIUM 40 MG: 40 TABLET, DELAYED RELEASE ORAL at 21:03

## 2024-01-01 RX ADMIN — HYDROMORPHONE HYDROCHLORIDE 0.2 MG: 1 INJECTION, SOLUTION INTRAMUSCULAR; INTRAVENOUS; SUBCUTANEOUS at 20:13

## 2024-01-01 RX ADMIN — METOPROLOL SUCCINATE 100 MG: 100 TABLET, EXTENDED RELEASE ORAL at 09:27

## 2024-01-01 RX ADMIN — CEFEPIME 2 G: 2 INJECTION, POWDER, FOR SOLUTION INTRAVENOUS at 06:36

## 2024-01-01 RX ADMIN — SODIUM CHLORIDE: 9 INJECTION, SOLUTION INTRAVENOUS at 10:30

## 2024-01-01 RX ADMIN — VANCOMYCIN HYDROCHLORIDE 1000 MG: 1 INJECTION, SOLUTION INTRAVENOUS at 10:34

## 2024-01-01 RX ADMIN — DIGOXIN 125 MCG: 0.25 INJECTION INTRAMUSCULAR; INTRAVENOUS at 12:18

## 2024-01-01 RX ADMIN — PANTOPRAZOLE SODIUM 40 MG: 40 TABLET, DELAYED RELEASE ORAL at 22:22

## 2024-01-01 RX ADMIN — TRAMADOL HYDROCHLORIDE 50 MG: 50 TABLET, COATED ORAL at 20:40

## 2024-01-01 RX ADMIN — TRAMADOL HYDROCHLORIDE 50 MG: 50 TABLET, COATED ORAL at 21:48

## 2024-01-01 RX ADMIN — CEPHALEXIN 250 MG: 250 CAPSULE ORAL at 21:03

## 2024-01-01 RX ADMIN — THYROID 30 MG: 30 TABLET ORAL at 06:37

## 2024-01-01 RX ADMIN — DIGOXIN 125 MCG: 125 TABLET ORAL at 10:52

## 2024-01-01 RX ADMIN — DIGOXIN 62.5 MCG: 125 TABLET ORAL at 08:38

## 2024-01-01 RX ADMIN — FUROSEMIDE 40 MG: 40 TABLET ORAL at 08:32

## 2024-01-01 RX ADMIN — CALCIUM CARBONATE (ANTACID) CHEW TAB 500 MG 1500 MG: 500 CHEW TAB at 21:16

## 2024-01-01 RX ADMIN — FUROSEMIDE 40 MG: 10 INJECTION, SOLUTION INTRAMUSCULAR; INTRAVENOUS at 00:10

## 2024-01-01 RX ADMIN — MAGNESIUM OXIDE TAB 400 MG (241.3 MG ELEMENTAL MG) 400 MG: 400 (241.3 MG) TAB at 08:54

## 2024-01-01 RX ADMIN — METOPROLOL TARTRATE 25 MG: 25 TABLET, FILM COATED ORAL at 09:26

## 2024-01-01 RX ADMIN — ATORVASTATIN CALCIUM 40 MG: 40 TABLET, FILM COATED ORAL at 20:41

## 2024-01-01 RX ADMIN — DIGOXIN 125 MCG: 125 TABLET ORAL at 11:58

## 2024-01-01 RX ADMIN — HYDROMORPHONE HYDROCHLORIDE 0.3 MG: 1 INJECTION, SOLUTION INTRAMUSCULAR; INTRAVENOUS; SUBCUTANEOUS at 06:31

## 2024-01-01 RX ADMIN — FUROSEMIDE 40 MG: 10 INJECTION, SOLUTION INTRAMUSCULAR; INTRAVENOUS at 16:11

## 2024-01-01 RX ADMIN — FUROSEMIDE 40 MG: 10 INJECTION, SOLUTION INTRAMUSCULAR; INTRAVENOUS at 16:59

## 2024-01-01 RX ADMIN — FUROSEMIDE 40 MG: 40 TABLET ORAL at 10:08

## 2024-01-01 RX ADMIN — CALCIUM CARBONATE (ANTACID) CHEW TAB 500 MG 1000 MG: 500 CHEW TAB at 11:20

## 2024-01-01 RX ADMIN — FUROSEMIDE 40 MG: 40 TABLET ORAL at 09:28

## 2024-01-01 RX ADMIN — TRAMADOL HYDROCHLORIDE 50 MG: 50 TABLET, COATED ORAL at 18:56

## 2024-01-01 RX ADMIN — SODIUM CHLORIDE: 9 INJECTION, SOLUTION INTRAVENOUS at 22:21

## 2024-01-01 RX ADMIN — PANTOPRAZOLE SODIUM 40 MG: 40 TABLET, DELAYED RELEASE ORAL at 20:41

## 2024-01-01 RX ADMIN — DIGOXIN 62.5 MCG: 125 TABLET ORAL at 09:03

## 2024-01-01 RX ADMIN — METOPROLOL TARTRATE 2.5 MG: 5 INJECTION INTRAVENOUS at 12:26

## 2024-01-01 RX ADMIN — LORAZEPAM 0.5 MG: 0.5 TABLET ORAL at 21:14

## 2024-01-01 RX ADMIN — POLYETHYLENE GLYCOL 3350 17 G: 17 POWDER, FOR SOLUTION ORAL at 08:25

## 2024-01-01 RX ADMIN — PANTOPRAZOLE SODIUM 40 MG: 40 TABLET, DELAYED RELEASE ORAL at 08:24

## 2024-01-01 RX ADMIN — METOPROLOL TARTRATE 12.5 MG: 25 TABLET, FILM COATED ORAL at 08:24

## 2024-01-01 RX ADMIN — THYROID 30 MG: 30 TABLET ORAL at 08:37

## 2024-01-01 RX ADMIN — THYROID 30 MG: 30 TABLET ORAL at 06:55

## 2024-01-01 RX ADMIN — DOXYCYCLINE 100 MG: 100 INJECTION, POWDER, LYOPHILIZED, FOR SOLUTION INTRAVENOUS at 21:03

## 2024-01-01 RX ADMIN — THYROID 30 MG: 30 TABLET ORAL at 06:20

## 2024-01-01 RX ADMIN — DIGOXIN 500 MCG: 0.25 INJECTION INTRAMUSCULAR; INTRAVENOUS at 13:49

## 2024-01-01 RX ADMIN — ALUMINUM HYDROXIDE, MAGNESIUM HYDROXIDE, AND SIMETHICONE 30 ML: 200; 200; 20 SUSPENSION ORAL at 11:41

## 2024-01-01 RX ADMIN — METOPROLOL SUCCINATE 50 MG: 50 TABLET, EXTENDED RELEASE ORAL at 20:28

## 2024-01-01 RX ADMIN — DOXYCYCLINE 100 MG: 100 INJECTION, POWDER, LYOPHILIZED, FOR SOLUTION INTRAVENOUS at 22:10

## 2024-01-01 RX ADMIN — ALUMINUM HYDROXIDE, MAGNESIUM HYDROXIDE, AND SIMETHICONE 30 ML: 200; 200; 20 SUSPENSION ORAL at 21:03

## 2024-01-01 RX ADMIN — TRAMADOL HYDROCHLORIDE 50 MG: 50 TABLET, COATED ORAL at 08:23

## 2024-01-01 RX ADMIN — THYROID 30 MG: 30 TABLET ORAL at 08:27

## 2024-01-01 RX ADMIN — METOPROLOL SUCCINATE 100 MG: 50 TABLET, EXTENDED RELEASE ORAL at 09:55

## 2024-01-01 RX ADMIN — DIGOXIN 62.5 MCG: 125 TABLET ORAL at 10:07

## 2024-01-01 RX ADMIN — METOPROLOL SUCCINATE 100 MG: 100 TABLET, EXTENDED RELEASE ORAL at 09:50

## 2024-01-01 RX ADMIN — CEPHALEXIN 250 MG: 250 CAPSULE ORAL at 14:01

## 2024-01-01 RX ADMIN — TRAMADOL HYDROCHLORIDE 50 MG: 50 TABLET, COATED ORAL at 10:52

## 2024-01-01 RX ADMIN — FUROSEMIDE 40 MG: 10 INJECTION, SOLUTION INTRAMUSCULAR; INTRAVENOUS at 08:24

## 2024-01-01 RX ADMIN — ATORVASTATIN CALCIUM 40 MG: 40 TABLET, FILM COATED ORAL at 21:14

## 2024-01-01 RX ADMIN — THYROID 30 MG: 30 TABLET ORAL at 08:24

## 2024-01-01 RX ADMIN — ATORVASTATIN CALCIUM 40 MG: 40 TABLET, FILM COATED ORAL at 21:07

## 2024-01-01 RX ADMIN — METOPROLOL SUCCINATE 100 MG: 100 TABLET, EXTENDED RELEASE ORAL at 20:59

## 2024-01-01 RX ADMIN — TRAMADOL HYDROCHLORIDE 50 MG: 50 TABLET, COATED ORAL at 18:17

## 2024-01-01 RX ADMIN — CEPHALEXIN 250 MG: 250 CAPSULE ORAL at 09:03

## 2024-01-01 RX ADMIN — SODIUM CHLORIDE 500 ML: 9 INJECTION, SOLUTION INTRAVENOUS at 18:54

## 2024-01-01 RX ADMIN — WARFARIN SODIUM 2.5 MG: 2.5 TABLET ORAL at 18:16

## 2024-01-01 RX ADMIN — TRAMADOL HYDROCHLORIDE 50 MG: 50 TABLET, COATED ORAL at 20:58

## 2024-01-01 RX ADMIN — ACETAMINOPHEN 650 MG: 325 TABLET, FILM COATED ORAL at 12:01

## 2024-01-01 RX ADMIN — ATORVASTATIN CALCIUM 40 MG: 40 TABLET, FILM COATED ORAL at 20:28

## 2024-01-01 RX ADMIN — CEFAZOLIN SODIUM 1 G: 1 INJECTION, SOLUTION INTRAVENOUS at 23:26

## 2024-01-01 RX ADMIN — IOPAMIDOL 53 ML: 755 INJECTION, SOLUTION INTRAVENOUS at 20:32

## 2024-01-01 RX ADMIN — PANTOPRAZOLE SODIUM 40 MG: 40 TABLET, DELAYED RELEASE ORAL at 08:32

## 2024-01-01 RX ADMIN — CEFAZOLIN SODIUM 1 G: 1 INJECTION, SOLUTION INTRAVENOUS at 12:12

## 2024-01-01 RX ADMIN — HYDROMORPHONE HYDROCHLORIDE 0.3 MG: 1 INJECTION, SOLUTION INTRAMUSCULAR; INTRAVENOUS; SUBCUTANEOUS at 23:48

## 2024-01-01 RX ADMIN — ATORVASTATIN CALCIUM 40 MG: 40 TABLET, FILM COATED ORAL at 21:00

## 2024-01-01 RX ADMIN — METOPROLOL SUCCINATE 100 MG: 100 TABLET, EXTENDED RELEASE ORAL at 21:12

## 2024-01-01 RX ADMIN — PREDNISONE 5 MG: 5 TABLET ORAL at 10:08

## 2024-01-01 RX ADMIN — WARFARIN SODIUM 2.5 MG: 2.5 TABLET ORAL at 17:43

## 2024-01-01 ASSESSMENT — ACTIVITIES OF DAILY LIVING (ADL)
ADLS_ACUITY_SCORE: 46
ADLS_ACUITY_SCORE: 44
ADLS_ACUITY_SCORE: 41
ADLS_ACUITY_SCORE: 42
ADLS_ACUITY_SCORE: 38
ADLS_ACUITY_SCORE: 46
ADLS_ACUITY_SCORE: 41
ADLS_ACUITY_SCORE: 46
ADLS_ACUITY_SCORE: 42
ADLS_ACUITY_SCORE: 41
ADLS_ACUITY_SCORE: 42
ADLS_ACUITY_SCORE: 44
ADLS_ACUITY_SCORE: 44
ADLS_ACUITY_SCORE: 42
ADLS_ACUITY_SCORE: 43
ADLS_ACUITY_SCORE: 42
ADLS_ACUITY_SCORE: 42
ADLS_ACUITY_SCORE: 44
ADLS_ACUITY_SCORE: 46
ADLS_ACUITY_SCORE: 42
ADLS_ACUITY_SCORE: 40
ADLS_ACUITY_SCORE: 38
ADLS_ACUITY_SCORE: 42
ADLS_ACUITY_SCORE: 41
ADLS_ACUITY_SCORE: 42
ADLS_ACUITY_SCORE: 44
ADLS_ACUITY_SCORE: 42
ADLS_ACUITY_SCORE: 38
ADLS_ACUITY_SCORE: 42
ADLS_ACUITY_SCORE: 42
ADLS_ACUITY_SCORE: 41
ADLS_ACUITY_SCORE: 42
ADLS_ACUITY_SCORE: 43
ADLS_ACUITY_SCORE: 33
ADLS_ACUITY_SCORE: 41
ADLS_ACUITY_SCORE: 43
ADLS_ACUITY_SCORE: 42
ADLS_ACUITY_SCORE: 43
ADLS_ACUITY_SCORE: 42
ADLS_ACUITY_SCORE: 41
ADLS_ACUITY_SCORE: 42
ADLS_ACUITY_SCORE: 38
ADLS_ACUITY_SCORE: 43
ADLS_ACUITY_SCORE: 48
ADLS_ACUITY_SCORE: 42
ADLS_ACUITY_SCORE: 44
ADLS_ACUITY_SCORE: 42
ADLS_ACUITY_SCORE: 33
ADLS_ACUITY_SCORE: 41
ADLS_ACUITY_SCORE: 41
ADLS_ACUITY_SCORE: 46
ADLS_ACUITY_SCORE: 43
ADLS_ACUITY_SCORE: 44
ADLS_ACUITY_SCORE: 42
ADLS_ACUITY_SCORE: 40
ADLS_ACUITY_SCORE: 40
ADLS_ACUITY_SCORE: 41
ADLS_ACUITY_SCORE: 43
ADLS_ACUITY_SCORE: 42
ADLS_ACUITY_SCORE: 48
ADLS_ACUITY_SCORE: 42
ADLS_ACUITY_SCORE: 43
ADLS_ACUITY_SCORE: 42
ADLS_ACUITY_SCORE: 41
ADLS_ACUITY_SCORE: 44
ADLS_ACUITY_SCORE: 42
ADLS_ACUITY_SCORE: 42
ADLS_ACUITY_SCORE: 45
ADLS_ACUITY_SCORE: 42
ADLS_ACUITY_SCORE: 43
ADLS_ACUITY_SCORE: 42
ADLS_ACUITY_SCORE: 44
ADLS_ACUITY_SCORE: 45
ADLS_ACUITY_SCORE: 43
ADLS_ACUITY_SCORE: 41
ADLS_ACUITY_SCORE: 38
ADLS_ACUITY_SCORE: 45
ADLS_ACUITY_SCORE: 42
ADLS_ACUITY_SCORE: 33
ADLS_ACUITY_SCORE: 44
ADLS_ACUITY_SCORE: 42
ADLS_ACUITY_SCORE: 33
ADLS_ACUITY_SCORE: 48
ADLS_ACUITY_SCORE: 42
ADLS_ACUITY_SCORE: 46
ADLS_ACUITY_SCORE: 38
ADLS_ACUITY_SCORE: 41
ADLS_ACUITY_SCORE: 44
ADLS_ACUITY_SCORE: 42
ADLS_ACUITY_SCORE: 43
ADLS_ACUITY_SCORE: 41
ADLS_ACUITY_SCORE: 42
ADLS_ACUITY_SCORE: 42
ADLS_ACUITY_SCORE: 41
ADLS_ACUITY_SCORE: 42
ADLS_ACUITY_SCORE: 38
ADLS_ACUITY_SCORE: 41
ADLS_ACUITY_SCORE: 43
ADLS_ACUITY_SCORE: 42
ADLS_ACUITY_SCORE: 43
ADLS_ACUITY_SCORE: 42
ADLS_ACUITY_SCORE: 46
ADLS_ACUITY_SCORE: 42
ADLS_ACUITY_SCORE: 42
ADLS_ACUITY_SCORE: 41
ADLS_ACUITY_SCORE: 43
ADLS_ACUITY_SCORE: 40
ADLS_ACUITY_SCORE: 43
ADLS_ACUITY_SCORE: 44
ADLS_ACUITY_SCORE: 38
ADLS_ACUITY_SCORE: 41
ADLS_ACUITY_SCORE: 43
ADLS_ACUITY_SCORE: 44
ADLS_ACUITY_SCORE: 41
ADLS_ACUITY_SCORE: 41
ADLS_ACUITY_SCORE: 48
ADLS_ACUITY_SCORE: 43
ADLS_ACUITY_SCORE: 41
ADLS_ACUITY_SCORE: 41
ADLS_ACUITY_SCORE: 42
ADLS_ACUITY_SCORE: 42
ADLS_ACUITY_SCORE: 44
ADLS_ACUITY_SCORE: 42
ADLS_ACUITY_SCORE: 38
ADLS_ACUITY_SCORE: 46
ADLS_ACUITY_SCORE: 43
ADLS_ACUITY_SCORE: 40
ADLS_ACUITY_SCORE: 44
ADLS_ACUITY_SCORE: 40
ADLS_ACUITY_SCORE: 42
ADLS_ACUITY_SCORE: 42
ADLS_ACUITY_SCORE: 44
ADLS_ACUITY_SCORE: 37
ADLS_ACUITY_SCORE: 42
ADLS_ACUITY_SCORE: 43
ADLS_ACUITY_SCORE: 40
ADLS_ACUITY_SCORE: 44
ADLS_ACUITY_SCORE: 41
ADLS_ACUITY_SCORE: 42
ADLS_ACUITY_SCORE: 46
ADLS_ACUITY_SCORE: 42
ADLS_ACUITY_SCORE: 44
ADLS_ACUITY_SCORE: 42
ADLS_ACUITY_SCORE: 46
ADLS_ACUITY_SCORE: 38
ADLS_ACUITY_SCORE: 42
ADLS_ACUITY_SCORE: 43
ADLS_ACUITY_SCORE: 46
ADLS_ACUITY_SCORE: 44
ADLS_ACUITY_SCORE: 46
ADLS_ACUITY_SCORE: 44
ADLS_ACUITY_SCORE: 42
ADLS_ACUITY_SCORE: 43
ADLS_ACUITY_SCORE: 40
ADLS_ACUITY_SCORE: 42
ADLS_ACUITY_SCORE: 43
ADLS_ACUITY_SCORE: 42
ADLS_ACUITY_SCORE: 41
ADLS_ACUITY_SCORE: 43
ADLS_ACUITY_SCORE: 41
ADLS_ACUITY_SCORE: 43
ADLS_ACUITY_SCORE: 44
ADLS_ACUITY_SCORE: 48
ADLS_ACUITY_SCORE: 41
ADLS_ACUITY_SCORE: 41
ADLS_ACUITY_SCORE: 38
ADLS_ACUITY_SCORE: 38
ADLS_ACUITY_SCORE: 43
ADLS_ACUITY_SCORE: 41
ADLS_ACUITY_SCORE: 42
ADLS_ACUITY_SCORE: 42
ADLS_ACUITY_SCORE: 44
ADLS_ACUITY_SCORE: 41
ADLS_ACUITY_SCORE: 41
ADLS_ACUITY_SCORE: 38
ADLS_ACUITY_SCORE: 41
ADLS_ACUITY_SCORE: 41
ADLS_ACUITY_SCORE: 40
ADLS_ACUITY_SCORE: 42
ADLS_ACUITY_SCORE: 42
ADLS_ACUITY_SCORE: 43
ADLS_ACUITY_SCORE: 42
ADLS_ACUITY_SCORE: 46
ADLS_ACUITY_SCORE: 41
ADLS_ACUITY_SCORE: 42
ADLS_ACUITY_SCORE: 42
ADLS_ACUITY_SCORE: 43
DEPENDENT_IADLS:: CLEANING;COOKING;LAUNDRY;SHOPPING;MEAL PREPARATION;MEDICATION MANAGEMENT;TRANSPORTATION
ADLS_ACUITY_SCORE: 41
ADLS_ACUITY_SCORE: 44
ADLS_ACUITY_SCORE: 46
ADLS_ACUITY_SCORE: 42
ADLS_ACUITY_SCORE: 46
ADLS_ACUITY_SCORE: 38
ADLS_ACUITY_SCORE: 41
ADLS_ACUITY_SCORE: 38
ADLS_ACUITY_SCORE: 43
ADLS_ACUITY_SCORE: 41

## 2024-01-01 ASSESSMENT — ENCOUNTER SYMPTOMS
FEVER: 0
CHILLS: 0
SHORTNESS OF BREATH: 0
COUGH: 0

## 2024-01-01 ASSESSMENT — PAIN SCALES - GENERAL
PAINLEVEL: NO PAIN (0)
PAINLEVEL: NO PAIN (0)
PAINLEVEL: SEVERE PAIN (6)
PAINLEVEL: MODERATE PAIN (4)
PAINLEVEL: WORST PAIN (10)
PAINLEVEL: MILD PAIN (3)
PAINLEVEL: WORST PAIN (10)
PAINLEVEL: NO PAIN (0)
PAINLEVEL: EXTREME PAIN (8)
PAINLEVEL: SEVERE PAIN (6)
PAINLEVEL: SEVERE PAIN (6)
PAINLEVEL: MILD PAIN (3)

## 2024-01-01 ASSESSMENT — COLUMBIA-SUICIDE SEVERITY RATING SCALE - C-SSRS
2. IN THE PAST MONTH, HAVE YOU ACTUALLY HAD ANY THOUGHTS OF KILLING YOURSELF?: NO
2. HAVE YOU ACTUALLY HAD ANY THOUGHTS OF KILLING YOURSELF IN THE PAST MONTH?: NO
1. IN THE PAST MONTH, HAVE YOU WISHED YOU WERE DEAD OR WISHED YOU COULD GO TO SLEEP AND NOT WAKE UP?: NO
6. HAVE YOU EVER DONE ANYTHING, STARTED TO DO ANYTHING, OR PREPARED TO DO ANYTHING TO END YOUR LIFE?: NO
1. WITHIN THE PAST MONTH, HAVE YOU WISHED YOU WERE DEAD OR WISHED YOU COULD GO TO SLEEP AND NOT WAKE UP?: NO
2. HAVE YOU ACTUALLY HAD ANY THOUGHTS OF KILLING YOURSELF IN THE PAST MONTH?: NO
1. WITHIN THE PAST MONTH, HAVE YOU WISHED YOU WERE DEAD OR WISHED YOU COULD GO TO SLEEP AND NOT WAKE UP?: NO
6. HAVE YOU EVER DONE ANYTHING, STARTED TO DO ANYTHING, OR PREPARED TO DO ANYTHING TO END YOUR LIFE?: NO
6. HAVE YOU EVER DONE ANYTHING, STARTED TO DO ANYTHING, OR PREPARED TO DO ANYTHING TO END YOUR LIFE?: NO
1. IN THE PAST MONTH, HAVE YOU WISHED YOU WERE DEAD OR WISHED YOU COULD GO TO SLEEP AND NOT WAKE UP?: NO
6. HAVE YOU EVER DONE ANYTHING, STARTED TO DO ANYTHING, OR PREPARED TO DO ANYTHING TO END YOUR LIFE?: NO
1. IN THE PAST MONTH, HAVE YOU WISHED YOU WERE DEAD OR WISHED YOU COULD GO TO SLEEP AND NOT WAKE UP?: NO
6. HAVE YOU EVER DONE ANYTHING, STARTED TO DO ANYTHING, OR PREPARED TO DO ANYTHING TO END YOUR LIFE?: NO
2. HAVE YOU ACTUALLY HAD ANY THOUGHTS OF KILLING YOURSELF IN THE PAST MONTH?: NO
2. IN THE PAST MONTH, HAVE YOU ACTUALLY HAD ANY THOUGHTS OF KILLING YOURSELF?: NO

## 2024-01-01 ASSESSMENT — PATIENT HEALTH QUESTIONNAIRE - PHQ9: SUM OF ALL RESPONSES TO PHQ QUESTIONS 1-9: 10

## 2024-01-09 NOTE — TELEPHONE ENCOUNTER
Appointment information sent to [facility] to schedule patient for a telemed with Dr. Garcia on 1/31/24

## 2024-01-09 NOTE — TELEPHONE ENCOUNTER
Contacted Jahaira SOMERS at [facility] with available times for rescheduling Shannon's missed appt with Dr. Davion Breaux, gave her the dates/times sent to from Dixie Garcia LPN.    Jahaira SOMERS made the appointment on behalf of the patient.    Dixie Garcia LPN updated.  Shannon has been scheduled to see Dr. Garcia for her telemed visit, appointment time of 09:30 on 1/31/24

## 2024-01-12 PROBLEM — L89.90 DECUBITUS SKIN ULCER: Status: ACTIVE | Noted: 2023-04-18

## 2024-01-12 PROBLEM — Z79.52 CURRENT CHRONIC USE OF SYSTEMIC STEROIDS: Status: ACTIVE | Noted: 2023-04-18

## 2024-01-12 NOTE — PROGRESS NOTES
SUBJECTIVE:  Shannon Walls, 89 year old, female presents with the following Chief Complaint(s) with HPI to follow:   Chief Complaint   Patient presents with    WOUND CARE          HPI:  Shannon is here for the re-assessment and treatment of multiple bilateral lower extremity wounds to her legs and feet.  She has not been seen in the Emergency department for approximately two weeks, which is an improvement.  She reports the dizziness has improved and she has not fallen since it resolved.      MED REC REQUIRED  Post Medication Reconciliation Status:  Discharge medications reconciled, continue medications without change     Wound history:   Shannon was seen in the ER on 10/21/23 with leg swelling. She also had noted skin tears.    She was seen in wound care for the first time on 10/31/23 by Maame Campbell.  Her forehead is noted as being suspicious for skin cancer.  A biopsy was done in the past, (9/25/23) not conclusive. This area scabs, falls off and re-develops.    She lives in assisted living and staff are changing her left leg dressing daily with xeroform, gauze, kerlix and tubigrip.  They are also changing her forehead dressing with silver foam.      Wound History:  10/21/23- Wounds developed, visit to ER  10/31/23- First visit to wound care.  Her forehead was dressed with silver foam and xeroform, gauze, kerlix and tubigrip size F to her left leg and tubigrip only to the right leg.  Tubigrip size F only to right leg.   10/31/23- MARÍA done:   FINDINGS: No Doppler flow was identified in the left posterior tibial  artery. The right posterior tibial artery was noncompressible. The  dorsalis pedis ankle brachial indices were 0.52 on the right and 0.55  on the left.  11/17/23- Switch to size G tubigrip, which are loose, this is not really any compression but can help to hold dressings in place.  Add silver foam to right 2nd toe also.  Wound culture done on anterior left leg wound.  Positive for 4+ staph aureus,  treated with Clindamycin.     Patient Active Problem List   Diagnosis    Pathological fracture L4 vertebrae    Fracture of sacrum (H)    Permanent atrial fibrillation (H)    Altered mental status, unspecified altered mental status type    Acquired hypothyroidism    Anxiety disorder, unspecified    Chronic combined systolic and diastolic congestive heart failure (H)    Gastroesophageal reflux disease without esophagitis    Atrial fibrillation with RVR (H)    Fall at home, initial encounter    Current chronic use of systemic steroids    Decubitus skin ulcer    History of CVA on 4/13/23    Peripheral edema    History of tobacco abuse quitting in the 50s and 60s    Stage 3a chronic kidney disease (H)    Elevated brain natriuretic peptide (BNP) level    Mixed hyperlipidemia    Mild aortic stenosis    H/O apical mural thrombus    PAD (peripheral artery disease) (H24)    Elevated BUN    Atrial fibrillation with rapid ventricular response (H)    Fall, initial encounter    Compression fracture of thoracic vertebra, unspecified thoracic vertebral level, initial encounter (H)    Chronic anticoagulation (Coumadin)    Cellulitis of right leg    Venous stasis ulcer of ankle without varicose veins (H)    Elevated INR    Polymyalgia rheumatica (H24)       Past Medical History:   Diagnosis Date    Atrial fibrillation with rapid ventricular response (H)        Past Surgical History:   Procedure Laterality Date    EYE SURGERY      OPEN REDUCTION INTERNAL FIXATION WRIST Right 8/22/2018    Procedure: OPEN REDUCTION INTERNAL FIXATION WRIST;  OPEN REDUCTION INTERNAL FIXATION RIGHT DISTAL RADIUS;  Surgeon: Taqueria Edwards MD;  Location: [facility]       History reviewed. No pertinent family history.    Social History     Tobacco Use    Smoking status: Former    Smokeless tobacco: Never   Substance Use Topics    Alcohol use: Not on file       Current Outpatient Medications   Medication Sig Dispense Refill    atorvastatin (LIPITOR) 40 MG tablet  Take 1 tablet by mouth at bedtime      calcium carbonate (OS-EFFIE) 1500 (600 Ca) MG tablet Take 600 mg by mouth every evening -4:00 PM.      GAVILAX 17 GM/SCOOP powder Take 17 g by mouth every morning -hold if having loose stools.      Multiple Vitamins-Minerals (CERTAVITE SENIOR/ANTIOXIDANT) TABS Take 1 tablet by mouth every morning      NP THYROID 30 MG tablet Take 30 mg by mouth every morning (before breakfast) 7:00 AM      omeprazole (PRILOSEC) 20 MG DR capsule Take 20 mg by mouth 2 times daily      predniSONE (DELTASONE) 5 MG tablet Take 5 mg by mouth every morning      Vitamin D, Cholecalciferol, 10 MCG (400 UNIT) TABS Take 1 tablet by mouth every morning      acetaminophen (TYLENOL) 325 MG tablet Take 2 tablets (650 mg) by mouth every 6 hours as needed for mild pain or fever      alum & mag hydroxide-simethicone (MAALOX) 200-200-20 MG/5ML SUSP suspension Take 30 mLs by mouth every 4 hours as needed for indigestion      calcium carbonate (TUMS) 500 MG chewable tablet Take 1 tablet (500 mg) by mouth 4 times daily as needed for heartburn      digoxin (LANOXIN) 62.5 MCG tablet Take 1 tablet (62.5 mcg) by mouth daily      furosemide (LASIX) 40 MG tablet Take 1 tablet (40 mg) by mouth every morning 90 tablet 3    metoprolol succinate ER (TOPROL XL) 100 MG 24 hr tablet Take 100 mg by mouth 2 times daily      traMADol (ULTRAM) 50 MG tablet Take 1 tablet (50 mg) by mouth every 6 hours as needed for moderate to severe pain (back pain) 20 tablet 0    warfarin ANTICOAGULANT (COUMADIN) 2 MG tablet Take 2 mg on 3/19, then check INR on 3/20 and follow-up with Dr. Gilda Mcgregor at [facility] for further dosing recommendations.         Allergies   Allergen Reactions    Levothyroxine Shortness Of Breath and Swelling     Swelling in feet     Nitrogen Swelling and Blisters     Liquid nitrogen (swelling at sight)       REVIEW OF SYSTEMS  Constitutional, HEENT, cardiovascular, pulmonary, gi and gu  systems are negative, except as otherwise noted.     OBJECTIVE:    B/P: 100/69, T: 97.6, P: 66, R: Data Unavailable, W: 0 lbs 0 oz, BMI: There is no height or weight on file to calculate BMI.  Constitutional: alert and no distress  Head: Normocephalic. No masses, lesions, tenderness or abnormalities  Cardiovascular:   Lower Extremity Perfusion Assessment (10/31/23): done by Maame Campbell NP  Right lower extremity:  Warmth: cool toes  Dorsal pedal pulse: yes, faint via doppler              Posterior tibial pulse: yes, via doppler--mono              Skin color: pink              Edema: yes, +3/+4  Left lower extremity:  Warmth: cool toes  Dorsal pedal pulse: yes, via doppler--mono              Posterior tibial pulse: yes, faint via doppler              Skin color: pink              Edema: +3/+4  Intervention: MARÍA done 10/31, FINDINGS: No Doppler flow was identified in the left posterior tibial  artery. The right posterior tibial artery was noncompressible. The  dorsalis pedis ankle brachial indices were 0.52 on the right and 0.55  on the left.  Respiratory:  Respirations even and unlabored  Musculoskeletal: severe kyphosis; arrived in a wheelchair; transfers with assist of one to exam chair  Skin:        Wound description:     Type of Wound:  stasis ulcers   Location:  right lower extremity    Drainage amount:  moderate   Drainage color:  serous - tan   Odor:  none   Wound bed:  please see pictures below   Depth:  full thickness   Surrounding skin:  fragile        Measurements:      Right knee - healed    Right 2nd toe - 0.5 x 0.3 cm (probes to bone - not a new finding) and 0.2 x 0.2 cm   Pain:  denies   Wound debridement completed on: 1/12/2024, debridement type: Instrument        Dressing change:      Wounds cleansed with mild soap, rinse, dried.  Verbal consent obtained for debridement. Sharp excisional debridement performed with ring curette to remove non-viable tissue (2 sq cm) to the level of the subcutaneous.   Dressed with Mepilex Ag foam, cut to fit wound, secured each with medipore tape.    Right 2nd toe      Wound description:     Type of Wound:  pressure injury stage 2   Location:  right heel    Drainage amount:  moderate   Drainage color:  tan   Odor:  none   Wound bed:  see picture below   Depth:  full thickness   Surrounding skin:  intact, thickened        Measurements:  1.4 x 0.4 x 0.3 cm   Pain:  tender with debridement   Wound debridement completed on: 1/12/2024, debridement type: Instrument        Dressing change:      Wound cleansed with mild soap, rinse, dried.  Sharp excisional debridement performed with Adsmiguel's forceps and iris scissors to remove non-viable tissue (2 sq cm) into the level of the dermis.  Patient was informed of the risks and benefits and consented to the procedure.  Dressed with Mepilex Ag foam, secured with medipore tape.        Wound description:     Type of Wound:  stasis ulcers   Location:  left lower extremity    Drainage amount:  moderate   Drainage color:  serous - tan   Odor:  none   Wound bed:  please see pictures below   Depth:  full thickness   Surrounding skin:  fragile, intact        Measurements:      Left 5th toe - 0.4 x 0.5 cm        Left medial leg - 1.4 x 1.2    Left lateral leg  - 3.8 x 1.5 x 04 cm  Left shin anterior (medial - lateral) -   3 x 1.5 x 0.7 cm  2.5 x 1.8 cm  0.6 x 0.6 x 0.3 cm   Pain:  Denies   Wound debridement completed on: 1/12/2024, debridement type: sharp        Dressing change:      Wound cleansed with mild soap, rinse, dried.  Sharp excisional debridement performed with ring curette to remove non-viable tissue (16 sq cm) into the level of the subcutaneous.  Patient was informed of the risks and benefits and consented to the procedure.  Dressed with Mepilex Ag foam to each wound(except left lateral leg), secured with medipore tape.  Left lateral leg dressed with honey alginate, all wounds covered with 8x10 ABD pad secured with medipore tape.        Left lateral  extremity         Left anterior lower extremity          Left medial extremity        Left 5th toe      Neurologic: Sensation grossly WNL.  Psychiatric: affect normal/bright and sits with eyes closed unless she is spoken to    THERAPY GOAL:    Complete healing    ASSESSMENT / PLAN:  Comments:    - She is following with Dr. Guerin, podiatrist, for the toe wound which probes to bone  - The right second toe has more erythema today and is warm to touch and I am concerned about cellulitis, therefore I am going to prescribe a round of clindamycin for her.      Plan:    - Dressing changes as outlined in the after visit summary  - Rx sent to [facility] for dressing supplies  - Rx sent to [facility] for the clindamycin    Follow-up in two weeks or as needed for acute concerns.    Mei Bernard CNS  Surgery and Wound Care  [facility]

## 2024-01-12 NOTE — PATIENT INSTRUCTIONS
Wound Care Instructions left lower extremity (except lateral and medial wounds):  1) Wash your hands and work space  2) Gather all your supplies: clean wash cloths, mild soap (baby soap), Mepilex Ag foam, white tape  3) Cleanse all wounds with mild soap, rinse, pat dry  4) Dress all wounds (except medial and lateral) with Mepilex Ag foam, cover with 8x10 ABD pad, secure with white tape; apply tubigrip size G as needed to help secure dressings.    5) Change dressing Tuesday and Friday  6) Dispose of all dirty supplies  7) Wash hands and equipment     Wound Care Instructions left lower extremity medial and lateral wounds:  1) Wash your hands and work space  2) Gather all your supplies: clean wash cloths, mild soap (baby soap), honey alginate, 8x10 ABD pad, white tape  3) Cleanse wounds with mild soap, rinse, pat dry  4) Dress wounds with honey alginate lightly packed into wound, cover all wounds with an 8x10 ABD pad, secure with white tape; you can apply tubigrip size G to help hold dressings in place    5) Change dressing Tuesday, and Friday  6) Dispose of all dirty supplies  7) Wash hands and equipment    Wound Care Instructions left 5th toe and right 2nd toe:  1) Wash your hands and work space  2) Gather all your supplies: clean wash cloths, mild soap (baby soap), Mepilex Ag foam,  white tape  3) Cleanse all wounds with mild soap, rinse, pat dry  4) Dress each wound with Mepilex Ag foam secure with white tape    5) Change dressing Tuesday and Friday  6) Dispose of all dirty supplies  7) Wash hands and equipment    Wound Care Instructions right heel:  1) Wash your hands and work space  2) Gather all your supplies: clean wash cloths, mild soap (baby soap), Mepilex Ag foam, white tape  3) Cleanse wound with mild soap, rinse, pat dry  4) Dress wound with Mepilex Ag foam secure with white tape    5) Change dressing Tuesday and Friday  6) Dispose of all dirty supplies  7) Wash hands and equipment     Please report any  increase in pain, fevers, chills, changes in the drainage odor.   Please call [phone] if you have any questions or concerns or if any problems develop.      Follow up on Friday, January 26, 2024 at 9 a.m. in wound care.

## 2024-01-26 NOTE — PATIENT INSTRUCTIONS
Wound Care Instructions left lower extremity:  1) Wash your hands and work space  2) Gather all your supplies: clean wash cloths, mild soap (baby soap), honey alginate, 4x4 gauze (4), 8x10 ABD pad(1), rolled gauze, white tape  3) Cleanse wounds with mild soap, rinse, pat dry  4) Dress wounds with honey alginate lightly packed into each wound, cover all wounds with 4x4 gauze (4) and 8x10 ABD pad(1), secure with rolled gauze and white tape    5) Change dressing Tuesday, and Friday  6) Dispose of all dirty supplies  7) Wash hands and equipment     Wound Care Instructions right 2nd toe:  1) Wash your hands and work space  2) Gather all your supplies: clean wash cloths, mild soap (baby soap), Mepilex Ag foam,  white tape  3) Cleanse all wounds with mild soap, rinse, pat dry  4) Dress each wound with Mepilex Ag foam secure with white tape    5) Change dressing Tuesday and Friday  6) Dispose of all dirty supplies  7) Wash hands and equipment     Wound Care Instructions right heel:  1) Wash your hands and work space  2) Gather all your supplies: clean wash cloths, mild soap (baby soap), Mepilex Ag foam, white tape  3) Cleanse wound with mild soap, rinse, pat dry  4) Dress wound with Mepilex Ag foam secure with white tape    5) Change dressing Tuesday and Friday  6) Dispose of all dirty supplies  7) Wash hands and equipment     Please report any increase in pain, fevers, chills, changes in the drainage odor.   Please call [phone] if you have any questions or concerns or if any problems develop.      Follow up on Thursday, February 15, 2024 at 9 a.m. in wound care.

## 2024-01-26 NOTE — PROGRESS NOTES
SUBJECTIVE:  Shannon Walls, 89 year old, female presents with the following Chief Complaint(s) with HPI to follow:   Chief Complaint   Patient presents with    WOUND CARE          HPI:  Shannon is here for the re-assessment and treatment of multiple bilateral lower extremity wounds to her legs and feet.  She looks great today - she is awake and interactive.      MED REC REQUIRED  Post Medication Reconciliation Status:        Wound history:   Shannon was seen in the ER on 10/21/23 with leg swelling. She also had noted skin tears.    She was seen in wound care for the first time on 10/31/23 by Maame Campbell.  Her forehead is noted as being suspicious for skin cancer.  A biopsy was done in the past, (9/25/23) not conclusive. This area scabs, falls off and re-develops.    She lives in assisted living and staff are changing her left leg dressing daily with xeroform, gauze, kerlix and tubigrip.  They are also changing her forehead dressing with silver foam.      Wound History:  10/21/23- Wounds developed, visit to ER  10/31/23- First visit to wound care.  Her forehead was dressed with silver foam and xeroform, gauze, kerlix and tubigrip size F to her left leg and tubigrip only to the right leg.  Tubigrip size F only to right leg.   10/31/23- MARÍA done:   FINDINGS: No Doppler flow was identified in the left posterior tibial  artery. The right posterior tibial artery was noncompressible. The  dorsalis pedis ankle brachial indices were 0.52 on the right and 0.55  on the left.  11/17/23- Switch to size G tubigrip, which are loose, this is not really any compression but can help to hold dressings in place.  Add silver foam to right 2nd toe also.  Wound culture done on anterior left leg wound.  Positive for 4+ staph aureus, treated with Clindamycin.     Patient Active Problem List   Diagnosis    Pathological fracture L4 vertebrae    Fracture of sacrum (H)    Permanent atrial fibrillation (H)    Altered mental status,  unspecified altered mental status type    Acquired hypothyroidism    Anxiety disorder, unspecified    Chronic systolic heart failure (H)    Gastroesophageal reflux disease without esophagitis    Ischemic stroke (H)    Atrial fibrillation with RVR (H)    Fall at home, initial encounter    Current chronic use of systemic steroids    Decubitus skin ulcer       Past Medical History:   Diagnosis Date    Atrial fibrillation with rapid ventricular response (H)        Past Surgical History:   Procedure Laterality Date    EYE SURGERY      OPEN REDUCTION INTERNAL FIXATION WRIST Right 8/22/2018    Procedure: OPEN REDUCTION INTERNAL FIXATION WRIST;  OPEN REDUCTION INTERNAL FIXATION RIGHT DISTAL RADIUS;  Surgeon: Taqueria Edwards MD;  Location: [facility]       No family history on file.    Social History     Tobacco Use    Smoking status: Former    Smokeless tobacco: Never   Substance Use Topics    Alcohol use: Not on file       Current Outpatient Medications   Medication Sig Dispense Refill    acetaminophen (TYLENOL) 650 MG CR tablet Take 650 mg by mouth every 4 hours as needed for mild pain or fever      alum & mag hydroxide-simethicone (MAALOX) 200-200-20 MG/5ML SUSP suspension Take 30 mLs by mouth every 4 hours as needed for indigestion      atorvastatin (LIPITOR) 40 MG tablet Take 1 tablet by mouth at bedtime      bisacodyl (DULCOLAX) 10 MG suppository Place 10 mg rectally daily as needed for constipation      calcium carbonate (OS-EFFIE) 1500 (600 Ca) MG tablet Take 600 mg by mouth daily       dextromethorphan (TUSSIN COUGH) 15 MG/5ML syrup       digoxin (LANOXIN) 125 MCG tablet Take 62.5 mcg by mouth daily       furosemide (LASIX) 20 MG tablet Take 20 mg by mouth daily      GAVILAX 17 GM/SCOOP powder Take 17 g by mouth daily      loperamide (IMODIUM) 2 MG capsule Take 2 mg by mouth as needed for diarrhea      LORazepam (ATIVAN) 0.5 MG tablet Take 0.5 mg by mouth At Bedtime       magnesium hydroxide (MILK OF MAGNESIA) 400 MG/5ML  suspension Take 30 mLs by mouth daily as needed for constipation or heartburn      metoprolol succinate ER (TOPROL XL) 25 MG 24 hr tablet Take 25 mg by mouth daily      Multiple Vitamins-Minerals (CERTAVITE SENIOR/ANTIOXIDANT) TABS       NP THYROID 30 MG tablet Take 30 mg by mouth daily      omeprazole (PRILOSEC) 20 MG DR capsule Take 20 mg by mouth daily      predniSONE (DELTASONE) 5 MG tablet Take 5 mg by mouth daily      sodium phosphate (FLEET ENEMA) 7-19 GM/118ML rectal enema Place 1 enema rectally once      traMADol (ULTRAM) 50 MG tablet Take 50 mg by mouth 2 times daily      Vitamin D, Cholecalciferol, 10 MCG (400 UNIT) TABS Take 1 tablet by mouth daily      warfarin ANTICOAGULANT (COUMADIN) 2 MG tablet Take 1 tablet (2 mg) by mouth daily Check INR to adjust dose by 12/21         Allergies   Allergen Reactions    Levothyroxine Shortness Of Breath and Swelling    Nitrogen Swelling and Blisters     Liquid nitrogen       REVIEW OF SYSTEMS  Constitutional, HEENT, cardiovascular, pulmonary, gi and gu systems are negative, except as otherwise noted.     OBJECTIVE:    B/P: 100/69, T: 97.6, P: 66, R: Data Unavailable, W: 0 lbs 0 oz, BMI: There is no height or weight on file to calculate BMI.  Constitutional: alert and no distress  Head: Normocephalic. No masses, lesions, tenderness or abnormalities  Cardiovascular:   Lower Extremity Perfusion Assessment (10/31/23): done by Maame Campbell NP  Right lower extremity:  Warmth: cool toes  Dorsal pedal pulse: yes, faint via doppler              Posterior tibial pulse: yes, via doppler--mono              Skin color: pink              Edema: yes, +3/+4  Left lower extremity:  Warmth: cool toes  Dorsal pedal pulse: yes, via doppler--mono              Posterior tibial pulse: yes, faint via doppler              Skin color: pink              Edema: +3/+4  Intervention: MARÍA done 10/31, FINDINGS: No Doppler flow was identified in the left posterior tibial  artery. The right  posterior tibial artery was noncompressible. The  dorsalis pedis ankle brachial indices were 0.52 on the right and 0.55  on the left.  Respiratory:  Respirations even and unlabored  Musculoskeletal: severe kyphosis; arrived in a wheelchair; transfers with assist of one to exam chair  Skin:        Wound description:     Type of Wound:  stasis ulcers   Location:  right lower extremity    Drainage amount:  moderate   Drainage color:  serous - tan   Odor:  none   Wound bed:  please see pictures below   Depth:  full thickness   Surrounding skin:  fragile        Measurements:      Right knee - healed    Right 2nd toe - 0.3 x 0.2 cm (probes to bone - not a new finding) and 0.2 x 0.2 cm   Pain:  denies   Wound debridement completed on: 1/26/2024, debridement type: Instrument        Dressing change:      Wounds cleansed with mild soap, rinse, dried.  Verbal consent obtained for debridement. Sharp excisional debridement performed with ring curette to remove non-viable tissue (1 sq cm) to the level of the subcutaneous.  Dressed with Mepilex Ag foam, cut to fit wound, secured each with medipore tape.    Right 2nd toe      Wound description:     Type of Wound:  pressure injury stage 2   Location:  right heel    Drainage amount:  moderate   Drainage color:  tan   Odor:  none   Wound bed:  see picture below   Depth:  full thickness   Surrounding skin:  intact, thickened        Measurements:  0.5 x 0.3 x 0.3 cm (smaller)   Pain:  tender with debridement   Wound debridement completed on: 1/26/2024, debridement type: Instrument        Dressing change:      Wound cleansed with mild soap, rinse, dried.  Sharp excisional debridement performed with Adsons's forceps, iris scissors, and sharp ring curette to remove non-viable tissue (1 sq cm) into the level of the dermis.  Patient was informed of the risks and benefits and consented to the procedure.  Dressed with Mepilex Ag foam, secured with medipore tape.        Wound description:      Type of Wound:  stasis ulcers   Location:  left lower extremity    Drainage amount:  moderate   Drainage color:  serous - tan   Odor:  none   Wound bed:  please see pictures below   Depth:  full thickness   Surrounding skin:  fragile, intact        Measurements:      Left 5th toe - healed    Left medial leg - 1.5 x 2(larger)    Left lateral leg  - 3 x 1.2 x 04 cm (smaller)  Left shin anterior (medial - lateral) - 2 x 0.7 x 0.4 cm and 0.4 x 0.4 x 0.2 cm (smaller)   Pain:  Denies   Wound debridement completed on: 1/26/2024, debridement type: Instrument       Dressing change:      Wound cleansed with mild soap, rinse, dried.  Sharp excisional debridement performed with ring curette to remove non-viable tissue (8 sq cm) into the level of the subcutaneous.  Patient was informed of the risks and benefits and consented to the procedure.  Dressed with honey alginate to each wound, all wounds covered 4x4 gauze (4) and 8x10 ABD pad(1), secured with rolled gauze and medipore tape.       Left lateral  extremity         Left anterior lower extremity      Left medial extremity - this photo did not save to the chart today      Neurologic: Sensation grossly WNL.  Psychiatric: affect normal/bright and sits with eyes closed unless she is spoken to    THERAPY GOAL:    Complete healing    ASSESSMENT / PLAN:  Comments:    - She is following with Dr. Guerin, podiatrist, for the toe wound which probes to bone  - The right second toe erythema has resolved and the wounds are measuring smaller.   - I am changing the plan of care for the left lower extremity to honey alginate to each wound in an effort to facilitate autolytic debridement.       Plan:    - Dressing changes as outlined in the after visit summary    Follow-up in wound care as scheduled or as needed for acute concerns.    Mei Bernard [facility]  Surgery and Wound Care  [facility]

## 2024-01-31 NOTE — NURSING NOTE
"Chief Complaint   Patient presents with    Consult     [facility]        Patient is here for a consult to discuss wounds    Pt is currently taking .statin, warfarin    /68 (BP Location: Right arm, Patient Position: Sitting, Cuff Size: Child)   Pulse 87   Temp 97.8  F (36.6  C)   Resp 16   Ht 1.499 m (4' 11\")   Wt 48.4 kg (106 lb 11.2 oz)   SpO2 93%   BMI 21.55 kg/m      The provider has been notified that the patient vascular concerns.     Questions patient would like addressed today are: .    Refills are needed: No    Has homecare services and agency name:  No           Initial /68 (BP Location: Right arm, Patient Position: Sitting, Cuff Size: Child)   Pulse 87   Temp 97.8  F (36.6  C)   Ht 1.499 m (4' 11\")   Wt 48.4 kg (106 lb 11.2 oz)   SpO2 93%   BMI 21.55 kg/m   Estimated body mass index is 21.55 kg/m  as calculated from the following:    Height as of this encounter: 1.499 m (4' 11\").    Weight as of this encounter: 48.4 kg (106 lb 11.2 oz).  Medication Reconciliation: complete    Renetta De Jesus RN   "

## 2024-01-31 NOTE — LETTER
1/31/2024       RE: Shannon Walls  [address]  [facility] [address]       Dear Colleague,    Thank you for referring your patient, Shannon Walls, to the [facility] [place] [facility]. Please see a copy of my visit note below.    Vascular & Endovascular Surgery Clinic Consultation   Vascular Surgeon: Wes Garcia MD, RPVI       Location:  Virtual Visit Details:    Type of service:  telemedicine    Originating Location (Pt. Location):  [facility]     Distant Location (Provider location):  [facility]     Received verbal consent for telemedicine visit.     Shannon Walls  Medical Record #:  [id number]  YOB: 1933  Age:  89 year old     Date of Service: 1/31/2024    Referring Provider: Carisa Guerin.  Primary Care Provider: Gilda Mcgregor    Chief Complaint/Reason for Visit: Nonhealing wounds    HPI:  Ms. Walls is a pleasant 89 year old female seen today with her family via telemedicine for nonhealing wounds.  She has no overt claudication. She walks around the house with a walker.  No ischemic rest pain.  She has had the wounds for approximately 3 months.  She has had no known trauma but did find them with crusted blood on them initially. She has wounds on the left 5th toe, right 2nd toe both of which are quite small. She hasa right heel wound. And bilateral shin wounds.  Per report they are all healing very slowly.  She has frequent falls.    CV risk factors include peripheral arterial disease, hyperlipidemia, combined systolic and diastolic heart failure, atrial fibrillation, chronic kidney disease, prior CVA, history of ventricular thrombus, and is a former smoker    Relevant surgical history:  - None    Review of Systems:    A 12 point ROS was reviewed and is negative except for symptoms noted in HPI.     Medical/Surgical History:    Past  "Medical History:   Diagnosis Date    Atrial fibrillation with rapid ventricular response (H)          Past Surgical History:   Procedure Laterality Date    EYE SURGERY      OPEN REDUCTION INTERNAL FIXATION WRIST Right 8/22/2018    Procedure: OPEN REDUCTION INTERNAL FIXATION WRIST;  OPEN REDUCTION INTERNAL FIXATION RIGHT DISTAL RADIUS;  Surgeon: Taqueria Edwards MD;  Location: [facility]        Allergies:     Allergies   Allergen Reactions    Levothyroxine Shortness Of Breath and Swelling     Swelling in feet     Nitrogen Swelling and Blisters     Liquid nitrogen (swelling at sight)        Medications:    Current Outpatient Medications   Medication    atorvastatin (LIPITOR) 40 MG tablet    calcium carbonate (OS-EFFIE) 1500 (600 Ca) MG tablet    digoxin (LANOXIN) 125 MCG tablet    GAVILAX 17 GM/SCOOP powder    LORazepam (ATIVAN) 0.5 MG tablet    Multiple Vitamins-Minerals (CERTAVITE SENIOR/ANTIOXIDANT) TABS    NP THYROID 30 MG tablet    omeprazole (PRILOSEC) 20 MG DR capsule    predniSONE (DELTASONE) 5 MG tablet    traMADol (ULTRAM) 50 MG tablet    Vitamin D, Cholecalciferol, 10 MCG (400 UNIT) TABS    furosemide (LASIX) 40 MG tablet    metoprolol succinate ER (TOPROL XL) 100 MG 24 hr tablet    metoprolol succinate ER (TOPROL XL) 100 MG 24 hr tablet    traMADol (ULTRAM) 50 MG tablet    warfarin ANTICOAGULANT (COUMADIN) 2.5 MG tablet    warfarin ANTICOAGULANT (COUMADIN) 5 MG tablet     No current facility-administered medications for this visit.        Social Habits:    Tobacco Use      Smoking status: Former      Smokeless tobacco: Never       Alcohol Use: Not on file       History   Drug Use Not on file        Family History:  No family history on file.    Physical Examination:  /68 (BP Location: Right arm, Patient Position: Sitting, Cuff Size: Child)   Pulse 87   Temp 97.8  F (36.6  C)   Resp 16   Ht 1.499 m (4' 11\")   Wt 48.4 kg (106 lb 11.2 oz)   SpO2 93%   BMI 21.55 kg/m    Wt Readings from Last 1 Encounters: "   03/07/24 46.3 kg (102 lb)     Body mass index is 21.55 kg/m .    Exam:  No full examination as this was a video visit however,   General: No apparent distress. Answers questions appropriately with no evidence of neurologic deficit on limited video.  Neurologic: move all extremities equally  Eyes: no obvious scleral abnormality  Skin: heel wound looks improved compared to pictures.     Laboratory Findings:  Hemoglobin   Date Value Ref Range Status   02/17/2024 13.4 11.7 - 15.7 g/dL Final   02/16/2024 13.3 11.7 - 15.7 g/dL Final     WBC Count   Date Value Ref Range Status   02/17/2024 8.7 4.0 - 11.0 10e3/uL Final   02/16/2024 9.6 4.0 - 11.0 10e3/uL Final     Platelet Count   Date Value Ref Range Status   02/17/2024 264 150 - 450 10e3/uL Final   02/16/2024 241 150 - 450 10e3/uL Final     Creatinine   Date Value Ref Range Status   02/18/2024 1.28 (H) 0.51 - 0.95 mg/dL Final   08/20/2018 1.07 0.70 - 1.20 mg/dL Final     Sodium   Date Value Ref Range Status   02/18/2024 139 135 - 145 mmol/L Final     Comment:     Reference intervals for this test were updated on 09/26/2023 to more accurately reflect our healthy population. There may be differences in the flagging of prior results with similar values performed with this method. Interpretation of those prior results can be made in the context of the updated reference intervals.      Glucose   Date Value Ref Range Status   02/18/2024 132 (H) 70 - 99 mg/dL Final   02/17/2024 110 (H) 70 - 99 mg/dL Final   11/28/2021 96 70 - 99 mg/dL Final   11/27/2021 129 (H) 70 - 99 mg/dL Final   08/20/2018 82 60 - 99 mg/dL Final     INR   Date Value Ref Range Status   02/18/2024 4.66 (H) 0.85 - 1.15 Final     Imaging:    I personally reviewed the ABIs from 10/31/2023 which is non diagnostic on the right and 0.55 on the left.    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 Walls is a pleasant 89 year old female evaluated for wounds in the setting of peripheral arterial disease.  We discussed the natural history and etiology of peripheral arterial disease as well as modifiable risk factors.  We discussed that her studies are relatively inconclusive and ideally she would undergo a more comprehensive workup to determine if she would benefit from intervention. I do suspect she has enough disease that her healing potential is reduced.    Risks, benefits, and alternatives of vascular intervention including but not limited to death, anesthetic or cardiopulmonary complications, bleeding or access site complications, infection, dissection, distal embolization, vessel perforation or other injury, worsening ischemia with resulting limb loss, compartment syndrome, fasciotomy, and acute renal insufficiency due to contrast exposure requiring temporary or permanent dialysis.  We discussed need for further vascular evaluation to determine need for intervention and severity of her peripheral arterial disease.  At this time, given her frailty and other issues, the patient and her family would prefer to avoid intervention and would like to forgo travel to [place] for more comprehensive evaluation.  We discussed possibility of worsening PAD, wound or limb loss    Discussed warning signs including, but not limited to, acute limb ischemia, vascular lower extremity pain, motor or sensory dysfunction, chronic limb threatening ischemia, ischemic rest pain, and wounds.  If she experiences any of these, she has been advised to seek treatment and contact my team.    Ms. Walls  and her family are in agreement with this  plan as outlined.    Recommendations/Plan:  I do think it would be quite reasonable to undergo more comprehensive vascular evaluation.  The patient and her family do not want more aggressive treatment and would like to proceed with continued wound care given the wounds are healing       30 minutes spent on the day of encounter doing chart review from our system and care everywhere, history and exam, documentation, coordinating care, and further activities as noted with over half spent counseling.         Again, thank you for allowing me to participate in the care of your patient.      Sincerely,    Wes Garcia MD

## 2024-01-31 NOTE — NURSING NOTE
"Chief Complaint   Patient presents with    Consult   [facility]        Patient is here for a consult to discuss wounds    Pt is currently taking Statin and Warfarin.    /68   Pulse 87   Temp 97.8  F (36.6  C)   Resp 16   Ht 1.499 m (4' 11\")   Wt 48.4 kg (106 lb 11.2 oz)   SpO2 93%   BMI 21.55 kg/m      The provider has been notified that the patient vascular concerns.     Questions patient would like addressed today are:     Refills are needed: No    Has homecare services and agency name:  No            Initial /68   Pulse 87   Temp 97.8  F (36.6  C)   Resp 16   Ht 1.499 m (4' 11\")   Wt 48.4 kg (106 lb 11.2 oz)   SpO2 93%   BMI 21.55 kg/m   Estimated body mass index is 21.55 kg/m  as calculated from the following:    Height as of this encounter: 1.499 m (4' 11\").    Weight as of this encounter: 48.4 kg (106 lb 11.2 oz).  Medication Reconciliation: complete    Renetta De Jesus RN   "

## 2024-01-31 NOTE — PATIENT INSTRUCTIONS
"Thank you so much for choosing us for your care. It was a pleasure to see you at the vascular clinic today.     Follow-up recommendations: We will see you back in the vascular clinic on an \"as-needed\" basis. Please contact our clinic at [phone] if any issues arise. You may also send us a [employer] message with any concerns.    Additional testing/imaging ordered today: None      Our scheduling team will get in touch with you to set up any follow-up testing/imaging and/or appointments. Please be aware that any testing/imaging recommended today will need to completed prior to your next visit with the provider. If testing/imaging is not completed prior to your next visit, your visit may be rescheduled.     If you have any questions, please contact our clinic directly at [phone] and ask for the nurse. We also encourage the use of [employer] to communicate with your healthcare provider.    If you have an urgent need after business hours (8:00 am to 4:30 pm) please call [phone], option 4, and ask for the vascular attending on call. For non-urgent after hours needs, please call the vascular clinic at [phone]. For scheduling needs, please call our clinic directly at [phone].    =====================================================================    [facility] is recognized by the [facility] as a comprehensive stroke center. As part of our commitment to better patient outcomes and excellent stroke education, we attach the below stroke education materials to ALL of the after visit summaries in our vascular clinic.        Learning About BE FAST: Stroke Warning Signs  BE FAST is a simple way to remember the main symptoms of stroke. These symptoms happen suddenly. So learning what to look for helps you know when to call for medical help. BE FAST stands for:    B - Balance.  Loss of balance or trouble walking.     E - Eyes.  Trouble seeing out of one or both eyes.     F - Face. "  Weakness or drooping on one side of the face.     A - Arm.  Weakness or numbness in an arm or leg.     S - Speech.  Trouble speaking.     T - Time to call 911.  Also call 911 if you have other stroke symptoms. They include:  Sudden confusion.  Sudden trouble understanding simple statements.  Fainting.  A seizure.  A sudden, severe headache.     A stroke happens when a blood vessel in the brain bursts or is blocked by a blood clot. The blood supply to part of the brain--and the oxygen the blood carries--is reduced. This damages the brain.   If you have a stroke, quick treatment may save your life. And it may reduce the damage in your brain so that you have fewer problems after the stroke.   Current as of: December 18, 2022               Content Version: 13.8    3337-0169 [employer].   Care instructions adapted under license by your healthcare professional. If you have questions about a medical condition or this instruction, always ask your healthcare professional. [employer] disclaims any warranty or liability for your use of this information.    Learning About How to Prevent a Stroke  What is a stroke?  A stroke is damage to the brain that occurs when a blood vessel in the brain bursts or is blocked by a blood clot. Without blood and the oxygen it carries, part of the brain starts to die. The part of the body controlled by the damaged area of the brain can't work properly.  Brain damage can start within minutes of a stroke. But quick treatment can help limit the damage and increase the chance of a full recovery.  What puts you at risk for stroke?  A risk factor is anything that makes you more likely to have a particular health problem.  Risk factors for stroke that you can manage or change include:  Health problems like atrial fibrillation, diabetes, high blood pressure, high cholesterol, hardening of the arteries (atherosclerosis), and sickle cell disease.  Smoking.  Drinking more than  2 alcoholic drinks a day for men and 1 drink a day for women.  Being overweight.  Not eating healthy foods.  Not getting enough physical activity.  Risk factors you can't change include:  Having a previous stroke.  Family history of stroke.  Being older.  Being , [place] Native, , or South  American.  Being female.  Having certain problems during pregnancy, such as preeclampsia.  Being past menopause.  Your doctor can help you know your risk. Then you and your doctor can talk about whether to take steps to lower it.  How can you help prevent a stroke?  Here are some things you can do to help prevent a stroke.  Manage health problems that raise your risk. These include atrial fibrillation, diabetes, high blood pressure, and high cholesterol.  Have a heart-healthy lifestyle.  Don't smoke. If you need help quitting, talk to your doctor about stop-smoking programs and medicines. These can increase your chances of quitting for good.  Limit alcohol to 2 drinks a day for men and 1 drink a day for women.  Stay at a healthy weight. Lose weight if you need to.  Be active. Get at least 30 minutes of exercise on most days of the week. Walking is a good choice. You also may want to do other activities, such as running, swimming, cycling, or playing tennis or team sports.  Eat heart-healthy foods. These include vegetables, fruits, nuts, beans, lean meat, fish, and whole grains. Limit sodium and sugar.  If you think you may have a problem with alcohol or drug use, talk to your doctor.  If you use hormone therapy for menopause or hormonal birth control, talk with your doctor. Ask if these are right for you. They may raise the risk of stroke in some people.  Decide with your doctor whether you will also take medicines to help lower your risk. For example, you and your doctor may decide you will take a medicine that prevents blood clots.  What are the symptoms of a stroke?  Symptoms of a stroke  happen quickly. A stroke may cause:  Sudden numbness, tingling, weakness, or loss of movement in your face, arm, or leg, especially on only one side of your body.  Sudden vision changes.  Sudden trouble speaking.  Sudden confusion or trouble understanding simple statements.  Sudden problems with walking or balance.  A sudden, severe headache that is different from past headaches.  Fainting.  A seizure.  It's important to call for medical help if you have stroke symptoms. Quick treatment may save your life. And it may reduce the damage in your brain so that you have fewer problems after the stroke.  Follow-up care is a key part of your treatment and safety. Be sure to make and go to all appointments, and call your doctor if you are having problems. It's also a good idea to know your test results and keep a list of the medicines you take.    Current as of: December 18, 2022               Content Version: 13.8    2026-8447 [employer].   Care instructions adapted under license by your healthcare professional. If you have questions about a medical condition or this instruction, always ask your healthcare professional. [employer] disclaims any warranty or liability for your use of this information.

## 2024-01-31 NOTE — PATIENT INSTRUCTIONS
Discussed further testing at the [facility] with provider, Dr. Garcia.  Patient states she would like to continue with current wound care plan at this time.      Patient advised to follow up if needed.

## 2024-02-02 NOTE — PROGRESS NOTES
[facility] HEART CARE   CARDIOLOGY CONSULT     Shannon Walls   1/12/1933  [id number]    Gilda Mcgregor     Chief Complaint   Patient presents with    Consult For     Afib/chf, taking digoxin          HPI:   Mrs. Walls is a 89-year-old female who is being seen by cardiology in follow-up to hospitalization for A-fib with RVR on 12/14/2023.  She has had A-fib dating back to 12/29/2022.  She has been asymptomatic.  There is also history of systolic and diastolic dysfunction with mild aortic insufficiency.    She has a history of chronic systolic heart failure with an EF of 35-40%, now recovered with a low normal EF at 50-55%, CVA off anticoagulation on 4/13/2023, PAD on 10/31/2023, 50% proximal internal carotid artery stenosis on 4/13/2023, peripheral edema, elevated BNP, hyperlipidemia-controlled, possible apical mural thrombus on 1/14/2023, generalized weakness with worse weakness to right upper extremity related to stroke.    Shannon has been having A-fib since first documentation on 12/21/2022.  She was admitted to the hospital for A-fib with RVR.  She had been increasingly weak and confused.  She was slower than usual.  Heart rate on arrival was 180.  It is uncertain if she had been taking her medication.  Dig level was less than 0.4 suggesting she was not taking it.  Chest x-ray unremarkable.  She was given a diltiazem IV drip and 2 doses of IV dig 0.25 mg.  She was admitted to the hospital.  Patient only been on aspirin for stroke phylaxis.  She had multiple falls and was not on anticoagulation.  EF at that time was 35-40% likely rate induced.  She noted to have mild aortic stenosis with severe left atrial enlargement.  Based on previous discussions, she is now up anticoagulation.  With treatment, heart rate was controlled.    She was admitted for A-fib with RVR on 12/14/2023.  Heart rate was in the 130s.  EKG showed A-fib with a heart rate of 132 bpm.  Digoxin level was 2.9 improving to 1.6.  CT  head showed no acute cranial process.  CT cervical spine showed no acute fracture.  Chest x-ray from 2 months earlier on 10/21/2023 was without acute airspace opacities.  Patient has been on Coumadin digoxin and metoprolol.    Seen the patient at time of consultation, heart rate has been controlled.  Remains in A-fib with a heart rate of 68 bpm.  Denies palpitations, fluttering, fatigue, and shortness of breath.  She is somewhat wheelchair-bound with her history of CVA on 4/13/2023.  She has right-sided weakness to her upper extremity.  She does not have any memory issues or problems speaking.  She has been stable on digoxin 0.625 mg daily and metoprolol 25 mg XL daily.    There is also history of heart failure.  Heart function had been previously reduced.  Her EF is down to 35-40% on 12/29/2022.  It has increased to low normal to 50-55%.  She continues to have mild to moderate peripheral edema on Lasix 20 mg daily.  I suspect she also has diastolic dysfunction contributing to this issue.  BNP was 2113 on 10/21/2023.    There was concern for CVA with small left ventricular apical thrombus on 9/14/2023.  She has since been on Coumadin.  There is also concern for mild aortic stenosis.  Repeat echocardiogram ordered on 2/5/2023.      Also, she has concerns for PAD.  She did have ABIs on 10/31/2023.  There is moderate arterial occlusive disease in both lower extremities.  She had ultrasound of carotids on 4/13/2023.  There is a 50% lesion to her left carotid artery.  Repeat ultrasound carotid arteries ordered 2/5/2024.  Had been on atorvastatin 40 mg at times of consultation.    IMAGING RESULTS:   MARÍA in 10/31/2023:  Moderate arterial occlusive disease in both lower extremities.    ECHO on 1/14/23:  Biatrial enlargement. The left ventricular systolic function is low normal.   There is mild global hypokinesis of the left ventricle. Qualitative ejection fraction is 50-55% (low normal).   A small LV apical thrombus cannot  be ruled out.   Diastolic function could not be accurately assessed due to atrial fibrillation.   The aortic valve is moderately sclerotic. There is mild aortic stenosis. Vmax 2.5m/s; MG 12mmHg; KOJO 1.2cm2; DVI 0.34.   There is mild mitral regurgitation.   Mild tricuspid regurgitation.   Estimated right ventricular systolic pressure 26mmHg.   Inferior vena cava size normal and collapsibility > 50% normal indicating normal right atrial pressure (3 mm Hg).   There is no pericardial effusion.   There is no evidence of an atrial septal defect or patent foramen ovale by agitated saline injection or color flow doppler.     CTA head and neck on 4/13/2023:   50% stenosis proximal internal carotid artery on the left.  No intracranial stenoses or occlusions are noted.    MRI Brain on 4/13/23, [facility]:  1. There are small areas of restricted diffusion associated with the left frontal lobe and posterior left temporal lobe indicative of small areas of ischemic insult. No evidence of acute hemorrhage.   2. There is moderate global atrophy with moderate to severe periventricular, deep and subcortical white matter changes that are nonspecific but can be seen in setting of chronic microvascular ischemia.     Echo on 12/29/2022:  Left ventricular function is decreased.   The ejection fraction is 35-40% (moderately reduced).  Mild to moderate diffuse hypokinesis is present.  The right ventricle is normal size.  Global right ventricular function is normal.  Severe left atrial enlargement is present.  Mild mitral insufficiency is present.  Mild aortic valve calcification is present.  Mild aortic stenosis is present.  The mean gradient across the aortic valve is17 mmHg.  Trace tricuspid insufficiency is present.  Mild pulmonic insufficiency is present.  Previous study not available for comparison.       CURRENT MEDICATIONS:   Prior to Admission medications    Medication Sig Start Date End Date Taking? Authorizing Provider    acetaminophen (TYLENOL) 650 MG CR tablet Take 650 mg by mouth every 4 hours as needed for mild pain or fever    Reported, Patient   alum & mag hydroxide-simethicone (MAALOX) 200-200-20 MG/5ML SUSP suspension Take 30 mLs by mouth every 4 hours as needed for indigestion    Reported, Patient   atorvastatin (LIPITOR) 40 MG tablet Take 1 tablet by mouth at bedtime 4/19/23   Reported, Patient   bisacodyl (DULCOLAX) 10 MG suppository Place 10 mg rectally daily as needed for constipation    Reported, Patient   calcium carbonate (OS-EFFIE) 1500 (600 Ca) MG tablet Take 600 mg by mouth daily     Reported, Patient   dextromethorphan (TUSSIN COUGH) 15 MG/5ML syrup     Reported, Patient   digoxin (LANOXIN) 125 MCG tablet Take 62.5 mcg by mouth daily     Reported, Patient   furosemide (LASIX) 20 MG tablet Take 20 mg by mouth daily    Reported, Patient   GAVILAX 17 GM/SCOOP powder Take 17 g by mouth daily 10/12/23   Reported, Patient   loperamide (IMODIUM) 2 MG capsule Take 2 mg by mouth as needed for diarrhea    Reported, Patient   LORazepam (ATIVAN) 0.5 MG tablet Take 0.5 mg by mouth At Bedtime     Reported, Patient   magnesium hydroxide (MILK OF MAGNESIA) 400 MG/5ML suspension Take 30 mLs by mouth daily as needed for constipation or heartburn    Reported, Patient   metoprolol succinate ER (TOPROL XL) 25 MG 24 hr tablet Take 25 mg by mouth daily 1/3/24   Reported, Patient   Multiple Vitamins-Minerals (CERTAVITE SENIOR/ANTIOXIDANT) TABS  12/26/23   Reported, Patient   NP THYROID 30 MG tablet Take 30 mg by mouth daily 12/19/22   Reported, Patient   omeprazole (PRILOSEC) 20 MG DR capsule Take 20 mg by mouth daily    Unknown, Entered By History   predniSONE (DELTASONE) 5 MG tablet Take 5 mg by mouth daily 10/13/23   Reported, Patient   sodium phosphate (FLEET ENEMA) 7-19 GM/118ML rectal enema Place 1 enema rectally once    Reported, Patient   traMADol (ULTRAM) 50 MG tablet Take 50 mg by mouth 2 times daily 10/30/23   Reported,  Patient   Vitamin D, Cholecalciferol, 10 MCG (400 UNIT) TABS Take 1 tablet by mouth daily    Reported, Patient   warfarin ANTICOAGULANT (COUMADIN) 2 MG tablet Take 1 tablet (2 mg) by mouth daily Check INR to adjust dose by 12/21 12/18/23   Seth King MD       ALLERGIES:   Allergies   Allergen Reactions    Levothyroxine Shortness Of Breath and Swelling    Nitrogen Swelling and Blisters     Liquid nitrogen        PAST MEDICAL HISTORY:   Past Medical History:   Diagnosis Date    Atrial fibrillation with rapid ventricular response (H)         PAST SURGICAL HISTORY:   Past Surgical History:   Procedure Laterality Date    EYE SURGERY      OPEN REDUCTION INTERNAL FIXATION WRIST Right 8/22/2018    Procedure: OPEN REDUCTION INTERNAL FIXATION WRIST;  OPEN REDUCTION INTERNAL FIXATION RIGHT DISTAL RADIUS;  Surgeon: Taqueria Edwards MD;  Location: [facility] OR        FAMILY HISTORY:   No family history on file.     SOCIAL HISTORY:   Social History     Socioeconomic History    Marital status:    Tobacco Use    Smoking status: Former    Smokeless tobacco: Never          ROS:   CONSTITUTIONAL: No weight loss, fever, chills, weakness or fatigue.   CARDIOVASCULAR: No chest pain, chest pressure or chest discomfort. No palpitations or lower extremity edema.   RESPIRATORY: No shortness of breath, dyspnea upon exertion, cough or sputum production.   NEUROLOGICAL: No headache, lightheadedness, dizziness, syncope, ataxia or weakness.   HEMATOLOGIC: No anemia, bleeding or bruising.         PHYSICAL EXAM:   GENERAL: The patient is a well-developed, well-nourished, in no apparent distress. Alert and oriented x3.   HEART: Regular rate and rhythm, S1S2 present without murmur, rub or gallop.   LUNGS: Respirations regular and unlabored. Clear to auscultation.   EXTREMITIES: No peripheral edema present.   SKIN: No jaundice. No rashes or visible skin lesions present.        LAB RESULTS:   No visits with results within 2 Month(s) from this  visit.   Latest known visit with results is:   Office Visit on 11/17/2023   Component Date Value Ref Range Status    Culture 11/17/2023 4+ Staphylococcus aureus (A)   Final          ASSESSMENT:       ICD-10-CM    1. Atrial fibrillation with RVR (H)  I48.91 Adult Cardiology al [facility] Referral      2. Chronic systolic heart failure (H)  I50.22 Echocardiogram Complete      3. Permanent atrial fibrillation (H)  I48.21       4. History of CVA (cerebrovascular accident)  Z86.73 US Carotid Bilateral      5. Peripheral edema  R60.9 Echocardiogram Complete      6. History of tobacco abuse quitting in the 50s and 60s  Z87.891       7. Stage 3a chronic kidney disease (H)  N18.31       8. Mixed hyperlipidemia  E78.2       9. Elevated brain natriuretic peptide (BNP) level  R79.89 Echocardiogram Complete      10. Mild aortic stenosis  I35.0 Echocardiogram Complete      11. H/O apical mural thrombus  I51.3     On echo from 1/14/2023      12. PAD (peripheral artery disease) (H24)  I73.9             PLAN:   1.  A-fib: Was admitted to the hospital with A-fib with RVR on 12/14/2023.  Heart rates are now controlled at 68 bpm.  Patient denies symptoms of palpitations fluttering, shortness of breath, or fatigue.  As a result, no changes made.  Briefly, discussed NOAC/DOAC's.  These have been tried in the past and she is not able to take them secondary to cost.  At this point continue Coumadin and metoprolol 25 mg XL daily.  Currently on digoxin 0.625 mg daily.  Heart rate stable, no changes.  Briefly, using a heart model room, we discussed A-fib.  We discussed medications, cardioversion, and possible ablation.  At this point, patient was clear that she would not want cardioversion or ablation.  Because she is symptomatic, I would not suggest alteration of treatment.  2.  CHF: Likely both systolic and diastolic.  Now with recovered EF.  Continues to have mild peripheral edema.  Currently on Lasix 20 mg daily.  She has a hard time  ambulating at baseline with his history of stroke.  She was seen today in wheelchair.  Discussed salt restriction, fluid restriction, and daily weights.  BNP was elevated at 2113 on 10/21/2023.  3.  Concern for left ventricular thrombus: Potentially seen on echocardiogram from 1/14/2023.  Plan for repeat echocardiogram.  Currently on Coumadin.  Plan for repeat echocardiogram.  4.  Carotid artery stenosis: 50% on the left.  Plan for ultrasound of carotid arteries.  She has had a stroke previously on 4/13/2023.  Continue Lipitor 40 mg daily.  5.  Mild aortic stenosis: Noted on echocardiogram from 2022.  Plan for repeat echocardiogram.  6.  Follow-up in 1 year or sooner with abnormalities on ultrasound of carotids and echocardiogram.    Total time spent on day of visit, including review of tests, obtaining/reviewing separately obtained history, ordering medications/tests/procedures, communicating with PCP/consultants, and documenting in electronic medical record: 60 minutes.        Thank you for allowing me to participate in the care of your patient. Please do not hesitate to contact me if you have any questions.     Devin Meyer, DO

## 2024-02-05 PROBLEM — N18.31 STAGE 3A CHRONIC KIDNEY DISEASE (H): Status: ACTIVE | Noted: 2024-01-01

## 2024-02-05 PROBLEM — I51.3 APICAL MURAL THROMBUS: Status: ACTIVE | Noted: 2024-01-01

## 2024-02-05 PROBLEM — E78.2 MIXED HYPERLIPIDEMIA: Status: ACTIVE | Noted: 2024-01-01

## 2024-02-05 PROBLEM — R60.0 PERIPHERAL EDEMA: Status: ACTIVE | Noted: 2024-01-01

## 2024-02-05 PROBLEM — Z87.891 HISTORY OF TOBACCO ABUSE: Status: ACTIVE | Noted: 2024-01-01

## 2024-02-05 PROBLEM — R79.89 ELEVATED BRAIN NATRIURETIC PEPTIDE (BNP) LEVEL: Status: ACTIVE | Noted: 2024-01-01

## 2024-02-05 PROBLEM — I73.9 PAD (PERIPHERAL ARTERY DISEASE) (H): Status: ACTIVE | Noted: 2024-01-01

## 2024-02-05 PROBLEM — Z86.73 HISTORY OF CVA (CEREBROVASCULAR ACCIDENT): Status: ACTIVE | Noted: 2024-01-01

## 2024-02-05 PROBLEM — I35.0 MILD AORTIC STENOSIS: Status: ACTIVE | Noted: 2024-01-01

## 2024-02-05 NOTE — PATIENT INSTRUCTIONS
Thank you for allowing Dr CORBY Meyer and our  team to participate in your care. Please call our office at [phone] with scheduling questions or if you need to cancel or change your appointment. With any other questions or concerns you may call cardiology nurse at  [phone].       If you experience chest pain, chest pressure, chest tightness, shortness of breath, fainting, lightheadedness, nausea, vomiting, or other concerning symptoms, please report to the Emergency Department or call 911. These symptoms may be emergent, and best treated in the Emergency Department.

## 2024-02-15 PROBLEM — S22.000A COMPRESSION FRACTURE OF THORACIC VERTEBRA, UNSPECIFIED THORACIC VERTEBRAL LEVEL, INITIAL ENCOUNTER (H): Status: ACTIVE | Noted: 2024-01-01

## 2024-02-15 PROBLEM — R79.9 ELEVATED BUN: Status: ACTIVE | Noted: 2024-01-01
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
## 2024-02-15 PROBLEM — I50.22 CHRONIC SYSTOLIC HEART FAILURE (H): Status: ACTIVE | Noted: 2023-04-13

## 2024-02-15 PROBLEM — I48.91 ATRIAL FIBRILLATION WITH RAPID VENTRICULAR RESPONSE (H): Status: ACTIVE | Noted: 2024-01-01

## 2024-02-15 PROBLEM — I48.21 PERMANENT ATRIAL FIBRILLATION (H): Status: ACTIVE | Noted: 2021-11-29

## 2024-02-15 NOTE — H&P
[facility]    History and Physical  Hospitalist       Date of Admission:  2/15/2024  Date of Service (when I saw the patient): 02/15/24    Assessment & Plan       Atrial fibrillation with rapid ventricular response (H):  Prior history of RVR-last hospital stay for same was in December. Rates are running initially in the 140-150's but now down to 110-120's. She is asymptomatic-only found at wound care appointment during routine vitals check. She does have some increased edema as well as mildly elevated BP, BNP from her baseline. Suspect early CHF exacerbation at least contributing. She received IV dig in the ER-level 1.1. Will continue home dose metoprolol, IV lasix 40mg every 8 and evaluate response. May increase metoprolol if rates running >140 consistently. She basically had no response to the IV dose metoprolol that they gave in the ED      Permanent atrial fibrillation (H): As above, she was just seen by cardiology last week with no changed to regimen at that time.       Acute on Chronic systolic heart failure (H): At least has systolic CHF but at cardiology visit Dr. Meyer did state suspicion of diastolic failure as well and scheduled her for new ECHO as she has not had one in 5 years. Will see if we can get that done tomorrow to ensure no acute hypokinesia. Mild elevation in trop likely due to  cardiac stress for tachycardia/CHF exacerbation.       Stage 3a chronic kidney disease (H): renal function stable within her baseline limits but on the high end.       Fall, initial encounter: several days ago at her assisted living. She denies any acute injury at the time but does state her back has been more stiff and sore since then. Imaging shows significant kyphosis along with likely new compression fracture. Ice, reposition, increase tramadol to q6prn from q12. Add scheduled tylenol as well.       Compression fracture of thoracic vertebra, unspecified thoracic vertebral level, initial encounter (H): as  above     # Drug Induced Coagulation Defect: home medication list includes an anticoagulant medication     # Chronic heart failure with reduced ejection fraction: last echo with EF <40%                DVT Prophylaxis: Warfarin  Code Status: DNR / DNI    Disposition: Expected discharge in 1-3 days once vital signs stable.    Jodi Fritz CNP    Primary Care Physician   Gilda Mcgregor    Chief Complaint   NO complaints, noted tachycardia at clinic appointment     History is obtained from the patient    History of Present Illness   Shannon Walls is a 89 year old female who presented to wound care clinic for scheduled appointment. When staff took her vitals they noted pulse of 140-160's so sent her to ED. She has prior known history of persistent A-fib with occasional bouts of RVR. She was previously having trouble remembering to take her medications consistently but she recently moved to [place] where they set up meds for her. She denies any palpitations, chest pain. Does endorse periodic dyspnea, however unable to remember if this gets worse when dependent. She denies any nausea, abdominal pain, dizziness. On arrival telemetry showed rates 140-160's but then after dig decreased to 100-120's.     Past Medical History    I have reviewed this patient's medical history and updated it with pertinent information if needed.   Past Medical History:   Diagnosis Date    Atrial fibrillation with rapid ventricular response (H)        Past Surgical History   I have reviewed this patient's surgical history and updated it with pertinent information if needed.  Past Surgical History:   Procedure Laterality Date    EYE SURGERY      OPEN REDUCTION INTERNAL FIXATION WRIST Right 8/22/2018    Procedure: OPEN REDUCTION INTERNAL FIXATION WRIST;  OPEN REDUCTION INTERNAL FIXATION RIGHT DISTAL RADIUS;  Surgeon: Taqueria Edwards MD;  Location: [facility] OR       Prior to Admission Medications   Prior to Admission Medications   Prescriptions  Last Dose Informant Patient Reported? Taking?   GAVILAX 17 GM/SCOOP powder  Nursing Home Yes No   Sig: Take 17 g by mouth daily   LORazepam (ATIVAN) 0.5 MG tablet  Nursing Home Yes No   Sig: Take 0.5 mg by mouth At Bedtime    Multiple Vitamins-Minerals (CERTAVITE SENIOR/ANTIOXIDANT) TABS   Yes No   NP THYROID 30 MG tablet  Nursing Home Yes No   Sig: Take 30 mg by mouth daily   Vitamin D, Cholecalciferol, 10 MCG (400 UNIT) TABS  Nursing Home Yes No   Sig: Take 1 tablet by mouth daily   acetaminophen (TYLENOL) 650 MG CR tablet  Nursing Home Yes No   Sig: Take 650 mg by mouth every 4 hours as needed for mild pain or fever   alum & mag hydroxide-simethicone (MAALOX) 200-200-20 MG/5ML SUSP suspension   Yes No   Sig: Take 30 mLs by mouth every 4 hours as needed for indigestion   atorvastatin (LIPITOR) 40 MG tablet  Nursing Home Yes No   Sig: Take 1 tablet by mouth at bedtime   bisacodyl (DULCOLAX) 10 MG suppository   Yes No   Sig: Place 10 mg rectally daily as needed for constipation   calcium carbonate (OS-EFFIE) 1500 (600 Ca) MG tablet  Nursing Home Yes No   Sig: Take 600 mg by mouth daily    dextromethorphan (TUSSIN COUGH) 15 MG/5ML syrup   Yes No   digoxin (LANOXIN) 125 MCG tablet  Nursing Home Yes No   Sig: Take 62.5 mcg by mouth daily    furosemide (LASIX) 20 MG tablet  Nursing Home Yes No   Sig: Take 20 mg by mouth daily   loperamide (IMODIUM) 2 MG capsule   Yes No   Sig: Take 2 mg by mouth as needed for diarrhea   magnesium hydroxide (MILK OF MAGNESIA) 400 MG/5ML suspension   Yes No   Sig: Take 30 mLs by mouth daily as needed for constipation or heartburn   metoprolol succinate ER (TOPROL XL) 25 MG 24 hr tablet   Yes No   Sig: Take 25 mg by mouth daily   omeprazole (PRILOSEC) 20 MG DR capsule  Nursing Home Yes No   Sig: Take 20 mg by mouth daily   predniSONE (DELTASONE) 5 MG tablet  Nursing Home Yes No   Sig: Take 5 mg by mouth daily   sodium phosphate (FLEET ENEMA) 7-19 GM/118ML rectal enema   Yes No   Sig: Place 1  enema rectally once   traMADol (ULTRAM) 50 MG tablet  Nursing Home Yes No   Sig: Take 50 mg by mouth 2 times daily   warfarin ANTICOAGULANT (COUMADIN) 2 MG tablet   No No   Sig: Take 1 tablet (2 mg) by mouth daily Check INR to adjust dose by 12/21      Facility-Administered Medications: None     Allergies   Allergies   Allergen Reactions    Levothyroxine Shortness Of Breath and Swelling    Nitrogen Swelling and Blisters     Liquid nitrogen       Social History   I have reviewed this patient's social history and updated it with pertinent information if needed. Shannon MENG Kavita  reports that she has quit smoking. She has never used smokeless tobacco.    Family History   I have reviewed this patient's family history and updated it with pertinent information if needed.   No family history on file.    Review of Systems   CONSTITUTIONAL:  negative for  fevers, chills, sweats, fatigue, and malaise  RESPIRATORY:  negative for  dry cough, cough with sputum, dyspnea, wheezing, hemoptysis, chest pain, and pleuritic pain  CARDIOVASCULAR:  negative for  chest pain, dyspnea, palpitations, exertional chest pressure/discomfort, fatigue, syncope  GASTROINTESTINAL:  negative for nausea, vomiting, change in bowel habits, diarrhea, constipation, and abdominal pain  GENITOURINARY:  negative for frequency, dysuria, nocturia, and urinary incontinence  HEMATOLOGIC/LYMPHATIC:  negative for bleeding and petechiae  MUSCULOSKELETAL:  negative for  myalgias, arthralgias, and muscle weakness  NEUROLOGICAL:  negative for headaches, dizziness, weakness, numbness, syncope, and near syncope    Physical Exam   Temp: 97.4  F (36.3  C) Temp src: Tympanic BP: 129/95 Pulse: (!) 125   Resp: 16 SpO2: 96 % O2 Device: None (Room air)    Vital Signs with Ranges  Temp:  [97.4  F (36.3  C)-97.7  F (36.5  C)] 97.4  F (36.3  C)  Pulse:  [125-160] 125  Resp:  [16-22] 16  BP: (129-139)/() 129/95  SpO2:  [96 %] 96 %  107 lbs 12.88 oz    Constitutional:  Awake,alert, no acute distress, forgetful  Respiratory: Clear bilaterally though diminished, no crackles, rhonchi  Cardiovascular: HR irregular, tachy, no murmurs, thrills, rubs.   GI: Soft,nontender, bowel sound are hypoactive   Skin: No rashes, open areas, bruising  Musculoskeletal: Moves all extremities equally with good strength, no sonia joint abnormalities  Neurologic: AOx3 but some trouble with short and long term memory       Data   Data reviewed today:  I personally reviewed .  Recent Labs   Lab 02/15/24  1056   WBC 10.2   HGB 13.4   MCV 95      INR 2.39*      POTASSIUM 4.7   CHLORIDE 96*   CO2 29   BUN 23.4*   CR 1.01*   ANIONGAP 12   EFFIE 9.5   *       Recent Results (from the past 24 hour(s))   XR Chest 2 Views    Narrative    PROCEDURE:  XR CHEST 2 VIEWS    HISTORY:  Tachycardia.     COMPARISON:  10/21/2023    FINDINGS:   The heart is enlarged The pulmonary vasculature is normal.  There is  exaggerated thoracic kyphosis. There is atelectatic changes in both  lung bases. Multiple thoracic compression fractures are noted. 2 are  unchanged from previous examination. There appears to be a new mid  thoracic vertebral body compression fracture as compared to October 2023  No pleural effusion or pneumothorax.      Impression    IMPRESSION: Cardiomegaly.    New mid thoracic vertebral body compression fracture    SONIA GASCA MD         SYSTEM ID:  [id number]

## 2024-02-15 NOTE — PATIENT INSTRUCTIONS
Wound to right second toe: Please complete dressings every two days. Cleanse toe with mild soap and water, pat dry. Cover with Mepilex AG foam and secure with tape. Dressings to be done every two days.     Please wear loose fitting shoes to keep pressure off of that toe.

## 2024-02-15 NOTE — PLAN OF CARE
Writer returned pt's daughter Bette's phone call at this time and gave her an update on her Mom.

## 2024-02-15 NOTE — PROGRESS NOTES
Chief complaint: Patient presents with:  Toenail: DFE      History of Present Illness: This 89-year-old female is seen for follow-up management of high risk nail debridement and a RIGHT second toe ulcer.    She has been seeing wound care for leg ulcerations and she saw wound care before her podiatry appointment today. Her caretakers at [place] have been changing her dressings. She thinks her leg ulcers and her RIGHT second toe ulcers started around October, 2023.    Additionally, she is here for high risk nail debridement. The toenails are too thick for her to be able to trim them on her own.    No further pedal complaints today.       BP (!) 139/100   Pulse (!) 125   Temp 97.7  F (36.5  C)   Resp 22     Patient Active Problem List   Diagnosis    Pathological fracture L4 vertebrae    Fracture of sacrum (H)    Permanent atrial fibrillation (H)    Altered mental status, unspecified altered mental status type    Acquired hypothyroidism    Anxiety disorder, unspecified    Chronic systolic heart failure (H)    Gastroesophageal reflux disease without esophagitis    Atrial fibrillation with RVR (H)    Fall at home, initial encounter    Current chronic use of systemic steroids    Decubitus skin ulcer    History of CVA on 4/13/23    Peripheral edema    History of tobacco abuse quitting in the 50s and 60s    Stage 3a chronic kidney disease (H)    Elevated brain natriuretic peptide (BNP) level    Mixed hyperlipidemia    Mild aortic stenosis    H/O apical mural thrombus    PAD (peripheral artery disease) (H24)       Past Surgical History:   Procedure Laterality Date    EYE SURGERY      OPEN REDUCTION INTERNAL FIXATION WRIST Right 8/22/2018    Procedure: OPEN REDUCTION INTERNAL FIXATION WRIST;  OPEN REDUCTION INTERNAL FIXATION RIGHT DISTAL RADIUS;  Surgeon: Taqueria Edwards MD;  Location: [facility]       Current Outpatient Medications   Medication    acetaminophen (TYLENOL) 650 MG CR tablet    alum & mag hydroxide-simethicone  (MAALOX) 200-200-20 MG/5ML SUSP suspension    atorvastatin (LIPITOR) 40 MG tablet    bisacodyl (DULCOLAX) 10 MG suppository    calcium carbonate (OS-EFFIE) 1500 (600 Ca) MG tablet    dextromethorphan (TUSSIN COUGH) 15 MG/5ML syrup    digoxin (LANOXIN) 125 MCG tablet    furosemide (LASIX) 20 MG tablet    GAVILAX 17 GM/SCOOP powder    loperamide (IMODIUM) 2 MG capsule    LORazepam (ATIVAN) 0.5 MG tablet    magnesium hydroxide (MILK OF MAGNESIA) 400 MG/5ML suspension    metoprolol succinate ER (TOPROL XL) 25 MG 24 hr tablet    Multiple Vitamins-Minerals (CERTAVITE SENIOR/ANTIOXIDANT) TABS    NP THYROID 30 MG tablet    omeprazole (PRILOSEC) 20 MG DR capsule    predniSONE (DELTASONE) 5 MG tablet    sodium phosphate (FLEET ENEMA) 7-19 GM/118ML rectal enema    traMADol (ULTRAM) 50 MG tablet    Vitamin D, Cholecalciferol, 10 MCG (400 UNIT) TABS    warfarin ANTICOAGULANT (COUMADIN) 2 MG tablet     No current facility-administered medications for this visit.          Allergies   Allergen Reactions    Levothyroxine Shortness Of Breath and Swelling    Nitrogen Swelling and Blisters     Liquid nitrogen       No family history on file.    Social History     Socioeconomic History    Marital status:      Spouse name: None    Number of children: None    Years of education: None    Highest education level: None   Tobacco Use    Smoking status: Former    Smokeless tobacco: Never       ROS: 10 point ROS neg other than the symptoms noted above in the HPI.  EXAM  Constitutional: healthy, alert, and no distress    Psychiatric: mentation appears normal and affect normal/bright    VASCULAR:  -Dorsalis pedis pulse minimally palpable +1/4 bilaterally   -Posterior tibial pulse +0/4 b/l  -Capillary refill time < 3 seconds to b/l hallux  -Hair growth Absent to b/l anterior legs and ankles  -Pitting edema 2+ to the bilateral foot and leg  NEURO:  -Light touch sensation intact to b/l plantar forefoot  DERM:  -Skin temperature, texture and  turgor WNL b/l  -Toenails elongated, thickened, dystrophic and discolored x 10  Wound Location:  RIGHT dorsal second toe PIPJ  02/16/2024  Measurement:  0.2cm x 0.2cm x exposed bone  Drainage:  Minimal serous  Odor:  None  Undermining:  None  Edges:  Intact  Base:  100% bone  Surrounding Skin: Intact  No severe erythema, no ascending erythema, no calor, no purulence, no malodor, no other signs of infection.     12/05/2023  Measurement:  0.2cm x 0.2cm x exposed bone  Drainage:  Minimal serous  Odor:  None  Undermining:  None  Edges:  Intact  Base:  100% bone  Surrounding Skin: Intact  No severe erythema, no ascending erythema, no calor, no purulence, no malodor, no other signs of infection.     -------------------------------    Leg wounds: Covered with fresh dressings (treated by wound care on 02/16/2024)                                                                                                                                                                                                                                                                                                                                                                                                                                                                                                                                                                                                                                                   MSK:  -No pain on palpation to the RIGHT dorsal second toe PIPJ  -Mild tenderness on palpation to the LEFT leg ulcerations  -Muscle strength of ankles +5/5 for dorsiflexion, plantarflexion, ABDUction and ADDuction b/l    MARÍA BILATERALLY 10/31/2023  IMPRESSION: Moderate arterial occlusive disease in both lower  extremities  PRABHAKAR GASCA MD   RIGHT FOOT RADIOGRAPHS 12/01/2023  IMPRESSION: No plain film changes of osteomyelitis.    PRABHAKAR GASCA MD    ============================================================    ASSESSMENT:  (L97.516) Ulcer of right foot with bone involvement without evidence of necrosis (H)  (primary encounter diagnosis)    (L97.922) Ulcer of left lower leg, with fat layer exposed (H)    (L60.3) Onychodystrophy    (I73.9) Peripheral arterial disease (H24)    (I70.235) Atherosclerosis of native artery of right lower extremity with ulceration of other part of foot (H)        PLAN:  -Patient evaluated and examined. Treatment options discussed with no educational barriers noted.    -High risk toenail debridement x 10 toenails without incident on 02/15/2024    PAD:  -Previous vascular referral placed on 12/05/2023.    RIGHT dorsal second toe ulcer:  -The ulceration still probes to the bone, but the wound appears stable without concerning or acute signs of infection.  ----Patient was instructed to look for signs of infection (redness, swelling, pain, purulence, fever, chills, nausea, vomiting) and to return to podiatry or the emergency department immediately if there are any signs of infection.   -Patient's toe ulcer may potentially not heal. She has abnormal ABIs which may also indicate she had microvascular disease. There is no pain from the toe.  -Outer callus cared for and mechanical debridement of wound performed with gauze on the RIGHT dorsal second toe.  -Applied a dressing to the wound with Mepilex Ag and Medipore tape.  -Patient 's caretakers to change the dressing three every two days.   -Patient was instructed to look for signs of infection (redness, swelling, pain, purulence, fever, chills, nausea, vomiting) and to return to podiatry or the emergency department immediately if there are any signs of infection.   -Offloading: Keep pressure off toe and avoid wearing tight shoes.  -This is an acute, uncomplicated illness/injury with OTC treatment options reviewed.      -Leg ulcerations treated by wound care today    -Patient's current goal  is to keep the wounds clean and minimize risks of infection. She understands the wounds may potentially not heal. The toe wound is stable without acute signs of infection today.      CKD: Patient's CKDis managed by their PCP. The kidney function appears to be stable. Patient's last GFR was 52 on 02/15/2024. The reduced kidney function contributes to increased edema in the foot.     -Patient in agreement with the above treatment plan and all of patient's questions were answered.      Return to clinic 2-4 weeks for RIGHT dorsal toe wound  Return to clinic two weeks with wound care and 63+ days for high risk nail debridement      Carisa Guerin DPM

## 2024-02-15 NOTE — PATIENT INSTRUCTIONS
Wound Care Instructions left lower extremity:  1) Wash your hands and work space  2) Gather all your supplies: clean wash cloths, mild soap (baby soap), honey alginate, 4x4 gauze (4), rolled gauze, white tape  3) Cleanse wounds with mild soap, rinse, pat dry  4) Dress wounds with honey alginate lightly packed into each wound, cover all wounds with 4x4 gauze (4), secure with rolled gauze and white tape    5) Change dressing Tuesday, and Friday  6) Dispose of all dirty supplies  7) Wash hands and equipment     Wound Care Instructions right 2nd toe:  1) Wash your hands and work space  2) Gather all your supplies: clean wash cloths, mild soap (baby soap), Mepilex Ag foam,  white tape  3) Cleanse all wounds with mild soap, rinse, pat dry  4) Dress each wound with Mepilex Ag foam secure with white tape    5) Change dressing Tuesday and Friday  6) Dispose of all dirty supplies  7) Wash hands and equipment     Wound Care Instructions left and right arms:  1) Wash your hands and work space  2) Gather all your supplies: 4x4 gauze, conform rolled gauze, white tape  3) Cleanse wound with mild soap, rinse, pat dry  4) Apply a clean 4x4 gauze over each piece of Mepitel One, secure each with 1/2 roll of conform rolled gauze, secure with white tape    5) Change gauze daily  6) Dispose of all dirty supplies  7) Wash hands and equipment    Supplies were ordered thru [facility] [place] with three refills for each order.      Please report any increase in pain, fevers, chills, changes in the drainage odor.   Please call [phone] if you have any questions or concerns or if any problems develop.      Follow up on Thursday, February 15, 2024 at 9 a.m. in wound care.

## 2024-02-15 NOTE — MEDICATION SCRIBE - ADMISSION MEDICATION HISTORY
Medication Scribe Admission Medication History    Admission medication history is complete. The information provided in this note is only as accurate as the sources available at the time of the update.    Information Source(s): Facility (U/NH/) medication list/MAR and CareEverywhere/SureScripts via N/A    Coumadin information:  INR date: 2/9/24  INR result: 2.2   Next INR date: 2/16/24    Coumadin strength/instructions: 5 mg F: 2.5 ROW  Tablet strength: 2.5 mg tab and 5 mg tab     (Nursing staff draws IN[facility] and [facility] and/or [facility] and urturn doses)    Time of day patient takes coumadin at home: 9 PM  Exact last dose/time patient took PTA:  2/14/24 9 pm 2.5 mg    Patient's coumadin clinic [facility] and contact[facility]     Pertinent Information:   Patient resides at [facility] and staff manages medications    Changes made to PTA medication list:  Added: None  Deleted: standing orders  Changed: updated coumadin    Allergies reviewed with patient and updates made in EHR: yes    Medication History Completed By: Alexandra Anderson 2/15/2024 3:46 PM    PTA Med List   Medication Sig Last Dose    atorvastatin (LIPITOR) 40 MG tablet Take 1 tablet by mouth at bedtime 2/14/2024 at 2100    calcium carbonate (OS-EFFIE) 1500 (600 Ca) MG tablet Take 600 mg by mouth every evening 2/14/2024 at 1600    digoxin (LANOXIN) 125 MCG tablet Take 62.5 mcg by mouth every morning 2/15/2024 at 0800    furosemide (LASIX) 20 MG tablet Take 20 mg by mouth every morning 2/15/2024 at 0800    GAVILAX 17 GM/SCOOP powder Take 17 g by mouth every morning -hold if having loose stools. 2/15/2024 at 0800    LORazepam (ATIVAN) 0.5 MG tablet Take 0.5 mg by mouth At Bedtime  2/14/2024 at 2100    metoprolol succinate ER (TOPROL XL) 25 MG 24 hr tablet Take 25 mg by mouth at bedtime -check pulse first and HOLD if pulse < 55. 2/14/2024 at 2100    Multiple Vitamins-Minerals (CERTAVITE SENIOR/ANTIOXIDANT) TABS Take 1  tablet by mouth every morning 2/15/2024 at 0800    NP THYROID 30 MG tablet Take 30 mg by mouth every morning (before breakfast) 2/15/2024 at 0700    omeprazole (PRILOSEC) 20 MG DR capsule Take 20 mg by mouth 2 times daily 2/15/2024 at 0730    predniSONE (DELTASONE) 5 MG tablet Take 5 mg by mouth every morning 2/15/2024 at 0800    traMADol (ULTRAM) 50 MG tablet Take 50 mg by mouth daily as needed (back pain) 2/14/2024    traMADol (ULTRAM) 50 MG tablet Take 50 mg by mouth 2 times daily 2/15/2024 at 0800    Vitamin D, Cholecalciferol, 10 MCG (400 UNIT) TABS Take 1 tablet by mouth every morning 2/15/2024 at 0800    warfarin ANTICOAGULANT (COUMADIN) 2.5 MG tablet Take 1 tablet (2.5 mg) by mouth once daily on Saturday, Sunday, Monday, Tuesday, Wednesday, and Thursday and recheck INR on Friday 2/16/24. Dose is given at 9:00 PM. 2/14/2024 at 2100    warfarin ANTICOAGULANT (COUMADIN) 5 MG tablet Take 5 mg by mouth once a week -Fridays. 2/9/2024 at 2100

## 2024-02-15 NOTE — ED PROVIDER NOTES
History     Chief Complaint   Patient presents with    Irregular Heart Beat     HPI  Shannon Walls is a 89 year old female who presents to the emergency department with concerns of a fast heart rate.  Patient states that recently she is felt a little bit more rundown.  She was evaluated at wound clinic today and was found to have heart rates as high as 160.  Therefore, they brought the patient down here for further evaluation.    Patient denies any pain while resting at this time.  States that she has maybe had some increased lower extremity edema.  No nausea or vomiting.  No chest pain.  No belly pain.  No reported changes to bowel or bladder.    Allergies:  Allergies   Allergen Reactions    Levothyroxine Shortness Of Breath and Swelling    Nitrogen Swelling and Blisters     Liquid nitrogen       Problem List:    Patient Active Problem List    Diagnosis Date Noted    Elevated BUN 02/15/2024     Priority: Medium    Atrial fibrillation with rapid ventricular response (H) 02/15/2024     Priority: Medium    Fall, initial encounter 02/15/2024     Priority: Medium    Compression fracture of thoracic vertebra, unspecified thoracic vertebral level, initial encounter (H) 02/15/2024     Priority: Medium    History of CVA on 4/13/23 02/05/2024     Priority: Medium    Peripheral edema 02/05/2024     Priority: Medium    History of tobacco abuse quitting in the 50s and 60s 02/05/2024     Priority: Medium    Stage 3a chronic kidney disease (H) 02/05/2024     Priority: Medium    Elevated brain natriuretic peptide (BNP) level 02/05/2024     Priority: Medium    Mixed hyperlipidemia 02/05/2024     Priority: Medium    Mild aortic stenosis 02/05/2024     Priority: Medium    H/O apical mural thrombus 02/05/2024     Priority: Medium     On echo from 1/14/2023      PAD (peripheral artery disease) (H24) 02/05/2024     Priority: Medium    Atrial fibrillation with RVR (H) 12/14/2023     Priority: Medium    Fall at home, initial  encounter 12/14/2023     Priority: Medium    Anxiety disorder, unspecified 04/18/2023     Priority: Medium    Gastroesophageal reflux disease without esophagitis 04/18/2023     Priority: Medium    Current chronic use of systemic steroids 04/18/2023     Priority: Medium    Decubitus skin ulcer 04/18/2023     Priority: Medium    Acquired hypothyroidism 04/13/2023     Priority: Medium    Chronic systolic heart failure (H) 04/13/2023     Priority: Medium    Altered mental status, unspecified altered mental status type 12/29/2022     Priority: Medium    Permanent atrial fibrillation (H) 11/29/2021     Priority: Medium    Pathological fracture L4 vertebrae 11/27/2021     Priority: Medium    Fracture of sacrum (H) 11/27/2021     Priority: Medium        Past Medical History:    Past Medical History:   Diagnosis Date    Atrial fibrillation with rapid ventricular response (H)        Past Surgical History:    Past Surgical History:   Procedure Laterality Date    EYE SURGERY      OPEN REDUCTION INTERNAL FIXATION WRIST Right 8/22/2018    Procedure: OPEN REDUCTION INTERNAL FIXATION WRIST;  OPEN REDUCTION INTERNAL FIXATION RIGHT DISTAL RADIUS;  Surgeon: Taqueria Edwards MD;  Location: [facility]       Family History:    No family history on file.    Social History:  Marital Status:   [5]  Social History     Tobacco Use    Smoking status: Former    Smokeless tobacco: Never        Medications:    acetaminophen (TYLENOL) 650 MG CR tablet  alum & mag hydroxide-simethicone (MAALOX) 200-200-20 MG/5ML SUSP suspension  atorvastatin (LIPITOR) 40 MG tablet  bisacodyl (DULCOLAX) 10 MG suppository  calcium carbonate (OS-EFFIE) 1500 (600 Ca) MG tablet  dextromethorphan (TUSSIN COUGH) 15 MG/5ML syrup  digoxin (LANOXIN) 125 MCG tablet  furosemide (LASIX) 20 MG tablet  GAVILAX 17 GM/SCOOP powder  loperamide (IMODIUM) 2 MG capsule  LORazepam (ATIVAN) 0.5 MG tablet  magnesium hydroxide (MILK OF MAGNESIA) 400 MG/5ML suspension  metoprolol succinate ER  (TOPROL XL) 25 MG 24 hr tablet  Multiple Vitamins-Minerals (CERTAVITE SENIOR/ANTIOXIDANT) TABS  NP THYROID 30 MG tablet  omeprazole (PRILOSEC) 20 MG DR capsule  predniSONE (DELTASONE) 5 MG tablet  sodium phosphate (FLEET ENEMA) 7-19 GM/118ML rectal enema  traMADol (ULTRAM) 50 MG tablet  Vitamin D, Cholecalciferol, 10 MCG (400 UNIT) TABS  warfarin ANTICOAGULANT (COUMADIN) 2 MG tablet          Review of Systems  SEE HPI  Physical Exam   BP: 129/95  Pulse: (!) 128  Temp: 97.4  F (36.3  C)  Resp: 16  Weight: 48.9 kg (107 lb 12.9 oz)  SpO2: 96 %      Physical Exam  Constitutional: Alert and conversant. NAD   HENT: Atraumatic  Eyes: Normal pupils   Neck: Normal ROM  CV: Tachycardic, Irregular.   Pulmonary/Chest: Non-labored respirations, clear to auscultation bilaterally   Abdominal: Soft, non-tender, non-distended   MSK: PHILIP. Mild to moderate LE edema appreciated.   Neuro: Alert and appropriate. Cnx, Strength, and Sensation grossly intact.  Skin: Warm and dry. No diaphoresis. No rashes on exposed skin.  Psych: Appropriate mood and affect     ED Course   Impression and Plan: Patient presents with concern of generally not feeling well and having a fast heart rate.  EKG obtained in triage shows atrial fibrillation with rapid ventricular response at a heart rate in the 140s.  Patient generally stable appearing.  Patient has chronic A-fib.  Plan at this time will be to initiate evaluation with extensive laboratory evaluation as well as chest x-ray.  We will also give the patient a small dose of metoprolol IV to see if we can slow down her heart rate somewhat.  Final plan pending results and ED course.           ED Course as of 02/15/24 1322   Thu Feb 15, 2024   1126 CBC without any leukocytosis or anemia.    1153 INR at therapeutic level.    1154 UA without sign of infection. BMP with some indication of dehydration.    1158 FINDINGS:   The heart is enlarged The pulmonary vasculature is normal.  There is  exaggerated thoracic  kyphosis. There is atelectatic changes in both  lung bases. Multiple thoracic compression fractures are noted. 2 are  unchanged from previous examination. There appears to be a new mid  thoracic vertebral body compression fracture as compared to October 2023  No pleural effusion or pneumothorax.                                                                      IMPRESSION: Cardiomegaly.     New mid thoracic vertebral body compression fracture     PRABHAKAR GASCA MD      1158 BNP elevated at 4100 appreciated.    1159 Troponin Mildly elevated. Will get delta.    1215 Patient continued to be in atrial fibrillation with rapid ventricular response.  Will start the patient on digoxin as she had good effect previously with this.  Hospitalist paged for admission.   1321 Patient discussed with hospitalist who accepted the patient for admission.  Patient given a total of 250 mcg of digoxin down here in the emergency department.  Patient continues to be tachycardic in the 100s to 120s.  Patient otherwise doing reasonably well.     Procedures      Critical Care time:  was 30 minutes for this patient excluding procedures.           Results for orders placed or performed during the hospital encounter of 02/15/24 (from the past 24 hour(s))   EKG 12-lead, tracing only   Result Value Ref Range    Systolic Blood Pressure  mmHg    Diastolic Blood Pressure  mmHg    Ventricular Rate 149 BPM    Atrial Rate  BPM    AK Interval  ms    QRS Duration 70 ms     ms    QTc 434 ms    P Axis  degrees    R AXIS -45 degrees    T Axis 133 degrees    Interpretation ECG       Atrial fibrillation with rapid ventricular response  Left axis deviation  Low voltage QRS  Inferior infarct , age undetermined  Abnormal ECG  When compared with ECG of 29-DEC-2023 04:20,  No significant change was found     CBC with platelets differential    Narrative    The following orders were created for panel order CBC with platelets differential.  Procedure                                Abnormality         Status                     ---------                               -----------         ------                     CBC with platelets and d...[[id number]]  Abnormal            Final result                 Please view results for these tests on the individual orders.   Basic metabolic panel   Result Value Ref Range    Sodium 137 135 - 145 mmol/L    Potassium 4.7 3.4 - 5.3 mmol/L    Chloride 96 (L) 98 - 107 mmol/L    Carbon Dioxide (CO2) 29 22 - 29 mmol/L    Anion Gap 12 7 - 15 mmol/L    Urea Nitrogen 23.4 (H) 8.0 - 23.0 mg/dL    Creatinine 1.01 (H) 0.51 - 0.95 mg/dL    GFR Estimate 52 (L) >60 mL/min/1.73m2    Calcium 9.5 8.2 - 9.6 mg/dL    Glucose 124 (H) 70 - 99 mg/dL   Magnesium   Result Value Ref Range    Magnesium 1.8 1.7 - 2.3 mg/dL   Nt probnp inpatient (BNP)   Result Value Ref Range    N terminal Pro BNP Inpatient 4,104 (H) 0 - 1,800 pg/mL   Troponin T, High Sensitivity   Result Value Ref Range    Troponin T, High Sensitivity 24 (H) <=14 ng/L   INR   Result Value Ref Range    INR 2.39 (H) 0.85 - 1.15   CBC with platelets and differential   Result Value Ref Range    WBC Count 10.2 4.0 - 11.0 10e3/uL    RBC Count 4.30 3.80 - 5.20 10e6/uL    Hemoglobin 13.4 11.7 - 15.7 g/dL    Hematocrit 40.8 35.0 - 47.0 %    MCV 95 78 - 100 fL    MCH 31.2 26.5 - 33.0 pg    MCHC 32.8 31.5 - 36.5 g/dL    RDW 14.2 10.0 - 15.0 %    Platelet Count 263 150 - 450 10e3/uL    % Neutrophils 83 %    % Lymphocytes 8 %    % Monocytes 7 %    % Eosinophils 0 %    % Basophils 1 %    % Immature Granulocytes 1 %    NRBCs per 100 WBC 0 <1 /100    Absolute Neutrophils 8.6 (H) 1.6 - 8.3 10e3/uL    Absolute Lymphocytes 0.8 0.8 - 5.3 10e3/uL    Absolute Monocytes 0.7 0.0 - 1.3 10e3/uL    Absolute Eosinophils 0.0 0.0 - 0.7 10e3/uL    Absolute Basophils 0.1 0.0 - 0.2 10e3/uL    Absolute Immature Granulocytes 0.1 <=0.4 10e3/uL    Absolute NRBCs 0.0 10e3/uL   Extra Tube    Narrative    The following orders were  created for panel order Extra Tube.  Procedure                               Abnormality         Status                     ---------                               -----------         ------                     Extra Blue Top Tube[[id number]]                              Final result               Extra Red Top Tube[[id number]]                               Final result               Extra Green Top (Lithium...[[id number]]                      Final result                 Please view results for these tests on the individual orders.   Extra Blue Top Tube   Result Value Ref Range    Hold Specimen JIC    Extra Red Top Tube   Result Value Ref Range    Hold Specimen JIC    Extra Green Top (Lithium Heparin) Tube   Result Value Ref Range    Hold Specimen JIC    Digoxin level   Result Value Ref Range    Digoxin 1.1 0.6 - 2.0 ng/mL   XR Chest 2 Views    Narrative    PROCEDURE:  XR CHEST 2 VIEWS    HISTORY:  Tachycardia.     COMPARISON:  10/21/2023    FINDINGS:   The heart is enlarged The pulmonary vasculature is normal.  There is  exaggerated thoracic kyphosis. There is atelectatic changes in both  lung bases. Multiple thoracic compression fractures are noted. 2 are  unchanged from previous examination. There appears to be a new mid  thoracic vertebral body compression fracture as compared to October 2023  No pleural effusion or pneumothorax.      Impression    IMPRESSION: Cardiomegaly.    New mid thoracic vertebral body compression fracture    PRABHAKAR GASCA MD         SYSTEM ID:  [id number]   UA with Microscopic reflex to Culture    Specimen: Urine, NOS   Result Value Ref Range    Color Urine Light Yellow Colorless, Straw, Light Yellow, Yellow    Appearance Urine Clear Clear    Glucose Urine Negative Negative mg/dL    Bilirubin Urine Negative Negative    Ketones Urine Negative Negative mg/dL    Specific Gravity Urine 1.011 1.003 - 1.035    Blood Urine Negative Negative    pH Urine 7.0 4.7 - 8.0    Protein Albumin Urine 30  (A) Negative mg/dL    Urobilinogen Urine Normal Normal, 2.0 mg/dL    Nitrite Urine Negative Negative    Leukocyte Esterase Urine Negative Negative    RBC Urine 2 <=2 /HPF    WBC Urine <1 <=5 /HPF    Squamous Epithelials Urine 0 <=1 /HPF    Hyaline Casts Urine 1 <=2 /LPF    Narrative    Urine Culture not indicated   Symptomatic Influenza A/B, RSV, & SARS-CoV2 PCR (COVID-19) Nasopharyngeal    Specimen: Nasopharyngeal; Swab   Result Value Ref Range    Influenza A PCR Negative Negative    Influenza B PCR Negative Negative    RSV PCR Negative Negative    SARS CoV2 PCR Negative Negative    Narrative    Testing was performed using the Xpert Xpress CoV2/Flu/RSV Assay on the [facility] GeneXpert Instrument. This test should be ordered for the detection of SARS-CoV-2, influenza, and RSV viruses in individuals who meet clinical and/or epidemiological criteria. Test performance is unknown in asymptomatic patients. This test is for in vitro diagnostic use under the FDA EUA for laboratories certified under CLIA to perform high or moderate complexity testing. This test has not been FDA cleared or approved. A negative result does not rule out the presence of PCR inhibitors in the specimen or target RNA in concentration below the limit of detection for the assay. If only one viral target is positive but coinfection with multiple targets is suspected, the sample should be re-tested with another FDA cleared, approved, or authorized test, if coinfection would change clinical management. This test was validated by the [facility]. These laboratories are certified under the Clinical Laboratory Improvement Amendments of 1988 (CLIA-88) as qualified to perform high complexity laboratory testing.       Medications   digoxin (LANOXIN) injection 125 mcg (125 mcg Intravenous $Given 2/15/24 1320)   metoprolol (LOPRESSOR) injection 5 mg (5 mg Intravenous $Given 2/15/24 1125)   digoxin (LANOXIN) injection 125 mcg (125 mcg  Intravenous $Given 2/15/24 1219)       Assessments & Plan (with Medical Decision Making)     I have reviewed the nursing notes.    I have reviewed the findings, diagnosis, plan and need for follow up with the patient.          New Prescriptions    No medications on file       Final diagnoses:   Atrial fibrillation with rapid ventricular response (H)   Fall, initial encounter   Compression fracture of thoracic vertebra, unspecified thoracic vertebral level, initial encounter (H)   Elevated BUN       2/15/2024   [facility]       Heath Connors MD  02/15/24 [id number]

## 2024-02-15 NOTE — PHARMACY-ANTICOAGULATION SERVICE
Pharmacy Consult-Warfarin Assessment Day #1    Shannon Walls is a 89 year old female admitted on 2/15/2024 with Atrial fibrillation with rapid ventricular response (H)    Primary Indication(s) for Anticoagulation: afib    Goal INR: 2-3 (Alexandra to clarify with [facility] tomorrow)     FYI, patient is followed by the Anticoagulation[facility]time Clinic at: [facility] (patient resides at [facility])     Patient previously anticoagulated on Coumadin at a dose of 20 mg/week; dosed as: 5 mg on Fridays; 2.5 mg ROW     Home tablet strength(s): 2.5 mg & 5 mg tablets    Factors that may increase patient's bleeding risk and/or sensitivity to warfarin (Coumadin) include: recent fall, wounds, advanced age, tramadol     Factors that may decrease patient's bleeding risk and/or sensitivity to warfarin (Coumadin) include:          2/15/2024   Recent Dosing and Labs           INR 2.39      CBC RESULTS:   Recent Labs   Lab Test 02/15/24  1056   HGB 13.4          Assessment/Plan: Will continue with home dose of 2.5 mg tonight. Recheck INR with AM labs.     Thank You for the Consult. Will continue to follow.    Debbie Bee RPH ....................  2/15/2024   3:40 PM

## 2024-02-15 NOTE — ED TRIAGE NOTES
Pt presents came from wound care and her HR high.  Pt thinks it was 160. Pt denies any chest pain or SOB.  Pt reports she has hx of Afib  pt did say she fell yesterday also with some minor back and arm pain. Pt stated she fell to the side and bumped her arms, arms had some skin tears that were assessed by wound care and dressed.  No shadowing noted on the dressing.  Pt does have back pain but she reports she has some compression fx and that its hard to tell if she has any new pains.

## 2024-02-15 NOTE — PLAN OF CARE
Writer contacted [facility] to return their call regarding an update on patient. Writer updated Vicky. Rosanna who had called for an update had left for the evening and writer said they will update them again tomorrow.

## 2024-02-15 NOTE — PROGRESS NOTES
SUBJECTIVE:  Shannon Walls, 89 year old, female presents with the following Chief Complaint(s) with HPI to follow:   Chief Complaint   Patient presents with    WOUND CARE          HPI:  Shannon is here for the re-assessment and treatment of multiple bilateral lower extremity wounds to her legs and feet.  She fell at the assisted living facility yesterday and sustained two new skin tears - one to each arm.  She also reports her back is sore today.     Wound history:     She was seen in wound care for the first time on 10/31/23 by Maame Campbell.  Her forehead is noted as being suspicious for skin cancer.  A biopsy was done in the past, (9/25/23) not conclusive. This area scabs, falls off and re-develops.    She lives in assisted living and staff are changing her dressings as directed.       Wound History:  10/21/23- Wounds developed, visit to ER  10/31/23- First visit to wound care.     10/31/23- MARÍA done:   FINDINGS: No Doppler flow was identified in the left posterior tibial  artery. The right posterior tibial artery was noncompressible. The  dorsalis pedis ankle brachial indices were 0.52 on the right and 0.55  on the left.       Patient Active Problem List   Diagnosis    Pathological fracture L4 vertebrae    Fracture of sacrum (H)    Permanent atrial fibrillation (H)    Altered mental status, unspecified altered mental status type    Acquired hypothyroidism    Anxiety disorder, unspecified    Chronic systolic heart failure (H)    Gastroesophageal reflux disease without esophagitis    Atrial fibrillation with RVR (H)    Fall at home, initial encounter    Current chronic use of systemic steroids    Decubitus skin ulcer    History of CVA on 4/13/23    Peripheral edema    History of tobacco abuse quitting in the 50s and 60s    Stage 3a chronic kidney disease (H)    Elevated brain natriuretic peptide (BNP) level    Mixed hyperlipidemia    Mild aortic stenosis    H/O apical mural thrombus    PAD (peripheral artery  disease) (H24)       Past Medical History:   Diagnosis Date    Atrial fibrillation with rapid ventricular response (H)        Past Surgical History:   Procedure Laterality Date    EYE SURGERY      OPEN REDUCTION INTERNAL FIXATION WRIST Right 8/22/2018    Procedure: OPEN REDUCTION INTERNAL FIXATION WRIST;  OPEN REDUCTION INTERNAL FIXATION RIGHT DISTAL RADIUS;  Surgeon: Taqueria Edwards MD;  Location: [facility]       No family history on file.    Social History     Tobacco Use    Smoking status: Former    Smokeless tobacco: Never   Substance Use Topics    Alcohol use: Not on file       Current Outpatient Medications   Medication Sig Dispense Refill    acetaminophen (TYLENOL) 650 MG CR tablet Take 650 mg by mouth every 4 hours as needed for mild pain or fever      alum & mag hydroxide-simethicone (MAALOX) 200-200-20 MG/5ML SUSP suspension Take 30 mLs by mouth every 4 hours as needed for indigestion      atorvastatin (LIPITOR) 40 MG tablet Take 1 tablet by mouth at bedtime      bisacodyl (DULCOLAX) 10 MG suppository Place 10 mg rectally daily as needed for constipation      calcium carbonate (OS-EFFIE) 1500 (600 Ca) MG tablet Take 600 mg by mouth daily       dextromethorphan (TUSSIN COUGH) 15 MG/5ML syrup       digoxin (LANOXIN) 125 MCG tablet Take 62.5 mcg by mouth daily       furosemide (LASIX) 20 MG tablet Take 20 mg by mouth daily      GAVILAX 17 GM/SCOOP powder Take 17 g by mouth daily      loperamide (IMODIUM) 2 MG capsule Take 2 mg by mouth as needed for diarrhea      LORazepam (ATIVAN) 0.5 MG tablet Take 0.5 mg by mouth At Bedtime       magnesium hydroxide (MILK OF MAGNESIA) 400 MG/5ML suspension Take 30 mLs by mouth daily as needed for constipation or heartburn      metoprolol succinate ER (TOPROL XL) 25 MG 24 hr tablet Take 25 mg by mouth daily      Multiple Vitamins-Minerals (CERTAVITE SENIOR/ANTIOXIDANT) TABS       NP THYROID 30 MG tablet Take 30 mg by mouth daily      omeprazole (PRILOSEC) 20 MG DR capsule Take 20  mg by mouth daily      predniSONE (DELTASONE) 5 MG tablet Take 5 mg by mouth daily      sodium phosphate (FLEET ENEMA) 7-19 GM/118ML rectal enema Place 1 enema rectally once      traMADol (ULTRAM) 50 MG tablet Take 50 mg by mouth 2 times daily      Vitamin D, Cholecalciferol, 10 MCG (400 UNIT) TABS Take 1 tablet by mouth daily      warfarin ANTICOAGULANT (COUMADIN) 2 MG tablet Take 1 tablet (2 mg) by mouth daily Check INR to adjust dose by 12/21         Allergies   Allergen Reactions    Levothyroxine Shortness Of Breath and Swelling    Nitrogen Swelling and Blisters     Liquid nitrogen       REVIEW OF SYSTEMS  Constitutional, HEENT, cardiovascular, pulmonary, gi and gu systems are negative, except as otherwise noted.     OBJECTIVE:  BP (!) 139/100   Pulse (!) 125   Temp 97.7  F (36.5  C)   Resp 22      Constitutional: alert and no distress  Head: Normocephalic. No masses, lesions, tenderness or abnormalities  Cardiovascular:   Lower Extremity Perfusion Assessment (10/31/23): done by Maame Campbell NP  Right lower extremity:  Warmth: cool toes  Dorsal pedal pulse: yes, faint via doppler              Posterior tibial pulse: yes, via doppler--mono              Skin color: pink              Edema: yes, +3/+4  Left lower extremity:  Warmth: cool toes  Dorsal pedal pulse: yes, via doppler--mono              Posterior tibial pulse: yes, faint via doppler              Skin color: pink              Edema: +3/+4  Intervention: MARÍA done 10/31, FINDINGS: No Doppler flow was identified in the left posterior tibial  artery. The right posterior tibial artery was noncompressible. The  dorsalis pedis ankle brachial indices were 0.52 on the right and 0.55  on the left.  Respiratory:  Respirations even and unlabored  Musculoskeletal: severe kyphosis; arrived in a wheelchair; transfers with assist of one to exam chair  Skin:        Wound description:     Type of Wound:  stasis ulcers   Location:  right lower extremity    Drainage  amount:  moderate   Drainage color:  serous - tan   Odor:  none   Wound bed:  please see pictures below   Depth:  full thickness   Surrounding skin:  fragile        Measurements:      Right 2nd toe - 0.2 x 0.4 cm (probes to bone - not a new finding)    Pain:  denies   Wound debridement completed on: 2/15/2024, debridement type: Instrument        Dressing change:      Wounds cleansed with mild soap, rinse, dried.  Verbal consent obtained for debridement. Sharp excisional debridement performed with ring curette to remove non-viable tissue (1 sq cm) to the level of the subcutaneous.  Dressed with Mepilex Ag foam, cut to fit wound, secured each with medipore tape.    Right 2nd toe      Wound description:     Type of Wound:  pressure injury stage 2   Location:  right heel    Drainage amount:  none   Drainage color:  N/A   Odor:  none   Wound bed:  epithelialized   Depth:  full thickness   Surrounding skin:  intact, thickened        Measurements:  healed   Pain:  tender with debridement   Wound debridement completed on: 1/26/2024, debridement type: Instrument        Dressing change:      Wound cleansed with mild soap, rinse, dried.      Wound description:     Type of Wound:  stasis ulcers   Location:  left lower extremity    Drainage amount:  moderate   Drainage color:  serous - tan   Odor:  none   Wound bed:  please see pictures below   Depth:  full thickness   Surrounding skin:  fragile, intact        Measurements:      Left medial leg - 1 x 2(smaller)    Left lateral leg  - 1.5 x 0.4 cm (smaller)  Left shin anterior (medial - lateral) - 1 x 0.3 cm and 0.3 x 0.3 cm (smaller)   Pain:  Denies   Wound debridement completed on: 2/15/2024, debridement type: Instrument       Dressing change:      Wound cleansed with mild soap, rinse, dried.  Sharp excisional debridement performed with ring curette to remove non-viable tissue (2 sq cm) into the level of the subcutaneous.  Patient was informed of the risks and benefits and  consented to the procedure.  Dressed with honey alginate to each wound, all wounds covered 4x4 gauze (4), secured with rolled gauze and medipore tape.       Left lateral  extremity         Left anterior lower extremity    Wound description:     Type of Wound:  Trauma   Location:  bilateral arms    Drainage amount:  moderate   Drainage color:  serous   Odor:  none   Wound bed:  please see pictures below   Depth:  full thickness   Surrounding skin:  ecchymotic, thin, and fragile        Measurements:      Left arm - 10 x 1.5 cm    Right arm - 6.5 x 7 cm   Pain:  tender   Wound debridement completed on: 2/15/2024, debridement type: Mechanical        Dressing change:      Wound cleansed with mild soap, rinse, dried.  Conservative non-excisional debridement performed with gauze to remove biofilm (16 sq cm) into the level of the dermis.  Patient was informed of the risks and benefits and consented to the procedure.  Dressed with Mepitel one to each wound, covered each wound with one piece of 4x4 gauze, secured each with 1/2 roll of conform rolled gauze.        Neurologic: Sensation grossly WNL.  Psychiatric: affect normal/bright, interactive     Depression Screening Follow-up        2/15/2024     8:00 AM   PHQ   PHQ-9 Total Score 10   Q9: Thoughts of better off dead/self-harm past 2 weeks Several days              No data to display                  Follow Up       Follow Up Actions Taken  Patient declined referral.    Discussed the following ways the patient can remain in a safe environment:  be around others    I have reviewed the results of the [place] Screening and proposed plan of care. The patient agrees with the follow up and safety plan.    SHAINA Woodard CNS       THERAPY GOAL:    Complete healing    ASSESSMENT / PLAN:  Comments:    - She is following with Dr. Guerin, podiatrist, for the toe wound which probes to bone  - Her heart rate is elevated today.  She specifically denies lightheadedness,  palpitations, fatigue, chest pain, or fluttering in her chest.  She has been encouraged to present to the ED for further workup.  She does have a history of atrial fibrillation with rapid ventricular response, with hospitalization for rate control.  She is in agreement with the ED evaluation at this time.   - She has been seen by vascular and cardiology since the last wound care visit, notes reviewed.  ECHO and carotid US ordered and managed per cardiology.     Plan:    - Dressing changes as outlined in the after visit summary    Follow-up in wound care as scheduled or as needed for acute concerns.    Mei Bernard CNS  Surgery and Wound Care  [facility]

## 2024-02-15 NOTE — PLAN OF CARE
[facility] Inpatient Admission Note:    Patient admitted to 3124/3124-1 at approximately 1505 via bed accompanied by nurse from emergency room . Report received from Aaliyah SOMERS in SBAR format at 1420 via telephone. Patient transferred to bed via slide board.. Patient is alert and oriented X 4, denies pain; rates at 0 on 0-10 scale.  Patient oriented to room, unit, hourly rounding, and plan of care. Explained admission packet and patient handbook with patient bill of rights brochure. Will continue to monitor and document as needed.     Inpatient Nursing criteria listed below was met:    Health care directives status obtained and documented: Yes    Patient identifies a surrogate decision maker: Yes If yes, who: Daughter's Clyde Contact Information: see facesheet      If initial lactic acid greater than 2.0, repeat lactic acid drawn within one hour of arrival to unit: No. If no, state reason: N/A    Clergy visit ordered if patient requests: Yes    Skin issues/needs documented: Yes    Isolation Patient: no Education given, correct sign in place and documentation row added to PCS:  No    Fall Prevention Yes: Care plan updated, education given and documented, sticker and magnet in place: Yes    Care Plan initiated: Yes    Education Documented (including assessment): Yes    Patient has discharge needs :Yes. Pt has been having frequent falls and family feels patient may need a higher level of care. They would like this to be addressed.

## 2024-02-16 NOTE — PLAN OF CARE
Goal Outcome Evaluation:       A&O, makes needs known. VSS, afebrile. Chronic back pain, relieved with repositioning in bed. Placed on 2L O2 overnight due to O2 sats in the 80s on RA. Able to wean down to 1L this AM with O2 sats mid 90's. LS clear. HR irregular. Afib 80-110s, does jump up to the 130-140s briefly with activity. +2 edema throughout. Dressings to multiple wounds throughout body CDI. Skin jadiel, red, very fragile. IV SL. Assist of 1 to bedside commode. Voiding frequently due to IV lasix, 1500cc of urine output this shift. Call light within reach.        Face to face report given with opportunity to observe patient.    Report given to LIZBET Gonzalez RN   2/16/2024  7:11 AM

## 2024-02-16 NOTE — PROGRESS NOTES
02/16/24 1400   Appointment Info   Signing Clinician's Name / Credentials (OT) Chasity Nguyen OTR/L   Living Environment   People in Home facility resident   Current Living Arrangements assisted living   Home Accessibility no concerns   Transportation Anticipated family or friend will provide;agency   Living Environment Comments Pt is a resident of [facility]. She bathes in a walk in shower with grab bars and a shower chair. There are also grab bars by her toilet.   Self-Care   Usual Activity Tolerance moderate   Current Activity Tolerance fair   Equipment Currently Used at Home wheelchair, manual;walker, standard;walker, rolling;grab bar, toilet;grab bar, tub/shower;shower chair   Fall history within last six months yes   Number of times patient has fallen within last six months 4   Activity/Exercise/Self-Care Comment Pt reports she is mostly independent with ADLs. She does have assistance with bathing and sometimes assistance with doffing/donning socks. Pt uses a wheelchair for longer distances otherwise walks with a walker.   Instrumental Activities of Daily Living (IADL)   IADL Comments Facility manages   General Information   Onset of Illness/Injury or Date of Surgery 02/15/24   Referring Physician Jodi Fritz NP   Additional Occupational Profile Info/Pertinent History of Current Problem Pt admitted due to afib RVR, early CHF exacerbation, stage 3a CKD, and a fall several days ago with a new compression fx of thoracic vertebra   Cognitive Status Examination   Orientation Status orientation to person, place and time   Affect/Mental Status (Cognitive) WNL   Follows Commands WNL   Visual Perception   Visual Impairment/Limitations corrective lenses full-time   Pain Assessment   Patient Currently in Pain Yes, see Vital Sign flowsheet  (back pain increased with sit-stand transfers. RN notified and would be in to provide meds)   Range of Motion Comprehensive   Comment, General Range of Motion Functional    Strength Comprehensive (MMT)   Comment, General Manual Muscle Testing (MMT) Assessment Generalized weakness noted   Bed Mobility   Comment (Bed Mobility) NT, pt already up OOB up OT arrival   Transfers   Transfers sit-stand transfer;toilet transfer   Sit-Stand Transfer   Sit-Stand [place] (Transfers) supervision;contact guard   Assistive Device (Sit-Stand Transfers) walker, front-wheeled   Sit/Stand Transfer Comments Pt had to complete 2x. She had a sudden increase in pain with the first sit to stand so returned to sitting to catch her breath. RN notified and was going to come in to give meds. Pt did complete a second time then without pain.   Toilet Transfer   Toilet Transfer Comments Pt attemped a toilet transfer in the bathroom. When trying to sit, she had increased pain and preferred not to actually sit down so returned to the chair.   Balance   Balance Comments Pt ambulated to/from the BR using FWW with SBA-CGA   Clinical Impression   Criteria for Skilled Therapeutic Interventions Met (OT) Yes, treatment indicated   OT Diagnosis generalized weakness, decreased activity tolerance   Influenced by the following impairments pain, decreased strength, decreased activity tolerance   OT Problem List-Impairments impacting ADL activity tolerance impaired;pain;strength;mobility   Assessment of Occupational Performance 1-3 Performance Deficits   Identified Performance Deficits ADLs   Planned Therapy Interventions (OT) ADL retraining;strengthening;risk factor education;progressive activity/exercise;home program guidelines   Clinical Decision Making Complexity (OT) problem focused assessment/low complexity   Risk & Benefits of therapy have been explained evaluation/treatment results reviewed;care plan/treatment goals reviewed;risks/benefits reviewed   Clinical Impression Comments OT evaluation completed. Pt from [facility] and is mostly independent in ADLs. She does have some assist for bathing and dressing. Today, pt able  to ambulate and transfer with CGA at most. She was limited by pain this afternoon and due for pain meds. When pain under control, pt likely would be able to complete this without difficulties. Pt appears appropriate to return to her [facility] with home care/therapy services. Plan to treat pt during her acute care stay.   OT Total Evaluation Time   OT Eval, Low Complexity Minutes (72067) 20   OT Goals   Therapy Frequency (OT) 5 times/week   OT Predicted Duration/Target Date for Goal Attainment 02/23/24   OT Goals Hygiene/Grooming;Toilet Transfer/Toileting;Aerobic Activity   OT: Hygiene/Grooming supervision/stand-by assist   OT: Toilet Transfer/Toileting Supervision/stand-by assist   OT: Perform aerobic activity with stable cardiovascular response 10 minutes   OT Discharge Planning   OT Plan Progress ADLs, strength, and activity tolerance as able   OT Discharge Recommendation (DC Rec) home with assist;home with home care occupational therapy   OT Rationale for DC Rec Pt from [facility] and is mostly independent in ADLs. She does have some assist for bathing and dressing. Today, pt able to ambulate and transfer with CGA at most. She was limited by pain this afternoon and due for pain meds. When pain under control, pt likely would be able to complete this without difficulties. Pt appears appropriate to return to her [facility] with home care/therapy services.   OT Brief overview of current status CGA sit<>stand from chair, ambulating in room using FWW. Limited by pain today so unable to assess toilet transfer. Pt has assist with doffing/donning socks at [facility] so this was not assessed. HR after tasks was 121, O2 on RA was 91%   Total Session Time   Total Session Time (sum of timed and untimed services) 20

## 2024-02-16 NOTE — DISCHARGE INSTRUCTIONS
Wound Care Instructions left lower extremity:  1) Wash your hands and work space  2) Gather all your supplies: clean wash cloths, mild soap (baby soap), honey alginate, 4x4 gauze (4), rolled gauze, white tape  3) Cleanse wounds with mild soap, rinse, pat dry  4) Dress wounds with honey alginate lightly packed into each wound, cover all wounds with 4x4 gauze (4), secure with rolled gauze and white tape    5) Change dressing Tuesday, and Friday  6) Dispose of all dirty supplies  7) Wash hands and equipment     Wound Care Instructions right 2nd toe:  1) Wash your hands and work space  2) Gather all your supplies: clean wash cloths, mild soap (baby soap), Mepilex Ag foam,  white tape  3) Cleanse all wounds with mild soap, rinse, pat dry  4) Dress each wound with Mepilex Ag foam secure with white tape    5) Change dressing Tuesday and Friday  6) Dispose of all dirty supplies  7) Wash hands and equipment     Wound Care Instructions left and right arms:  1) Wash your hands and work space  2) Gather all your supplies: 4x4 gauze, conform rolled gauze, white tape, oil emulsion gauze  3) Cleanse wound with mild soap, rinse, pat dry  4) Apply one piece of oil emulsion gauze over the right upper extremity Mepitel One, then apply a clean 4x4 gauze over each piece of Mepitel One (3), secure each with 1/2 roll of conform rolled gauze, secure with white tape    5) Change gauze daily  6) Dispose of all dirty supplies  7) Wash hands and equipment     Supplies were ordered thru [facility] with three refills for each order.      Please report any increase in pain, fevers, chills, changes in the drainage odor.   Please call [phone] if you have any questions or concerns or if any problems develop.      Follow up on Thursday, March 9, 2024 at 9 a.m. in wound care.        Follow up with primary care provider, Gilda Mcgregor, within 7 days for hospital follow- up.  No follow up labs or test are needed.    Follow-up with Dr. Meyer regarding medication changes as soon as available. - please call to make these appointments on Monday 2/19/24.         ****A SELF HELP GUIDE FOR PATIENTS WITH HEART FAILURE HANDOUT INCLUDED IN DISCHARGE TEACHING/MATERIAL****

## 2024-02-16 NOTE — PLAN OF CARE
Pt A&O x 4. Remains in AFIB rate 's. Pt did reach 140's w/ exertion. BP remains elevated systolic and diastolic, see flowsheets. Metoprolol 25 mg PO given. 40 mg IVP lasix given, adequate output following the lasix. A total of 1,175 ml for writer. No BM. LS clear, pt was placed on 1L NC for some time due to pt's sats dropping to 84-86% w/ activity or while asleep. Pt c/o pain with any movement in her back, stating this is chronic for her but also worse since her fall yesterday. Tylenol given x 1, Ultram given x 1, pt stated this is most effective for her. Denies nausea, SOB, chest pain. IV SL. Wounds to left lower arm, right upper arm, left lower leg, and R 2nd toe. Dressings were changed during wound care today, and remain CDI. Writer placed a new dressing to pt's R elbow d/t a skin tear during transfer. Continent, but placed a brief on her incase. Blanchable redness to buttocks and spine. Barrier cream applied. 2+ edema to BLE. Able to make needs known, calls appropriately, remains free of inj all shift, call light in reach.     Face to face report given with opportunity to observe patient.    Report given to Elin Morton RN   2/15/2024  7:34 PM

## 2024-02-16 NOTE — PROGRESS NOTES
"CLINICAL NUTRITION SERVICES  -  ASSESSMENT NOTE    REASON FOR ASSESSMENT:  Admission Nutrition Risk Screen - unsure of weight loss      NUTRITION HISTORY  Shannon Walls is a 89 year old female admitted for a fib. Medical hx includes a fib, CHF, CKD stage 3. Multiple wounds. Pt reports her intake was good until a couple days ago. She reports getting over hungry when meals are late which causes her to lose her appetite. She has a Boost supplement every evening as a snack at the assisted living home since dinner is served early. Possible weight loss of 8-10lbs in the last couple weeks. Pt reports usual weight is around 110lb. Encouraged snacks between meals to prevent becoming over hungry. She would like to have Ensure as a snack in the evening while here.    CURRENT NUTRITION ORDERS  Diet Order:   Orders Placed This Encounter      Combination Diet Regular Diet Adult; 2 gm NA Diet    Current Intake/Tolerance: 25% x 1 meal documented    ANTHROPOMETRICS  Height: 4' 11\"  Weight: 98 lbs 1.68 oz  Body mass index is 19.81 kg/m .  Weight Status:  Normal BMI  Weight History: depending on weight accuracy- possible 8-10lb (7-9%) weight loss in 2 weeks  Wt Readings from Last 10 Encounters:   02/16/24 44.5 kg (98 lb 1.7 oz)- standing   02/15/24 52.8 kg (116 lb 6.5 oz) - bed   02/15/24 48.9 kg (107 lb 12.9 oz)- no method documented   02/05/24 49 kg (108 lb 1.6 oz)   01/31/24 48.4 kg (106 lb 11.2 oz)   01/31/24 48.4 kg (106 lb 11.2 oz)   12/18/23 48.4 kg (106 lb 11.2 oz)   11/17/23 49.9 kg (110 lb)   10/21/23 49.9 kg (110 lb 0.2 oz)   10/04/23 49.3 kg (108 lb 11.2 oz)   04/13/23 54.7 kg (120 lb 9.5 oz)   01/04/23 49.6 kg (109 lb 5.6 oz)      ASSESSED NUTRITION NEEDS:  44.5kg  Estimated Energy Needs: 1380-4025 kcals (30-35 Kcal/Kg)  Estimated Protein Needs: 45-55 grams protein (1-1.2 g pro/Kg)    MALNUTRITION:  % Weight Loss:  > 2% in 1 week (severe malnutrition)- depending on accuracy of scales  % Intake:  Decreased intake " does not meet criteria for malnutrition   Muscle and Subcutaneous Fat Loss:  mild, moderate, weakness    Malnutrition Diagnosis: Moderate malnutrition  In Context of:  Chronic illness or disease, Environmental or social circumstances    NUTRITION INTERVENTIONS  Recommendations / Nutrition Prescription  Encourage intake during meal times.  Ensure as an evening snack    MONITORING AND EVALUATION:  Intake, weight, labs

## 2024-02-16 NOTE — PROGRESS NOTES
[facility]    Hospitalist Progress Note    Date of Service (when I saw the patient): 02/16/2024    Assessment & Plan       Atrial fibrillation with rapid ventricular response (H):  Prior history of RVR-last hospital stay for same was in December. Rates are running initially in the 140-150's but now down to 110-120's. She is asymptomatic-only found at wound care appointment during routine vitals check. She does have some increased edema as well as mildly elevated BP, BNP from her baseline. Suspect early CHF exacerbation at least contributing. She received IV dig in the ER-level 1.1. Will continue home dose metoprolol, IV lasix 40mg every 8 and evaluate response. May increase metoprolol if rates running >140 consistently. She basically had no response to the IV dose metoprolol that they gave in the ED  -2/16: Rates have improved from yesterday. Will increase metoprolol again today as BP will handle it. Do not want to increase dig if it can be avoided as would really like to avoid dig toxicity in this frail elderly lady.        Permanent atrial fibrillation (H): As above, she was just seen by cardiology last week with no changed to regimen at that time.        Acute on Chronic systolic heart failure (H): At least has systolic CHF but at cardiology visit Dr. Meyer did state suspicion of diastolic failure as well and scheduled her for new ECHO as she has not had one in 5 years. Will see if we can get that done tomorrow to ensure no acute hypokinesia. Mild elevation in trop likely due to  cardiac stress for tachycardia/CHF exacerbation.        Stage 3a chronic kidney disease (H): renal function stable within her baseline limits but on the high end.        Fall, initial encounter: several days ago at her assisted living. She denies any acute injury at the time but does state her back has been more stiff and sore since then. Imaging shows significant kyphosis along with likely new compression fracture. Ice,  reposition, increase tramadol to q6prn from q12. Add scheduled tylenol as well.               # Drug Induced Coagulation Defect: home medication list includes an anticoagulant medication     # Acute heart failure with reduced ejection fraction: last echo with EF <40% and receiving IV diuretics                DVT Prophylaxis: Low Risk/Ambulatory with no VTE prophylaxis indicated  Code Status: No CPR- Do NOT Intubate    Disposition: Expected discharge in 1-2 days once medications titrated and hr stable.    Jodi Fritz, CNP    Interval History   Feels well, denies chest pain, palpitations, abdominal pain, nausea.     -Data reviewed today: I reviewed all new labs and imaging results over the last 24 hours.     Physical Exam   Temp: 97.1  F (36.2  C) Temp src: Tympanic BP: 132/100 Pulse: (!) 129   Resp: 18 SpO2: 93 % O2 Device: Nasal cannula Oxygen Delivery: 1 LPM  Vitals:    02/15/24 1045 02/15/24 1524 02/16/24 0335   Weight: 48.9 kg (107 lb 12.9 oz) 52.8 kg (116 lb 6.5 oz) 44.5 kg (98 lb 1.7 oz)     Vital Signs with Ranges  Temp:  [96.8  F (36  C)-98.5  F (36.9  C)] 97.1  F (36.2  C)  Pulse:  [] 129  Resp:  [15-26] 18  BP: (126-162)/() 132/100  SpO2:  [86 %-98 %] 93 %  I/O last 3 completed shifts:  In: 240 [P.O.:240]  Out: 3175 [Urine:3175]    Peripheral IV 02/15/24 Left;Posterior Hand (Active)   Site Assessment WDL 02/16/24 0900   Line Status Saline locked 02/16/24 0900   Dressing Transparent 02/16/24 0900   Dressing Status clean;dry;intact 02/15/24 2000   Line Intervention Flushed 02/16/24 0900   Phlebitis Scale 0-->no symptoms 02/16/24 0900   Infiltration? no 02/16/24 0900   Number of days: 1       Wound Leg Ulceration (Active)   Number of days: 64       Wound Leg Ulceration (Active)   Number of days: 63     Line/device assessment(s) completed for medical necessity    Constitutional: Awake,alert, no acute distress  Respiratory: Clear bilaterally, no crackle, rhonchi, wheezes   Cardiovascular: HR  irregular, no murmurs, rubs, thrills   GI: Soft, nontender, bowel sounds positive   Skin/Integumen: No rashes, open areas or bruising   Other:      Medications    - MEDICATION INSTRUCTIONS -      ACE/ARB/ARNI NOT PRESCRIBED        acetaminophen  975 mg Oral TID    atorvastatin  40 mg Oral At Bedtime    calcium carbonate  1,500 mg Oral QPM    digoxin  62.5 mcg Oral QAM    furosemide  40 mg Intravenous BID    [Held by provider] furosemide  20 mg Oral QAM    LORazepam  0.5 mg Oral At Bedtime    metoprolol succinate ER  50 mg Oral At Bedtime    pantoprazole  40 mg Oral BID AC    polyethylene glycol  17 g Oral QAM    predniSONE  5 mg Oral QAM    senna-docusate  1 tablet Oral BID    Or    senna-docusate  2 tablet Oral BID    sodium chloride (PF)  3 mL Intracatheter Q8H    thyroid  30 mg Oral QAM AC    warfarin ANTICOAGULANT  2.5 mg Oral ONCE at 18:00    Warfarin Therapy Reminder  1 each Oral See Admin Instructions       Data   Recent Labs   Lab 02/16/24  0437 02/15/24  2100 02/15/24  1056   WBC 9.6  --  10.2   HGB 13.3  --  13.4   MCV 95  --  95     --  263   INR 2.71*  --  2.39*     --  137   POTASSIUM 3.5 4.1 4.7   CHLORIDE 97*  --  96*   CO2 32*  --  29   BUN 21.0  --  23.4*   CR 1.00*  --  1.01*   ANIONGAP 11  --  12   EFFIE 8.9  --  9.5   GLC 98  --  124*       No results found for this or any previous visit (from the past 24 hour(s)).

## 2024-02-16 NOTE — PLAN OF CARE
Janell Bernard who completed wound care on patient yesterday in the clinic, came up to pt's room to assess her wounds with writer. Janell re-wrapped her LLA wound with rolled gauze, and applied a new dressing to her R elbow skin tear and GARFIELD wound. (Mepitel One secured with conform rolled gauze)     Wound dressings do not need to be changed prior to pt discharging. Janell is going to put wound care instructions in the AVS for when pt discharges back to [facility].     Wounds covered/CDI include pt's:    LLA  R elbow  GARFIELD  LLE  R 2nd Toe

## 2024-02-16 NOTE — PROGRESS NOTES
02/16/24 1400   General Information   Assessment completed with: Patient;Other  (C[place] Sierra)   Person spoke with Jahaira Ortega   Type of CM/SW Visit Initial Assessment   Primary Care Provider verified and updated as needed? Yes   Readmission Within the Last 30 Days no previous admission in last 30 days   Reason for Consult discharge planning   Communication Assessment   Patient / Family communication style spoken language (English or Bilingual)   Living Environment   Current Living Arrangements assisted living   Care Provided by other (see comments)  (Facility staff)   Provides Care For no one   Able to Return to Prior Arrangements yes   Employment/   Employment Status retired   Current or Previous Occupation education   Current Resources   Patient receiving home care services: No   Community Resources None   Supplies Currently Used at Home Wound Care Supplies   Functional Status   Usual Activity Tolerance moderate   Current Activity Tolerance fair   Assessment of Functional Status   Dependent ADLs: Ambulation-walker;Wheelchair-independent;Bathing;Dressing  (Help with socks only for dressing)   Dependent IADLs: Cleaning;Cooking;Laundry;Shopping;Meal Preparation;Medication Management;Transportation   Intellectual Performance WDL   Level of Consciousness alert   Discharge Needs Assessment   Equipment Currently Used at Home wheelchair, manual;walker, standard;walker, rolling;grab bar, toilet;grab bar, tub/shower;shower chair   Transportation Anticipated family or friend will provide;agency     Pt/family was provided with the Medicare Compare list for Home Health Care.  Discussed associated Medicare star ratings to assist with choice for referrals/discharge planning.    Education was given to pt/family that star ratings are updated/maintained by Medicare and can be reviewed by visiting www.medicare.gov.    Patient/family choices for vendor in priority are:   First Choice : [facility]    Second Choice: [facility]

## 2024-02-16 NOTE — PHARMACY-ANTICOAGULATION SERVICE
Pharmacy Consult-Warfarin Assessment Day #2    Shannon Walls is a 89 year old female admitted on 2/15/2024 with Atrial fibrillation with rapid ventricular response (H)    Primary Indication(s) for Anticoagulation: A-fib    Goal INR: 2-3    FYI, patient is followed by the Anticoagulation/Protime Clinic at: [facility] staff draws INR and sends to [facility] and/or [facility] and [facility] Benjamin doses)     Patient previously anticoagulated on Coumadin at a dose of 20 mg/week; dosed as: 2.5 mg daily except 5 mg on Fridays    Home tablet strength(s): 2.5 mg & 5 mg    Factors that may increase patient's bleeding risk and/or sensitivity to warfarin (Coumadin) include: Advanced age, Fall PTA, IV Diuresis    Factors that may decrease patient's bleeding risk and/or sensitivity to warfarin (Coumadin) include: none    Anticoagulation Dose History                 2/15/2024 2/16/2024      warfarin ANTICOAGULANT (COUMADIN) 2 MG tablet       - -   warfarin ANTICOAGULANT (COUMADIN) 2.5 MG tablet       2.5 mg, $Given -   warfarin ANTICOAGULANT (COUMADIN) 4 MG tablet       - -   INR       2.39  2.71      CBC RESULTS:   Recent Labs   Lab Test 02/16/24  0437   HGB 13.3          Assessment/Plan: INR therapeutic; will continue home dose of warfarin 2.5 mg. Check INR with AM labs    Thank You for the Consult. Will continue to follow.    Sal Ledesma [facility] ....................  2/16/2024   8:37 AM

## 2024-02-17 NOTE — PLAN OF CARE
Patient remains in Afib 's, pt does still get up to 120-140's w/ activity. LS clear. BP's checked in either ONEIL, or LLL. Patient given 12.5 of Metoprolol this am and 62.5 mcg of Digoxin. 40 mg IVP Lasix given x 2 today, with adequate output, see flowsheets. AX1 to the commode with GB and Walker. Pt did have a BM today. Refused Senna and Miralax. Echocardiogram completed today. Carotid ultrasound to be rescheduled outpatient as it was not able to be made inpatient. Pt worked with both PT and OT today and Dietary. Enlive to be given w/ meals. ON 1-2 L NC while asleep as patient tends to drop to 86-88%, but has remained on RA since writer got pt up this am. Good appetite, just gets full fast. C/O back pain and tylenol given as scheduled, Ultram given x 1. Interventions effective per pt. Janell Mcdaniel in today and assessed wounds, see note. Dressings remain to LLA, E elbow, GARFIELD, LLE, and R 2nd toe (CDI) Back and Buttocks blanchable redness, spine left CAMPOS and barrier cream applied to buttocks. Bed bath completed. Potassium replaced this am, and re-check ordered. Plan is to discharge patient back to [place] once HCP has titrated her cardiac medications accurately. Case management following. Writer gave Cleveland an update today. Able to make needs known. Bed in low position. Call light in reach. Alarms activated and audible.     Face to face report given with opportunity to observe patient.    Report given to Irma Morton RN   2/16/2024  7:33 PM

## 2024-02-17 NOTE — PROGRESS NOTES
02/17/24 7989   Appointment Info   Signing Clinician's Name / Credentials (PT) Delaney Turcios DPT   Interventions   Interventions Quick Adds Therapeutic Activity   Therapeutic Activity   Therapeutic Activities: dynamic activities to improve functional performance Minutes (86378) 18   Symptoms Noted During/After Treatment Fatigue   Treatment Detail/Skilled Intervention Pt up in chair upon PT arrival, agreeable to PT.  Pt transferred sit>stand CGA from chair.  Pt ambulated 120' with FWW CGA-SBA, no LOB, ambulates at slow but steady deonna.  Pt transferred into chair CGA and provided with brief rest break then completed repeated sit<>stands x12 reps from room chair.  Pt tolerated well.  Pt left sitting up in chair with call light in reach and clip alarm on .   PT Discharge Planning   PT Plan Progress ambulation and strength   PT Discharge Recommendation (DC Rec) home with assist;home with home care physical therapy   PT Rationale for DC Rec Pt appropriate for return to assisted living.  Recommend home PT for ongoing work on strength and conditioning   PT Brief overview of current status sit<>stand CGA, ambulated 120' with FWW CGA   Total Session Time   Timed Code Treatment Minutes 18   Total Session Time (sum of timed and untimed services) 18

## 2024-02-17 NOTE — PHARMACY-ANTICOAGULATION SERVICE
Pharmacy Consult-Warfarin Assessment Day #3    Shannon Walls is a 89 year old female admitted on 2/15/2024 with Atrial fibrillation with rapid ventricular response (H)    Primary Indication(s) for Anticoagulation: Afib    Goal INR: 2-3    FYI, patient is followed by the Anticoagulation/Protime Clinic at: [facility] staff draws INR and sends to [facility] and/or [facility] and [facility] Benjamin doses)    Patient previously anticoagulated on Coumadin at a dose of 20 mg/week; dosed as: 2.5 mg daily except 5 mg on Fridays     Home tablet strength(s): 2.5 mg and 5 mg    Factors that may increase patient's bleeding risk and/or sensitivity to warfarin (Coumadin) include: Advanced age, Fall PTA, IV Diuresis     Factors that may decrease patient's bleeding risk and/or sensitivity to warfarin (Coumadin) include: none    Anticoagulation Dose History  More data exists         2/15/2024 2/16/2024 2/17/2024   Recent Dosing and Labs   warfarin ANTICOAGULANT (COUMADIN) 2 MG tablet - - -   warfarin ANTICOAGULANT (COUMADIN) 2.5 MG tablet 2.5 mg, $Given 2.5 mg, $Given -   INR 2.39  2.71  3.94      CBC RESULTS:   Recent Labs   Lab Test 02/17/24  0440   HGB 13.4        Assessment/Plan: INR supratherapeutic. Will hold tonight's dose.     Thank You for the Consult. Will continue to follow.    Sheri Christian [facility] ....................  2/17/2024   8:57 AM

## 2024-02-17 NOTE — PROGRESS NOTES
02/16/24 1900   Appointment Info   Signing Clinician's Name / Credentials (PT) Delaney Turcios DPSHAHNAZ   Living Environment   People in Home facility resident   Current Living Arrangements assisted living   Home Accessibility no concerns   Transportation Anticipated family or friend will provide;agency   Living Environment Comments [facility]   Self-Care   Usual Activity Tolerance fair   Current Activity Tolerance fair   Equipment Currently Used at Home wheelchair, manual;walker, standard;walker, rolling;grab bar, toilet;grab bar, tub/shower;shower chair   Fall history within last six months yes   Number of times patient has fallen within last six months 4   Activity/Exercise/Self-Care Comment Pt report she is independent with dressing and toileting, gets assist with bathing.  Pt ambulates within apartment with 4WW mod I, does use w/c to self properl in to go to dining room and for longer distances.  Pt does report falls, states they have all occurred while ambulating but can't determine what common cuase might be   General Information   Onset of Illness/Injury or Date of Surgery 02/15/24   Referring Physician Jodi Fritz   Patient/Family Therapy Goals Statement (PT) to return to [facility]   Pertinent History of Current Problem (include personal factors and/or comorbidities that impact the POC) Pt admitted with a-fib with RVR   Existing Precautions/Restrictions fall   General Observations Pt up in chair upon arrival, agreeable to PT.  HR in 90s at rest   Cognition   Affect/Mental Status (Cognition) WFL   Orientation Status (Cognition) oriented x 3   Follows Commands (Cognition) WFL   Pain Assessment   Patient Currently in Pain No   Integumentary/Edema   Integumentary/Edema Comments Bilateral LE wounds, wrapped and not observed by this writer, bilateral UE skin tears   Posture    Posture Kyphosis;Protracted shoulders;Forward head position   Range of Motion (ROM)   Range of Motion ROM is WFL   Strength  (Manual Muscle Testing)   Strength (Manual Muscle Testing) Deficits observed during functional mobility   Bed Mobility   Comment, (Bed Mobility) NT, up in chair upon PT arrival   Transfers   Transfers sit-stand transfer   Sit-Stand Transfer   Sit-Stand [place] (Transfers) contact guard   Assistive Device (Sit-Stand Transfers) walker, front-wheeled   Gait/Stairs (Locomotion)   [place] Level (Gait) contact guard   Assistive Device (Gait) walker, front-wheeled   Distance in Feet (Gait) 120'   Pattern (Gait) step-through   Comment, (Gait/Stairs) Pt ambulated on RA, unable to obtain sats after ambulation, nursing aware.  No apparent dyspnea noted.  Pt is steady on feet with no LOB during ambulation   Balance   Balance Comments fair+ with support from walker   Clinical Impression   Criteria for Skilled Therapeutic Intervention Yes, treatment indicated   PT Diagnosis (PT) gait disturbance   Influenced by the following impairments weakness, decreased activity tolerance   Functional limitations due to impairments decreased tolerance for funcional mobility and ADLs   Clinical Presentation (PT Evaluation Complexity) evolving   Clinical Presentation Rationale clinical judgement   Clinical Decision Making (Complexity) moderate complexity   Planned Therapy Interventions (PT) bed mobility training;gait training;transfer training   Risk & Benefits of therapy have been explained evaluation/treatment results reviewed;care plan/treatment goals reviewed;risks/benefits reviewed;participants included;patient   Clinical Impression Comments PT evaluation completed.  Pt is a resident of [facility] and largely independent with most ADLs at baseline and ambulates with 4WW or scoots around in w/c.  Pt at this time able to ambulate with FWW '.  Pt is appropriate to return to assisted living with home PT services to work on strength, conditioning, and balance.  Will see during acute care stay to progress  strength and mobiltiy.   PT Total Evaluation Time   PT Eval, Moderate Complexity Minutes (29036) 19   Physical Therapy Goals   PT Frequency 6x/week   PT Predicted Duration/Target Date for Goal Attainment 03/01/24   PT Goals Bed Mobility;Transfers;Gait   PT: Bed Mobility Modified independent;Supine to/from sit   PT: Transfers Supervision/stand-by assist;Sit to/from stand;Bed to/from chair;Assistive device   PT: Gait Supervision/stand-by assist;Rolling walker;100 feet   PT Discharge Planning   PT Plan Progress ambulation and strength   PT Discharge Recommendation (DC Rec) home with assist;home with home care physical therapy   PT Rationale for DC Rec PT evaluation completed. Pt is a resident of [facility] and largely independent with most ADLs at baseline and ambulates with 4WW or scoots around in w/c. Pt at this time able to ambulate with FWW '. Pt is appropriate to return to assisted living with home PT services to work on strength, conditioning, and balance. Will see during acute care stay to progress strength and mobiltiy.   PT Brief overview of current status sit<>stand CGA, ambulated 120' with FWW CGA   Total Session Time   Total Session Time (sum of timed and untimed services) 19

## 2024-02-17 NOTE — PROGRESS NOTES
[facility]    Hospitalist Progress Note    Date of Service (when I saw the patient): 02/17/2024    Assessment & Plan       Atrial fibrillation with rapid ventricular response (H):  Prior history of RVR-last hospital stay for same was in December. Rates are running initially in the 140-150's but now down to 110-120's. She is asymptomatic-only found at wound care appointment during routine vitals check. She does have some increased edema as well as mildly elevated BP, BNP from her baseline. Suspect early CHF exacerbation at least contributing. She received IV dig in the ER-level 1.1. Will continue home dose metoprolol, IV lasix 40mg every 8 and evaluate response. May increase metoprolol if rates running >140 consistently. She basically had no response to the IV dose metoprolol that they gave in the ED  -2/16: Rates have improved from yesterday. Will increase metoprolol again today as BP will handle it. Do not want to increase dig if it can be avoided as would really like to avoid dig toxicity in this frail elderly lady.   -2/17: Overall HR runnin 100-120's with bumps up to 140 with activity. Increase metoprolol today again       Permanent atrial fibrillation (H): As above, she was just seen by cardiology last week with no changed to regimen at that time.        Acute on Chronic systolic heart failure (H): At least has systolic CHF but at cardiology visit Dr. Meyer did state suspicion of diastolic failure as well and scheduled her for new ECHO as she has not had one in 5 years. Will see if we can get that done tomorrow to ensure no acute hypokinesia. Mild elevation in trop likely due to  cardiac stress for tachycardia/CHF exacerbation.   -2/17: Down >5 liters, creatinine still normal but looks a bit dry. Will stop IV lasix and continue with oral lasix.        Stage 3a chronic kidney disease (H): renal function stable within her baseline limits but on the high end.        Fall, initial encounter: several  days ago at her assisted living. She denies any acute injury at the time but does state her back has been more stiff and sore since then. Imaging shows significant kyphosis along with likely new compression fracture. Ice, reposition, increase tramadol to q6prn from q12. Add scheduled tylenol as well.                          # Moderate Malnutrition: based on nutrition assessment, PRESENT ON ADMISSION          DVT Prophylaxis: Low Risk/Ambulatory with no VTE prophylaxis indicated  Code Status: No CPR- Do NOT Intubate    Disposition: Expected discharge in 1-2 days once medications titrated and hr stable.    Jodi Fritz, CNP    Interval History   Feels well, denies chest pain, palpitations, abdominal pain, nausea.     -Data reviewed today: I reviewed all new labs and imaging results over the last 24 hours.     Physical Exam   Temp: 96.9  F (36.1  C) Temp src: Tympanic BP: (!) 120/104 Pulse: (!) 129   Resp: 16 SpO2: (!) 91 % O2 Device: None (Room air)    Vitals:    02/15/24 1524 02/16/24 0335 02/17/24 0600   Weight: 52.8 kg (116 lb 6.5 oz) 44.5 kg (98 lb 1.7 oz) 43.4 kg (95 lb 10.9 oz)     Vital Signs with Ranges  Temp:  [96.8  F (36  C)-98.6  F (37  C)] 96.9  F (36.1  C)  Pulse:  [100-131] 129  Resp:  [12-18] 16  BP: (102-156)/() 120/104  SpO2:  [90 %-94 %] 91 %  I/O last 3 completed shifts:  In: 210 [P.O.:210]  Out: 2175 [Urine:2175]    Peripheral IV 02/15/24 Left;Posterior Hand (Active)   Site Assessment WDL 02/17/24 0355   Line Status Saline locked 02/17/24 0355   Dressing Transparent 02/17/24 0355   Dressing Status intact;dry 02/17/24 0355   Line Intervention Flushed 02/17/24 0355   Phlebitis Scale 0-->no symptoms 02/17/24 0355   Infiltration? no 02/17/24 0355   Number of days: 2       Wound Leg Ulceration (Active)   Number of days: 65       Wound Leg Ulceration (Active)   Number of days: 64     Line/device assessment(s) completed for medical necessity    Constitutional: Awake,alert, no acute  distress  Respiratory: Clear bilaterally, no crackle, rhonchi, wheezes   Cardiovascular: HR irregular, no murmurs, rubs, thrills   GI: Soft, nontender, bowel sounds positive   Skin/Integumen: No rashes, open areas or bruising   Other:      Medications    - MEDICATION INSTRUCTIONS -      ACE/ARB/ARNI NOT PRESCRIBED        acetaminophen  975 mg Oral TID    atorvastatin  40 mg Oral At Bedtime    calcium carbonate  1,500 mg Oral QPM    digoxin  62.5 mcg Oral QAM    furosemide  20 mg Oral QAM    LORazepam  0.5 mg Oral At Bedtime    metoprolol succinate ER  100 mg Oral At Bedtime    metoprolol tartrate  25 mg Oral Once    pantoprazole  40 mg Oral BID AC    polyethylene glycol  17 g Oral QAM    predniSONE  5 mg Oral QAM    senna-docusate  1 tablet Oral BID    Or    senna-docusate  2 tablet Oral BID    sodium chloride (PF)  3 mL Intracatheter Q8H    thyroid  30 mg Oral QAM AC    Warfarin Therapy Reminder  1 each Oral See Admin Instructions       Data   Recent Labs   Lab 02/17/24  0440 02/16/24  1913 02/16/24  0437 02/15/24  2100 02/15/24  1056   WBC 8.7  --  9.6  --  10.2   HGB 13.4  --  13.3  --  13.4   MCV 95  --  95  --  95     --  241  --  263   INR 3.94*  --  2.71*  --  2.39*     --  140  --  137   POTASSIUM 3.6 4.4 3.5   < > 4.7   CHLORIDE 96*  --  97*  --  96*   CO2 33*  --  32*  --  29   BUN 22.6  --  21.0  --  23.4*   CR 1.14*  --  1.00*  --  1.01*   ANIONGAP 11  --  11  --  12   EFFIE 9.0  --  8.9  --  9.5   *  --  98  --  124*    < > = values in this interval not displayed.       No results found for this or any previous visit (from the past 24 hour(s)).

## 2024-02-17 NOTE — PLAN OF CARE
Goal Outcome Evaluation:         Pt A&O. DBP elevated. HR afib 90-120s mostly, 140s+ when up to commode but asymptomatic. PIV SL. Ultram given for back pain at bedtime. Voiding per BSC. Mild edema to BLE, wounds dressed. Bed alarm in place. Plan to discharge back to [place] when medically stable.     Face to face report given with opportunity to observe patient.    Report given to LIZBET Sherwood RN   2/17/2024  7:18 AM

## 2024-02-18 NOTE — PLAN OF CARE
Patient discharged at 9:25 AM via wheel chair accompanied by other: friend Brandy and staff. Prescriptions sent to patients preferred pharmacy. All belongings sent with patient.     Discharge instructions reviewed with patient and friend Brandy. Listed belongings gathered and returned to patient. yes    [facility]   Report called to Assisted Living: Ingrid- on call nurse for facility     Surgical Patient   Surgical Procedures during stay: no  Did patient receive discharge instruction on wound care and recognition of infection symptoms? Yes    MISC  Follow up appointment made:  No- clinic closed. Facility informed they will need to schedule follow up appointments for pt   Home medications returned to patient: N/A  Patient reports pain was well managed at discharge: Yes

## 2024-02-18 NOTE — PLAN OF CARE
"/88 (BP Location: Left arm)   Pulse 74   Temp 97.5  F (36.4  C) (Tympanic)   Resp 16   Ht 1.499 m (4' 11\")   Wt 43.4 kg (95 lb 10.9 oz)   SpO2 92%   BMI 19.32 kg/m      A&O, VSS, Afebrile does c/o pain 4-7/10 did receive scheduled tylenolx2 and tramadolx1 with relief, IV SL, lungs clear, remains on RA, murmur, telemetry afib, received Prn zofranx1 for intermit nausea with relief, couple Bms as charted, good urine output, 1-2+ edema as charted, worked with PT today, up in chair this shift, free from falls, barrier cream applied to buttocks, SBA/Assist 1 with walker and gait belt, call light within reach, makes needs known.    Face to face report given with opportunity to observe patient.    Report given to LIZBET Sharma RN   2/17/2024  6:55 PM    "

## 2024-02-18 NOTE — PLAN OF CARE
Pt calm and cooperative, vs and assessment as charted. Denies pain. Up in chair. IV SL. Appetite fair. Free from falls /injury, Dressings intact. Discharge orders placed. Awaiting transportation.

## 2024-02-18 NOTE — PLAN OF CARE
Goal Outcome Evaluation:         Pt A&O. HR afib 80-110s most of night, 130-140s when up to commode, asymptomatic. Other VS stable on RA. Up on and off during night to void and c/o back pain. Prn tramadol given, frequent repositioning and ice pack tried. PIV SL, flushed but has some drainage. Multiple dressings per WOC NP intact except for moderate bloody drainage to RUE, reinforced and updated provider this morning. Up with SBA and FWW. 2gm Na diet. INR 4.66.

## 2024-02-18 NOTE — DISCHARGE SUMMARY
[facility]    Discharge Summary  Hospitalist    Date of Admission:  2/15/2024  Date of Discharge:  2/18/2024  Discharging Provider: Jodi Fritz NP  Date of Service (when I saw the patient): 02/18/24    Discharge Diagnoses     Principal Problem:    Atrial fibrillation with rapid ventricular response (H)  Active Problems:    Permanent atrial fibrillation (H)    Chronic systolic heart failure (H)    Stage 3a chronic kidney disease (H)    Fall, initial encounter    Compression fracture of thoracic vertebra, unspecified thoracic vertebral level, initial encounter (H)      History of Present Illness   From admission: Shannon Walls is a 89 year old female who presented to wound care clinic for scheduled appointment. When staff took her vitals they noted pulse of 140-160's so sent her to ED. She has prior known history of persistent A-fib with occasional bouts of RVR. She was previously having trouble remembering to take her medications consistently but she recently moved to [place] where they set up meds for her. She denies any palpitations, chest pain. Does endorse periodic dyspnea, however unable to remember if this gets worse when dependent. She denies any nausea, abdominal pain, dizziness. On arrival telemetry showed rates 140-160's but then after dig decreased to 100-120's.      Hospital Course     Atrial fibrillation with rapid ventricular response (H):  Prior history of RVR-last hospital stay for same was in December. Rates are running initially in the 140-150's but now down to 110-120's. She is asymptomatic-only found at wound care appointment during routine vitals check. She does have some increased edema as well as mildly elevated BP, BNP from her baseline. Suspect early CHF exacerbation at least contributing. She received IV dig in the ER-level 1.1. Will continue home dose metoprolol, IV lasix 40mg every 8 and evaluate response. May increase metoprolol if rates running >140 consistently. She  basically had no response to the IV dose metoprolol that they gave in the ED  -2/16: Rates have improved from yesterday. Will increase metoprolol again today as BP will handle it. Do not want to increase dig if it can be avoided as would really like to avoid dig toxicity in this frail elderly lady.   -2/17: Overall HR runnin 100-120's with bumps up to 140 with activity. Increase metoprolol today again  -2/18: Hr and bp stable with 100mg toprol xl daily. Discharge home on that dose with follow-up with cardiology.         Permanent atrial fibrillation (H): As above, she was just seen by cardiology last week with no changed to regimen at that time.        Acute on Chronic systolic heart failure (H): At least has systolic CHF but at cardiology visit Dr. Meyer did state suspicion of diastolic failure as well and scheduled her for new ECHO as she has not had one in 5 years. Will see if we can get that done tomorrow to ensure no acute hypokinesia. Mild elevation in trop likely due to  cardiac stress for tachycardia/CHF exacerbation.   -2/17: Down >5 liters, creatinine still normal but looks a bit dry. Will stop IV lasix and continue with oral lasix.   -2/18: Weight stable, small bump in BUN/creat so holding today's dose. Will have her weight everyday and follow-up with Dr. Meyer regarding her new medications changes.        Stage 3a chronic kidney disease (H): renal function stable within her baseline limits but on the high end.        Fall, initial encounter: several days ago at her assisted living. She denies any acute injury at the time but does state her back has been more stiff and sore since then. Imaging shows significant kyphosis along with likely new compression fracture. Ice, reposition, increase tramadol to q6prn from q12. Add scheduled tylenol as well.        Jodi Fritz, CNP      Pending Results     Unresulted Labs Ordered in the Past 30 Days of this Admission       No orders found from 1/16/2024 to  2/16/2024.            Code Status   DNR / DNI       Primary Care Physician   Gilda Mcgregor           Discharge Disposition   Discharged to home  Condition at discharge: Stable    Consultations This Hospital Stay   PHARMACY TO DOSE WARFARIN  PHYSICAL THERAPY ADULT IP CONSULT  OCCUPATIONAL THERAPY ADULT IP CONSULT    Time Spent on this Encounter   IJodi NP, personally saw the patient today and spent greater than 30 minutes discharging this patient.    Discharge Orders      Follow-Up with Cardiology [facility] Heart Failure Discharge      Reason for your hospital stay    A-fib with RVR (Fast irregular heart rhythm), CHF exacerbation     Follow-up and recommended labs and tests     Follow up with primary care provider, Gilda Mcgregor, within 7 days for hospital follow- up.  No follow up labs or test are needed.  Follow-up with Dr. Meyer regarding medication changes as soon as available.     Activity    Your activity upon discharge: activity as tolerated     Compression Sleeve/Stocking    Compression Sleeve/Stocking Documentation:   The patient needs compression stockings for Other reason: dependent edema    I, the undersigned, certify that the above prescribed supplies are medically necessary for this patient and is both reasonable and necessary in reference to accepted standards of medical and necessary in reference to accepted standards of medical practice in the treatment of this patient's condition and is not prescribed as a convenience.     Diet    Follow this diet upon discharge: Orders Placed This Encounter      Snacks/Supplements Adult: Ensure Enlive; With Meals      Combination Diet Regular Diet Adult; 2 gm NA Diet     Discharge Medications   Current Discharge Medication List        CONTINUE these medications which have CHANGED    Details   metoprolol succinate ER (TOPROL XL) 100 MG 24 hr tablet Take 1 tablet (100 mg) by mouth at bedtime -check pulse first and HOLD if pulse < 55.  Qty: 30  tablet, Refills: 0    Associated Diagnoses: Atrial fibrillation with rapid ventricular response (H)           CONTINUE these medications which have NOT CHANGED    Details   atorvastatin (LIPITOR) 40 MG tablet Take 1 tablet by mouth at bedtime      calcium carbonate (OS-EFFIE) 1500 (600 Ca) MG tablet Take 600 mg by mouth every evening      digoxin (LANOXIN) 125 MCG tablet Take 62.5 mcg by mouth every morning      furosemide (LASIX) 20 MG tablet Take 20 mg by mouth every morning      GAVILAX 17 GM/SCOOP powder Take 17 g by mouth every morning -hold if having loose stools.      LORazepam (ATIVAN) 0.5 MG tablet Take 0.5 mg by mouth At Bedtime       Multiple Vitamins-Minerals (CERTAVITE SENIOR/ANTIOXIDANT) TABS Take 1 tablet by mouth every morning      NP THYROID 30 MG tablet Take 30 mg by mouth every morning (before breakfast)      omeprazole (PRILOSEC) 20 MG DR capsule Take 20 mg by mouth 2 times daily      predniSONE (DELTASONE) 5 MG tablet Take 5 mg by mouth every morning      !! traMADol (ULTRAM) 50 MG tablet Take 50 mg by mouth daily as needed (back pain)      !! traMADol (ULTRAM) 50 MG tablet Take 50 mg by mouth 2 times daily      Vitamin D, Cholecalciferol, 10 MCG (400 UNIT) TABS Take 1 tablet by mouth every morning      !! warfarin ANTICOAGULANT (COUMADIN) 2.5 MG tablet Take 1 tablet (2.5 mg) by mouth once daily on Saturday, Sunday, Monday, Tuesday, Wednesday, and Thursday and recheck INR on Friday 2/16/24. Dose is given at 9:00 PM.      !! warfarin ANTICOAGULANT (COUMADIN) 5 MG tablet Take 5 mg by mouth once a week -Fridays.       !! - Potential duplicate medications found. Please discuss with provider.        Allergies   Allergies   Allergen Reactions    Levothyroxine Shortness Of Breath and Swelling     Swelling in feet     Nitrogen Swelling and Blisters     Liquid nitrogen (swelling at sight)     Data   Most Recent 3 CBC's:  Recent Labs   Lab Test 02/17/24  0440 02/16/24  0437 02/15/24  1056   WBC 8.7 9.6 10.2    HGB 13.4 13.3 13.4   MCV 95 95 95    241 263      Most Recent 3 BMP's:  Recent Labs   Lab Test 02/18/24  0447 02/17/24  0440 02/16/24  1913 02/16/24  0437    140  --  140   POTASSIUM 5.0 3.6 4.4 3.5   CHLORIDE 99 96*  --  97*   CO2 31* 33*  --  32*   BUN 29.1* 22.6  --  21.0   CR 1.28* 1.14*  --  1.00*   ANIONGAP 9 11  --  11   EFFIE 9.4 9.0  --  8.9   * 110*  --  98     Most Recent 2 LFT's:  Recent Labs   Lab Test 12/14/23  1211 12/29/22  2106   AST 29 20   ALT 24 17   ALKPHOS 65 46   BILITOTAL 0.8 0.6     Most Recent INR's and Anticoagulation Dosing History:  Anticoagulation Dose History  More data exists         Latest Ref Rng & Units 12/17/2023 12/18/2023 12/29/2023 2/15/2024 2/16/2024 2/17/2024 2/18/2024   Recent Dosing and Labs   warfarin ANTICOAGULANT (COUMADIN) 2.5 MG tablet - - - - 2.5 mg, $Given 2.5 mg, $Given - -   INR 0.85 - 1.15 3.43  3.03  5.80  2.39  2.71  3.94  4.66      Most Recent 3 Troponin's:No lab results found.  Most Recent Cholesterol Panel:  Recent Labs   Lab Test 08/20/18  0000   CHOL 174   LDL 96   HDL 47*   TRIG 153*     Most Recent 6 Bacteria Isolates From Any Culture (See EPIC Reports for Culture Details):No lab results found.  Most Recent TSH, T4 and A1c Labs:  Recent Labs   Lab Test 02/15/24  1308   TSH 1.57     Results for orders placed or performed during the hospital encounter of 02/15/24   XR Chest 2 Views    Narrative    PROCEDURE:  XR CHEST 2 VIEWS    HISTORY:  Tachycardia.     COMPARISON:  10/21/2023    FINDINGS:   The heart is enlarged The pulmonary vasculature is normal.  There is  exaggerated thoracic kyphosis. There is atelectatic changes in both  lung bases. Multiple thoracic compression fractures are noted. 2 are  unchanged from previous examination. There appears to be a new mid  thoracic vertebral body compression fracture as compared to October 2023  No pleural effusion or pneumothorax.      Impression    IMPRESSION: Cardiomegaly.    New mid  thoracic vertebral body compression fracture    PRABHAKAR GASCA MD         SYSTEM ID:  [id number]   Echocardiogram Complete     Value    LVEF  45-50% (mildly reduced)    [facility]    [id number]  [id number]  [id number]  [id number]^VALERIA^LOWELL^R     [facility]  Echocardiography Laboratory  [address] [place] [id number]     Name: GONZALEZ BAUTISTA  MRN: [id number]  : 1933  Study Date: 2024 07:03 AM  Age: 89 yrs  Gender: Female  Patient Location: [facility]  Reason For Study: Atrial Fibrillation  History: Afib  Ordering Physician: LOWELL SHAW  Performed By: Cira Beaver RDCS     BSA: 1.9 m2  Height: 64 in  Weight: 185 lb  ______________________________________________________________________________  Procedure  Complete Portable Echo Adult. Technically difficult study. The patient's  rhythm is atrial fibrillation.  ______________________________________________________________________________  Interpretation Summary  Left ventricular function is decreased. The ejection fraction is 45-50%  (mildly reduced).  Mild concentric wall thickening consistent with left ventricular hypertrophy  is present.  Global right ventricular function is normal.  Mild left atrial enlargement is present.  Mild mitral insufficiency is present.  Moderate aortic valve calcification is present.  Mild aortic stenosis is present.  The mean gradient across the aortic valve is 12 mmHg.  The peak aortic velocity is 2.26 m/sec.  Trace tricuspid insufficiency is present.  ______________________________________________________________________________  Left Ventricle  Mild concentric wall thickening consistent with left ventricular hypertrophy  is present. Left ventricular function is decreased. The ejection fraction is  45-50% (mildly reduced). No regional wall motion abnormalities are seen.     Right Ventricle  Global right ventricular function is normal.     Atria  Mild left atrial enlargement is  present.     Mitral Valve  Mild mitral insufficiency is present.     Aortic Valve  Moderate aortic valve calcification is present. The peak aortic velocity is  2.26 m/sec. The mean gradient across the aortic valve is 12 mmHg. The peak AoV  pressure gradient is 20.5 mmHg. Mild aortic stenosis is present.     Tricuspid Valve  Trace tricuspid insufficiency is present.     Pulmonic Valve  Trace pulmonic insufficiency is present.     Vessels  Ascending aorta 3.16 cm.     Pericardium  No pericardial effusion is present.     ______________________________________________________________________________  MMode/2D Measurements & Calculations  IVSd: 1.2 cm  LVIDd: 3.6 cm  LVIDs: 2.8 cm  LVPWd: 1.2 cm  FS: 22.6 %  LV mass(C)d: 141.4 grams  LV mass(C)dI: 74.7 grams/m2  Ao root diam: 3.1 cm  LA dimension: 4.3 cm     asc Aorta Diam: 3.2 cm  LA/Ao: 1.4  LVOT diam: 1.9 cm  LVOT area: 3.0 cm2  Ao root diam index Ht(cm/m): 1.9  Ao root diam index BSA (cm/m2): 1.7  Asc Ao diam index BSA (cm/m2): 1.7  Asc Ao diam index Ht(cm/m): 1.9  LA Volume Indexed (AL/bp): 33.7 ml/m2  RWT: 0.66     Doppler Measurements & Calculations  Ao V2 max: 226.0 cm/sec  Ao max P.5 mmHg  Ao V2 mean: 160.0 cm/sec  Ao mean P.9 mmHg  Ao V2 VTI: 37.9 cm  KOJO(I,D): 0.77 cm2  KOJO(V,D): 0.78 cm2  LV V1 max P.4 mmHg  LV V1 max: 59.0 cm/sec  LV V1 VTI: 9.8 cm  SV(LVOT): 29.2 ml  SI(LVOT): 15.4 ml/m2  AV Tavo Ratio (DI): 0.26  KOJO Index (cm2/m2): 0.41     ______________________________________________________________________________  Report approved by: Michelle Corral 2024 03:01 PM

## 2024-02-18 NOTE — CARE PLAN
Face to face report given with opportunity to observe patient.    Report given to LIZBET Go RN   2/18/2024  7:35 AM

## 2024-02-18 NOTE — PHARMACY-ANTICOAGULATION SERVICE
Pharmacy Consult-Warfarin Assessment Day #4    Shannon Walls is a 89 year old female admitted on 2/15/2024 with Atrial fibrillation with rapid ventricular response (H)    Primary Indication(s) for Anticoagulation: A-fib     Goal INR: 2-3     FYI, patient is followed by the Anticoagulation/Protime Clinic at: [facility] staff draws INR and sends to [facility] and/or [facility] and [facility] doses)      Patient previously anticoagulated on Coumadin at a dose of 20 mg/week; dosed as: 2.5 mg daily except 5 mg on Fridays     Home tablet strength(s): 2.5 mg & 5 mg     Factors that may increase patient's bleeding risk and/or sensitivity to warfarin (Coumadin) include: Advanced age, Fall PTA, IV Diuresis, Malnutrition, increasing SCr     Factors that may decrease patient's bleeding risk and/or sensitivity to warfarin (Coumadin) include: none    Anticoagulation Dose History  More data exists             2/15/2024 2/16/2024 2/17/2024 2/18/2024      warfarin ANTICOAGULANT (COUMADIN) 2.5 MG tablet     2.5 mg, $Given 2.5 mg, $Given HOLD -   INR     2.39  2.71  3.94  4.66      CBC RESULTS:   Recent Labs   Lab Test 02/17/24  0440   HGB 13.4          Assessment/Plan: INR continues to increase; plan is for discharge back to [facility] today.    Discharge Instructions:  HOLD warfarin 2/18/24;  HOLD warfarin 2/19/24;  Recheck INR on 2/20/24 and receive additional dosing instructions per Anticoagulation Clinic.    Thank You for the Consult. Will continue to follow.    Sal Ledesma [facility] ....................  2/18/2024   8:30 AM

## 2024-02-19 NOTE — PLAN OF CARE
Writer opened chart to make a follow up specialty appointment as patient was discharged over the weekend. She has an appointment with Dr. Meyer in [place] at the clinic at 1:45pm. Will call [place] to let them know.

## 2024-02-19 NOTE — PLAN OF CARE
Occupational Therapy Discharge Summary    Reason for therapy discharge:    Discharged to [facility] with recommended home care OT.    Progress towards therapy goal(s). See goals on Care Plan in [facility] electronic health record for goal details.  Goals not met.  Barriers to achieving goals:   discharge from facility.    Therapy recommendation(s):    Continued therapy is recommended.  Rationale/Recommendations:  Pt would benefit from home OT to continue progressing her strength and activity tolerance for increased safety and independence in ADLs.    Goal Outcome Evaluation:

## 2024-02-19 NOTE — PROGRESS NOTES
Physical Therapy Discharge Summary    Reason for therapy discharge:    Discharged to [facility] with recommended home care PT.    Progress towards therapy goal(s). See goals on Care Plan in [facility] electronic health record for goal details.  Goals partially met.  Barriers to achieving goals:   discharge from facility.    Therapy recommendation(s):    Continued therapy is recommended.  Rationale/Recommendations:  For ongoing work on strength and conditioning.

## 2024-02-20 NOTE — TELEPHONE ENCOUNTER
This writer received a call from LIZBET Campo at [facility] stating patient had another fall recently either today or yesterday. Patient was not brought into . She is wondering about getting some Mepitel for the NEW skin tears with skin flaps, either ordered to [facility] or can she pick some up from wound care. I let her know I would send a message to the provider to see about ordering them. Jahaira stated the new tears are on the right leg. Largest being a rough estimate of about 10 cm x 8 cm and another smaller one about 4 cm x 2 cm. Currently she is putting oil emulsion, dry gauze and kerlix because the dry gauze alone was sticking. Next appt is on 3-7-24. Please advise.

## 2024-02-20 NOTE — TELEPHONE ENCOUNTER
After talking with Jahaira about the mepitel, she also stated the recent hospital stay the patient had, the paperwork she came back home with says they ordered compression garments for her but Jahaira said you did not want her to have compression. She is wanting to clarify if she should be using compression or not. Please advise.

## 2024-02-21 PROBLEM — Z79.01 CHRONIC ANTICOAGULATION: Status: ACTIVE | Noted: 2024-01-01

## 2024-02-21 PROBLEM — I50.42 CHRONIC COMBINED SYSTOLIC AND DIASTOLIC CONGESTIVE HEART FAILURE (H): Status: ACTIVE | Noted: 2023-04-13

## 2024-02-21 NOTE — PROGRESS NOTES
[facility] HEART CARE   CARDIOLOGY PROGRESS NOTE     Chief Complaint   Patient presents with    Follow Up    New Patient     Hospital follow up          Diagnosis:    ICD-10-CM    1. Atrial fibrillation with RVR (H)  I48.91 EKG 12-lead complete w/read - (Clinic Performed)     metoprolol succinate ER (TOPROL XL) 50 MG 24 hr tablet      2. Hospital discharge follow-up  Z09       3. Chronic anticoagulation (Coumadin)  Z79.01       4. Chronic combined systolic and diastolic congestive heart failure (H)  I50.42       5. Mild aortic stenosis  I35.0             Assessment/Plan:    Atrial fibrillation  Admitted to the hospital with A-fib with RVR on 12/14/2023 and again on 2/15/2023.  During most recent hospitalization her metoprolol succinate was increased to 100 mg daily with improvement in RVR. EKG today shows atrial fibrillation with ventricular rate on 107 bpm, tachycardic on exam. She admits to feeling good when she saw Dr. Meyer and rate was controlled at 68 bpm but today she continues with fatigue and some dyspnea on exertion.  Continue digoxin 0.625 mg daily. Previously had elevated digoxin level. She is frail and elderly. No changes to digoxin dosing  Continue metoprolol succinate 100 mg every evening.   Start metoprolol succinate 50 mg every morning  Again, we reviewed rhythm versus rate control approach. At this point, Shannon was clear that she would not want cardioversion or ablation. We will continue with rate controlling approach.     CHADs-VASC >=2  Continue chronic oral anticoagulation: Coumadin for stroke prophylaxis with history of atrial fibrillation  No bleeding concerns today    Congestive Heart Failure  Likely both systolic and diastolic  Previously 35-40% on TTE 2022  TTE 2/2024: LVEF 45-50%    Chronic systolic heart failure/HFrEF   NYHA Symptom Class III  Stage C    ACE-I/ARB/ARNi:     BB:   Continue metoprolol succinate. Dosing being titrated for atrial fibrillation with RVR  SGLT-2 Inhibitor:   Could  consider  Aldosterone antagonist:    Fluid status:  Hypervolemic- +1 bilateral LE edema  Continue furosemide 20 mg once daily           Sodium restriction  Fluid restriction  Monitoring daily weights    Wt Readings from Last 5 Encounters:   02/21/24 45.2 kg (99 lb 9.6 oz)   02/18/24 43.5 kg (95 lb 14.4 oz)   02/05/24 49 kg (108 lb 1.6 oz)   01/31/24 48.4 kg (106 lb 11.2 oz)   01/31/24 48.4 kg (106 lb 11.2 oz)         Concern for left ventricular thrombus  Potentially seen on echocardiogram from 1/14/2023.  Not seen on TTE 2/2024.    Currently on Coumadin.      Carotid artery stenosis  50% on the left.  She has had a stroke previously on 4/13/2023.  Continue Lipitor 40 mg daily.  Plan for ultrasound of carotid arteries- this is scheduled    Aortic stenosis  Moderate aortic valve calcification with mild aortic stenosis on TTE 2/2024. Unchanged from TTE in 2022.           Follow-up in 2 weeks with EKG prior.         Interval history:  Shannon Walls is a very pleasant 89-year old female who presents accompanied by her daughter for cardiology follow-up. Prior visit was initial consultation with Dr. Meyer. She has a cardiovascular history including atrial fibrillation, combined systolic and diastolic heart failure, peripheral artery disease, hyperlipidemia. She has a non-cardiac medical history including chronic kidney disease, cerebral vascular accident.    Ms. Walls saw Dr. eMyer on 2/5/2024 with EKG showing atrial fibrillation. Ventricular rate was controlled at 68 bpm. On 2/15/2024 she presented to wound care for management of her wounds to her bilateral LEs and was noted to have a heart rate in the 140s-160s. During ED evaluation she denies chest pain, palpitations. She did have episodes of dyspnea but was not able to recall when the dyspnea would worsen. Her metoprolol succinate was increased to 100 mg daily. Given her history of elevated digoxin level her 62.5 mg dose was not changed and her digoxin level  was 1.1.     Today, Shannon tells me that the night prior to presenting to wound care on 2/15/2024 she had a fall at the assisted living facility. She tells me that she felt dizzy and this caused her to fall. She thinks she was in atrial fibrillation at this time and it contributed to her dizziness and fall. She denies any chest pain or pressure. She has felt weak since recent hospitalization. She has not been feeling dizzy, lightheaded. She has been more fatigued and dyspneic with exertion than previously.     She is mostly sedentary in her recliner chair or wheelchair but does try to participate in daily exercises and walks down with assistance to lunch once weekly. She has not done either of these since her hospital discharge three days ago. She thinks she would be too weak and tired.       HPI:    Mrs. Walls is a 89-year-old female who is being seen by cardiology in follow-up to hospitalization for A-fib with RVR on 12/14/2023.  She has had A-fib dating back to 12/29/2022.  She has been asymptomatic.  There is also history of systolic and diastolic dysfunction with mild aortic insufficiency.    She has a history of chronic systolic heart failure with an EF of 35-40%, now recovered with a low normal EF at 50-55%, CVA off anticoagulation on 4/13/2023, PAD on 10/31/2023, 50% proximal internal carotid artery stenosis on 4/13/2023, peripheral edema, elevated BNP, hyperlipidemia-controlled, possible apical mural thrombus on 1/14/2023, generalized weakness with worse weakness to right upper extremity related to stroke.    Shannon has been having A-fib since first documentation on 12/21/2022.  She was admitted to the hospital for A-fib with RVR.  She had been increasingly weak and confused.  She was slower than usual.  Heart rate on arrival was 180.  It is uncertain if she had been taking her medication.  Dig level was less than 0.4 suggesting she was not taking it.  Chest x-ray unremarkable.  She was given a  diltiazem IV drip and 2 doses of IV dig 0.25 mg.  She was admitted to the hospital.  Patient only been on aspirin for stroke phylaxis.  She had multiple falls and was not on anticoagulation.  EF at that time was 35-40% likely rate induced.  She noted to have mild aortic stenosis with severe left atrial enlargement.  Based on previous discussions, she is now up anticoagulation.  With treatment, heart rate was controlled.    She was admitted for A-fib with RVR on 12/14/2023.  Heart rate was in the 130s.  EKG showed A-fib with a heart rate of 132 bpm.  Digoxin level was 2.9 improving to 1.6.  CT head showed no acute cranial process.  CT cervical spine showed no acute fracture.  Chest x-ray from 2 months earlier on 10/21/2023 was without acute airspace opacities.  Patient has been on Coumadin digoxin and metoprolol.    Seen the patient at time of consultation, heart rate has been controlled.  Remains in A-fib with a heart rate of 68 bpm.  Denies palpitations, fluttering, fatigue, and shortness of breath.  She is somewhat wheelchair-bound with her history of CVA on 4/13/2023.  She has right-sided weakness to her upper extremity.  She does not have any memory issues or problems speaking.  She has been stable on digoxin 0.625 mg daily and metoprolol 25 mg XL daily.    There is also history of heart failure.  Heart function had been previously reduced.  Her EF is down to 35-40% on 12/29/2022.  It has increased to low normal to 50-55%.  She continues to have mild to moderate peripheral edema on Lasix 20 mg daily.  I suspect she also has diastolic dysfunction contributing to this issue.  BNP was 2113 on 10/21/2023.    There was concern for CVA with small left ventricular apical thrombus on 9/14/2023.  She has since been on Coumadin.  There is also concern for mild aortic stenosis.  Repeat echocardiogram ordered on 2/5/2023.      Also, she has concerns for PAD.  She did have ABIs on 10/31/2023.  There is moderate arterial  occlusive disease in both lower extremities.  She had ultrasound of carotids on 4/13/2023.  There is a 50% lesion to her left carotid artery.  Repeat ultrasound carotid arteries ordered 2/5/2024.  Had been on atorvastatin 40 mg at times of consultation.      RELEVANT TESTING:  Transthoracic Echocardiogram 2/2024  Interpretation Summary  Left ventricular function is decreased. The ejection fraction is 45-50%  (mildly reduced).  Mild concentric wall thickening consistent with left ventricular hypertrophy  is present.  Global right ventricular function is normal.  Mild left atrial enlargement is present.  Mild mitral insufficiency is present.  Moderate aortic valve calcification is present.  Mild aortic stenosis is present.  The mean gradient across the aortic valve is 12 mmHg.  The peak aortic velocity is 2.26 m/sec.  Trace tricuspid insufficiency is present.      MARÍA in 10/31/2023:  Moderate arterial occlusive disease in both lower extremities.    ECHO on 1/14/23:  Biatrial enlargement. The left ventricular systolic function is low normal.   There is mild global hypokinesis of the left ventricle. Qualitative ejection fraction is 50-55% (low normal).   A small LV apical thrombus cannot be ruled out.   Diastolic function could not be accurately assessed due to atrial fibrillation.   The aortic valve is moderately sclerotic. There is mild aortic stenosis. Vmax 2.5m/s; MG 12mmHg; KOJO 1.2cm2; DVI 0.34.   There is mild mitral regurgitation.   Mild tricuspid regurgitation.   Estimated right ventricular systolic pressure 26mmHg.   Inferior vena cava size normal and collapsibility > 50% normal indicating normal right atrial pressure (3 mm Hg).   There is no pericardial effusion.   There is no evidence of an atrial septal defect or patent foramen ovale by agitated saline injection or color flow doppler.     CTA head and neck on 4/13/2023:   50% stenosis proximal internal carotid artery on the left.  No intracranial stenoses or  occlusions are noted.    MRI Brain on 4/13/23, [facility]:  1. There are small areas of restricted diffusion associated with the left frontal lobe and posterior left temporal lobe indicative of small areas of ischemic insult. No evidence of acute hemorrhage.   2. There is moderate global atrophy with moderate to severe periventricular, deep and subcortical white matter changes that are nonspecific but can be seen in setting of chronic microvascular ischemia.     Echo on 12/29/2022:  Left ventricular function is decreased.   The ejection fraction is 35-40% (moderately reduced).  Mild to moderate diffuse hypokinesis is present.  The right ventricle is normal size.  Global right ventricular function is normal.  Severe left atrial enlargement is present.  Mild mitral insufficiency is present.  Mild aortic valve calcification is present.  Mild aortic stenosis is present.  The mean gradient across the aortic valve is17 mmHg.  Trace tricuspid insufficiency is present.  Mild pulmonic insufficiency is present.  Previous study not available for comparison.      Past Medical History:   Diagnosis Date    Atrial fibrillation with rapid ventricular response (H)        Past Surgical History:   Procedure Laterality Date    EYE SURGERY      OPEN REDUCTION INTERNAL FIXATION WRIST Right 8/22/2018    Procedure: OPEN REDUCTION INTERNAL FIXATION WRIST;  OPEN REDUCTION INTERNAL FIXATION RIGHT DISTAL RADIUS;  Surgeon: Taqueria Edwards MD;  Location: [facility] OR       Allergies   Allergen Reactions    Levothyroxine Shortness Of Breath and Swelling     Swelling in feet     Nitrogen Swelling and Blisters     Liquid nitrogen (swelling at sight)       Current Outpatient Medications   Medication Sig Dispense Refill    atorvastatin (LIPITOR) 40 MG tablet Take 1 tablet by mouth at bedtime      calcium carbonate (OS-EFFIE) 1500 (600 Ca) MG tablet Take 600 mg by mouth every evening      digoxin (LANOXIN) 125 MCG tablet Take 62.5 mcg by mouth every morning       furosemide (LASIX) 20 MG tablet Take 20 mg by mouth every morning      GAVILAX 17 GM/SCOOP powder Take 17 g by mouth every morning -hold if having loose stools.      LORazepam (ATIVAN) 0.5 MG tablet Take 0.5 mg by mouth At Bedtime       metoprolol succinate ER (TOPROL XL) 100 MG 24 hr tablet Take 1 tablet (100 mg) by mouth at bedtime -check pulse first and HOLD if pulse < 55. 30 tablet 0    metoprolol succinate ER (TOPROL XL) 50 MG 24 hr tablet Take 1 tablet (50 mg) by mouth every morning 90 tablet 3    Multiple Vitamins-Minerals (CERTAVITE SENIOR/ANTIOXIDANT) TABS Take 1 tablet by mouth every morning      NP THYROID 30 MG tablet Take 30 mg by mouth every morning (before breakfast)      omeprazole (PRILOSEC) 20 MG DR capsule Take 20 mg by mouth 2 times daily      predniSONE (DELTASONE) 5 MG tablet Take 5 mg by mouth every morning      traMADol (ULTRAM) 50 MG tablet Take 50 mg by mouth daily as needed (back pain)      traMADol (ULTRAM) 50 MG tablet Take 50 mg by mouth 2 times daily      Vitamin D, Cholecalciferol, 10 MCG (400 UNIT) TABS Take 1 tablet by mouth every morning      warfarin ANTICOAGULANT (COUMADIN) 2.5 MG tablet HOLD warfarin 2/18/24;  HOLD warfarin 2/19/24;  Recheck INR on 2/20/24 and receive additional dosing instructions per Anticoagulation Clinic.      warfarin ANTICOAGULANT (COUMADIN) 5 MG tablet HOLD warfarin 2/18/24;  HOLD warfarin 2/19/24;  Recheck INR on 2/20/24 and receive additional dosing instructions per Anticoagulation Clinic.         Social History     Socioeconomic History    Marital status:      Spouse name: Not on file    Number of children: Not on file    Years of education: Not on file    Highest education level: Not on file   Occupational History    Not on file   Tobacco Use    Smoking status: Former    Smokeless tobacco: Never   Substance and Sexual Activity    Alcohol use: Not on file    Drug use: Not on file    Sexual activity: Not on file   Other Topics Concern    Not on  "file   Social History Narrative    Not on file     Social Determinants of Health     Financial Resource Strain: Not on file   Food Insecurity: Not on file   Transportation Needs: Not on file   Physical Activity: Not on file   Stress: Not on file   Social Connections: Not on file   Interpersonal Safety: Not on file   Housing Stability: Not on file       TEST RESULTS:   Results for orders placed or performed in visit on 02/21/24   EKG 12-lead complete w/read - (Clinic Performed)     Status: None (Preliminary result)   Result Value Ref Range    Systolic Blood Pressure  mmHg    Diastolic Blood Pressure  mmHg    Ventricular Rate 107 BPM    Atrial Rate  BPM    MI Interval  ms    QRS Duration 72 ms     ms    QTc 411 ms    P Axis  degrees    R AXIS -47 degrees    T Axis -30 degrees    Interpretation ECG       Atrial fibrillation with rapid ventricular response  Left axis deviation  Anterolateral infarct , age undetermined  Abnormal ECG  When compared with ECG of 15-FEB-2024 10:36,  No significant change was found           Physical examination:    Vitals: /88 (BP Location: Left arm, Patient Position: Sitting, Cuff Size: Adult Regular)   Pulse 89   Ht 1.499 m (4' 11\")   Wt 45.2 kg (99 lb 9.6 oz)   SpO2 92%   BMI 20.12 kg/m    BMI= Body mass index is 20.12 kg/m .      GENERAL APPEARANCE: healthy, alert and no distress  HEENT: no icterus, no xanthelasmas, normal pupil size and reaction, no cyanosis.  NECK: no adenopathy, no asymmetry, masses.  CHEST: lungs clear to auscultation - no rales, rhonchi or wheezes, no use of accessory muscles, no retractions, respirations are unlabored, normal respiratory rate  CARDIOVASCULAR: regular rhythm, normal S1 with physiologic split S2, no S3 or S4 and no murmur, click or rub  EXTREMITIES: no clubbing, cyanosis or edema  NEURO: alert and oriented normal speech, and affect  VASC: No vascular bruits heard.  SKIN: no ecchymoses, no rashes        Thank you for allowing me to " participate in the care of your patient. Please do not hesitate to contact me if you have any questions.       Mirta Barbosa, CNP

## 2024-02-21 NOTE — PATIENT INSTRUCTIONS
Thank you for allowing Mirta Barbosa CNP and our  team to participate in your care. Please call our office at [phone] with scheduling questions or if you need to cancel or change your appointment. With any other questions or concerns you may call cardiology nurse at  [phone].       If you experience chest pain, chest pressure, chest tightness, shortness of breath, fainting, lightheadedness, nausea, vomiting, or other concerning symptoms, please report to the Emergency Department or call 911. These symptoms may be emergent, and best treated in the Emergency Department.         Continue all medications as directed    START metoprolol succinate 50 mg every morning in addition to the metoprolol succinate 100 mg every evening.       Follow-up March 7th at 10:30 am, certainly sooner with acute concerns.       Mirta Barbosa CNP

## 2024-02-28 NOTE — PROGRESS NOTES
SUBJECTIVE:  Shannon Walls, 89 year old, female presents with the following Chief Complaint(s) with HPI to follow:   Chief Complaint   Patient presents with    WOUND CARE          HPI:  Shannon is here for the re-assessment and treatment of multiple wounds to all extremities.  She tells me she fell again last night, slid out of her recliner.  She denies any new injuries.       MED REC REQUIRED{  Post Medication Reconciliation Status:  Discharge medications reconciled, continue medications without change     Wound history:   She was seen in wound care for the first time on 10/31/23 by Maame Campbell.  Her forehead is noted as being suspicious for skin cancer.  A biopsy was done in the past, (9/25/23) not conclusive. This area scabs, falls off and re-develops.    She lives in assisted living and staff are changing her dressings as directed.       Wound History:  10/21/23- Wounds developed, visit to ER  10/31/23- First visit to wound care.     10/31/23- MARÍA done:   FINDINGS: No Doppler flow was identified in the left posterior tibial  artery. The right posterior tibial artery was noncompressible. The  dorsalis pedis ankle brachial indices were 0.52 on the right and 0.55  on the left.  She has had multiple skin tears due to falls since the initial visit to wound care.     Patient Active Problem List   Diagnosis    Pathological fracture L4 vertebrae    Fracture of sacrum (H)    Permanent atrial fibrillation (H)    Altered mental status, unspecified altered mental status type    Acquired hypothyroidism    Anxiety disorder, unspecified    Chronic combined systolic and diastolic congestive heart failure (H)    Gastroesophageal reflux disease without esophagitis    Atrial fibrillation with RVR (H)    Fall at home, initial encounter    Current chronic use of systemic steroids    Decubitus skin ulcer    History of CVA on 4/13/23    Peripheral edema    History of tobacco abuse quitting in the 50s and 60s    Stage 3a chronic  kidney disease (H)    Elevated brain natriuretic peptide (BNP) level    Mixed hyperlipidemia    Mild aortic stenosis    H/O apical mural thrombus    PAD (peripheral artery disease) (H24)    Elevated BUN    Atrial fibrillation with rapid ventricular response (H)    Fall, initial encounter    Compression fracture of thoracic vertebra, unspecified thoracic vertebral level, initial encounter (H)    Chronic anticoagulation (Coumadin)       Past Medical History:   Diagnosis Date    Atrial fibrillation with rapid ventricular response (H)        Past Surgical History:   Procedure Laterality Date    EYE SURGERY      OPEN REDUCTION INTERNAL FIXATION WRIST Right 8/22/2018    Procedure: OPEN REDUCTION INTERNAL FIXATION WRIST;  OPEN REDUCTION INTERNAL FIXATION RIGHT DISTAL RADIUS;  Surgeon: Taqueria Edwards MD;  Location: [facility]       History reviewed. No pertinent family history.    Social History     Tobacco Use    Smoking status: Former    Smokeless tobacco: Never   Substance Use Topics    Alcohol use: Not on file       Current Outpatient Medications   Medication Sig Dispense Refill    atorvastatin (LIPITOR) 40 MG tablet Take 1 tablet by mouth at bedtime      calcium carbonate (OS-EFFIE) 1500 (600 Ca) MG tablet Take 600 mg by mouth every evening      digoxin (LANOXIN) 125 MCG tablet Take 62.5 mcg by mouth every morning      furosemide (LASIX) 20 MG tablet Take 20 mg by mouth every morning      GAVILAX 17 GM/SCOOP powder Take 17 g by mouth every morning -hold if having loose stools.      LORazepam (ATIVAN) 0.5 MG tablet Take 0.5 mg by mouth At Bedtime       metoprolol succinate ER (TOPROL XL) 100 MG 24 hr tablet Take 1 tablet (100 mg) by mouth at bedtime -check pulse first and HOLD if pulse < 55. 30 tablet 0    metoprolol succinate ER (TOPROL XL) 50 MG 24 hr tablet Take 1 tablet (50 mg) by mouth every morning 90 tablet 3    Multiple Vitamins-Minerals (CERTAVITE SENIOR/ANTIOXIDANT) TABS Take 1 tablet by mouth every morning      NP  THYROID 30 MG tablet Take 30 mg by mouth every morning (before breakfast)      omeprazole (PRILOSEC) 20 MG DR capsule Take 20 mg by mouth 2 times daily      predniSONE (DELTASONE) 5 MG tablet Take 5 mg by mouth every morning      traMADol (ULTRAM) 50 MG tablet Take 50 mg by mouth daily as needed (back pain)      traMADol (ULTRAM) 50 MG tablet Take 50 mg by mouth 2 times daily      Vitamin D, Cholecalciferol, 10 MCG (400 UNIT) TABS Take 1 tablet by mouth every morning      warfarin ANTICOAGULANT (COUMADIN) 2.5 MG tablet HOLD warfarin 2/18/24;  HOLD warfarin 2/19/24;  Recheck INR on 2/20/24 and receive additional dosing instructions per Anticoagulation Clinic.      warfarin ANTICOAGULANT (COUMADIN) 5 MG tablet HOLD warfarin 2/18/24;  HOLD warfarin 2/19/24;  Recheck INR on 2/20/24 and receive additional dosing instructions per Anticoagulation Clinic.         Allergies   Allergen Reactions    Levothyroxine Shortness Of Breath and Swelling     Swelling in feet     Nitrogen Swelling and Blisters     Liquid nitrogen (swelling at sight)       REVIEW OF SYSTEMS  Constitutional, HEENT, cardiovascular, pulmonary, gi and gu systems are negative, except as otherwise noted.     OBJECTIVE:  /88   Temp 97.6  F (36.4  C)  HR - 96 (Apical)    Constitutional: alert and no distress  Head: Normocephalic. No masses, lesions, tenderness or abnormalities  Cardiovascular:   Lower Extremity Perfusion Assessment (10/31/23): done by Maame Campbell NP  Right lower extremity:  Warmth: cool toes  Dorsal pedal pulse: yes, faint via doppler              Posterior tibial pulse: yes, via doppler--mono              Skin color: pink              Edema: yes, +3/+4  Left lower extremity:  Warmth: cool toes  Dorsal pedal pulse: yes, via doppler--mono              Posterior tibial pulse: yes, faint via doppler              Skin color: pink              Edema: +3/+4  Intervention: MARÍA done 10/31, FINDINGS: No Doppler flow was identified in the left  posterior tibial artery. The right posterior tibial artery was noncompressible. The dorsalis pedis ankle brachial indices were 0.52 on the right and 0.55 on the left.  Respiratory:  Respirations even and unlabored  Musculoskeletal: severe kyphosis; arrived in a wheelchair; transfers with assist of one to exam chair  Skin:        Wound description:     Type of Wound:  stasis ulcers   Location:  right 2nd toe   Drainage amount:  moderate   Drainage color:  serous - tan   Odor:  none   Wound bed:  pink   Depth:  full thickness   Surrounding skin:  fragile        Measurements:      Right 2nd toe - 0.2 x 0.4 cm (probes to bone - not a new finding)    Pain:  denies   Wound debridement completed on: 2/28/2024, debridement type: Instrument        Dressing change:      Wounds cleansed with mild soap, rinse, dried.  Verbal consent obtained for debridement. Sharp excisional debridement performed with ring curette to remove non-viable tissue (1 sq cm) to the level of the subcutaneous.  Dressed with Mepilex Ag foam, cut to fit wound, secured with medipore tape.    Wound description:     Type of Wound:  trauma   Location:  right shin   Drainage amount:  moderate   Drainage color: serosanguinous   Odor:  none   Wound bed:  see picture below   Depth:  full thickness   Surrounding skin:  fragile        Measurements:  3.5 x 3.5 cm   Pain:  tender with debridement   Wound debridement completed on: 2/28/2024, debridement type: Instrument        Dressing change:      Wound cleansed with mild soap, rinse, dried.  Verbal consent obtained for debridement. Sharp excisional debridement performed with ring curette to remove non-viable tissue (1 sq cm) to the level of the subcutaneous.  Dressed with Mepilex Ag foam, cut to fit wound, secured with medipore tape.    Right shin    Wound description:     Type of Wound:  stasis ulcers   Location:  left lower extremity    Drainage amount:  moderate   Drainage color:  serous - tan   Odor:   none   Wound bed:  please see pictures below   Depth:  full thickness   Surrounding skin:  fragile, intact        Measurements:      Left medial leg - 1 x 1.4(smaller)    Left lateral leg  - 0.5 x 0.3 cm (smaller)  Left shin anterior - 0.5 x 0.4 cm (smaller)   Pain:  Denies   Wound debridement completed on: 2/28/2024, debridement type: Instrument       Dressing change:      Wound cleansed with mild soap, rinse, dried.  Sharp excisional debridement performed with ring curette to remove non-viable tissue (2 sq cm) into the level of the subcutaneous.  Patient was informed of the risks and benefits and consented to the procedure.  Dressed with honey alginate to each wound, all wounds covered 4x4 gauze (4), secured with rolled gauze and medipore tape.       Left lower extremity       Wound description:     Type of Wound:  Trauma   Location:  bilateral arms    Drainage amount:  moderate   Drainage color:  serous   Odor:  none   Wound bed:  please see pictures below   Depth:  full thickness   Surrounding skin:  ecchymotic, thin, and fragile        Measurements:      Left arm - 10 x 1.5 cm    Right arm - 5.8 x 2.5 cm (smaller)    Right elbow - 2.3 x 1.3 cm    Pain:  tender   Wound debridement completed on: 2/28/2024, debridement type: Instrument        Dressing change:      Wound cleansed with mild soap, rinse, dried.  Excisional debridement performed with Adson's and iris scissors to remove non-viable skin (5 sq cm) into the level of the dermis.  Patient was informed of the risks and benefits and consented to the procedure.  Dressed with xeroform gauze (cut to fit) each wound, covered each wound with two pieces of 4x4 gauze, secured each with 1/2 roll of conform rolled gauze.      Left forearm        Right posterior arm        Right elbow      Neurologic: Sensation grossly WNL.  Psychiatric: affect normal/bright, interactive     THERAPY GOAL:    Complete healing    ASSESSMENT / PLAN:  Comments:    - She is following with  Dr. Guerin, podiatrist, for the toe wound which probes to bone  - Sulma was hospitalized after the last wound care visit due to atrial fibrillation with rapid ventricular response, notes reviewed along with cardiology appointment notes.     Plan:    - Dressing changes as outlined in the after visit summary    Follow-up in wound care in one week or as needed for acute concerns.    Mei Bernard CNS  Surgery and Wound Care  [facility]

## 2024-02-28 NOTE — PATIENT INSTRUCTIONS
Wound Care Instructions left lower extremity:  1) Wash your hands and work space  2) Gather all your supplies: clean wash cloths, mild soap (baby soap), honey alginate, 4x4 gauze (4), rolled gauze, white tape  3) Cleanse wounds with mild soap, rinse, pat dry  4) Dress wounds with honey alginate lightly packed into each wound, cover all wounds with 4x4 gauze (4), secure with rolled gauze and white tape    5) Change dressing Tuesday, and Friday  6) Dispose of all dirty supplies  7) Wash hands and equipment     Wound Care Instructions right 2nd toe:  1) Wash your hands and work space  2) Gather all your supplies: clean wash cloths, mild soap (baby soap), Mepilex Ag foam,  white tape  3) Cleanse all wounds with mild soap, rinse, pat dry  4) Dress each wound with Mepilex Ag foam secure with white tape    5) Change dressing Tuesday and Friday  6) Dispose of all dirty supplies  7) Wash hands and equipment     Wound Care Instructions left and right arms:  1) Wash your hands and work space  2) Gather all your supplies: xeroform gauze, 4x4 gauze, conform rolled gauze, white tape  3) Cleanse wounds gently with mild soap, rinse, pat dry  4) Apply xeroform (cut to fit) to each wound, cover with dry 4x4 gauze, secure each with 1/2 roll of conform rolled gauze, secure with white tape    5) Change every other day  6) Dispose of all dirty supplies  7) Wash hands and equipment     Wound Care Instructions right shin:  1) Wash your hands and work space  2) Gather all your supplies: clean wash cloths, mild soap (baby soap), Mepitel One, 4x4 gauze, white tape  3) Cleanse wound with mild soap, rinse, pat dry  4) Dress wound with Mepitel One, cover with dry 4x4 gauze (2), secure with white tape    5) Change gauze dressing Monday, Wednesday, and Friday  6) Dispose of all dirty supplies  7) Wash hands and equipment     Please report any increase in pain, fevers, chills, changes in the drainage odor.   Please call [phone] if you have  any questions or concerns or if any problems develop.      Follow up on Thursday, March 7, 2024 at 9 a.m. in wound care.

## 2024-03-06 NOTE — TELEPHONE ENCOUNTER
Called nurse at facility to let her know about changes to appt on Thursday. Nurse/patient are requesting to change Thursday wound care to Friday as patient does not want to sit and wait on Thursday if appt time is not concrete. Patient requesting 11am on Friday. Would this work? Nurse also stated she has concerns about a few wounds. The left medial lower leg looks bigger and very red but not warm. Also, the second toe looks bigger and more red. Patient is also concerned her left heel hurts. The nurse at the facility looked and the heel looks really white and it is non-blanchable. Please advise.

## 2024-03-07 NOTE — PROGRESS NOTES
[facility] HEART CARE   CARDIOLOGY PROGRESS NOTE     Chief Complaint   Patient presents with    Follow Up     C/O SOB     Atrial Fib          Diagnosis:    ICD-10-CM    1. Atrial fibrillation with rapid ventricular response (H)  I48.91 Follow-Up with Cardiology [facility] Heart Failure Discharge     EKG 12-lead complete w/read - (Clinic Performed)     EKG 12-lead complete w/read - (Clinic Performed)      2. Chronic combined systolic and diastolic congestive heart failure (H)  I50.42 furosemide (LASIX) 40 MG tablet     Basic metabolic panel            Assessment/Plan:    Atrial fibrillation  Admitted to the hospital with A-fib with RVR on 12/14/2023 and again on 2/15/2023.  During most recent hospitalization her metoprolol succinate was increased to 100 mg daily with improvement in RVR. EKG today shows atrial fibrillation with ventricular rate on 107 bpm, tachycardic on exam. She admits to feeling good when she saw Dr. Meyer and rate was controlled at 68 bpm but today she continues with fatigue and some dyspnea on exertion.  Continue digoxin 0.625 mg daily. Previously had elevated digoxin level. She is frail and elderly. No changes to digoxin dosing  Continue metoprolol succinate 100 mg every evening.   At prior visit we added metoprolol succinate 50 mg every morning. EKG continues to show rapid ventricular rates. She has had some increased dyspnea, orthopnea, and increased LE edema. We will increased metoprolol succinate 50 mg every morning to 100 mg every morning.   Again, we reviewed rhythm versus rate control approach. At this point, Shannon was clear that she would not want cardioversion or ablation. We will continue with rate controlling approach.     CHADs-VASC >=2  Continue chronic oral anticoagulation: Coumadin for stroke prophylaxis with history of atrial fibrillation  No bleeding concerns today    Congestive Heart Failure  Likely both systolic and diastolic  Previously 35-40% on TTE 2022  TTE 2/2024: LVEF  45-50%    Chronic systolic heart failure/HFrEF   NYHA Symptom Class III  Stage C    ACE-I/ARB/ARNi:     BB:   Continue metoprolol succinate. Dosing being titrated for atrial fibrillation with RVR  SGLT-2 Inhibitor:   Contraindicated with a crcl less than 30 Estimated Creatinine Clearance: 20.9 mL/min (A) (based on SCr of 1.28 mg/dL (H)).  Aldosterone antagonist:    Fluid status:  Hypervolemic- Increased LE edema, dyspnea, orthopnea   Increase furosemide 20 mg once daily to 40 mg once daily. Take an extra 20 mg tablet today.     Sodium restriction  Fluid restriction  Monitoring daily weights    Wt Readings from Last 5 Encounters:   03/07/24 46.3 kg (102 lb)   02/21/24 45.2 kg (99 lb 9.6 oz)   02/18/24 43.5 kg (95 lb 14.4 oz)   02/05/24 49 kg (108 lb 1.6 oz)   01/31/24 48.4 kg (106 lb 11.2 oz)         Concern for left ventricular thrombus  Potentially seen on echocardiogram from 1/14/2023.  Not seen on TTE 2/2024.    Currently on Coumadin.      Carotid artery stenosis  50% on the left.  She has had a stroke previously on 4/13/2023.  Continue Lipitor 40 mg daily.  Plan for ultrasound of carotid arteries- this is scheduled    Aortic stenosis  Moderate aortic valve calcification with mild aortic stenosis on TTE 2/2024. Unchanged from TTE in 2022.           Follow-up in 2 weeks with EKG, labs prior.         Interval history:  Shannon Walls is a very pleasant 89-year old female who presents accompanied by her daughter for cardiology follow-up. Prior visit was initial consultation with Dr. Meyer. She has a cardiovascular history including atrial fibrillation, combined systolic and diastolic heart failure, peripheral artery disease, hyperlipidemia. She has a non-cardiac medical history including chronic kidney disease, cerebral vascular accident.    Ms. Walls saw Dr. Meyer on 2/5/2024 with EKG showing atrial fibrillation. Ventricular rate was controlled at 68 bpm. On 2/15/2024 she presented to wound care for  management of her wounds to her bilateral LEs and was noted to have a heart rate in the 140s-160s. During ED evaluation she denies chest pain, palpitations. She did have episodes of dyspnea but was not able to recall when the dyspnea would worsen. Her metoprolol succinate was increased to 100 mg daily. Given her history of elevated digoxin level her 62.5 mg dose was not changed and her digoxin level was 1.1.     Today, at prior visit we added metoprolol succinate 50 mg every evening for improved rate control as she thought her atrial fibrillation had contributed to her feeling unwell and having a fall. Today, she tells me that in the last few weeks she has been having increasing difficulty with breathing at night. There is also a nursing note that she is reporting dyspnea at night and oxygen saturations have been greater than 90%. She denies any URI symptoms including rhinorrhea, cough, fever. She also has had some increased LE edema.       HPI:    Mrs. Walls is a 89-year-old female who is being seen by cardiology in follow-up to hospitalization for A-fib with RVR on 12/14/2023.  She has had A-fib dating back to 12/29/2022.  She has been asymptomatic.  There is also history of systolic and diastolic dysfunction with mild aortic insufficiency.    She has a history of chronic systolic heart failure with an EF of 35-40%, now recovered with a low normal EF at 50-55%, CVA off anticoagulation on 4/13/2023, PAD on 10/31/2023, 50% proximal internal carotid artery stenosis on 4/13/2023, peripheral edema, elevated BNP, hyperlipidemia-controlled, possible apical mural thrombus on 1/14/2023, generalized weakness with worse weakness to right upper extremity related to stroke.    Shannon has been having A-fib since first documentation on 12/21/2022.  She was admitted to the hospital for A-fib with RVR.  She had been increasingly weak and confused.  She was slower than usual.  Heart rate on arrival was 180.  It is uncertain if  she had been taking her medication.  Dig level was less than 0.4 suggesting she was not taking it.  Chest x-ray unremarkable.  She was given a diltiazem IV drip and 2 doses of IV dig 0.25 mg.  She was admitted to the hospital.  Patient only been on aspirin for stroke phylaxis.  She had multiple falls and was not on anticoagulation.  EF at that time was 35-40% likely rate induced.  She noted to have mild aortic stenosis with severe left atrial enlargement.  Based on previous discussions, she is now up anticoagulation.  With treatment, heart rate was controlled.    She was admitted for A-fib with RVR on 12/14/2023.  Heart rate was in the 130s.  EKG showed A-fib with a heart rate of 132 bpm.  Digoxin level was 2.9 improving to 1.6.  CT head showed no acute cranial process.  CT cervical spine showed no acute fracture.  Chest x-ray from 2 months earlier on 10/21/2023 was without acute airspace opacities.  Patient has been on Coumadin digoxin and metoprolol.    Seen the patient at time of consultation, heart rate has been controlled.  Remains in A-fib with a heart rate of 68 bpm.  Denies palpitations, fluttering, fatigue, and shortness of breath.  She is somewhat wheelchair-bound with her history of CVA on 4/13/2023.  She has right-sided weakness to her upper extremity.  She does not have any memory issues or problems speaking.  She has been stable on digoxin 0.625 mg daily and metoprolol 25 mg XL daily.    There is also history of heart failure.  Heart function had been previously reduced.  Her EF is down to 35-40% on 12/29/2022.  It has increased to low normal to 50-55%.  She continues to have mild to moderate peripheral edema on Lasix 20 mg daily.  I suspect she also has diastolic dysfunction contributing to this issue.  BNP was 2113 on 10/21/2023.    There was concern for CVA with small left ventricular apical thrombus on 9/14/2023.  She has since been on Coumadin.  There is also concern for mild aortic stenosis.   Repeat echocardiogram ordered on 2/5/2023.      Also, she has concerns for PAD.  She did have ABIs on 10/31/2023.  There is moderate arterial occlusive disease in both lower extremities.  She had ultrasound of carotids on 4/13/2023.  There is a 50% lesion to her left carotid artery.  Repeat ultrasound carotid arteries ordered 2/5/2024.  Had been on atorvastatin 40 mg at times of consultation.      RELEVANT TESTING:  Transthoracic Echocardiogram 2/2024  Interpretation Summary  Left ventricular function is decreased. The ejection fraction is 45-50%  (mildly reduced).  Mild concentric wall thickening consistent with left ventricular hypertrophy  is present.  Global right ventricular function is normal.  Mild left atrial enlargement is present.  Mild mitral insufficiency is present.  Moderate aortic valve calcification is present.  Mild aortic stenosis is present.  The mean gradient across the aortic valve is 12 mmHg.  The peak aortic velocity is 2.26 m/sec.  Trace tricuspid insufficiency is present.      MARÍA in 10/31/2023:  Moderate arterial occlusive disease in both lower extremities.    ECHO on 1/14/23:  Biatrial enlargement. The left ventricular systolic function is low normal.   There is mild global hypokinesis of the left ventricle. Qualitative ejection fraction is 50-55% (low normal).   A small LV apical thrombus cannot be ruled out.   Diastolic function could not be accurately assessed due to atrial fibrillation.   The aortic valve is moderately sclerotic. There is mild aortic stenosis. Vmax 2.5m/s; MG 12mmHg; KOJO 1.2cm2; DVI 0.34.   There is mild mitral regurgitation.   Mild tricuspid regurgitation.   Estimated right ventricular systolic pressure 26mmHg.   Inferior vena cava size normal and collapsibility > 50% normal indicating normal right atrial pressure (3 mm Hg).   There is no pericardial effusion.   There is no evidence of an atrial septal defect or patent foramen ovale by agitated saline injection or  color flow doppler.     CTA head and neck on 4/13/2023:   50% stenosis proximal internal carotid artery on the left.  No intracranial stenoses or occlusions are noted.    MRI Brain on 4/13/23, [facility]:  1. There are small areas of restricted diffusion associated with the left frontal lobe and posterior left temporal lobe indicative of small areas of ischemic insult. No evidence of acute hemorrhage.   2. There is moderate global atrophy with moderate to severe periventricular, deep and subcortical white matter changes that are nonspecific but can be seen in setting of chronic microvascular ischemia.     Echo on 12/29/2022:  Left ventricular function is decreased.   The ejection fraction is 35-40% (moderately reduced).  Mild to moderate diffuse hypokinesis is present.  The right ventricle is normal size.  Global right ventricular function is normal.  Severe left atrial enlargement is present.  Mild mitral insufficiency is present.  Mild aortic valve calcification is present.  Mild aortic stenosis is present.  The mean gradient across the aortic valve is17 mmHg.  Trace tricuspid insufficiency is present.  Mild pulmonic insufficiency is present.  Previous study not available for comparison.      Past Medical History:   Diagnosis Date    Atrial fibrillation with rapid ventricular response (H)        Past Surgical History:   Procedure Laterality Date    EYE SURGERY      OPEN REDUCTION INTERNAL FIXATION WRIST Right 8/22/2018    Procedure: OPEN REDUCTION INTERNAL FIXATION WRIST;  OPEN REDUCTION INTERNAL FIXATION RIGHT DISTAL RADIUS;  Surgeon: Taqueria Edwards MD;  Location: [facility] OR       Allergies   Allergen Reactions    Levothyroxine Shortness Of Breath and Swelling     Swelling in feet     Nitrogen Swelling and Blisters     Liquid nitrogen (swelling at sight)       Current Outpatient Medications   Medication Sig Dispense Refill    atorvastatin (LIPITOR) 40 MG tablet Take 1 tablet by mouth at bedtime      calcium carbonate  (OS-EFFIE) 1500 (600 Ca) MG tablet Take 600 mg by mouth every evening      digoxin (LANOXIN) 125 MCG tablet Take 62.5 mcg by mouth every morning      furosemide (LASIX) 40 MG tablet Take 1 tablet (40 mg) by mouth every morning 90 tablet 3    GAVILAX 17 GM/SCOOP powder Take 17 g by mouth every morning -hold if having loose stools.      LORazepam (ATIVAN) 0.5 MG tablet Take 0.5 mg by mouth At Bedtime       metoprolol succinate ER (TOPROL XL) 100 MG 24 hr tablet Take 1 tablet (100 mg) by mouth every morning 90 tablet 3    metoprolol succinate ER (TOPROL XL) 100 MG 24 hr tablet Take 1 tablet (100 mg) by mouth at bedtime -check pulse first and HOLD if pulse < 55. 30 tablet 0    Multiple Vitamins-Minerals (CERTAVITE SENIOR/ANTIOXIDANT) TABS Take 1 tablet by mouth every morning      NP THYROID 30 MG tablet Take 30 mg by mouth every morning (before breakfast)      omeprazole (PRILOSEC) 20 MG DR capsule Take 20 mg by mouth 2 times daily      predniSONE (DELTASONE) 5 MG tablet Take 5 mg by mouth every morning      traMADol (ULTRAM) 50 MG tablet Take 50 mg by mouth daily as needed (back pain)      traMADol (ULTRAM) 50 MG tablet Take 50 mg by mouth 2 times daily      Vitamin D, Cholecalciferol, 10 MCG (400 UNIT) TABS Take 1 tablet by mouth every morning      warfarin ANTICOAGULANT (COUMADIN) 2.5 MG tablet HOLD warfarin 2/18/24;  HOLD warfarin 2/19/24;  Recheck INR on 2/20/24 and receive additional dosing instructions per Anticoagulation Clinic.      warfarin ANTICOAGULANT (COUMADIN) 5 MG tablet HOLD warfarin 2/18/24;  HOLD warfarin 2/19/24;  Recheck INR on 2/20/24 and receive additional dosing instructions per Anticoagulation Clinic.         Social History     Socioeconomic History    Marital status:      Spouse name: Not on file    Number of children: Not on file    Years of education: Not on file    Highest education level: Not on file   Occupational History    Not on file   Tobacco Use    Smoking status: Former     "Smokeless tobacco: Never   Substance and Sexual Activity    Alcohol use: Not on file    Drug use: Not on file    Sexual activity: Not on file   Other Topics Concern    Not on file   Social History Narrative    Not on file     Social Determinants of Health     Financial Resource Strain: Not on file   Food Insecurity: Not on file   Transportation Needs: Not on file   Physical Activity: Not on file   Stress: Not on file   Social Connections: Not on file   Interpersonal Safety: Not on file   Housing Stability: Not on file       TEST RESULTS:   Results for orders placed or performed in visit on 03/07/24   EKG 12-lead complete w/read - (Clinic Performed)     Status: None   Result Value Ref Range    Systolic Blood Pressure  mmHg    Diastolic Blood Pressure  mmHg    Ventricular Rate 119 BPM    Atrial Rate  BPM    NV Interval  ms    QRS Duration 64 ms     ms    QTc 402 ms    P Axis  degrees    R AXIS -55 degrees    T Axis -67 degrees    Interpretation ECG       Atrial fibrillation with rapid ventricular response  Left axis deviation  Low voltage QRS  Inferior infarct , age undetermined  Cannot rule out Anteroseptal infarct (cited on or before 21-OCT-2023)  Abnormal ECG  Left anterior fascicular block  When compared with ECG of 21-FEB-2024 08:55,  No significant change was found  Confirmed by MD NELDA, ARACELI ([id number]) on 3/7/2024 11:34:57 AM           Physical examination:    Vitals: /88 (BP Location: Left arm, Patient Position: Sitting, Cuff Size: Adult Small)   Pulse 72   Resp 16   Ht 1.499 m (4' 11\")   Wt 46.3 kg (102 lb)   SpO2 97%   BMI 20.60 kg/m    BMI= Body mass index is 20.6 kg/m .      GENERAL APPEARANCE: frail, elderly, alert and no distress  CHEST: lungs clear to auscultation - no rales, rhonchi or wheezes, no use of accessory muscles, no retractions, respirations are unlabored, normal respiratory rate  CARDIOVASCULAR: rapid rates, irregularly irregular rhythm  EXTREMITIES: +1 to 2 bilateral LE " edema  NEURO: alert and oriented normal speech, and affect  VASC: No carotid bruits heard.        Thank you for allowing me to participate in the care of your patient. Please do not hesitate to contact me if you have any questions.       Mirta Barbosa, CNP

## 2024-03-07 NOTE — PATIENT INSTRUCTIONS
Thank you for allowing Mirat Barbosa CNP and our  team to participate in your care. Please call our office at [phone] with scheduling questions or if you need to cancel or change your appointment. With any other questions or concerns you may call cardiology nurse at  [phone].       If you experience chest pain, chest pressure, chest tightness, shortness of breath, fainting, lightheadedness, nausea, vomiting, or other concerning symptoms, please report to the Emergency Department or call 911. These symptoms may be emergent, and best treated in the Emergency Department.      Hypervolemic with symptoms of weight gain (95# at hospital discharge and 102# today), increased LE edema, orthopnea. We will increase furosemide 20 mg once daily to 40 mg once daily. I would like her to take an extra 20 mg of furosemide today.     Daily weights    Increase metoprolol succinate 50 mg every morning to 100 mg every morning. Metoprolol is 100 mg twice daily (morning and evening).       Follow-up next Wednesday after wound care with labs, EKG prior to appointment.       Mirta Barbosa CNP

## 2024-03-09 NOTE — PROGRESS NOTES
Vascular & Endovascular Surgery Clinic Consultation   Vascular Surgeon: Wes Garcia MD, RPVI       Location:  Virtual Visit Details:    Type of service:  telemedicine    Originating Location (Pt. Location):  [facility]     Distant Location (Provider location):  [facility]     Received verbal consent for telemedicine visit.     Shannon Walls  Medical Record #:  [id number]  YOB: 1933  Age:  89 year old     Date of Service: 1/31/2024    Referring Provider: Carisa Guerin.  Primary Care Provider: Gilad Mcgregor    Chief Complaint/Reason for Visit: Nonhealing wounds    HPI:  Ms. Walls is a pleasant 89 year old female seen today with her family via telemedicine for nonhealing wounds.  She has no overt claudication. She walks around the house with a walker.  No ischemic rest pain.  She has had the wounds for approximately 3 months.  She has had no known trauma but did find them with crusted blood on them initially. She has wounds on the left 5th toe, right 2nd toe both of which are quite small. She hasa right heel wound. And bilateral shin wounds.  Per report they are all healing very slowly.  She has frequent falls.    CV risk factors include peripheral arterial disease, hyperlipidemia, combined systolic and diastolic heart failure, atrial fibrillation, chronic kidney disease, prior CVA, history of ventricular thrombus, and is a former smoker    Relevant surgical history:  - None    Review of Systems:    A 12 point ROS was reviewed and is negative except for symptoms noted in HPI.     Medical/Surgical History:    Past Medical History:   Diagnosis Date    Atrial fibrillation with rapid ventricular response (H)          Past Surgical History:   Procedure Laterality Date    EYE SURGERY      OPEN REDUCTION INTERNAL FIXATION WRIST Right 8/22/2018    Procedure: OPEN REDUCTION INTERNAL FIXATION WRIST;  OPEN REDUCTION INTERNAL FIXATION RIGHT  "DISTAL RADIUS;  Surgeon: Taqueria Edwards MD;  Location: [facility]        Allergies:     Allergies   Allergen Reactions    Levothyroxine Shortness Of Breath and Swelling     Swelling in feet     Nitrogen Swelling and Blisters     Liquid nitrogen (swelling at sight)        Medications:    Current Outpatient Medications   Medication    atorvastatin (LIPITOR) 40 MG tablet    calcium carbonate (OS-EFFIE) 1500 (600 Ca) MG tablet    digoxin (LANOXIN) 125 MCG tablet    GAVILAX 17 GM/SCOOP powder    LORazepam (ATIVAN) 0.5 MG tablet    Multiple Vitamins-Minerals (CERTAVITE SENIOR/ANTIOXIDANT) TABS    NP THYROID 30 MG tablet    omeprazole (PRILOSEC) 20 MG DR capsule    predniSONE (DELTASONE) 5 MG tablet    traMADol (ULTRAM) 50 MG tablet    Vitamin D, Cholecalciferol, 10 MCG (400 UNIT) TABS    furosemide (LASIX) 40 MG tablet    metoprolol succinate ER (TOPROL XL) 100 MG 24 hr tablet    metoprolol succinate ER (TOPROL XL) 100 MG 24 hr tablet    traMADol (ULTRAM) 50 MG tablet    warfarin ANTICOAGULANT (COUMADIN) 2.5 MG tablet    warfarin ANTICOAGULANT (COUMADIN) 5 MG tablet     No current facility-administered medications for this visit.        Social Habits:    Tobacco Use      Smoking status: Former      Smokeless tobacco: Never       Alcohol Use: Not on file       History   Drug Use Not on file        Family History:  No family history on file.    Physical Examination:  /68 (BP Location: Right arm, Patient Position: Sitting, Cuff Size: Child)   Pulse 87   Temp 97.8  F (36.6  C)   Resp 16   Ht 1.499 m (4' 11\")   Wt 48.4 kg (106 lb 11.2 oz)   SpO2 93%   BMI 21.55 kg/m    Wt Readings from Last 1 Encounters:   03/07/24 46.3 kg (102 lb)     Body mass index is 21.55 kg/m .    Exam:  No full examination as this was a video visit however,   General: No apparent distress. Answers questions appropriately with no evidence of neurologic deficit on limited video.  Neurologic: move all extremities equally  Eyes: no obvious scleral " abnormality  Skin: heel wound looks improved compared to pictures.     Laboratory Findings:  Hemoglobin   Date Value Ref Range Status   02/17/2024 13.4 11.7 - 15.7 g/dL Final   02/16/2024 13.3 11.7 - 15.7 g/dL Final     WBC Count   Date Value Ref Range Status   02/17/2024 8.7 4.0 - 11.0 10e3/uL Final   02/16/2024 9.6 4.0 - 11.0 10e3/uL Final     Platelet Count   Date Value Ref Range Status   02/17/2024 264 150 - 450 10e3/uL Final   02/16/2024 241 150 - 450 10e3/uL Final     Creatinine   Date Value Ref Range Status   02/18/2024 1.28 (H) 0.51 - 0.95 mg/dL Final   08/20/2018 1.07 0.70 - 1.20 mg/dL Final     Sodium   Date Value Ref Range Status   02/18/2024 139 135 - 145 mmol/L Final     Comment:     Reference intervals for this test were updated on 09/26/2023 to more accurately reflect our healthy population. There may be differences in the flagging of prior results with similar values performed with this method. Interpretation of those prior results can be made in the context of the updated reference intervals.      Glucose   Date Value Ref Range Status   02/18/2024 132 (H) 70 - 99 mg/dL Final   02/17/2024 110 (H) 70 - 99 mg/dL Final   11/28/2021 96 70 - 99 mg/dL Final   11/27/2021 129 (H) 70 - 99 mg/dL Final   08/20/2018 82 60 - 99 mg/dL Final     INR   Date Value Ref Range Status   02/18/2024 4.66 (H) 0.85 - 1.15 Final     Imaging:    I personally reviewed the ABIs from 10/31/2023 which is non diagnostic on the right and 0.55 on the left.    Impression/Shared Medical Decision Making:    #1- Peripheral arterial disease, indeterminate severity  #2- Multiple wounds, slow to heal  #3- Recent admissions for falls  #4- Hyperlipidemia  #5- Combined systolic and diastolic heart failure, EF 35-40% in 2022  #6- Mild mitral regurgitation  #7- Prior CVA  #8- Chronic kidney disease  #9- Atrial fibrillation  #10- History of ventricular apical thrombus  #11- Former nicotine dependence, quit  Ms. Gannarelli is a pleasant 89 year  old female evaluated for wounds in the setting of peripheral arterial disease.  We discussed the natural history and etiology of peripheral arterial disease as well as modifiable risk factors.  We discussed that her studies are relatively inconclusive and ideally she would undergo a more comprehensive workup to determine if she would benefit from intervention. I do suspect she has enough disease that her healing potential is reduced.    Risks, benefits, and alternatives of vascular intervention including but not limited to death, anesthetic or cardiopulmonary complications, bleeding or access site complications, infection, dissection, distal embolization, vessel perforation or other injury, worsening ischemia with resulting limb loss, compartment syndrome, fasciotomy, and acute renal insufficiency due to contrast exposure requiring temporary or permanent dialysis.  We discussed need for further vascular evaluation to determine need for intervention and severity of her peripheral arterial disease.  At this time, given her frailty and other issues, the patient and her family would prefer to avoid intervention and would like to forgo travel to [place] for more comprehensive evaluation.  We discussed possibility of worsening PAD, wound or limb loss    Discussed warning signs including, but not limited to, acute limb ischemia, vascular lower extremity pain, motor or sensory dysfunction, chronic limb threatening ischemia, ischemic rest pain, and wounds.  If she experiences any of these, she has been advised to seek treatment and contact my team.    Ms. Walls  and her family are in agreement with this plan as outlined.    Recommendations/Plan:  I do think it would be quite reasonable to undergo more comprehensive vascular evaluation.  The patient and her family do not want more aggressive treatment and would like to proceed with continued wound care given the wounds are healing     Wes Garcia,  MD  Vascular & Endovascular Surgery      30 minutes spent on the day of encounter doing chart review from our system and care everywhere, history and exam, documentation, coordinating care, and further activities as noted with over half spent counseling.

## 2024-03-11 PROBLEM — L03.115 CELLULITIS OF RIGHT LEG: Status: ACTIVE | Noted: 2024-01-01

## 2024-03-11 PROBLEM — E03.9 ACQUIRED HYPOTHYROIDISM: Status: ACTIVE | Noted: 2023-04-13

## 2024-03-11 PROBLEM — F41.9 ANXIETY DISORDER, UNSPECIFIED: Status: ACTIVE | Noted: 2023-04-18

## 2024-03-11 PROBLEM — L97.309: Status: ACTIVE | Noted: 2024-01-01

## 2024-03-11 PROBLEM — M84.40XA PATHOLOGICAL FRACTURE: Status: ACTIVE | Noted: 2021-11-27

## 2024-03-11 PROBLEM — I87.2: Status: ACTIVE | Noted: 2024-01-01

## 2024-03-11 NOTE — PHARMACY-VANCOMYCIN DOSING SERVICE
Pharmacy Vancomycin Initial Note  Date of Service 2024  Patient's  1933  89 year old, female    Indication: Skin and Soft Tissue Infection    Current estimated CrCl = Estimated Creatinine Clearance: 25.8 mL/min (A) (based on SCr of 1.1 mg/dL (H)).    Creatinine for last 3 days  3/11/2024:  8:52 AM Creatinine 1.10 mg/dL    Recent Vancomycin Level(s) for last 3 days  No results found for requested labs within last 3 days.      Vancomycin IV Administrations (past 72 hours)                     vancomycin (VANCOCIN) 1,000 mg in 200 mL dextrose intermittent infusion (mg) 1,000 mg New Bag 24 1034                    Nephrotoxins and other renal medications (From now, onward)      None            Contrast Orders - past 72 hours (72h ago, onward)      None            [employer] Prediction of Planned Initial Vancomycin Regimen  Loading dose: N/A  Regimen: 750 mg IV every 24 hours.  Start time: 10:34 on 2024  Exposure target: AUC24 (range)400-600 mg/L.hr   AUC24,ss: 541 mg/L.hr  Probability of AUC24 > 400: 82 %  Ctrough,ss: 17.7 mg/L  Probability of Ctrough,ss > 20: 38 %  Probability of nephrotoxicity (Lodise MARIO ): 14 %          Plan:  Start vancomycin  750 mg IV q24h.   Vancomycin monitoring method: AUC  Vancomycin therapeutic monitoring goal: 400-600 mg*h/L  Pharmacy will check vancomycin levels as appropriate in 1-3 Days.    Serum creatinine levels will be ordered daily for the first week of therapy and at least twice weekly for subsequent weeks.      Lena Pham RPH

## 2024-03-11 NOTE — PHARMACY-ANTICOAGULATION SERVICE
"Pharmacy Consult-Warfarin Assessment Day #1    Shannon Walls is a 89 year old female admitted on 3/11/2024 with Cellulitis of right leg    Primary Indication(s) for Anticoagulation: A-fib    Goal INR: 2-3    FYI, patient is followed by the Anticoagulation[facility] at: Med rec was not completed by med scribe, so unsure if this information is accurate but based on last inpatient pharmacy note: \"[facility] staff draws INR and sends to [facility] and/or [facility] and [facility] doses\"    Patient previously anticoagulated on Coumadin at a dose of 5 mg Fridays, 2.5 mg ROW. Patient has not received dose yet today. Last dose 3/10/24. Confirmed this info with nurse Celis on 3/11/24 at 1630. Last INR check was 3/7/24, next one was scheduled for today    Home tablet strength(s): \"2.5 mg & 5 mg\" per last inpatient pharmacy note    Factors that may increase patient's bleeding risk and/or sensitivity to warfarin (Coumadin) include: Advanced age, prednisone, tramadol    Factors that may decrease patient's bleeding risk and/or sensitivity to warfarin (Coumadin) include: none    Anticoagulation Dose History  More data exists         Latest Ref Rng & Units 12/18/2023 12/29/2023 2/15/2024 2/16/2024 2/17/2024 2/18/2024 3/11/2024   Recent Dosing and Labs   warfarin ANTICOAGULANT (COUMADIN) 2.5 MG tablet - - - 2.5 mg, $Given 2.5 mg, $Given - - -   INR 0.85 - 1.15 3.03  5.80  2.39  2.71  3.94  4.66  2.87      CBC RESULTS:   Recent Labs   Lab Test 03/11/24  0852   HGB 13.4          Assessment/Plan: INR therapeutic. Give warfarin 2.5 mg tonight. Recheck INR tomorrow with am labs.    Thank You for the Consult. Will continue to follow.    Ambar Temple [facility] ....................  3/11/2024   4:26 PM    "

## 2024-03-11 NOTE — PHARMACY-MEDICATION REGIMEN REVIEW
Pharmacy Antimicrobial Stewardship Assessment     Current Antimicrobial Therapy:  Anti-infectives (From now, onward)      Start     Dose/Rate Route Frequency Ordered Stop    24 1230  cefTRIAXone in d5w (ROCEPHIN) intermittent infusion 1 g         1 g  over 30 Minutes Intravenous EVERY 24 HOURS 24 1303      24 1000  vancomycin (VANCOCIN) 750 mg in 5% dextrose 150 mL intermittent infusion         750 mg  over 60 Minutes Intravenous EVERY 24 HOURS 24 1342              Indication: SSTI    Days of Therapy: 1     Pertinent Labs:  Recent Labs   Lab Test 24  0852 WBC 21.2*   Recent Labs   Lab Test 24  1428 24  1224 LACT 2.5* 3.2* PCAL  --   --       Temperature:  Temp (24hrs), Av.5  F (36.4  C), Min:96.8  F (36  C), Max:98.4  F (36.9  C)      Culture Results:   30-Day Micro Results       Collected Updated Procedure Result Status      2024 1236 2024 1239 Blood Culture Hand, Right [[id number]]   Blood from Hand, Right    In process Component Value   No component results               2024 1224 2024 1239 Blood Culture Arm, Left [[id number]]   Blood from Arm, Left    In process Component Value   No component results               2024 1012 2024 1015 MRSA MSSA PCR, Nasal Swab [[id number]]   Swab from Nose    In process Component Value   No component results            02/15/2024 1128 02/15/2024 1213 Symptomatic Influenza A/B, RSV, & SARS-CoV2 PCR (COVID-19) Nasopharyngeal [[id number]]    Swab from Nasopharyngeal    Final result Component Value   Influenza A PCR Negative   Influenza B PCR Negative   RSV PCR Negative   SARS CoV2 PCR Negative   NEGATIVE: SARS-CoV-2 (COVID-19) RNA not detected, presumed negative.                   2023 Knee Wound - ELLE    Recent Antibiotics:      Recommendations/Interventions:  1. Stop vancomycin after 48 hours per Vancomycin Use Guidelines    Sal Ledesma, formerly [facility]  2024

## 2024-03-11 NOTE — H&P
[facility]    History and Physical - Hospitalist Service       Date of Admission:  3/11/2024    Assessment & Plan      Shannon Walls is a 89 year old female admitted on 3/11/2024. She  established diagnosis of hypothyroidism, permanent atrial fibrillation, anxiety, history of cva, history of tobacco use, CHD stage 3a, compression fracture of thoracic vertebra. Patent admitted with cellulitis of her right leg also had elevated lactic acid receiving IV fluids. She was started on broad spectrum antibiotics. She di have afib rvr un ER resolved with IV metoprolol     Principal Problem:  Cellulitis of right leg (3/11/2024)  Elevated lactic acid  Leukocytosis                      On vancomycin Rocephin  Monitor cultures  Repeat lactic acid  Normal saline at 75 cc an hour    Active Problems:   History Pathological fracture L4 vertebrae (11/27/2021)     History Compression fracture of thoracic vertebra, unspecified thoracic vertebral level, sequale    (H) (2/15/2024)  -PT  -Fall risk     Permanent atrial fibrillation (H) (11/29/2021)   Chronic anticoagulation (Coumadin) (2/21/2024)  -Home metoprolol  -Coumadin ordered     Acquired hypothyroidism (4/13/2023)  -home synthroid    Anxiety disorder, unspecified (4/18/2023)  -prn ativan    History of CVA on 4/13/23 (2/5/2024)    History of tobacco abuse quitting in the 50s and 60s (2/5/2024)  -prn nicotine patch    Stage 3a chronic kidney disease (H) (2/5/2024)  -Daily labs      Mixed hyperlipidemia (2/5/2024)  -home statin      Venous stasis ulcer of ankle without varicose veins (H) (3/11/2024)  -wound care consulted, appreciate input          Diet: Regular Diet Adult    DVT Prophylaxis: Warfarin  Garcia Catheter: Not present  Lines: None     Cardiac Monitoring: None  Code Status: No CPR- Do NOT Intubate      Clinically Significant Risk Factors Present on Admission               # Drug Induced Coagulation Defect: home medication list includes an anticoagulant  medication     # Heart failure, NOS: heart failure noted on the problem list and last echo with EF 40-50%                Disposition Plan      Expected Discharge Date: 03/13/2024      Destination: assisted living            Manda Martinez DO  Hospitalist Service  [facility]  Securely message with [employer] (more info)  Text page via [facility] Paging/Directory     ______________________________________________________________________    Chief Complaint   Leg swelling     History is obtained from the patient, electronic health record, and emergency department physician.    History of Present Illness   Shannon Walls is a 89 year old female who  established diagnosis of hypothyroidism, permanent atrial fibrillation, anxiety, history of cva, history of tobacco use, CHD stage 3a, compression fracture of thoracic vertebra. She resides at nursing home she was brought in to hospital with leg swelling redness.  She was found elevated white blood cell count 21,000, elevated creatinine 1.1, INR 2.87, on Coumadin, MRSA wound culture was pending, patient also did have a lactic acid 3.2,.  Patient did have A-fib RVR in the emergency room did require dose of her home metoprolol and also IV push, her heart rate did improve into the 80s, patient is most of the time in A-fib.  She has been previously admitted to the hospital with cellulitis and need for wound care.  She was given Rocephin and vancomycin, patient is previously established DNR.      Past Medical History    Past Medical History:   Diagnosis Date    Atrial fibrillation with rapid ventricular response (H)        Past Surgical History   Past Surgical History:   Procedure Laterality Date    EYE SURGERY      OPEN REDUCTION INTERNAL FIXATION WRIST Right 8/22/2018    Procedure: OPEN REDUCTION INTERNAL FIXATION WRIST;  OPEN REDUCTION INTERNAL FIXATION RIGHT DISTAL RADIUS;  Surgeon: Taqueria Edwards MD;  Location: [facility]       Prior to Admission Medications   Prior  to Admission Medications   Prescriptions Last Dose Informant Patient Reported? Taking?   GAVILAX 17 GM/SCOOP powder 3/10/2024 at 0751 Nursing Home Yes Yes   Sig: Take 17 g by mouth every morning -hold if having loose stools.   LORazepam (ATIVAN) 0.5 MG tablet  Nursing Home Yes No   Sig: Take 0.5 mg by mouth At Bedtime    Multiple Vitamins-Minerals (CERTAVITE SENIOR/ANTIOXIDANT) TABS 3/10/2024 at 0913  Yes Yes   Sig: Take 1 tablet by mouth every morning   NP THYROID 30 MG tablet  Nursing Home Yes No   Sig: Take 30 mg by mouth every morning (before breakfast)   Vitamin D, Cholecalciferol, 10 MCG (400 UNIT) TABS 3/10/2024 at 0913 Nursing Home Yes Yes   Sig: Take 1 tablet by mouth every morning   atorvastatin (LIPITOR) 40 MG tablet 3/10/2024 at 2150 Nursing Home Yes Yes   Sig: Take 1 tablet by mouth at bedtime   calcium carbonate (OS-EFFIE) 1500 (600 Ca) MG tablet 3/10/2024 at 1632 Nursing Home Yes Yes   Sig: Take 600 mg by mouth every evening   digoxin (LANOXIN) 125 MCG tablet  Nursing Home Yes No   Sig: Take 62.5 mcg by mouth every morning   furosemide (LASIX) 40 MG tablet 3/10/2024 at 0913  No Yes   Sig: Take 1 tablet (40 mg) by mouth every morning   metoprolol succinate ER (TOPROL XL) 100 MG 24 hr tablet 3/10/2024 at 2150  No Yes   Sig: Take 1 tablet (100 mg) by mouth at bedtime -check pulse first and HOLD if pulse < 55.   metoprolol succinate ER (TOPROL XL) 100 MG 24 hr tablet   No No   Sig: Take 1 tablet (100 mg) by mouth every morning   omeprazole (PRILOSEC) 20 MG DR capsule 3/11/2024 at 0708 Nursing Home Yes Yes   Sig: Take 20 mg by mouth 2 times daily   predniSONE (DELTASONE) 5 MG tablet 3/10/2024 at 0913 Nursing Home Yes Yes   Sig: Take 5 mg by mouth every morning   traMADol (ULTRAM) 50 MG tablet  Nursing Home Yes No   Sig: Take 50 mg by mouth 2 times daily   traMADol (ULTRAM) 50 MG tablet 3/11/2024 at 0418  Yes Yes   Sig: Take 50 mg by mouth daily as needed (back pain)   warfarin ANTICOAGULANT (COUMADIN) 2.5 MG  tablet 3/10/2024 at 2150  No Yes   Sig: HOLD warfarin 2/18/24;  HOLD warfarin 2/19/24;  Recheck INR on 2/20/24 and receive additional dosing instructions per Anticoagulation Clinic.   warfarin ANTICOAGULANT (COUMADIN) 5 MG tablet   No No   Sig: HOLD warfarin 2/18/24;  HOLD warfarin 2/19/24;  Recheck INR on 2/20/24 and receive additional dosing instructions per Anticoagulation Clinic.      Facility-Administered Medications: None        Review of Systems    The 10 point Review of Systems is negative other than noted in the HPI or here.      Social History   I have reviewed this patient's social history and updated it with pertinent information if needed.  Social History     Tobacco Use    Smoking status: Former    Smokeless tobacco: Never         Family History     Family history non contributory       Allergies   Allergies   Allergen Reactions    Levothyroxine Shortness Of Breath and Swelling     Swelling in feet     Nitrogen Swelling and Blisters     Liquid nitrogen (swelling at sight)        Physical Exam   Vital Signs: Temp: 97.4  F (36.3  C) Temp src: Tympanic BP: (P) 98/69 Pulse: 91   Resp: 20 SpO2: 93 % O2 Device: None (Room air)    Weight: 108 lbs .41 oz    Physical Exam  Constitutional:       Comments: Frail appearing stated age female    HENT:      Right Ear: External ear normal.      Left Ear: External ear normal.      Mouth/Throat:      Pharynx: Oropharynx is clear.   Cardiovascular:      Rate and Rhythm: Normal rate.   Pulmonary:      Effort: Pulmonary effort is normal.   Abdominal:      General: Abdomen is flat.   Musculoskeletal:         General: No swelling.   Skin:     Coloration: Skin is not jaundiced.      Findings: Erythema present.   Neurological:      Mental Status: She is alert.      Comments: Pleasantly confused                          Medical Decision Making       49 MINUTES SPENT BY ME on the date of service doing chart review, history, exam, documentation & further activities per the note.       Data     I have personally reviewed the following data over the past 24 hrs:    21.2 (H)  \   13.4   / 227     138 97 (L) 25.8 (H) /  119 (H)   4.2 29 1.10 (H) \     Procal: N/A CRP: N/A Lactic Acid: 3.2 (H)         Imaging results reviewed over the past 24 hrs:   Recent Results (from the past 24 hour(s))   US Lower Extremity Venous Duplex Bilateral    Narrative    Exam:US LOWER EXTREMITY VENOUS DUPLEX BILATERAL    History: Leg pain and swelling bilaterally    Comparisons:none    Technique: Venous duplex ultrasonography of the bilateral lower  extremities was performed.     Findings: The common femoral veins, superficial femoral veins and  popliteal veins are fully compressible with spontaneous and  augmentable venous flow.           Impression    Impression: No evidence of deep venous thrombosis within the lower  extremities.    PRABHAKAR GASCA MD         SYSTEM ID:  [id number]   XR Tibia and Fibula Left 2 Views    Narrative    Exam: XR TIBIA AND FIBULA LEFT 2 VIEWS    Technique: Left tibia and fibula, 2 views    Comparison: None.    Exam reason: ulcers and pain, eval for underlying osteo    Findings:  No acute fracture or dislocation. No focal erosion or lucency to  suggest osteomyelitis.    Vascular calcifications are noted. There is diffuse soft tissue  swelling.      Impression    Impression:  No acute fracture or dislocation.    No focal erosion or lucency to suggest osteomyelitis.    PAULO MARTINEZ MD         SYSTEM ID:  [id number]   XR Tibia and Fibula Right 2 Views    Narrative    Exam: XR TIBIA AND FIBULA RIGHT 2 VIEWS    Technique: Right tibia and fibula, 2 views    Comparison: None.    Exam reason: ulcers and pain, eval for underlying osteo    Findings:  No acute fracture or dislocation. No focal erosion or lucency to  suggest osteomyelitis.    Vascular calcifications are noted. There is diffuse soft tissue  swelling.      Impression    Impression:  No acute fracture or dislocation.    No focal  erosion or lucency to suggest osteomyelitis.    PAULO MARTINEZ MD         SYSTEM ID:  [id number]

## 2024-03-11 NOTE — PLAN OF CARE
[facility] Inpatient Admission Note:    Patient admitted to 3224/3224-1 at approximately 1300 via cart accompanied by transport tech from emergency room . Report received from ED RN in SBAR format at 1245 via telephone. Patient transferred to bed via slide board.. Patient is alert and oriented X 3, denies pain; rates at 0 on 0-10 scale.  Patient oriented to room, unit, hourly rounding, and plan of care. Explained admission packet and patient handbook with patient bill of rights brochure. Will continue to monitor and document as needed.     Inpatient Nursing criteria listed below was met:    Health care directives status obtained and documented: Yes    Patient identifies a surrogate decision maker: Yes If yes, who:daughters or  friend Brandy Engel Contact Information:in record     If initial lactic acid greater than 2.0, repeat lactic acid drawn within one hour of arrival to unit: NA. If no, state reason: na    Clergy visit ordered if patient requests: No    Skin issues/needs documented: Yes    Isolation Patient: no Education given, correct sign in place and documentation row added to PCS:  No    Fall Prevention Yes: Care plan updated, education given and documented, sticker and magnet in place: Yes    Care Plan initiated: Yes    Education Documented (including assessment): Yes    Patient has discharge needs : Yes If yes, please explain:woundcare

## 2024-03-11 NOTE — PROVIDER NOTIFICATION
DATE/TIME OF CALL RECEIVED FROM LAB:  03/11/24 at 1441 PM   LAB TEST:  Lactic Acid  LAB VALUE:  2.5  PROVIDER NOTIFIED?: Yes  PROVIDER NAME: Dr. Cornell  DATE/TIME LAB VALUE REPORTED TO PROVIDER: 3765  MECHANISM OF PROVIDER NOTIFICATION: Face-To-Face  PROVIDER RESPONSE: Confirmed

## 2024-03-11 NOTE — ED PROVIDER NOTES
"ED Provider Note      History     Chief Complaint   Patient presents with    Leg Swelling        HPI    Shannon Walls is a 89 year old year old with history of permanent Afib, edema here with right hand pain.  Also with redness.  This has been leg swelling ongoing but worse in the right leg over the past few days.  She has had several movements of the left leg which have been healing.  Denies any fevers or chills.  She gets her medications from her nursing staff daily but has not received medications at this morning for her A-fib.  Does have some baseline shortness of breath that is worse when laying flat.        A medically appropriate review of systems was performed with pertinent positives and negatives noted in the HPI, and all other systems negative.    Physical Exam   BP (P) 98/69   Pulse 91   Temp 97.4  F (36.3  C)   Resp 20   Ht 1.499 m (4' 11\")   Wt 49 kg (108 lb 0.4 oz)   SpO2 93%   BMI 21.82 kg/m     Physical Exam  General: no acute distress.  HENT: MMM, no oropharyngeal lesions  Eyes: PERRL, normal sclerae  Neck: non-tender, supple  Cardio: Irregular rate and rhythm  Resp: Normal work of breathing, no accessory muscle use  Abdomen: non tender, non-distended, no rebound, no guarding  Neuro: alert and oriented. Grossly normal strength and sensation in all extremities.   MSK: no deformities. Grossly normal ROM in extremities.   Integumentary/Skin: Several lesions of the left lower extremity that do not appear acutely infected.  Abrasions to bilateral arms.  Redness and swelling to the right lower extremity.      Procedures, & Data      Procedures      Medical Decision Making and ED Course    Patient is a 89-year-old female with history of lymphedema, permanent A-fib on metoprolol who presents for right lower extremity swelling, redness, and pain.  She does have baseline swelling related to lymphedema but worse over the last several days in the right lower extremity.  Patient is on blood " thinners is unsure if she has had a blood clot in the past.  Differentials include cellulitis, DVT, lymphedema, heart failure. Tachycardic but likely related to Afib. Afebrile so less likely sepsis.     ED Course as of 03/11/24 1322   Mon Mar 11, 2024   0833 EKG 12 lead  Afib with RVR with rates in 130s, has not received metoprolol yet this morning so this was ordered for her.  It is not causing her significant symptoms or hemodynamic changes likely consistent with previous A-fib and does not necessitate more emergent treatment at this time.   0917 US Lower Extremity Venous Duplex Bilateral  Reviewed and interpreted independently, no DVT visualized   0917 WBC(!): 21.2  Consistent with soft tissue infection   0937 Creatinine(!): 1.10  Stable from previous   1005 XR Tibia and Fibula Right 2 Views  No underlying fracture, no signs of osteomyelitis   1005 XR Tibia and Fibula Left 2 Views  No underlying fracture or signs of osteomyelitis   1115 POC US ECHO LIMITED  Bedside ultrasound with reduced function, shows A-fib, large left atrial dilation with regurgitation.  Consistent with her A-fib and heart failure   1142 Ceftriaxone added on.  Will give Tylenol for pain and IV metoprolol for ongoing tachycardia with A-fib with RVR   1255 Pulse: 91  Heart rate improved with metoprolol dosing   1255 Lactic Acid(!): 3.2  Mild elevation, considered fluids but given very poor heart function, plump IVC, and already getting 250cc fluid with antibiotics so will hold off on additional IVF     Patient admitted to inpatient hospitalist, signed out to Dr. Cornell.    I have reviewed the nursing notes. I have reviewed the findings, diagnosis, and plan with the patient.    Impression   Final diagnoses:   Cellulitis of right leg         Kadeem Harley MD  March 11, 2024       Kadeem Harley MD  03/11/24 1323     Burow's Advancement Flap Text: The defect edges were debeveled with a #15 scalpel blade.  Given the location of the defect and the proximity to free margins a Burow's advancement flap was deemed most appropriate.  Using a sterile surgical marker, the appropriate advancement flap was drawn incorporating the defect and placing the expected incisions within the relaxed skin tension lines where possible.    The area thus outlined was incised deep to adipose tissue with a #15 scalpel blade.  The skin margins were undermined to an appropriate distance in all directions utilizing iris scissors.

## 2024-03-12 NOTE — CONSULTS
SUBJECTIVE:  Shannon Walls, 89 year old, female presents with the following Chief Complaint(s) with HPI to follow:   Chief Complaint   Patient presents with    Leg Swelling          HPI:  I am asked to see Shannon regarding multiple wounds.  She was admitted to the hospital for cellulitis of the right lower extremity yesterday.  She has previous skin tears to the bilateral arms, wounds to the bilateral lower extremities, right 2nd toe, and right heel.  She tells me she has had pain to the bilateral feet and was unable to walk for two days prior to admission.  The right lower leg is reddened and edematous.  She is examined in the chair in her hospital room today.     She has a history multiple falls, atrial fibrillation with rapid ventricular response, CVA, compression fracture of thoracic vertebra.  She had an MARÍA on 10/31/2023, revealing significant peripheral arterial disease.     Wound history:   She was seen in wound care for the first time on 10/31/23 by Maame Campbell.  Her forehead is noted as being suspicious for skin cancer.  A biopsy was done in the past, (9/25/23) not conclusive. This area scabs, falls off and re-develops.    She lives in assisted living and staff are changing her dressings as directed.       Wound History:  10/21/23- Wounds developed, visit to ER  10/31/23- First visit to wound care.     10/31/23- MARÍA done:     FINDINGS: No Doppler flow was identified in the left posterior tibial  artery. The right posterior tibial artery was noncompressible. The  dorsalis pedis ankle brachial indices were 0.52 on the right and 0.55  on the left.  She has had multiple skin tears due to falls since the initial visit to wound care.     Patient Active Problem List   Diagnosis    Pathological fracture L4 vertebrae    Fracture of sacrum (H)    Permanent atrial fibrillation (H)    Altered mental status, unspecified altered mental status type    Acquired hypothyroidism    Anxiety disorder, unspecified     Chronic combined systolic and diastolic congestive heart failure (H)    Gastroesophageal reflux disease without esophagitis    Atrial fibrillation with RVR (H)    Fall at home, initial encounter    Current chronic use of systemic steroids    Decubitus skin ulcer    History of CVA on 4/13/23    Peripheral edema    History of tobacco abuse quitting in the 50s and 60s    Stage 3a chronic kidney disease (H)    Elevated brain natriuretic peptide (BNP) level    Mixed hyperlipidemia    Mild aortic stenosis    H/O apical mural thrombus    PAD (peripheral artery disease) (H24)    Elevated BUN    Atrial fibrillation with rapid ventricular response (H)    Fall, initial encounter    Compression fracture of thoracic vertebra, unspecified thoracic vertebral level, initial encounter (H)    Chronic anticoagulation (Coumadin)    Cellulitis of right leg    Venous stasis ulcer of ankle without varicose veins (H)       Past Medical History:   Diagnosis Date    Atrial fibrillation with rapid ventricular response (H)        Past Surgical History:   Procedure Laterality Date    EYE SURGERY      OPEN REDUCTION INTERNAL FIXATION WRIST Right 8/22/2018    Procedure: OPEN REDUCTION INTERNAL FIXATION WRIST;  OPEN REDUCTION INTERNAL FIXATION RIGHT DISTAL RADIUS;  Surgeon: Taqueria Edwards MD;  Location: [facility]       No family history on file.    Social History     Tobacco Use    Smoking status: Former    Smokeless tobacco: Never   Substance Use Topics    Alcohol use: Not on file       No current outpatient medications on file.       Allergies   Allergen Reactions    Levothyroxine Shortness Of Breath and Swelling     Swelling in feet     Nitrogen Swelling and Blisters     Liquid nitrogen (swelling at sight)       REVIEW OF SYSTEMS  Constitutional, HEENT, cardiovascular, pulmonary, gi and gu systems are negative, except as otherwise noted.     OBJECTIVE:  /65 (BP Location: Left arm, Patient Position: Sitting, Cuff Size: Adult Small)   Pulse  "101   Temp 98.2  F (36.8  C) (Tympanic)   Resp 16   Ht 1.499 m (4' 11\")   Wt 49 kg (108 lb 0.4 oz)   SpO2 96%   BMI 21.82 kg/m       Constitutional: alert and no distress  Head: Normocephalic. No masses, lesions, tenderness or abnormalities  Respiratory:  Respirations even and unlabored  Musculoskeletal: severe kyphosis  Skin: All scabbed areas painted with betadine swab.  Duoderm thin intact to right heel.        Wound description:     Type of Wound:  stasis ulcers   Location:  right 2nd toe   Drainage amount:  moderate   Drainage color:  serous - tan   Odor:  none   Wound bed:  pink   Depth:  full thickness   Surrounding skin:  fragile        Measurements:      Right 2nd toe - 0.2 x 0.4 cm (probes to bone - not a new finding)    Pain:  denies   Wound debridement completed on: 3/12/2024, debridement type: Mechanical        Dressing change:      Wounds cleansed with normal saline.  Verbal consent obtained for debridement. Conservative, non-excisional debridement performed with dry gauze to remove non-viable tissue (1 sq cm) to the level of the subcutaneous.  Dressed with Mepilex Ag foam, cut to fit wound, secured with medipore tape.    Wound description:     Type of Wound:  trauma   Location:  right shin   Drainage amount:  moderate   Drainage color: serosanguinous   Odor:  none   Wound bed:  50% pink/50% white with crusting around the wound   Depth:  full thickness   Surrounding skin:  fragile        Measurements:  3.5 x 3.5 cm   Pain:  tender with debridement   Wound debridement completed on: 3/12/2024, debridement type: Mechanical        Dressing change:      Wound cleansed with normal saline.  Verbal consent obtained for debridement. Conservative, non-excisional debridement performed with dry gauze to remove non-viable tissue (1 sq cm) to the level of the subcutaneous.  Dressed with Mepilex Ag foam, cut to fit wound, secured with medipore tape.        Wound description:     Type of Wound:  stasis " ulcers   Location:  left lower extremity    Drainage amount:  moderate   Drainage color:  serous - tan   Odor:  none   Wound bed:  pink with slough   Depth:  full thickness   Surrounding skin:  fragile, intact        Measurements:      Left medial leg - 1 x 1.4 cm    Left lateral leg  - 0.5 x 0.3 cm   Left shin anterior - 0.5 x 0.4 cm    Pain:  Denies   Wound debridement completed on: 3/12/2024, debridement type: Mechanical       Dressing change:      Wound cleansed with mild soap, rinse, dried.  Conservative, non-excisional debridement performed with dry gauze to remove non-viable tissue (2 sq cm) into the level of the subcutaneous.  Patient was informed of the risks and benefits and consented to the procedure.  Dressed with Mepilex Ag foam to each wound, all wounds covered 4x4 gauze (4), secured with rolled gauze and medipore tape.           Wound description:     Type of Wound:  Trauma   Location:  bilateral arms    Drainage amount:  moderate   Drainage color:  serous   Odor:  none   Wound bed:     Depth:  full thickness   Surrounding skin:  ecchymotic, thin, and fragile        Measurements:      Left arm - 10 x 1.5 cm    Right elbow - 2.3 x 1.3 cm    Pain:  tender   Wound debridement completed on: 3/12/2024, debridement type: Mechanical        Dressing change:      Wound cleansed with mild soap, rinse, dried.  Conservative, non-excisional debridement performed with dry gauze to remove non-viable skin (5 sq cm) into the level of the dermis.  Patient was informed of the risks and benefits and consented to the procedure.  Dressed with oil emulsion gauze impregnated with additional petrolatum (cut to fit) each wound, covered each wound with two pieces of 4x4 gauze, secured each with 1/2 roll of rolled gauze.      Neurologic: Sensation grossly WNL.  Psychiatric: affect normal/bright, interactive       ASSESSMENT / PLAN:  Dressing changes as follows:      - Right and left upper arms:  Daily dressing changes with oil  emulsion gauze, impregnated with petrolatum (cut to fit wounds), cover with dry gauze, secure with rolled gauze (1/2 roll) and medipore tape.  - Right and left lower extremities(shins), right heel:  Every three day dressing changes with Mepliex Ag foam (cut to fit wounds), secure with rolled gauze(1/2 roll for each) and medipore tape.   - Right 2nd toe:  Every three day dressing changes with Mepilex Ag foam (cut to fit), secure with medipore tape.   - Paint all scabs with betadine swab daily  - Observe right heel each shift for skin breakdown; ensure tegaderm thin is intact.     Due to her peripheral arterial disease, no compression to the bilateral lower extremities.  Elevate as much as possible.       Mei Bernard CNS  Surgery and Wound Care  [facility]

## 2024-03-12 NOTE — PHARMACY-ANTICOAGULATION SERVICE
Pharmacy Consult - Warfarin Assessment Day #2    Shannon Walls is a 89 year old female admitted on 3/11/2024 with Cellulitis of right leg    Primary Indication(s) for Anticoagulation: Afib    Goal INR: 2-3    FYI, patient is followed by the [facility] at: Managed by Dr. Mcgregor (nurse at [facility] calls in INR for dosing instructions)    Patient previously anticoagulated on Coumadin dosed as: 5 mg Fridays; 2.5 mg all other days (recent instructions to hold dose on 3/7 and 3/8, take 2.5 mg on 3/9 and 3/10, then recheck INR on 3/11)    Home tablet strength(s): 2.5 mg    Factors that may increase patient's bleeding risk and/or sensitivity to warfarin (Coumadin) include: advanced age, prednisone, tramadol (currently held inpatient)    Factors that may decrease patient's bleeding risk and/or sensitivity to warfarin (Coumadin) include: -      Anticoagulation Dose History  More data exists         3/11/2024 3/12/2024   Recent Dosing and Labs   warfarin ANTICOAGULANT (COUMADIN) 2.5 MG tablet 2.5 mg, $Given -   INR 2.87  5.14      CBC RESULTS:   Recent Labs   Lab Test 03/12/24  0554   HGB 12.7          Assessment/Plan: INR supratherapeutic. Will hold warfarin dose today. Recheck INR with AM labs.    Thank You for the Consult. Will continue to follow.    Lena Pham RP ....................  3/12/2024   10:29 AM

## 2024-03-12 NOTE — PROGRESS NOTES
"CLINICAL NUTRITION SERVICES  -  ASSESSMENT NOTE    REASON FOR ASSESSMENT:  Admission Nutrition Risk Screen - unsure of weight loss      NUTRITION HISTORY  Shannon Walls is a 89 year old female admitted for cellulitis of right leg. Medical hx includes a fib, CHF, HLD, hypothyroidism, CKD stage 3, CVA. Multiple wounds. Weight loss noted last month, appears to have gained some weight since her last admission.  Usual weight is around 110lbs. Currently pt reports having no appetite. She was drinking a vanilla Boost supplement as an evening snack daily but she was having heartburn in the evening. She has since stopped drinking Boost and her heartburn has improved. She will still have a small snack in the evening. Encouraged her to graze throughout the day while appetite is poor.    CURRENT NUTRITION ORDERS  Diet Order:   Orders Placed This Encounter      Regular Diet Adult  Current Intake/Tolerance: 1 meal documented with 75% itnake    ANTHROPOMETRICS  Height: 4' 11\"  Weight: 108 lbs .41 oz  Body mass index is 21.82 kg/m .  Weight Status:  Normal BMI  Weight History:   Wt Readings from Last 10 Encounters:   03/11/24 49 kg (108 lb 0.4 oz)   03/07/24 46.3 kg (102 lb)   02/21/24 45.2 kg (99 lb 9.6 oz)   02/18/24 43.5 kg (95 lb 14.4 oz)   02/05/24 49 kg (108 lb 1.6 oz)   01/31/24 48.4 kg (106 lb 11.2 oz)   01/31/24 48.4 kg (106 lb 11.2 oz)   12/18/23 48.4 kg (106 lb 11.2 oz)   11/17/23 49.9 kg (110 lb)   10/21/23 49.9 kg (110 lb 0.2 oz)        ASSESSED NUTRITION NEEDS:  44.5kg  Estimated Energy Needs: 0742-8355 kcals (30-35 Kcal/Kg)  Estimated Protein Needs: 45-55 grams protein (1-1.2 g pro/Kg)    Malnutrition Diagnosis: met criteria for moderate malnutrition last admission (2/2024) with weight loss > 2% in 1 week (severe malnutrition), mild to moderate muscle and subcutaneous fat loss and weakness. Stable/somewhat improved.  In Context of:  Acute illness or injury, Chronic illness or disease    NUTRITION " INTERVENTIONS  Recommendations / Nutrition Prescription  Encourage intake during meal times. Encourage snacking/grazing between meals.    MONITORING AND EVALUATION:  Intake, weight, labs

## 2024-03-12 NOTE — PROGRESS NOTES
03/12/24    General Information   Assessment completed with: Patient;Other  ([place])   Person spoke with Jahaira Ortega   Type of CM/SW Visit Initial Assessment   Primary Care Provider verified and updated as needed? Yes   Readmission Within the Last 30 Days Admission on 02/15/24 for A-fib and fall   Reason for Consult discharge planning   Communication Assessment   Patient / Family communication style spoken language (English or Bilingual)   Living Environment   Current Living Arrangements assisted living   Care Provided by other (see comments)  (Facility staff)   Provides Care For no one   Able to Return to Prior Arrangements TBD   Employment/   Employment Status retired   Current or Previous Occupation education   Current Resources   Patient receiving home care services: Yes, home care through [employer]   [employer] None   Supplies Currently Used at Home Wound Care Supplies   Functional Status   Usual Activity Tolerance moderate   Current Activity Tolerance fair   Assessment of Functional Status   Dependent ADLs: Ambulation-walker;Wheelchair-independent;Bathing;Dressing  (Help with socks only for dressing)   Dependent IADLs: Cleaning;Cooking;Laundry;Shopping;Meal Preparation;Medication Management;Transportation   Intellectual Performance WDL   Level of Consciousness alert   Discharge Needs Assessment   Equipment Currently Used at Home wheelchair, manual;walker, standard;walker, rolling;grab bar, toilet;grab bar, tub/shower;shower chair   Transportation Anticipated family or friend will provide;agency

## 2024-03-12 NOTE — PLAN OF CARE
Goal Outcome Evaluation:    Patient alert and oreinted x4. Forgetful.   Calm and cooperative.   Able to make needs known. Calls appropriately.   [place].   Not OOB this shift.   Pain managed with scheduled tramadol.   BUE/BLE wound dressings in place.   IVF infusing per orders.   IV abx infused per orders.   General diet.  Repo Q2H as patient allows.   Bedpan voiding.   Discharge plans pending.     Face to face report given with opportunity to observe patient.    Report given to Lalita.    Patti Westfall RN   3/12/2024  7:00 AM

## 2024-03-12 NOTE — MEDICATION SCRIBE - ADMISSION MEDICATION HISTORY
Medication Scribe Admission Medication History    Admission medication history is complete. The information provided in this note is only as accurate as the sources available at the time of the update.    Information Source(s): Facility (U/NH/) medication list/MAR and CareEverywhere/SureScripts via N/A    Coumadin information:  INR date: 3/7/24  INR result: 4.6  Next INR date: 11th  Range: 2.0-3.0  Indication: chronic a-fib    Coumadin strength/instructions: hold on 7th and 8th and take 2.5 mg on 9th and 10th and recheck on the 11th (normal dosage is 5 mg on Fridays 2.5 mg all other days)  Tablet strength: 2.5 mg    Time of day patient takes coumadin at home: 9 PM  Exact last dose/time patient took PTA: 3/10/24 9 pm    Patient's coumadin clinic facility and contact: [facility] (nurse at [facility] calls in INR)  Fax number for discharge instructions (if applicable): [phone]    Pertinent Information:   Patient resides at [facility] in [place] and staff manages medications.   Update 3/13/24: Recent medication change: home digoxin held/stopped as of 2/28/24 by Dr. Mcgregor due to concern that it could be contributing to her upset stomach (Per [facility] notes)    Changes made to PTA medication list:  Added: none  Deleted:   lorazepam- marked as HELD on MAR  Standing orders  Changed: updated coumadin, input metoprolol as one entry    Allergies reviewed with patient and updates made in EHR: yes    Medication History Completed By: Alexandra Anderson 3/12/2024 8:36 AM    PTA Med List   Medication Sig Last Dose    atorvastatin (LIPITOR) 40 MG tablet Take 1 tablet by mouth at bedtime 3/10/2024 at 2150    calcium carbonate (OS-EFFIE) 1500 (600 Ca) MG tablet Take 600 mg by mouth every evening -4:00 PM. 3/10/2024 at 1632    furosemide (LASIX) 40 MG tablet Take 1 tablet (40 mg) by mouth every morning 3/10/2024 at 0913    GAVILAX 17 GM/SCOOP powder Take 17 g by mouth every morning -hold if having loose stools.  3/10/2024 at 0751    LORazepam (ATIVAN) 0.5 MG tablet Take 0.5 mg by mouth at bedtime     metoprolol succinate ER (TOPROL XL) 100 MG 24 hr tablet Take 100 mg by mouth 2 times daily     Multiple Vitamins-Minerals (CERTAVITE SENIOR/ANTIOXIDANT) TABS Take 1 tablet by mouth every morning 3/10/2024 at 0913    NP THYROID 30 MG tablet Take 30 mg by mouth every morning (before breakfast) 7:00 AM 3/11/2024 at 0620    omeprazole (PRILOSEC) 20 MG DR capsule Take 20 mg by mouth 2 times daily 3/11/2024 at 0708    predniSONE (DELTASONE) 5 MG tablet Take 5 mg by mouth every morning 3/10/2024 at 0913    traMADol (ULTRAM) 50 MG tablet Take 50 mg by mouth daily as needed (back pain) 3/11/2024 at 0418    traMADol (ULTRAM) 50 MG tablet Take 50 mg by mouth 2 times daily 3/10/2024 at 2100    Vitamin D, Cholecalciferol, 10 MCG (400 UNIT) TABS Take 1 tablet by mouth every morning 3/10/2024 at 0913    warfarin ANTICOAGULANT (COUMADIN) 2.5 MG tablet Hold on the 7th and 8th and take 1 tablet (2.5 mg) by mouth on the 9th and 10th each evening at 9:00 PM. Re-check INR on the 11th. Take as directed by Dr. Gilda Bowen at [facility].

## 2024-03-12 NOTE — PROVIDER NOTIFICATION
Lab called with Lactic of 4.4, Provider updated face-to-face. Bolus ordered and maintained dose increased.

## 2024-03-12 NOTE — PLAN OF CARE
Goal Outcome Evaluation:    Pt is alert and oriented with forgetfulness. Reports minimal pain. Tramadol on hold along with Warfarin due to high INR of 5.1 Dilaudid and tylenol ordered for pain. Wound care seen wounds. Orders placed and state they will be up tomorrow to look at. Pt reports pain in her feet that has occurred before a couple days before admission otherwise she walks with walker independently normally at [facility]. VS unremarkable. Was able to get O2 x1 this shift, but was not strong signal. Assessment as charted.     Face to face report given with opportunity to observe patient.    Report given to LIZBET Armstrong RN   3/12/2024  7:07 PM

## 2024-03-12 NOTE — PHARMACY
Pharmacy Antimicrobial Stewardship Assessment     Current Antimicrobial Therapy:  Anti-infectives (From now, onward)      Start     Dose/Rate Route Frequency Ordered Stop    24 0630  ceFEPIme (MAXIPIME) 2 g vial to attach to  mL bag for ADULTS or 50 mL bag for PEDS         2 g  over 30 Minutes Intravenous EVERY 24 HOURS 24 0957      24 1000  vancomycin (VANCOCIN) 750 mg in 5% dextrose 150 mL intermittent infusion         750 mg  over 60 Minutes Intravenous EVERY 24 HOURS 24 1342              Indication: SSTI    Days of Therapy: 2 (Vancomycin), 1 (Cefepime)           Pertinent Labs:  Recent Labs   Lab Test 24  0554 24  0852 24  0440   WBC 24.2* 21.2* 8.7     Recent Labs   Lab Test 24  2304 24  1951 24  1224 10/04/23  1158   LACT 2.7* 4.0*   < >  --    PCAL  --   --   --  0.18*    < > = values in this interval not displayed.        Temperature:  Temp (24hrs), Av.9  F (36.6  C), Min:97.3  F (36.3  C), Max:98.9  F (37.2  C)      Culture Results:   30-Day Micro Results       Collected Updated Procedure Result Status      2024 1236 2024 0617 Blood Culture Hand, Right [[id number]]   Blood from Hand, Right    Preliminary result Component Value   Culture No growth after 12 hours  [P]                2024 1224 2024 0617 Blood Culture Arm, Left [[id number]]   Blood from Arm, Left    Preliminary result Component Value   Culture No growth after 12 hours  [P]                2024 1012 2024 195 MRSA MSSA PCR, Nasal Swab [[id number]]    Swab from Nose    Final result Component Value   MRSA Target DNA Negative   SA Target DNA Positive            02/15/2024 1128 02/15/2024 1213 Symptomatic Influenza A/B, RSV, & SARS-CoV2 PCR (COVID-19) Nasopharyngeal [[id number]]    Swab from Nasopharyngeal    Final result Component Value   Influenza A PCR Negative   Influenza B PCR Negative   RSV PCR Negative   SARS CoV2 PCR Negative    NEGATIVE: SARS-CoV-2 (COVID-19) RNA not detected, presumed negative.                     Recent Antibiotics:        Recommendations/Interventions:  1. MRSA nasal swab negative. Recommend discontinuing vancomycin.       Lena Pham [facility]  March 12, 2024

## 2024-03-12 NOTE — PROGRESS NOTES
[facility]    Medicine Progress Note - Hospitalist Service    Date of Admission:  3/11/2024    Assessment & Plan      Shannon Walls is a 89 year old female admitted on 3/11/2024. She  established diagnosis of hypothyroidism, permanent atrial fibrillation, anxiety, history of cva, history of tobacco use, CHD stage 3a, compression fracture of thoracic vertebra. Patent admitted with cellulitis of her right leg also had elevated lactic acid receiving IV fluids. She was started on broad spectrum antibiotics. She did have afib rvr un ER resolved with IV metoprolol. Omn 3/12 brocaded antibiotics to cefepime     Principal Problem:  Cellulitis of right leg (3/11/2024)  Elevated lactic acid  Leukocytosis                      On vancomycin   Stopped rocephin on 3/12  Started cefepime  Monitor cultures  Monitor  lactic acid  Normal saline at 50 cc an hour    Active Problems:   History Pathological fracture L4 vertebrae (11/27/2021)     History Compression fracture of thoracic vertebra, unspecified thoracic vertebral level, sequale    (H) (2/15/2024)  -PT  -Fall risk     Permanent atrial fibrillation (H) (11/29/2021)   Chronic anticoagulation (Coumadin) (2/21/2024)  -Home metoprolol  -Coumadin ordered     Acquired hypothyroidism (4/13/2023)  -home synthroid    Anxiety disorder, unspecified (4/18/2023)  -prn ativan    History of CVA on 4/13/23 (2/5/2024)    History of tobacco abuse quitting in the 50s and 60s (2/5/2024)  -prn nicotine patch    Stage 3a chronic kidney disease (H) (2/5/2024)  -Daily labs      Mixed hyperlipidemia (2/5/2024)  -home statin      Venous stasis ulcer of ankle without varicose veins (H) (3/11/2024)  -wound care consulted, appreciate input    Coagulopathy  Hold coumadin as per pharmacy  Suspect elevated INR in setting of infection   Monitor  May need to give vitamin k   No bleeding         Diet: Regular Diet Adult    DVT Prophylaxis: Warfarin  Garcia Catheter: Not present  Lines: None      Cardiac Monitoring: None  Code Status: No CPR- Do NOT Intubate      Clinically Significant Risk Factors Present on Admission               # Drug Induced Coagulation Defect: home medication list includes an anticoagulant medication     # Heart failure, NOS: heart failure noted on the problem list and last echo with EF 40-50%                Disposition Plan     pending clinical improvement          Manda Martinez DO  Hospitalist Service  [facility]  Securely message with [employer] (more info)  Text page via [employer] Paging/Directory   ______________________________________________________________________    Interval History   Patient was seen this morning for medical rounds.  Patient denies any pain but she does have swelling in the right leg she is receiving antibiotics and did have elevated lactic acid yesterday required IV fluid bolus.  Discussed yesterday her care with wound care and she will be seen later today.  Med rec is also being done.    Physical Exam   Vital Signs: Temp: 97.3  F (36.3  C) Temp src: Tympanic BP: 110/76 Pulse: 71   Resp: 18 SpO2: 96 % (was not a strong signal) O2 Device: None (Room air) Oxygen Delivery: 1 LPM  Weight: 108 lbs .41 oz   Physical Exam  Constitutional:       Comments: Frail appearing stated age female    HENT:      Right Ear: External ear normal.      Left Ear: External ear normal.      Nose: Nose normal.      Mouth/Throat:      Pharynx: Oropharynx is clear.   Cardiovascular:      Rate and Rhythm: Normal rate.   Pulmonary:      Effort: Pulmonary effort is normal.   Abdominal:      General: Abdomen is flat.   Musculoskeletal:         General: Swelling present.   Skin:     Coloration: Skin is not jaundiced.      Findings: Erythema present.   Neurological:      Mental Status: Mental status is at baseline.                       Medical Decision Making       53 MINUTES SPENT BY ME on the date of service doing chart review, history, exam, documentation & further  activities per the note.      Data     I have personally reviewed the following data over the past 24 hrs:    24.2 (H)  \   12.7   / 199     141 102 23.2 (H) /  73   4.2 27 1.07 (H) \     Procal: N/A CRP: N/A Lactic Acid: 2.7 (H)       INR:  5.14 (HH) PTT:  N/A   D-dimer:  N/A Fibrinogen:  N/A       Imaging results reviewed over the past 24 hrs:   No results found for this or any previous visit (from the past 24 hour(s)).

## 2024-03-12 NOTE — TELEPHONE ENCOUNTER
Jahaira SOMERS at [facility] in [place] left a message with cardiology stating that the patient was hospitalized.  She requested that her appt tomorrow be cancelled.  She did not request to reschedule, stating at this time we do not know of a discharge date.   Both provider and lab visit cancelled.

## 2024-03-12 NOTE — PLAN OF CARE
Goal Outcome Evaluation:    Received call from Celeste from [facility] [place], [place], who wanted update on pt condition, plan, when possible discharge would be. All questions answered at this time. This writer asked why lorazepam was held, it was reported PCP stopped due to frequent falls pt was having. They reported her falls minimized greatly with the discontinuation.

## 2024-03-12 NOTE — PLAN OF CARE
Goal Outcome Evaluation:    Pt is alert and oriented, with forgetfulness. Denies pain most of shift then reported 5/10 in legs. PRN given. Lungs clear, unlabored, no distressed, noted. Unable to obtain O2 sat. RT attempted as well with no success. Pt skin is very jadiel and red to all extremities, cool to touch. Tried warming hand up in warm blanket, ear probe and forehead sensor with no success. Provider updated. ABG gases ordered. Pt has wound to all extremities. New dressings placed on most wounds. See assessment. VSS. Assessment as charted. Call light in reach and alarms activated.     Face to face report given with opportunity to observe patient.    Report given to LIZBET Armstrong RN   3/11/2024  7:11 PM

## 2024-03-13 NOTE — PROGRESS NOTES
[facility]    Hospitalist Progress Note  Shannon Walls is a 89 year old female who  established diagnosis of hypothyroidism, permanent atrial fibrillation, anxiety, history of cva, history of tobacco use, CHD stage 3a, compression fracture of thoracic vertebra. She resides at nursing home she was brought in to hospital with leg swelling redness.  She was found elevated white blood cell count 21,000, elevated creatinine 1.1, INR 2.87, on Coumadin, MRSA wound culture was pending, patient also did have a lactic acid 3.2,.  Patient did have A-fib RVR in the emergency room did require dose of her home metoprolol and also IV push, her heart rate did improve into the 80s, patient is most of the time in A-fib.  She has been previously admitted to the hospital with cellulitis and need for wound care.  She was given Rocephin and vancomycin, patient is previously established DNR.     Assessment & Plan    Shannon Walls is a 89 year old female admitted on 3/11/2024. She  established diagnosis of hypothyroidism, permanent atrial fibrillation, anxiety, history of cva, history of tobacco use, CHD stage 3a, compression fracture of thoracic vertebra. Patent admitted with cellulitis of her right leg also had elevated lactic acid receiving IV fluids. She was started on broad spectrum antibiotics. She did have afib rvr un ER resolved with IV metoprolol. On 3/12 broadened antibiotics to cefepime    On vancomycin   Stopped rocephin on 3/12  Started cefepime  Monitor cultures  Monitor  lactic acid  Normal saline at 50 cc an hour  -3/13: Patient is afebrile.  White blood cell count 24,000 yesterday.  Today is pending.  Today's was 25,300.  CRP is 103.  Looking at her leg is still erythematous markedly less edema.  Does not appear to be extending anywhere else.  Blood cultures are negative.  Previous culture that she had done several months ago on the leg was coag positive staph.  Not MRSA.  Will discontinue  vancomycin put her on some doxycycline to cover for potential gram-positive's which is likely source.  Also use IV Ancef rather than cefepime.  Renal function has gone up a little bit.      History Compression fracture of thoracic vertebra, unspecified thoracic vertebral level, sequale    (H) (2/15/2024)  -PT  -Fall risk      Permanent atrial fibrillation (H) (11/29/2021)   Chronic anticoagulation (Coumadin) (2/21/2024)  -Home metoprolol  -Coumadin ordered  -3/13: INR quite elevated 7.57 today.  No evidence of bleeding.  Hemoglobin stable 12.7.  Warfarin is currently on hold until becomes more therapeutic range.  She is on her twice daily dosing of metoprolol.  Will make sure she is getting this that have been previously on hold.  Markedly elevated INR today no evidence of any bleeding.      Acquired hypothyroidism (4/13/2023)  -home synthroid     Anxiety disorder, unspecified (4/18/2023)  -prn ativan       Stage 3a chronic kidney disease (H) (2/5/2024)  -Daily labs  -3/13: Slight increase in creatinine 1.27 today BUN is 24.5.            Venous stasis ulcer of ankle without varicose veins (H) (3/11/2024)  -wound care consulted, appreciate input          Diet: Regular Diet Adult  Fluids: Saline 50 cc an hour    DVT Prophylaxis: Warfarin  Code Status: No CPR- Do NOT Intubate    Disposition: Expected discharge in 2-3 days once oral antibiotics.    Markel Ricardo MD    Interval History   Patient alert pleasant no real distress.  Is complaining of some heartburn.  Normally she takes omeprazole twice a day.  She is getting the Protonix twice a day in substitution.  Right lower extremity still somewhat erythematous.  A lot more wrinkling to the loss of the edema.  Afebrile white count is slightly better CRP is 103.  Likely this is going to be staph strep.  Will place her on some doxycycline and Ancef currently.  Follow cultures which have been negative to date.  Doing to get her up and ambulate we will start with  physical therapy.  INR still markedly elevated.  Warfarin is on hold.  May require small dose of vitamin K orally.  Heart was up a little bit also on auscultation.  Greater than 100.  Had not yet gotten her Toprol.  It had been on hold for short time.  Is now been resumed.    -Data reviewed today: I reviewed all new labs and imaging results over the last 24 hours. I personally reviewed no images or EKG's today.    Physical Exam   Temp: 97.8  F (36.6  C) Temp src: Tympanic BP: 104/75 Pulse: 93   Resp: 16 SpO2: 95 % O2 Device: None (Room air)    Vitals:    03/11/24 0807 03/11/24 1303   Weight: 46.3 kg (102 lb) 49 kg (108 lb 0.4 oz)     Vital Signs with Ranges  Temp:  [97.8  F (36.6  C)-98.6  F (37  C)] 97.8  F (36.6  C)  Pulse:  [] 93  Resp:  [16] 16  BP: (104-117)/(65-82) 104/75  SpO2:  [95 %] 95 %  I/O last 3 completed shifts:  In: 2348 [P.O.:120; I.V.:2228]  Out: 650 [Urine:650]    Peripheral IV 03/11/24 Left Hand (Active)   Site Assessment WDL except 03/13/24 0820   Line Status Saline locked 03/13/24 0820   Dressing Transparent 03/13/24 0820   Dressing Status dry;intact;old drainage 03/13/24 0820   Line Intervention Flushed 03/13/24 0820   Phlebitis Scale 0-->no symptoms 03/13/24 0820   Infiltration? no 03/13/24 0820   Number of days: 2       Wound Leg Ulceration (Active)   Wound Bed Other (Comment) 03/13/24 0820   Vaishali-wound Assessment Erythema 03/12/24 1531   Estimated Circumference ( if not measured quarter sized 03/12/24 1531   Drainage Amount Scant 03/12/24 1531   Drainage Color/Characteristics Serosanguineous 03/12/24 1531   Wound Care/Cleansing Medication applied - see MAR 03/12/24 1531   Dressing Medication (see MAR) 03/12/24 1531   Dressing Status Clean, dry, intact 03/13/24 0820   Number of days: 90       Wound Leg Ulceration (Active)   Number of days: 89       Wound Leg (Active)   Wound Bed Other (Comment) 03/13/24 0820   Vaishali-wound Assessment Erythema 03/12/24 1531   Estimated Circumference ( if  not measured golf sized 03/12/24 1531   Drainage Amount Scant 03/12/24 1531   Drainage Color/Characteristics Serosanguineous 03/12/24 1531   Dressing Medication (see MAR) 03/12/24 1531   Dressing Status Clean, dry, intact 03/13/24 0820   Number of days: 2       Wound Arm (Active)   Wound Bed Other (Comment) 03/13/24 0820   Vaishali-wound Assessment Erythema 03/12/24 1531   Estimated Circumference ( if not measured quarter sized 03/12/24 1531   Drainage Amount Scant 03/12/24 1531   Drainage Color/Characteristics Serosanguineous 03/12/24 1531   Wound Care/Cleansing Normal saline 03/12/24 1531   Dressing Medication (see MAR) 03/12/24 1531   Dressing Status Clean, dry, intact 03/13/24 0820   Number of days: 2       Wound Arm (Active)   Wound Bed Other (Comment) 03/13/24 0820   Vaishali-wound Assessment Erythema 03/12/24 1531   Estimated Circumference ( if not measured dime sized 03/12/24 1531   Drainage Amount Scant 03/12/24 1531   Drainage Color/Characteristics Serosanguineous 03/12/24 1531   Wound Care/Cleansing Medication applied - see MAR 03/12/24 1531   Dressing Medication (see MAR) 03/12/24 1531   Dressing Status Clean, dry, intact 03/13/24 0820   Number of days: 2       Wound Toe (Comment  which one) (Active)   Wound Bed Other (Comment) 03/13/24 0820   Vaishali-wound Assessment Erythema 03/12/24 1531   Estimated Circumference ( if not measured pea sized 03/12/24 1531   Drainage Amount Scant 03/12/24 1531   Drainage Color/Characteristics Yellow;Tan 03/12/24 1531   Wound Care/Cleansing Medication applied - see MAR 03/12/24 1531   Dressing Medication (see MAR) 03/12/24 1531   Dressing Status Clean, dry, intact 03/13/24 0820   Number of days: 1     No line/device    Constitutional: Alert and oriented x3. No distress    HEENT: Normocephalic/atraumatic, PERRL, EOMI, mouth moist, neck supple, no LN.     Cardiovascular: Tachycardic irregular RRR.  No murmur, no  rubs, or gallops.  JVD no no.  Bruits no.  Pulses 2+    Respiratory:  Clear to auscultation bilaterally    Abdomen: Soft, nontender, nondistended, no organomegaly. Bowel sounds present    Extremities: Warm/dry.  Significant decreased edema.  Right lower extremity is the wound more proximal just below her knee that is a dressing in place.  Erythema is present from just more proximal portion of her foot to her knee nonblanching.  Some of the looks more chronic venous stasis than anything else.  Mildly tender in the lower ankle left lower extremity with couple areas that are dressed.    Neuro:   Non focal, cranial nerves intact, Moves all extremities.  Medications    sodium chloride        atorvastatin  40 mg Oral At Bedtime    ceFAZolin  1 g Intravenous Q8H    doxycycline  100 mg Intravenous Q12H    furosemide  40 mg Oral QAM    metoprolol succinate ER  100 mg Oral QAM    metoprolol succinate ER  100 mg Oral At Bedtime    pantoprazole  40 mg Oral BID    predniSONE  5 mg Oral QAM    sodium chloride (PF)  3 mL Intracatheter Q8H    thyroid  30 mg Oral QAM AC    traMADol  50 mg Oral BID    Warfarin Therapy Reminder  1 each Oral See Admin Instructions    warfarin-No DOSE today  1 each Does not apply no dose today (warfarin)       Data   Recent Labs   Lab 03/13/24  0514 03/12/24  0554 03/12/24  0526 03/11/24  0852   WBC 25.3* 24.2*  --  21.2*   HGB 13.0 12.7  --  13.4   MCV 96 97  --  95    199  --  227   INR 7.57* 5.14*  --  2.87*     --  141 138   POTASSIUM 3.9  --  4.2 4.2   CHLORIDE 100  --  102 97*   CO2 27  --  27 29   BUN 24.5*  --  23.2* 25.8*   CR 1.27*  --  1.07* 1.10*   ANIONGAP 11  --  12 12   EFFIE 8.8  --  8.8 9.2   GLC 86  --  73 119*   ALBUMIN 2.8*  --   --   --    PROTTOTAL 5.2*  --   --   --    BILITOTAL 0.7  --   --   --    ALKPHOS 115  --   --   --    ALT 20  --   --   --    AST 24  --   --   --        No results found for this or any previous visit (from the past 24 hour(s)).

## 2024-03-13 NOTE — PLAN OF CARE
"Reason for hospital stay:  Cellulitis of right leg  Living situation PTA: [place]   Most recent vitals: /83 (BP Location: Right arm, Patient Position: Chair, Cuff Size: Adult Small)   Pulse 104   Temp 97.8  F (36.6  C) (Tympanic)   Resp 16   Ht 1.499 m (4' 11\")   Wt 49 kg (108 lb 0.4 oz)   SpO2 (!) 91%   BMI 21.82 kg/m      Pain Management:  Patient denied any pain from 9499-4657  LOC:  A&O x4 - Forgetful at times  Cardiac:  Telemetry in place. Apical pulse irregular and tachycardic, hx of Afib - Scheduled warfarin. Weak pedal pulses.   Respiratory:  Maintaining sats on room air. Lung sounds clear and equal bilaterally  GI:  Bowel sounds audible and normoactive   :  Voiding spontaneously without difficulty  Skin Issues:  Wounds to BLE and BUE with dressing CDI. Scattered abrasions and bruising. RLE red/jadiel with blister noted. LLE jadiel. Redness within previously marked boarders    IVF:  New IV placed this shift, NS @ 50 mL/hr  ABX:  IV Ancef and Doxycycline     Nutrition: Regular diet, fair appetite  ADL's:  Assist of 1-2   Ambulation: Assist of 2 with gait belt and walker  Safety:  Call light within reach, alarms active and audible, calls appropriately, makes needs known, lighting adjusted, nonskid footwear in place, bed in lowest position, wheels locked, room door open.     Face to face report given with opportunity to observe patient.    Report given to LIZBET Light RN   3/13/2024  7:25 PM    "

## 2024-03-13 NOTE — PHARMACY
Pharmacy Antimicrobial Stewardship Assessment     Current Antimicrobial Therapy:  Anti-infectives (From now, onward)      Start     Dose/Rate Route Frequency Ordered Stop    24 1100  ceFAZolin (ANCEF) intermittent infusion 1 g         1 g  over 30 Minutes Intravenous EVERY 12 HOURS 24 1005      24 1000  doxycycline (VIBRAMYCIN) 100 mg vial to attach to  mL bag         100 mg  over 1-2 Hours Intravenous EVERY 12 HOURS 24 0955              Indication: SSTI    Days of Therapy: 1  [Day 3 of Antibiotics]           Pertinent Labs:  Recent Labs   Lab Test 24  0514 24  0554 24  0852   WBC 25.3* 24.2* 21.2*     Recent Labs   Lab Test 24  2304 24  1951 24  1224   LACT 2.7* 4.0*   < >   PCAL  --   --   --     < > = values in this interval not displayed.        Temperature:  Temp (24hrs), Av.3  F (36.8  C), Min:97.8  F (36.6  C), Max:98.6  F (37  C)      Culture Results:   30-Day Micro Results       Collected Updated Procedure Result Status      2024 1236 2024 0621 Blood Culture Hand, Right [[id number]]   Blood from Hand, Right    Preliminary result Component Value   Culture No growth after 1 day  [P]                2024 1224 2024 1316 Blood Culture Arm, Left [[id number]]   Blood from Arm, Left    Preliminary result Component Value   Culture No growth after 1 day  [P]                2024 1012 2024 1954 MRSA MSSA PCR, Nasal Swab [[id number]]    Swab from Nose    Final result Component Value   MRSA Target DNA Negative   SA Target DNA Positive            02/15/2024 1128 02/15/2024 1213 Symptomatic Influenza A/B, RSV, & SARS-CoV2 PCR (COVID-19) Nasopharyngeal [[id number]]    Swab from Nasopharyngeal    Final result Component Value   Influenza A PCR Negative   Influenza B PCR Negative   RSV PCR Negative   SARS CoV2 PCR Negative   NEGATIVE: SARS-CoV-2 (COVID-19) RNA not detected, presumed negative.                      Recent Antibiotics:        Recommendations/Interventions:  1. Decreased cefazolin dose to 1g q12h per [place] Renal Dose Adjustment Policy. No recommendations at this time. Will continue to monitor.       Lena Pham RPH  March 13, 2024

## 2024-03-13 NOTE — PLAN OF CARE
Goal Outcome Evaluation:    Patient alert and oreinted x4. Forgetful.   Calm and cooperative. Anxious at bedtime PRN ativan given per orders.   Able to make needs known. Calls appropriately.   False Pass.   Not OOB this shift.   Pain managed with scheduled tylenol and tramadol.   BUE/BLE wound dressings in place.   PIV SL.    IV abx infused per orders.   General diet.  Repo Q2H as patient allows.   Discharge plans pending.     Face to face report given with opportunity to observe patient.    Report given to Kaela.    Patti Westfall RN   3/13/2024  7:00 AM

## 2024-03-13 NOTE — PROVIDER NOTIFICATION
Notified provider patient c/o chest pain, under bilateral breasts generalized tightness, /70, HR upon arrival HR 130s via pulse ox, auscultation in 80s, alert and orientated, patient states this has happened multiple times since she had her prev stroke in April.   Ordered to start telemetry, IV metoprolol 2.5 mg verified to only give if patient HR >100    Per ICU report telemetry afib 120-150s, patient c/o pain 4/10 pain to abd, metoprolol 2.5MG IV dose given    MD notified of metoprolol given.    MD ordered digoxin IV one time dose (see MAR)

## 2024-03-13 NOTE — PHARMACY-ANTICOAGULATION SERVICE
Pharmacy Consult - Warfarin Assessment Day #3    Shannon Walls is a 89 year old female admitted on 3/11/2024 with Cellulitis of right leg    Primary Indication(s) for Anticoagulation: Afib    Goal INR: 2-3    FYI, patient is followed by the [facility] at: Managed by Dr. Mcgregor (nurse at [facility] calls in INR for dosing instructions)     Patient previously anticoagulated on Coumadin  dosed as: 5 mg Fridays; 2.5 mg all other days (recent instructions to hold dose on 3/7 and 3/8, take 2.5 mg on 3/9 and 3/10, then recheck INR on 3/11)     Home tablet strength(s): 2.5 mg    Factors that may increase patient's bleeding risk and/or sensitivity to warfarin (Coumadin) include: advanced age, infection, prednisone, tramadol     Factors that may decrease patient's bleeding risk and/or sensitivity to warfarin (Coumadin) include: -      Anticoagulation Dose History  More data exists         3/11/2024 3/12/2024 3/13/2024   Recent Dosing and Labs   warfarin ANTICOAGULANT (COUMADIN) 2.5 MG tablet 2.5 mg, $Given HELD -   INR 2.87  5.14  7.57      CBC RESULTS:   Recent Labs   Lab Test 03/13/24  0514   HGB 13.0          Assessment/Plan: INR supratherapeutic. Will hold warfarin dose today. Recheck INR with AM labs.    Thank You for the Consult. Will continue to follow.    Lena Pham RP ....................  3/13/2024   8:16 AM

## 2024-03-13 NOTE — PROGRESS NOTES
03/13/24 1301   Appointment Info   Signing Clinician's Name / Credentials (PT) Delaney Turcios, DPT   Living Environment   People in Home facility resident   Current Living Arrangements assisted living   Home Accessibility no concerns   Transportation Anticipated family or friend will provide   Living Environment Comments [place]   Self-Care   Usual Activity Tolerance fair   Current Activity Tolerance poor   Equipment Currently Used at Home wheelchair, manual;walker, rolling   Fall history within last six months yes   Number of times patient has fallen within last six months 4   Activity/Exercise/Self-Care Comment Pt report she is independent with dressing and toileting, gets assist with bathing. Pt ambulates within apartment with 4WW mod I, does use w/c to self propel to go to dining room and for longer distances. Pt reports she did resume home PT upon last discharge from this facility approx 1 month ago but states she only gets PT 1x/week.   General Information   Onset of Illness/Injury or Date of Surgery 03/11/24   Referring Physician Dr. Ricardo   Patient/Family Therapy Goals Statement (PT) take one day at a time, I can't think about things beyond today   Pertinent History of Current Problem (include personal factors and/or comorbidities that impact the POC) Pt admitted with LE cellulitis, had recent hospitalization about 1 month ago as well and discharged to home with home PT services   Existing Precautions/Restrictions fall   General Observations Pt in chair, needing to use commode   Cognition   Affect/Mental Status (Cognition) WFL   Orientation Status (Cognition) oriented x 3   Follows Commands (Cognition) WFL   Pain Assessment   Patient Currently in Pain   (Denies pain at rest but reports significant pain in standing)   Integumentary/Edema   Integumentary/Edema Comments L LE wrapped, unable to be observed   Posture    Posture Kyphosis;Protracted shoulders;Forward head position   Range of Motion  (ROM)   Range of Motion ROM is WFL   Strength (Manual Muscle Testing)   Strength (Manual Muscle Testing) Deficits observed during functional mobility   Transfers   Transfers sit-stand transfer   Sit-Stand Transfer   Sit-Stand [place] (Transfers) minimum assist (75% patient effort)   Assistive Device (Sit-Stand Transfers) walker, front-wheeled   Gait/Stairs (Locomotion)   [place] Level (Gait) minimum assist (75% patient effort)   Assistive Device (Gait) walker, front-wheeled   Distance in Feet (Gait) 4'   Pattern (Gait) step-to   Comment, (Gait/Stairs) Pt having difficulty taking steps due to significant pain in L LE with weight bearing   Balance   Balance Comments fair- with support from walker   Clinical Impression   Criteria for Skilled Therapeutic Intervention Yes, treatment indicated   PT Diagnosis (PT) gait disturbance   Influenced by the following impairments weakness, pain, decreased actvity tolerance, decreased balance   Functional limitations due to impairments decreased tolerance and safety with functional mobility and ADLs   Clinical Presentation (PT Evaluation Complexity) evolving   Clinical Presentation Rationale clinical judgement   Clinical Decision Making (Complexity) moderate complexity   Planned Therapy Interventions (PT) bed mobility training;gait training;transfer training   Risk & Benefits of therapy have been explained evaluation/treatment results reviewed;care plan/treatment goals reviewed;risks/benefits reviewed;participants included;patient   Clinical Impression Comments PT evaluation completed.  Pt is a resident of [facility].  Pt has been mostly independent with ADLs and ambulating with 4WW inside her apartment, reports if she leaves her apartment she uses w/c or staff provide assist with ambulation.  Pt was discharge approx 1 month ago from this facility back to her assisted living with home PT services but reports she only gets PT 1x/week.  Pt has had  "notable decline in mobility since last admission. At this time requiring min A and only tolerating transfer to commode due to pain in L LE.  At this time would recommend short term rehab upon discharge to improve level of rehab services as pt is a significant fall risk if she is not provided with assist with all mobility throughout the day.  Will see during acute care stay to progress mobility and strength.   PT Total Evaluation Time   PT Eval, Moderate Complexity Minutes (82062) 11   Physical Therapy Goals   PT Frequency 6x/week   PT Predicted Duration/Target Date for Goal Attainment 03/27/24   PT Goals Bed Mobility;Transfers;Gait   PT: Bed Mobility Modified independent;Supine to/from sit   PT: Transfers Modified independent;Sit to/from stand;Bed to/from chair;Assistive device   PT: Gait Modified independent;Rolling walker;25 feet   Interventions   Interventions Quick Adds Therapeutic Activity   Therapeutic Activity   Therapeutic Activities: dynamic activities to improve functional performance Minutes (68628) 10   Symptoms Noted During/After Treatment Fatigue;Increased pain   Treatment Detail/Skilled Intervention Pt required max A to doff brief, total A for hygiene, and max A to don brief.  Pt states she is having too much pain to be able to manage independently. Attempted further ambulation but pt declined reporting too much pain.  Pt transferred sit>stand min A from commode and completed transfer from commode back to chair with FWW min A with difficulty WB through L LE.  Discussed discharge and possible need for short term rehab for higher level of rehab and pt states \"I can't think about that today, I have to take 1 day at a time\".  Pt left sitting in chair with clip alarm on and call light in reach.   PT Discharge Planning   PT Plan Progress mobility and ambulation   PT Discharge Recommendation (DC Rec) Transitional Care Facility;home with assist;home with home care physical therapy   PT Rationale for DC Rec PT " evaluation completed. Pt is a resident of [facility]. Pt has been mostly independent with ADLs and ambulating with 4WW inside her apartment, reports if she leaves her apartment she uses w/c or staff provide assist with ambulation. Pt was discharge approx 1 month ago from this facility back to her assisted living with home PT services but reports she only gets PT 1x/week. Pt has had notable decline in mobility since last admission. At this time requiring min A and only tolerating transfer to commode due to pain in L LE. At this time would recommend short term rehab upon discharge to improve level of rehab services as pt is a significant fall risk if she is not provided with assist with all mobility throughout the day. Will see during acute care stay to progress mobility and strength.   PT Brief overview of current status sit>stand min A, ambulated 4' chair<>commode with FWW min A   Total Session Time   Timed Code Treatment Minutes 10   Total Session Time (sum of timed and untimed services) 21

## 2024-03-13 NOTE — PLAN OF CARE
"BP 93/71   Pulse 72   Temp 98.6  F (37  C) (Tympanic)   Resp 16   Ht 1.499 m (4' 11\")   Wt 49 kg (108 lb 0.4 oz)   SpO2 (!) 91%   BMI 21.82 kg/m      A&O, forgetful at times, BP soft, patient did have episode of chest pain (see MAR), patient placed on telemetry, Afib, patient states pain is better now, remains on RA lung clear, afebrile, denies pain to RLE, borders marked to LLE, IV abx changed per MD (SEE MAR), IV running ns at 50ml/hr, did c/o heartburn tums given with relief, dressing changed to BUE per care plan, dressing to BLE CDI, free from falls, assist 2 upto chair with walker and gaitbelt, bed/chair alarm on, call light within reach, makes needs known.    Face to face report given with opportunity to observe patient.    Report given to LIZBET Srinivasan RN   3/13/2024  3:16 PM    "

## 2024-03-13 NOTE — PLAN OF CARE
Goal Outcome Evaluation:       Face to face report given with opportunity to observe patient.    Report given to tracie SOMERS    PT is alert and oriented x4 with occasional forgetfulness. Reports of chest pain and tightness. Provider notify and ordered metoprolol and digoxin, which were given and pain symptoms subsided. As of now, pt reports no pain or tenderness. Both upper arm dressings were changed, minimal drainage was found. No complains of pain when changing dressing. IV in left hand leaks blood .Call light in reach, sitting in chair with wheels locked, and chair alarm on.     Adrián Schwartz   3/13/2024  1:35 PM

## 2024-03-14 NOTE — PROGRESS NOTES
03/14/24 1692   Appointment Info   Signing Clinician's Name / Credentials (PT) Delaney Turcios DPT   Interventions   Interventions Quick Adds Therapeutic Activity   Therapeutic Activity   Therapeutic Activities: dynamic activities to improve functional performance Minutes (57217) 23   Symptoms Noted During/After Treatment Fatigue;Increased pain   Treatment Detail/Skilled Intervention Pt resting in chair upon PT arrival, agreeable to PT.  Reports pain is somewhat better today but still present.  Pt transferred sit>stand min A from room chair with cues needed for safe hand placement.  Pt ambulated 50' with FWW CGA-min A, slow pace with forward flexed posture.  Pt reporting pain in LEs with ambulation. Upon returning to room pt's room chair noted to not lock so had pt transfer into wheelchair while new chair found.  Pt transferred sit>stand from w/c [facility] and ambulated 4' to new room chair with FWW min A.  Pt required total A to boost back in chair.  Pt completed seated LAQ x15, seated hip flex x15 while in chair.  Pt's LE elevated at end of session.  Pt left sitting in chair with clip alarm on and call light in reach.   PT Discharge Planning   PT Plan Progress mobility and ambulation   PT Discharge Recommendation (DC Rec) Transitional Care Facility;home with assist;home with home care physical therapy   PT Rationale for DC Rec Pt still requiring CGA-min A for mobility with decreased tolerance from last admission.  At this time would continue to recommend short term rehab upon discharge.   PT Brief overview of current status sit>stand min A, ambulated 50' with FWW CGA-min A   Total Session Time   Timed Code Treatment Minutes 23   Total Session Time (sum of timed and untimed services) 23

## 2024-03-14 NOTE — PHARMACY
Pharmacy Antimicrobial Stewardship Assessment     Current Antimicrobial Therapy:  Anti-infectives (From now, onward)      Start     Dose/Rate Route Frequency Ordered Stop    24 1100  ceFAZolin (ANCEF) intermittent infusion 1 g         1 g  over 30 Minutes Intravenous EVERY 12 HOURS 24 1005      24 1000  doxycycline (VIBRAMYCIN) 100 mg vial to attach to  mL bag         100 mg  over 1-2 Hours Intravenous EVERY 12 HOURS 24 0955              Indication: SSTI    Days of Therapy: 2  [Day 4 of Antibiotics]           Pertinent Labs:  Recent Labs   Lab Test 24  0522 24  0514 24  0554   WBC 19.8* 25.3* 24.2*     Recent Labs   Lab Test 24  0524  2304 24  1224   LACT  --  2.7* 4.0*   < >   PCAL 0.25  --   --   --     < > = values in this interval not displayed.        Temperature:  Temp (24hrs), Av  F (36.7  C), Min:97.7  F (36.5  C), Max:98.6  F (37  C)      Culture Results:   30-Day Micro Results       Collected Updated Procedure Result Status      2024 1236 2024 0621 Blood Culture Hand, Right [[id number]]   Blood from Hand, Right    Preliminary result Component Value   Culture No growth after 1 day  [P]                2024 1224 2024 1316 Blood Culture Arm, Left [[id number]]   Blood from Arm, Left    Preliminary result Component Value   Culture No growth after 1 day  [P]                2024 1012 2024 1954 MRSA MSSA PCR, Nasal Swab [[id number]]    Swab from Nose    Final result Component Value   MRSA Target DNA Negative   SA Target DNA Positive            02/15/2024 1128 02/15/2024 1213 Symptomatic Influenza A/B, RSV, & SARS-CoV2 PCR (COVID-19) Nasopharyngeal [[id number]]    Swab from Nasopharyngeal    Final result Component Value   Influenza A PCR Negative   Influenza B PCR Negative   RSV PCR Negative   SARS CoV2 PCR Negative   NEGATIVE: SARS-CoV-2 (COVID-19) RNA not detected, presumed negative.                      Recent Antibiotics:        Recommendations/Interventions:  1. None at this time. Will continue to monitor.       Lena Pham RPH  March 14, 2024

## 2024-03-14 NOTE — PROGRESS NOTES
Was called to see patient by RN.  She complained of some chest discomfort and dyspnea.  Initially could not get O2 sat however we have issues with her circulation.  They were able to get send 93% on room air.  Her heart rate was A-fib with rates in the 90s.  When I came to see her she was alert pleasant and really no distress.  She felt that the discomfort probably was from eating some fruit and how she is sitting in the chair she does have some scoliosis that is very hunched over.  Her lungs were clear.  Cardiac exam and irregular rhythm but controlled A-fib rate.  She was feeling better already.  We did get her back in bed and she is feeling fine at this time.  Her right lower extremity is a little more erythematous today.  Does have a couple of the bulla present.  She is afebrile we will continue on her current antibiotic course.

## 2024-03-14 NOTE — PROGRESS NOTES
[facility]    Hospitalist Progress Note    Shannon Walls is a 89 year old female who  established diagnosis of hypothyroidism, permanent atrial fibrillation, anxiety, history of cva, history of tobacco use, CHD stage 3a, compression fracture of thoracic vertebra. She resides at nursing home she was brought in to hospital with leg swelling redness.  She was found elevated white blood cell count 21,000, elevated creatinine 1.1, INR 2.87, on Coumadin, MRSA wound culture was pending, patient also did have a lactic acid 3.2,.  Patient did have A-fib RVR in the emergency room did require dose of her home metoprolol and also IV push, her heart rate did improve into the 80s, patient is most of the time in A-fib.  She has been previously admitted to the hospital with cellulitis and need for wound care.  She was given Rocephin and vancomycin, patient is previously established DNR.     Assessment & Plan     Shannon Walls is a 89 year old female admitted on 3/11/2024. She  established diagnosis of hypothyroidism, permanent atrial fibrillation, anxiety, history of cva, history of tobacco use, CHD stage 3a, compression fracture of thoracic vertebra. Patent admitted with cellulitis of her right leg also had elevated lactic acid receiving IV fluids. She was started on broad spectrum antibiotics. She did have afib rvr un ER resolved with IV metoprolol.     Cellulitis right lower extremity  On 3/12 broadened antibiotics to cefepime    On vancomycin   Stopped rocephin on 3/12  Started cefepime  Monitor cultures  Monitor  lactic acid  Normal saline at 50 cc an hour  -3/13: Patient is afebrile.  White blood cell count 24,000 yesterday.  Today is pending.  Today's was 25,300.  CRP is 103.  Looking at her leg is still erythematous markedly less edema.  Does not appear to be extending anywhere else.  Blood cultures are negative.  Previous culture that she had done several months ago on the leg was coag positive  staph.  Not MRSA.  Will discontinue vancomycin put her on some doxycycline to cover for potential gram-positive's which is likely source.  Also use IV Ancef rather than cefepime.  Renal function has gone up a little bit.  -3/14: Patient remains afebrile.  White count is down to 19,800.  Repeat CRP is a 84.6.  Procalcitonin 0.25.  Currently on doxycycline and Ancef.  Nasal swab was negative for MRSA.  She does have some bullae noted on her right lower extremity now on that medial portion there is a very large wound with a little bit of blood in it.  Also another smaller 1 on the anterior lateral lower part of her leg very small about a centimeter.  Will have wound care/Debbi Bernard look at it for us, she has been following the patient for a while as an outpatient and wound care regarding lower extremities.     History Compression fracture of thoracic vertebra, unspecified thoracic vertebral level, sequale    (H) (2/15/2024)  -PT  -Fall risk      Permanent atrial fibrillation (H) (11/29/2021)   Chronic anticoagulation (Coumadin) (2/21/2024)  -Home metoprolol  -Coumadin ordered  -3/13: INR quite elevated 7.57 today.  No evidence of bleeding.  Hemoglobin stable 12.7.  Warfarin is currently on hold until becomes more therapeutic range.  She is on her twice daily dosing of metoprolol.  Will make sure she is getting this that have been previously on hold.  Markedly elevated INR today no evidence of any bleeding.  -3/14: INR is coming down today 6.29.  No evidence of bleeding hemoglobin stable.  A-fib she had RVR yesterday.  She is on the twice daily dosing of oral metoprolol he had 1 small dose of IV metoprolol without much benefit.  Did give her a dose of IV digoxin which we have used in the past.  Rates are better today.  Will start oral digoxin in addition to her oral metoprolol.      Acquired hypothyroidism (4/13/2023)  -home synthroid     Anxiety disorder, unspecified (4/18/2023)  -prn ativan        Stage 3a chronic  kidney disease (H) (2/5/2024)  -Daily labs  -3/13: Slight increase in creatinine 1.27 today BUN is 24.5.     -3/14: BUN/creatinine did go up a bit today 27/1.42.  He is getting some gentle IV fluids.  The vancomycin was discontinued.  Continue to monitor closely.    Venous stasis ulcer of ankle without varicose veins (H) (3/11/2024)  -wound care consulted, appreciate input     Disposition: Patient is having a lot of pain apparently left lower extremity when working with physical therapy.  They are recommending likely short-term rehab stay to improve her mobility.    Diet: Regular Diet Adult  Fluids: Normal saline 50 cc an hour    DVT Prophylaxis: Warfarin  Code Status: No CPR- Do NOT Intubate    Disposition: Expected discharge in 3-5 days once cellulitis is improved.    Markel Ricardo MD    Interval History   Patient slept in a little bit.  Does have a little bit of back pain discomfort.  Otherwise no dyspnea no significant fevers.  Legs do hurt her especially if she is standing.  Her feet seem to hurt more than anything.  She is currently on the Ancef and doxycycline.  Her leg looks I think a little bit better except she is now got a larger bulla on the medial portion of a smaller one on the anterior lateral part.  Her white count is still elevated but drifting downward.  CRP is dropping.  Continue with the wound care will have them see her again the wound care service.  Ambulate as best she can.  At this point unless she gets markedly better she likely will require at least temporary placement for rehab purposes.  Will continue with some IV fluids her creatinine has jumped up a little bit at 1.42 the vancomycin is now been discontinued also.  Her A-fib rate is better it still creeping up a little bit greater than 100 at times.  Will start the oral digoxin also today in addition to her beta-blocker.  No obvious signs of congestive heart failure.    -Data reviewed today: I reviewed all new labs and imaging results  over the last 24 hours. I personally reviewed no images or EKG's today.    Physical Exam   Temp: 97.7  F (36.5  C) Temp src: Tympanic BP: 146/95 Pulse: 103   Resp: 16 SpO2: 93 % O2 Device: None (Room air)    Vitals:    03/11/24 0807 03/11/24 1303 03/14/24 0206   Weight: 46.3 kg (102 lb) 49 kg (108 lb 0.4 oz) 49.7 kg (109 lb 9.1 oz)     Vital Signs with Ranges  Temp:  [97.7  F (36.5  C)-98.6  F (37  C)] 97.7  F (36.5  C)  Pulse:  [] 103  Resp:  [16] 16  BP: ()/(60-95) 146/95  SpO2:  [91 %-95 %] 93 %  I/O last 3 completed shifts:  In: 360 [P.O.:360]  Out: 1025 [Urine:1025]    Peripheral IV 03/13/24 Anterior;Right Lower forearm (Active)   Site Assessment WDL 03/14/24 0500   Line Status Saline locked 03/14/24 0500   Dressing Transparent 03/14/24 0500   Dressing Status clean;dry;intact 03/14/24 0500   Line Intervention Flushed 03/13/24 1745   Phlebitis Scale 0-->no symptoms 03/14/24 0500   Infiltration? no 03/13/24 2000   Number of days: 1       Wound Leg Ulceration (Active)   Wound Bed Other (Comment) 03/14/24 0500   Vaishali-wound Assessment Erythema 03/12/24 1531   Estimated Circumference ( if not measured quarter sized 03/12/24 1531   Drainage Amount Scant 03/12/24 1531   Drainage Color/Characteristics Serosanguineous 03/12/24 1531   Wound Care/Cleansing Medication applied - see MAR 03/12/24 1531   Dressing Per Plan of Care 03/13/24 2000   Dressing Status Clean, dry, intact 03/14/24 0500   Number of days: 91       Wound Leg Ulceration (Active)   Number of days: 90       Wound Leg (Active)   Wound Bed Other (Comment) 03/14/24 0500   Vaishali-wound Assessment Erythema 03/13/24 1036   Estimated Circumference ( if not measured golf sized 03/13/24 1036   Drainage Amount Scant 03/13/24 1036   Drainage Color/Characteristics Serosanguineous 03/13/24 1036   Dressing Per Plan of Care 03/13/24 2000   Dressing Status Clean, dry, intact 03/14/24 0500   Number of days: 3       Wound Arm (Active)   Wound Bed Other (Comment)  03/14/24 0500   Vaishali-wound Assessment Erythema 03/13/24 1036   Estimated Circumference ( if not measured quarter sized 03/13/24 1036   Drainage Amount Scant 03/13/24 1036   Drainage Color/Characteristics Serosanguineous 03/13/24 1036   Wound Care/Cleansing Normal saline 03/13/24 1036   Dressing Per Plan of Care 03/13/24 2000   Dressing Status Clean, dry, intact 03/14/24 0500   Number of days: 3       Wound Arm (Active)   Wound Bed Other (Comment) 03/14/24 0500   Vaishali-wound Assessment Erythema 03/13/24 1036   Estimated Circumference ( if not measured dime sized 03/13/24 1036   Drainage Amount Scant 03/13/24 1036   Drainage Color/Characteristics Serosanguineous 03/13/24 1036   Wound Care/Cleansing Medication applied - see MAR 03/13/24 1036   Dressing Per Plan of Care 03/13/24 2000   Dressing Status Clean, dry, intact 03/14/24 0500   Number of days: 3       Wound Toe (Comment  which one) (Active)   Wound Bed Other (Comment) 03/14/24 0500   Vaishali-wound Assessment Erythema 03/13/24 1036   Estimated Circumference ( if not measured pea sized 03/13/24 1036   Drainage Amount Scant 03/13/24 1036   Drainage Color/Characteristics Yellow;Tan 03/13/24 1036   Wound Care/Cleansing Medication applied - see MAR 03/13/24 1036   Dressing Per Plan of Care 03/13/24 2000   Dressing Status Clean, dry, intact 03/14/24 0500   Number of days: 2     No line/device    Constitutional: Alert and oriented x3. No distress    HEENT: Normocephalic/atraumatic, PERRL, EOMI, mouth moist, neck supple, no LN.     Cardiovascular: Irregular occasionally tachycardic RRR.  No murmur, no  rubs, or gallops.  JVD flat.  Bruits no.  Pulses 2 upper extremities    Respiratory: Few crackles bases otherwise  clear to auscultation bilaterally    Abdomen: Soft, nontender, nondistended, no organomegaly. Bowel sounds present    Extremities: Warm/dry.  No major edema.  Right lower extremity with erythema from then below the knee down to the ankle.  Does appear to be stable  to slightly improved.  Does have a large bulla on the medial portion of her distal leg also very small 1 anterior laterally.  Left lower extremity shows no acute problems.    Neuro:   Non focal, cranial nerves intact, Moves all extremities.  Medications    sodium chloride 50 mL/hr at 03/13/24 1030      atorvastatin  40 mg Oral At Bedtime    ceFAZolin  1 g Intravenous Q12H    doxycycline  100 mg Intravenous Q12H    furosemide  40 mg Oral QAM    metoprolol succinate ER  100 mg Oral QAM    metoprolol succinate ER  100 mg Oral At Bedtime    pantoprazole  40 mg Oral BID    predniSONE  5 mg Oral QAM    sodium chloride (PF)  3 mL Intracatheter Q8H    thyroid  30 mg Oral QAM AC    traMADol  50 mg Oral BID    Warfarin Therapy Reminder  1 each Oral See Admin Instructions       Data   Recent Labs   Lab 03/14/24  0522 03/13/24  0514 03/12/24  0554 03/12/24  0526   WBC 19.8* 25.3* 24.2*  --    HGB 13.8 13.0 12.7  --    MCV 96 96 97  --     227 199  --    INR 6.29* 7.57* 5.14*  --     138  --  141   POTASSIUM 4.3 3.9  --  4.2   CHLORIDE 104 100  --  102   CO2 28 27  --  27   BUN 27.3* 24.5*  --  23.2*   CR 1.42* 1.27*  --  1.07*   ANIONGAP 10 11  --  12   EFFIE 8.4 8.8  --  8.8   * 86  --  73   ALBUMIN 2.9* 2.8*  --   --    PROTTOTAL 5.5* 5.2*  --   --    BILITOTAL 0.3 0.7  --   --    ALKPHOS 160* 115  --   --    ALT 17 20  --   --    AST 24 24  --   --        No results found for this or any previous visit (from the past 24 hour(s)).

## 2024-03-14 NOTE — PLAN OF CARE
"Reason for admission: Cellulitis of right leg   Pt is Aox4, forgetful at times.  Ax2 with gait belt and walker. Makes needs known, Call light within reach, wheels locked, ID band on. Pain has been managed throughout the shift, no PRN pain medication required this shift.. IV SL , ABX Ancef and doxy   Dressings remain CDI,    C/o nausea this shift, IV Zofran given with relief  Remains on tele    /95 (BP Location: Right leg, Patient Position: Dangled, Cuff Size: Adult Small)   Pulse 103   Temp 97.7  F (36.5  C) (Tympanic)   Resp 16   Ht 1.499 m (4' 11\")   Wt 49.7 kg (109 lb 9.1 oz)   SpO2 93%   BMI 22.13 kg/m        Face to face report given with opportunity to observe patient.    Report given to Zarina Payton RN on 3/14/2024 at 7:05 AM           "

## 2024-03-14 NOTE — PHARMACY-ANTICOAGULATION SERVICE
Pharmacy Consult - Warfarin Assessment Day #4    Shannon Walls is a 89 year old female admitted on 3/11/2024 with Cellulitis of right leg    Primary Indication(s) for Anticoagulation: Afib    Goal INR: 2-3    FYI, patient is followed by the Anticoagulation/[facility]time Clinic at: Managed by Dr. Gilda Mcgregor at [facility] (nurse at [place] calls in INR for dosing instructions)      Patient previously anticoagulated on Coumadin dosed as: 5 mg Fridays; 2.5 mg all other days (recent instructions to hold dose on 3/7 and 3/8, take 2.5 mg on 3/9 and 3/10, then recheck INR on 3/11)    Home tablet strength(s): 2.5 mg    Factors that may increase patient's bleeding risk and/or sensitivity to warfarin (Coumadin) include: advanced age, infection, prednisone, tramadol, antibiotics    Factors that may decrease patient's bleeding risk and/or sensitivity to warfarin (Coumadin) include: -      Anticoagulation Dose History  More data exists         3/11/2024 3/12/2024 3/13/2024 3/14/2024   Recent Dosing and Labs   warfarin ANTICOAGULANT (COUMADIN) 2.5 MG tablet 2.5 mg, $Given HELD HELD -   INR 2.87  5.14  7.57  6.29      CBC RESULTS:   Recent Labs   Lab Test 03/14/24  0522   HGB 13.8          Assessment/Plan: INR supratherapeutic. Will hold warfarin dose today. Recheck INR with AM labs.     Thank You for the Consult. Will continue to follow.    Lena Pham RPH ....................  3/14/2024   7:44 AM

## 2024-03-15 NOTE — PHARMACY
Pharmacy Antimicrobial Stewardship Assessment     Current Antimicrobial Therapy:  Anti-infectives (From now, onward)      Start     Dose/Rate Route Frequency Ordered Stop    24 1100  ceFAZolin (ANCEF) intermittent infusion 1 g         1 g  over 30 Minutes Intravenous EVERY 12 HOURS 24 1005      24 1000  doxycycline (VIBRAMYCIN) 100 mg vial to attach to  mL bag         100 mg  over 1-2 Hours Intravenous EVERY 12 HOURS 24 0955              Indication: SSTI    Days of Therapy: 3  [Day 5 of Antibiotics]           Pertinent Labs:  Recent Labs   Lab Test 03/15/24  0528 24  0522 24  0514   WBC 12.8* 19.8* 25.3*     Recent Labs   Lab Test 24  0522 24  2304 24  1224   LACT  --  2.7* 4.0*   < >   PCAL 0.25  --   --   --     < > = values in this interval not displayed.        Temperature:  Temp (24hrs), Av.7  F (36.5  C), Min:97.6  F (36.4  C), Max:97.7  F (36.5  C)      Culture Results:   30-Day Micro Results       Collected Updated Procedure Result Status      2024 1236 03/15/2024 0654 Blood Culture Hand, Right [[id number]]   Blood from Hand, Right    Preliminary result Component Value   Culture No growth after 3 days  [P]                2024 1224 03/15/2024 0654 Blood Culture Arm, Left [[id number]]   Blood from Arm, Left    Preliminary result Component Value   Culture No growth after 3 days  [P]                2024 1012 2024 MRSA MSSA PCR, Nasal Swab [[id number]]    Swab from Nose    Final result Component Value   MRSA Target DNA Negative   SA Target DNA Positive            02/15/2024 1128 02/15/2024 1213 Symptomatic Influenza A/B, RSV, & SARS-CoV2 PCR (COVID-19) Nasopharyngeal [[id number]]    Swab from Nasopharyngeal    Final result Component Value   Influenza A PCR Negative   Influenza B PCR Negative   RSV PCR Negative   SARS CoV2 PCR Negative   NEGATIVE: SARS-CoV-2 (COVID-19) RNA not detected, presumed  negative.                     Recent Antibiotics:      Recommendations/Interventions:  1. Consider transitioning to oral antibiotics when able.      Lena Pham RPH  March 15, 2024

## 2024-03-15 NOTE — PHARMACY-ANTICOAGULATION SERVICE
Pharmacy Consult - Warfarin Assessment Day #5    Shannon Walls is a 89 year old female admitted on 3/11/2024 with Cellulitis of right leg    Primary Indication(s) for Anticoagulation: Afib    Goal INR: 2-3    FYI, patient is followed by the Anticoagulation/Protime Clinic at: Managed by Dr. Gilda Mcgregor at [facility] (nurse at [place] calls in INR for dosing instructions)     Patient previously anticoagulated on Coumadin dosed as: 5 mg Fridays; 2.5 mg all other days (recent instructions to hold dose on 3/7 and 3/8, take 2.5 mg on 3/9 and 3/10, then recheck INR on 3/11)     Home tablet strength(s): 2.5 mg    Factors that may increase patient's bleeding risk and/or sensitivity to warfarin (Coumadin) include: advanced age, infection, prednisone, tramadol, antibiotics     Factors that may decrease patient's bleeding risk and/or sensitivity to warfarin (Coumadin) include: warfarin dose held on 3/7 and 3/8    Anticoagulation Dose History  More data exists         3/11/2024 3/12/2024 3/13/2024 3/14/2024 3/15/2024   Recent Dosing and Labs   warfarin ANTICOAGULANT (COUMADIN) 2.5 MG tablet 2.5 mg, $Given HELD HELD HELD -   INR 2.87  5.14  7.57  6.29  5.71      CBC RESULTS:   Recent Labs   Lab Test 03/15/24  0528   HGB 11.4*          Assessment/Plan: INR supratherapeutic. Will hold warfarin dose today. Recheck INR with AM labs.    Thank You for the Consult. Will continue to follow.    Lena Pham [facility] ....................  3/15/2024   8:54 AM

## 2024-03-15 NOTE — PLAN OF CARE
Dressings CDI. RLE redness and warm. Pt given scheduled IV antibiotics ancef and doxycycline. Pt up to bedside commode with walker and gait belt. Pt up voiding every 1-2 hrs. Pt reported feeling dizzy when getting up this morning, and then it passed. /80, HR 69. Pt given scheduled tramadol and PO tylenol 650 mg for leg pain. Let's needs be known.    Face to face report given with opportunity to observe patient.    Report given to [place] RN's.     Khushbu Flores RN   3/15/2024  7:51 AM

## 2024-03-15 NOTE — PROGRESS NOTES
[facility]    Hospitalist Progress Note    Shannon Walls is a 89 year old female who  established diagnosis of hypothyroidism, permanent atrial fibrillation, anxiety, history of cva, history of tobacco use, CHD stage 3a, compression fracture of thoracic vertebra. She resides at nursing home she was brought in to hospital with leg swelling redness.  She was found elevated white blood cell count 21,000, elevated creatinine 1.1, INR 2.87, on Coumadin, MRSA wound culture was pending, patient also did have a lactic acid 3.2,.  Patient did have A-fib RVR in the emergency room did require dose of her home metoprolol and also IV push, her heart rate did improve into the 80s, patient is most of the time in A-fib.  She has been previously admitted to the hospital with cellulitis and need for wound care.  She was given Rocephin and vancomycin, patient is previously established DNR.     Assessment & Plan     Shannon Walls is a 89 year old female admitted on 3/11/2024. She  established diagnosis of hypothyroidism, permanent atrial fibrillation, anxiety, history of cva, history of tobacco use, CHD stage 3a, compression fracture of thoracic vertebra. Patent admitted with cellulitis of her right leg also had elevated lactic acid receiving IV fluids. She was started on broad spectrum antibiotics. She did have afib rvr un ER resolved with IV metoprolol.      Cellulitis right lower extremity  On 3/12 broadened antibiotics to cefepime    On vancomycin   Stopped rocephin on 3/12  Started cefepime  Monitor cultures  Monitor  lactic acid  Normal saline at 50 cc an hour  -3/13: Patient is afebrile.  White blood cell count 24,000 yesterday.  Today is pending.  Today's was 25,300.  CRP is 103.  Looking at her leg is still erythematous markedly less edema.  Does not appear to be extending anywhere else.  Blood cultures are negative.  Previous culture that she had done several months ago on the leg was coag positive  staph.  Not MRSA.  Will discontinue vancomycin put her on some doxycycline to cover for potential gram-positive's which is likely source.  Also use IV Ancef rather than cefepime.  Renal function has gone up a little bit.  -3/14: Patient remains afebrile.  White count is down to 19,800.  Repeat CRP is a 84.6.  Procalcitonin 0.25.  Currently on doxycycline and Ancef.  Nasal swab was negative for MRSA.  She does have some bullae noted on her right lower extremity now on that medial portion there is a very large wound with a little bit of blood in it.  Also another smaller 1 on the anterior lateral lower part of her leg very small about a centimeter.  Will have wound care/Debbi Bernard look at it for us, she has been following the patient for a while as an outpatient and wound care regarding lower extremities.    -3/15: She is afebrile.  CRP continues to decline is down to 30.4 this morning white count is also improving down to 12.8.  She is on the Ancef and doxycycline.  Does have still erythema in that right lower extremity I think that is going to take a while for that to go away.  So clinically I think she is responding to her current regimen.  Will continue with this for now with antibiotics over the weekend consider changing to orals tomorrow.      History Compression fracture of thoracic vertebra, unspecified thoracic vertebral level, sequale    (H) (2/15/2024)  -PT  -Fall risk      Permanent atrial fibrillation (H) (11/29/2021)   Chronic anticoagulation (Coumadin) (2/21/2024)  -Home metoprolol  -Coumadin ordered  -3/13: INR quite elevated 7.57 today.  No evidence of bleeding.  Hemoglobin stable 12.7.  Warfarin is currently on hold until becomes more therapeutic range.  She is on her twice daily dosing of metoprolol.  Will make sure she is getting this that have been previously on hold.  Markedly elevated INR today no evidence of any bleeding.  -3/14: INR is coming down today 6.29.  No evidence of bleeding  hemoglobin stable.  A-fib she had RVR yesterday.  She is on the twice daily dosing of oral metoprolol he had 1 small dose of IV metoprolol without much benefit.  Did give her a dose of IV digoxin which we have used in the past.  Rates are better today.  Will start oral digoxin in addition to her oral metoprolol.  -3/15: INR drifting downward slowly 5.71.  No evidence of any bleeding fortunately.      Acquired hypothyroidism (4/13/2023)  -home synthroid     Anxiety disorder, unspecified (4/18/2023)  -prn ativan        Stage 3a chronic kidney disease (H) (2/5/2024)  -Daily labs  -3/13: Slight increase in creatinine 1.27 today BUN is 24.5.     -3/14: BUN/creatinine did go up a bit today 27/1.42.  He is getting some gentle IV fluids.  The vancomycin was discontinued.  Continue to monitor closely.  -3/15: Creatinine is improving.  Down to 1.26 is getting some IV fluid     Venous stasis ulcer of ankle without varicose veins (H) (3/11/2024)  -wound care consulted, appreciate input     Disposition: Patient is having a lot of pain apparently left lower extremity when working with physical therapy.  They are recommending likely short-term rehab stay to improve her mobility.  Patient attempted to work with physical therapy today however complained of being dizzy when she stood up.  Per PTs recommendations unless she has marked improvement she likely will require short-term stay at nursing home for rehab purposes.  Will reevaluate on Monday    Diet: Regular Diet Adult  Fluids: Saline 75 cc an hour    DVT Prophylaxis: Warfarin  Code Status: No CPR- Do NOT Intubate    Disposition: Expected discharge in 3-5 days once on oral antibiotics and appropriate disposition site is found.    Markel Ricardo MD    Interval History   Alert pleasant no real distress.  Extremely weak dizzy when she stands up.  Hemodynamically stable.  Just to think she is very deconditioned.  All of her inflammatory markers are improving with the IV  antibiotics for cellulitis.  Will continue with these over the weekend transitioning to oral tomorrow.  A-fib rate is adequately controlled on current regimen.  Renal functions also improving.  Overall looks better.  Right leg still is erythematous but I think this is going to take some time to improve.  She chronically has some erythema there due to chronic venous stasis.  Does have significant arterial insufficiency also.    -Data reviewed today: I reviewed all new labs and imaging results over the last 24 hours. I personally reviewed no images or EKG's today.    Physical Exam   Temp: 97.7  F (36.5  C) Temp src: Tympanic BP: 128/89 Pulse: 101   Resp: 18 SpO2: 93 % O2 Device: None (Room air)    Vitals:    03/11/24 0807 03/11/24 1303 03/14/24 0206   Weight: 46.3 kg (102 lb) 49 kg (108 lb 0.4 oz) 49.7 kg (109 lb 9.1 oz)     Vital Signs with Ranges  Temp:  [97.6  F (36.4  C)-97.7  F (36.5  C)] 97.7  F (36.5  C)  Pulse:  [] 101  Resp:  [14-18] 18  BP: (119-142)/(71-89) 128/89  SpO2:  [93 %-95 %] 93 %  I/O last 3 completed shifts:  In: 1179 [P.O.:390; I.V.:789]  Out: 1750 [Urine:1750]    Peripheral IV 03/14/24 Left;Posterior Hand (Active)   Site Assessment WDL 03/14/24 1420   Line Status Infusing 03/14/24 1420   Dressing Transparent 03/14/24 1420   Dressing Status clean;dry;intact 03/14/24 1420   Phlebitis Scale 0-->no symptoms 03/14/24 1420   Infiltration? no 03/14/24 1420   Number of days: 1       Wound Leg Ulceration (Active)   Wound Bed Other (Comment) 03/14/24 0903   Vaishali-wound Assessment Erythema 03/12/24 1531   Estimated Circumference ( if not measured quarter sized 03/12/24 1531   Drainage Amount None 03/14/24 0903   Drainage Color/Characteristics Serosanguineous 03/12/24 1531   Wound Care/Cleansing Medication applied - see MAR 03/12/24 1531   Dressing Per Plan of Care 03/14/24 0903   Dressing Status Clean, dry, intact 03/14/24 2300   Dressing Change Due 03/15/24 03/14/24 0903   Number of days: 92        Wound Leg Ulceration (Active)   Number of days: 91       Wound Leg (Active)   Wound Bed Other (Comment) 03/14/24 2300   Vaishali-wound Assessment Erythema 03/13/24 1036   Estimated Circumference ( if not measured golf sized 03/13/24 1036   Drainage Amount None 03/14/24 0903   Drainage Color/Characteristics Serosanguineous 03/13/24 1036   Dressing Per Plan of Care 03/14/24 0903   Dressing Status Clean, dry, intact 03/14/24 2300   Dressing Change Due 03/15/24 03/14/24 0903   Number of days: 4       Wound Arm (Active)   Wound Bed Dry scab;Pink;Red;Scabbing 03/14/24 0903   Vaishali-wound Assessment Erythema 03/14/24 0903   Estimated Circumference ( if not measured quarter sized 03/13/24 1036   Drainage Amount Scant 03/14/24 0903   Drainage Color/Characteristics Serosanguineous 03/14/24 0903   Wound Care/Cleansing Normal saline 03/13/24 1036   Dressing Per Plan of Care 03/14/24 0903   Dressing Status Clean, dry, intact 03/14/24 2300   Dressing Change Due 03/15/24 03/14/24 0903   Number of days: 4       Wound Arm (Active)   Wound Bed Scabbing;Slough;Yellow 03/14/24 0903   Vaishali-wound Assessment Erythema 03/14/24 0903   Estimated Circumference ( if not measured dime sized 03/13/24 1036   Drainage Amount Scant 03/14/24 0903   Drainage Color/Characteristics Serosanguineous 03/14/24 0903   Wound Care/Cleansing Medication applied - see MAR 03/13/24 1036   Dressing Per Plan of Care 03/14/24 0903   Dressing Status Clean, dry, intact 03/14/24 2300   Dressing Change Due 03/15/24 03/14/24 0903   Number of days: 4       Wound Toe (Comment  which one) (Active)   Wound Bed Other (Comment) 03/14/24 0903   Vaishali-wound Assessment Erythema 03/13/24 1036   Estimated Circumference ( if not measured pea sized 03/13/24 1036   Drainage Amount None 03/14/24 0903   Drainage Color/Characteristics Yellow;Tan 03/13/24 1036   Wound Care/Cleansing Medication applied - see MAR 03/13/24 1036   Dressing Per Plan of Care 03/14/24 0903   Dressing Status Clean,  dry, intact 03/14/24 2300   Dressing Change Due 03/15/24 03/14/24 0903   Number of days: 3     No line/device    Constitutional: Alert and oriented x3. No distress    HEENT: Normocephalic/atraumatic, PERRL, EOMI, mouth moist, neck supple, no LN.     Cardiovascular: Irregular RRR.  No murmur, no  rubs, or gallops.  JVD flat.  Bruits no.  Pulses 2    Respiratory: Few crackles at the bases otherwise clear to auscultation bilaterally    Abdomen: Soft, nontender, nondistended, no organomegaly. Bowel sounds present    Extremities: Warm/dry.  Right lower extremity with erythema below the knee to her ankle.  The previous bullous areas are improving.  Still erythematous but nothing appears to be worsened at all.  No spreading.  And distal are chronic with just an acute worsening.  Left lower extremity wounds look good no erythema.       Neuro:   Non focal, cranial nerves intact, Moves all extremities.  Medications    sodium chloride 50 mL/hr at 03/13/24 1030      atorvastatin  40 mg Oral At Bedtime    ceFAZolin  1 g Intravenous Q12H    digoxin  125 mcg Oral Daily    doxycycline  100 mg Intravenous Q12H    furosemide  40 mg Oral QAM    metoprolol succinate ER  100 mg Oral QAM    metoprolol succinate ER  100 mg Oral At Bedtime    pantoprazole  40 mg Oral BID    predniSONE  5 mg Oral QAM    sodium chloride (PF)  3 mL Intracatheter Q8H    thyroid  30 mg Oral QAM AC    traMADol  50 mg Oral BID    Warfarin Therapy Reminder  1 each Oral See Admin Instructions    warfarin-No DOSE today  1 each Does not apply no dose today (warfarin)       Data   Recent Labs   Lab 03/15/24  0627 03/15/24  0528 03/14/24  0522 03/13/24  0514   WBC  --  12.8* 19.8* 25.3*   HGB  --  11.4* 13.8 13.0   MCV  --  97 96 96   PLT  --  195 238 227   INR 5.71*  --  6.29* 7.57*   NA  --  142 142 138   POTASSIUM  --  3.6 4.3 3.9   CHLORIDE  --  103 104 100   CO2  --  27 28 27   BUN  --  26.8* 27.3* 24.5*   CR  --  1.26* 1.42* 1.27*   ANIONGAP  --  12 10 11   EFFIE   --  8.4 8.4 8.8   GLC  --  93 103* 86   ALBUMIN  --  2.8* 2.9* 2.8*   PROTTOTAL  --  5.2* 5.5* 5.2*   BILITOTAL  --  0.3 0.3 0.7   ALKPHOS  --  109 160* 115   ALT  --  10 17 20   AST  --  21 24 24       No results found for this or any previous visit (from the past 24 hour(s)).

## 2024-03-15 NOTE — PLAN OF CARE
"Reason for hospital stay:  Cellulitis of right leg   Living situation PTA: [place]    Most recent vitals: /71 (BP Location: Right arm, Patient Position: Chair, Cuff Size: Adult Small)   Pulse 99   Temp 97.6  F (36.4  C) (Tympanic)   Resp 18   Ht 1.499 m (4' 11\")   Wt 49.7 kg (109 lb 9.1 oz)   SpO2 93%   BMI 22.13 kg/m      Pain Management:  Patient reported back and chest pain this shift. Administered PRN tylenol, scheduled ultram, with adequate relief reported upon reassessment  LOC:  A&O x4   Cardiac:  Telemetry in place - Atrial fibrillation 's per ICU nurse. Apical pulse irregular and tachycardic, hx of Afib - Scheduled warfarin. Weak pedal pulses.   Respiratory:  Maintaining sats on room air. Lung sounds clear and equal bilaterally  GI:  Bowel sounds audible and normoactive   :  Voiding spontaneously without difficulty  Skin Issues:  Wounds to BLE and BUE with dressing CDI - BUE dressings changed as ordered this shift. Scattered abrasions, scabs, and bruising. RLE red/jadiel with blisters noted. LLE jadiel.      IVF:  New IV placed this shift, NS @ 50 mL/hr  ABX:  IV Ancef and Doxycycline      Nutrition: Regular diet, fair appetite  ADL's:  Assist of 1  Ambulation: Assist of 1 with gait belt and walker  Safety:  Call light within reach, alarms active and audible, calls appropriately, makes needs known, lighting adjusted, nonskid footwear in place, bed in lowest position, wheels locked, room door open.   Comments:  Around 1600, patient called - Reporting chest pain with VSS, this writer notified MD who assessed patient promptly. Repositioned patient and administered PRN tums for possibility of heartburn and PRN tylenol - upon reassessment patient denied any pain.     Warfarin held for INR of 6.29 today.    Face to face report given with opportunity to observe patient.    Report given to LIZBET Stark RN   3/14/2024  7:18 PM    "

## 2024-03-15 NOTE — PROGRESS NOTES
"   03/15/24 1130   Appointment Info   Signing Clinician's Name / Credentials (PT) Delaney Turcios DPT   Interventions   Interventions Quick Adds Therapeutic Activity   Therapeutic Activity   Therapeutic Activities: dynamic activities to improve functional performance Minutes (31277) 10   Symptoms Noted During/After Treatment Fatigue;Increased pain;Dizziness   Treatment Detail/Skilled Intervention Pt resting in bed upon PT arrival, reluctant to get OOB stating \"I'm comfortable here\".  Did explain importance of getting OOB, especially as she was eating breakfast in bed in position that could increase her risk for aspiration.  Pt agreeable but states she can't walk today because her legs are too painful.  Pt transferred sup>sit min A to EOB.  Pt transferred sit>stand min A from EOB.  Pt initiated 1-2 steps then had major LOB requiring max A from this writer to prevent fall and lowered patient back onto edge of bed.  Asked pt what had happened and she stated \"I go dizzy, like I always do\".  Asked clarifying question on when this started and pt stated \"4 days ago\", pt had no reports of dizziness on previous 2 days with this writer.  Pt states \"I can't keep moving when I'm dizzy or it will go on for hours\".  Asked pt if she was still dizzy while sitting at EOB and pt states \"No I'm not now but I don't know when it's going to happen\".  Pt max A to return to supine in bed.  LEs elevated back on pillows.  Encouraged pt to try to get up later this afternoon with nursing.  Pt left resting in bed with bed alarm on and call light in reach.   PT Discharge Planning   PT Plan Progress mobility and ambulation   PT Discharge Recommendation (DC Rec) Transitional Care Facility   PT Rationale for DC Rec Pt is not progressing with mobility and requiring min to max A at times with episode of dizziness.  Pt will require extensive 24 hour care with all functional mobility upon discharge.  Recommend short term rehab upon discharge   PT Brief " overview of current status sup>sit min A, sit>stand min A, attempted to initiate steps but had major LOB requiring mod - max A to prevent falling, returned to supine with max A   Total Session Time   Timed Code Treatment Minutes 10   Total Session Time (sum of timed and untimed services) 10

## 2024-03-16 NOTE — PLAN OF CARE
Pt is A & O. Heart sounds irregular. No c/o dizziness upon movement today. Telemetry in place, monitored by ICU nurses. Lungs sounds clear across all fields. Unable to obtain O2 sats due to cool extremities/poor circulation. Pt denies any SOB. No signs of dyspnea observed. Bowel sounds hypoactive all four quadrants. Pt is continent of B&B, incontinent pads utilized. Pt has bruising scattered throughout, skin is cool et jadiel. Cellulitis areas to (R) leg remains reddened. All extremities cool, jadiel/dusky. Pt has wounds on upper arms bilat et lower legs bilat w/dressings changed et intact. Pt received scheduled Tramadol w/effective pain relief. Pt uses call light appropriately. Safety precautions in place, alarms on and audible.    Face to face report given with opportunity to observe patient.    Report given to LIZBET Peace RN   3/15/2024  1930 PM

## 2024-03-16 NOTE — PROGRESS NOTES
[facility]    Hospitalist Progress Note    Shannon Walls is a 89 year old female who  established diagnosis of hypothyroidism, permanent atrial fibrillation, anxiety, history of cva, history of tobacco use, CHD stage 3a, compression fracture of thoracic vertebra. She resides at nursing home she was brought in to hospital with leg swelling redness.  She was found elevated white blood cell count 21,000, elevated creatinine 1.1, INR 2.87, on Coumadin, MRSA wound culture was pending, patient also did have a lactic acid 3.2,.  Patient did have A-fib RVR in the emergency room did require dose of her home metoprolol and also IV push, her heart rate did improve into the 80s, patient is most of the time in A-fib.  She has been previously admitted to the hospital with cellulitis and need for wound care.  She was given Rocephin and vancomycin, patient is previously established DNR.     Assessment & Plan     Shannon Walls is a 89 year old female admitted on 3/11/2024. She  established diagnosis of hypothyroidism, permanent atrial fibrillation, anxiety, history of cva, history of tobacco use, CHD stage 3a, compression fracture of thoracic vertebra. Patent admitted with cellulitis of her right leg also had elevated lactic acid receiving IV fluids. She was started on broad spectrum antibiotics. She did have afib rvr un ER resolved with IV metoprolol.      Cellulitis right lower extremity  On 3/12 broadened antibiotics to cefepime    On vancomycin   Stopped rocephin on 3/12  Started cefepime  Monitor cultures  Monitor  lactic acid  Normal saline at 50 cc an hour  -3/13: Patient is afebrile.  White blood cell count 24,000 yesterday.  Today is pending.  Today's was 25,300.  CRP is 103.  Looking at her leg is still erythematous markedly less edema.  Does not appear to be extending anywhere else.  Blood cultures are negative.  Previous culture that she had done several months ago on the leg was coag positive  staph.  Not MRSA.  Will discontinue vancomycin put her on some doxycycline to cover for potential gram-positive's which is likely source.  Also use IV Ancef rather than cefepime.  Renal function has gone up a little bit.  -3/14: Patient remains afebrile.  White count is down to 19,800.  Repeat CRP is a 84.6.  Procalcitonin 0.25.  Currently on doxycycline and Ancef.  Nasal swab was negative for MRSA.  She does have some bullae noted on her right lower extremity now on that medial portion there is a very large wound with a little bit of blood in it.  Also another smaller 1 on the anterior lateral lower part of her leg very small about a centimeter.  Will have wound care/Debbi Bernard look at it for us, she has been following the patient for a while as an outpatient and wound care regarding lower extremities.   -3/15: She is afebrile.  CRP continues to decline is down to 30.4 this morning white count is also improving down to 12.8.  She is on the Ancef and doxycycline.  Does have still erythema in that right lower extremity I think that is going to take a while for that to go away.  So clinically I think she is responding to her current regimen.  Will continue with this for now with antibiotics over the weekend consider changing to orals tomorrow.  -3/16: Afebrile CRP is getting down I think we definitely on the right track with things.  Will continue the IV antibiotics over the weekend definitely orals on Monday.  CRP was down to 22.  She has had no fevers at all.  Leg is erythematous but it is going to be that color for quite some time.  No evidence that it spreading anywhere.      History Compression fracture of thoracic vertebra, unspecified thoracic vertebral level, sequale    (H) (2/15/2024)  -PT  -Fall risk      Permanent atrial fibrillation (H) (11/29/2021)   Chronic anticoagulation (Coumadin) (2/21/2024)  -Home metoprolol  -Coumadin ordered  -3/13: INR quite elevated 7.57 today.  No evidence of bleeding.   Hemoglobin stable 12.7.  Warfarin is currently on hold until becomes more therapeutic range.  She is on her twice daily dosing of metoprolol.  Will make sure she is getting this that have been previously on hold.  Markedly elevated INR today no evidence of any bleeding.  -3/14: INR is coming down today 6.29.  No evidence of bleeding hemoglobin stable.  A-fib she had RVR yesterday.  She is on the twice daily dosing of oral metoprolol he had 1 small dose of IV metoprolol without much benefit.  Did give her a dose of IV digoxin which we have used in the past.  Rates are better today.  Will start oral digoxin in addition to her oral metoprolol.  -3/15: INR drifting downward slowly 5.71.  No evidence of any bleeding fortunately.  -3/16: A-fib rates nicely controlled.  Is on the digoxin will decide my dosage for this or if she requires it but I think she does need it.  Probably cut back on the dosage.  INR still elevated but drifting downward.  Slowly.  No bleeding issues told that she could go and have a salad today she is always avoiding greasy and such but currently given her relatively poor nutritional status think she needs to least try and will help get her INR down to relatively therapeutic level     Acquired hypothyroidism (4/13/2023)  -home synthroid     Anxiety disorder, unspecified (4/18/2023)  -prn ativan        Stage 3a chronic kidney disease (H) (2/5/2024)  -Daily labs  -3/13: Slight increase in creatinine 1.27 today BUN is 24.5.     -3/14: BUN/creatinine did go up a bit today 27/1.42.  He is getting some gentle IV fluids.  The vancomycin was discontinued.  Continue to monitor closely.  -3/16: Creatinine is improving.  Down to 1.26 is getting some IV fluid unfortunately creatinine today continues to decline it is down to 1.12 with a BUN of 26.  Will stop IV fluids today.      Venous stasis ulcer of ankle without varicose veins (H) (3/11/2024)  -wound care consulted, appreciate input     Disposition: Patient  is having a lot of pain apparently left lower extremity when working with physical therapy.  They are recommending likely short-term rehab stay to improve her mobility.  Patient attempted to work with physical therapy today however complained of being dizzy when she stood up.  Per PTs recommendations unless she has marked improvement she likely will require short-term stay at nursing home for rehab purposes.  Will reevaluate on Monday.  Same plan looking good    Diet: Regular Diet Adult  Fluids: Lock    DVT Prophylaxis: Warfarin  Code Status: No CPR- Do NOT Intubate    Disposition: Expected discharge in 2-3 days once disposition site is found and transition to oral antibiotics.    Markel Ricardo MD    Interval History   Patient alert pleasant no distress.  Some discomfort in that right leg but tolerable.  No shortness of breath no nausea vomiting.  Eating well afebrile sats good blood pressure stable CRP is coming down wound on right leg looks stable.  No worsening.  Will continue with her IV antibiotics over the weekend changing to orals on Monday told her to have a salad today which she fully agrees to help get her INR down a little bit better.  Does agree that she will likely need placement for short-term rehab.    -Data reviewed today: I reviewed all new labs and imaging results over the last 24 hours. I personally reviewed no images or EKG's today.    Physical Exam   Temp: 97.6  F (36.4  C) Temp src: Tympanic BP: 136/80 Pulse: 87   Resp: 20 SpO2: 95 % O2 Device: None (Room air)    Vitals:    03/11/24 0807 03/11/24 1303 03/14/24 0206   Weight: 46.3 kg (102 lb) 49 kg (108 lb 0.4 oz) 49.7 kg (109 lb 9.1 oz)     Vital Signs with Ranges  Temp:  [97.3  F (36.3  C)-97.7  F (36.5  C)] 97.6  F (36.4  C)  Pulse:  [] 87  Resp:  [18-20] 20  BP: (127-136)/(75-91) 136/80  SpO2:  [95 %] 95 %  I/O last 3 completed shifts:  In: 2200 [P.O.:480; I.V.:1720]  Out: 2125 [Urine:2125]    Peripheral IV 03/14/24 Left;Posterior  Hand (Active)   Site Assessment WDL 03/16/24 0618   Line Status Infusing;blood return noted 03/16/24 0618   Dressing Transparent 03/16/24 0618   Dressing Status clean;dry;intact 03/16/24 0618   Line Intervention Flushed 03/16/24 0618   Phlebitis Scale 0-->no symptoms 03/16/24 0618   Infiltration? no 03/16/24 0618   Number of days: 2       Wound Leg Ulceration (Active)   Wound Bed Other (Comment) 03/15/24 2219   Vaishali-wound Assessment Erythema 03/15/24 1745   Estimated Circumference ( if not measured quarter sized 03/12/24 1531   Drainage Amount Scant 03/15/24 1745   Drainage Color/Characteristics Bateman 03/15/24 1745   Wound Care/Cleansing Medication applied - see MAR 03/12/24 1531   Dressing Per Plan of Care 03/15/24 1745   Dressing Status Clean, dry, intact 03/15/24 2219   Dressing Change Due 03/16/24 03/15/24 1745   Number of days: 93       Wound Leg Ulceration (Active)   Number of days: 92       Wound Leg (Active)   Wound Bed Other (Comment) 03/15/24 2219   Vaishali-wound Assessment Other (Comment) 03/15/24 2219   Estimated Circumference ( if not measured golf sized 03/13/24 1036   Drainage Amount Scant 03/15/24 1745   Drainage Color/Characteristics Tan;Creamy 03/15/24 1745   Dressing Per Plan of Care 03/15/24 1745   Dressing Status Clean, dry, intact 03/15/24 2219   Dressing Change Due 03/16/24 03/15/24 1745   Number of days: 5       Wound Arm (Active)   Wound Bed Other (Comment) 03/15/24 2219   Vaishali-wound Assessment Erythema 03/15/24 1745   Estimated Circumference ( if not measured quarter sized 03/13/24 1036   Drainage Amount Scant 03/15/24 1745   Drainage Color/Characteristics Serosanguineous 03/15/24 1745   Wound Care/Cleansing Normal saline 03/15/24 1745   Dressing Per Plan of Care 03/15/24 1745   Dressing Status Clean, dry, intact 03/15/24 2219   Dressing Change Due 03/16/24 03/15/24 1745   Number of days: 5       Wound Arm (Active)   Wound Bed Other (Comment) 03/15/24 2219   Vaishali-wound Assessment Erythema  03/15/24 1745   Estimated Circumference ( if not measured dime sized 03/13/24 1036   Drainage Amount None 03/15/24 1745   Drainage Color/Characteristics Serosanguineous 03/14/24 0903   Wound Care/Cleansing Medication applied - see MAR 03/13/24 1036   Dressing Per Plan of Care 03/15/24 1745   Dressing Status Clean, dry, intact 03/15/24 2219   Dressing Change Due 03/16/24 03/15/24 1745   Number of days: 5       Wound Toe (Comment  which one) (Active)   Wound Bed Other (Comment) 03/15/24 2219   Vaishali-wound Assessment Erythema 03/15/24 1745   Estimated Circumference ( if not measured pea sized 03/13/24 1036   Drainage Amount Scant 03/15/24 1745   Drainage Color/Characteristics Yellow;Tan 03/15/24 1745   Wound Care/Cleansing Medication applied - see MAR 03/13/24 1036   Dressing Per Plan of Care 03/15/24 1745   Dressing Status Clean, dry, intact 03/15/24 2219   Dressing Change Due 03/16/24 03/15/24 1745   Number of days: 4     No line/device    Constitutional: Alert and oriented x3. No distress    HEENT: Normocephalic/atraumatic, PERRL, EOMI, mouth moist, neck supple, no LN.     Cardiovascular: Occasionally irregular RRR.  No murmur, no  rubs, or gallops.  JVD no.  Bruits no.  Pulses 2    Respiratory: Clear to auscultation bilaterally    Abdomen: Soft, nontender, nondistended, no organomegaly. Bowel sounds present    Extremities: Warm/dry.  Right leg with the erythema wrinkled no significant edema.  No purulence stable left lower extremity foot wounds dressed no purulence edema    Neuro:   Non focal, cranial nerves intact, Moves all extremities.  Medications    sodium chloride 50 mL/hr at 03/15/24 2221      atorvastatin  40 mg Oral At Bedtime    ceFAZolin  1 g Intravenous Q12H    digoxin  125 mcg Oral Daily    doxycycline  100 mg Intravenous Q12H    furosemide  40 mg Oral QAM    metoprolol succinate ER  100 mg Oral QAM    metoprolol succinate ER  100 mg Oral At Bedtime    pantoprazole  40 mg Oral BID    polyethylene glycol   17 g Oral Daily    predniSONE  5 mg Oral QAM    sodium chloride (PF)  3 mL Intracatheter Q8H    thyroid  30 mg Oral QAM AC    traMADol  50 mg Oral BID    Warfarin Therapy Reminder  1 each Oral See Admin Instructions       Data   Recent Labs   Lab 03/16/24  0615 03/15/24  0627 03/15/24  0528 03/14/24  0522 03/13/24  0514   WBC  --   --  12.8* 19.8* 25.3*   HGB  --   --  11.4* 13.8 13.0   MCV  --   --  97 96 96   PLT  --   --  195 238 227   INR 5.44* 5.71*  --  6.29* 7.57*   NA  --   --  142 142 138   POTASSIUM  --   --  3.6 4.3 3.9   CHLORIDE  --   --  103 104 100   CO2  --   --  27 28 27   BUN  --   --  26.8* 27.3* 24.5*   CR  --   --  1.26* 1.42* 1.27*   ANIONGAP  --   --  12 10 11   EFFIE  --   --  8.4 8.4 8.8   GLC  --   --  93 103* 86   ALBUMIN  --   --  2.8* 2.9* 2.8*   PROTTOTAL  --   --  5.2* 5.5* 5.2*   BILITOTAL  --   --  0.3 0.3 0.7   ALKPHOS  --   --  109 160* 115   ALT  --   --  10 17 20   AST  --   --  21 24 24       No results found for this or any previous visit (from the past 24 hour(s)).

## 2024-03-16 NOTE — PHARMACY-ANTICOAGULATION SERVICE
Pharmacy Consult-Warfarin Assessment Day #6    Shannon Walls is a 89 year old female admitted on 3/11/2024 with Cellulitis of right leg    Primary Indication(s) for Anticoagulation: A-fib     Goal INR: 2-3     FYI, patient is followed by the [facility]tico[facility] at: [place] staff draws INR and sends to [facility] and/or [facility] and [facility] doses)      Patient previously anticoagulated on Coumadin at a dose of 20 mg/week; dosed as: 2.5 mg daily except 5 mg on Fridays     Home tablet strength(s): 2.5 mg      Factors that may increase patient's bleeding risk and/or sensitivity to warfarin (Coumadin) include: Advanced age, Antibiotics, Malnutrition (Albumin = 2.8)     Factors that may decrease patient's bleeding risk and/or sensitivity to warfarin (Coumadin) include: none    Anticoagulation Dose History  More data exists           3/11/2024 3/12/2024 3/13/2024 3/14/2024 3/15/2024 3/16/2024      warfarin ANTICOAGULANT (COUMADIN) 2.5 MG tablet   2.5 mg, $Given HOLD HOLD HOLD HOLD HOLD   INR   2.87  5.14  7.57  6.29  5.71  5.44      CBC RESULTS:   Recent Labs   Lab Test 03/15/24  0528   HGB 11.4*          Assessment/Plan: INR remains supratherapeutic; continue to hold warfarin. INR with AM labs.     Thank You for the Consult. Will continue to follow.    Sal Ledesma [facility] ....................  3/16/2024   1:48 PM

## 2024-03-16 NOTE — PHARMACY-MEDICATION REGIMEN REVIEW
Pharmacy Antimicrobial Stewardship Assessment     Current Antimicrobial Therapy:  Anti-infectives (From now, onward)      Start     Dose/Rate Route Frequency Ordered Stop    24 1100  ceFAZolin (ANCEF) intermittent infusion 1 g         1 g  over 30 Minutes Intravenous EVERY 12 HOURS 24 1005      24 1000  doxycycline (VIBRAMYCIN) 100 mg vial to attach to  mL bag         100 mg  over 1-2 Hours Intravenous EVERY 12 HOURS 24 0955              Indication: Cellulitis    Days of Therapy: Day 4 Cefazolin/Doxycycline, Day 6 IV Antibiotics     Pertinent Labs:  Recent Labs   Lab Test 03/15/24  0528 24  0522 24  0514   WBC 12.8* 19.8* 25.3*     Recent Labs   Lab Test 24  0522 24  2304 24 LACT  --  2.7* 4.0* PCAL 0.25  --   --   < > = values in this interval not displayed.        Temperature:  Temp (24hrs), Av.4  F (36.3  C), Min:97.1  F (36.2  C), Max:97.6  F (36.4  C)      Culture Results:   30-Day Micro Results       Collected Updated Procedure Result Status      2024 1236 2024 0720 Blood Culture Hand, Right [[id number]]   Blood from Hand, Right    Preliminary result Component Value   Culture No growth after 4 days  [P]                2024 1224 2024 0720 Blood Culture Arm, Left [[id number]]   Blood from Arm, Left    Preliminary result Component Value   Culture No growth after 4 days  [P]                2024 1012 2024 MRSA MSSA PCR, Nasal Swab [[id number]]    Swab from Nose    Final result Component Value   MRSA Target DNA Negative   SA Target DNA Positive                     Recent Antibiotics:      Recommendations/Interventions:  1. Transition to oral    Sal Ledesma, [facility]  2024

## 2024-03-16 NOTE — PLAN OF CARE
"Pt A&O x 4 this shift. Pt afebrile, and remains on room air this shift. Cardiac monitoring remain on pt this shift. Apical pulse irregular, and tachycardiac this shift. Per ICU nurse tele report, pt \"Afib 115's\" this shift.  Pt complaints of pain to abdomen and lower back. Pt requests and receives PRN tylenol, and Tums at 22:20. Pt denies nausea at this time. Dressings remain clean, dry, and intact to bilateral upper, and lower extremities.   Pt stand by assist to bathroom this shift.   Alarms activated, and audible this shift. Pt remains free from falls and/or injuries this shift.     Face to face report given with opportunity to observe patient.    Report given to Angela Alamo RN.     Kaela Duggan RN   3/16/2024  7:29 AM   "

## 2024-03-16 NOTE — PROGRESS NOTES
03/16/24 1024   Appointment Info   Signing Clinician's Name / Credentials (PT) Delaney Turcios DPT   Interventions   Interventions Quick Adds Therapeutic Activity;Therapeutic Procedure   Therapeutic Procedure/Exercise   Ther. Procedure: strength, endurance, ROM, flexibillity Minutes (62288) 9   Symptoms Noted During/After Treatment fatigue;increased pain   Treatment Detail/Skilled Intervention Pt instructed in and completed seated therex x10 bilaterally each: ankle pumps, LAQ, hip flex, iso hip add.  Rest breaks between.  Pt reported some mild pain with initial few reps of LAQ but reported improvement with more repetitions.   Therapeutic Activity   Therapeutic Activities: dynamic activities to improve functional performance Minutes (55023) 12   Symptoms Noted During/After Treatment Fatigue;Increased pain   Treatment Detail/Skilled Intervention Pt in chair, agreeable to PT.  Denies dizziness today.  Pt transferred sit>stand CGA from room chair.  Pt reporting pain WB through R LE today but willing to try further ambulation.  Pt ambulated approx 25' with FWW min A, antalgic gait with decreased WB tolerance through R LE.  Attempts made to obtain O2 sat reading however unable due to cold extremity, mild dyspnea noted.  Pt able to transfer into chair CGA with good hand placement.  Pt left sitting in chair with clip alarm on and call light in reach.  Discussed recommendation for short term rehab again with patient and she agrees that would be the best plan for her at this time due to her level of needs.   PT Discharge Planning   PT Plan Progress mobility and ambulation   PT Discharge Recommendation (DC Rec) Transitional Care Facility   PT Rationale for DC Rec Pt still demonstrating limited activity tolerance due to pain and generalized deconditioning.  Recommend short term rehab as she remains a high fall risk and would not have appropriate level of support at assisted living.   PT Brief overview of current status  sit>stand CGA, ambulated 25' with FWW min A with antalgic gait noted   Total Session Time   Timed Code Treatment Minutes 21   Total Session Time (sum of timed and untimed services) 21

## 2024-03-17 NOTE — PHARMACY-MEDICATION REGIMEN REVIEW
Pharmacy Antimicrobial Stewardship Assessment     Current Antimicrobial Therapy:  Anti-infectives (From now, onward)      Start     Dose/Rate Route Frequency Ordered Stop    24 1100  doxycycline hyclate (VIBRAMYCIN) capsule 100 mg         100 mg Oral 2 TIMES DAILY 24 0958      24 1000  cephALEXin (KEFLEX) capsule 250 mg        Note to Pharmacy: Adjusted to TID per renal policy    250 mg Oral 3 TIMES DAILY 24 0954              Indication: Cellulitis    Days of Therapy: Day 1 Keflex, Day 5 Doxycycline, Day 7 Abx     Pertinent Labs:  Recent Labs   Lab Test 03/15/24  0528 24  0522 24  0514   WBC 12.8* 19.8* 25.3*     Recent Labs   Lab Test 24  0522 24  2304 24  1224 LACT  --  2.7* 4.0*   < > PCAL 0.25  --   --   --   < > = values in this interval not displayed.        Temperature:  Temp (24hrs), Av.7  F (36.5  C), Min:97.5  F (36.4  C), Max:98.1  F (36.7  C)      Culture Results:   30-Day Micro Results       Collected Updated Procedure Result Status      2024 1236 2024 0612 Blood Culture Hand, Right [[id number]]   Blood from Hand, Right    Final result Component Value   Culture No Growth               2024 1224 2024 0612 Blood Culture Arm, Left [[id number]]   Blood from Arm, Left    Final result Component Value   Culture No Growth               2024 1012 2024 MRSA MSSA PCR, Nasal Swab [[id number]]    Swab from Nose    Final result Component Value   MRSA Target DNA Negative   SA Target DNA Positive                     Recent Antibiotics:      Recommendations/Interventions:  1. Keflex reduced to TID Per Renal Policy    Sal Ledesma, [facility]  2024

## 2024-03-17 NOTE — PLAN OF CARE
Neuro/Orientation: Pt. A&Ox4 with occasional forgetfulness and calls appropriately.     Pain: Pt. C/o 8/10 pain in abd. Scheduled tramadol and PRN tylenol was given that was effective; pt. Slept through night and was up to toilet. Pt. Awoke in morning with 8/10 generalized pain and PRN dilaudid was given per orders that was effective in decreasing pt. Pain to 0/10 she stated.    Skin: Pt. Skin is dusky/red/jadiel on arms and legs. Skin cool to the touch but does have cap refill of less than 3 seconds when assessed this shift. Dressing to right heel for protection. Scabs painted with betadine per orders prior to shift change. Trace edema to BLE.    Resp: LSCTA bilaterally. Pt. On room air.     Cardiovascular: Apical pulse irregular. Coumadin on hold r/t high INR previously.     GI/: PRN tums was given this shift for heartburn/indigestion/abd discomfort that was effective. Pt. Using bedside commode for toileting. Pt. Continent of urine, but has brief in place.    Wounds: Right upper arm skin tear scabbed and CAMPOS. Left forearm skin tear has dressing in place. BLE and great toe wound have dressings in place and writer UTV. Dressings CDI and no drainage observed.     Transfers: SBAx1 with FWW and gait belt    Diet: regular    Bed alarm in place for pt. safety    Drains/Lines/IV: Pt. Has IV in left forearm/hand that is patent, flushed and saline locked. Abx given per orders this shift.     Repositioning: Pt. Able to T&R self and calls as needed for positioning help.     Mg+ to be rechecked in the morning.    Face to face report given with opportunity to observe patient.    Report given to Suresh RN & Cally RN    Tammy Sarah RN   3/17/2024  7:37 AM

## 2024-03-17 NOTE — PLAN OF CARE
Pt is A & O. Heart sounds irregular. No c/o dizziness upon movement today. Telemetry D/C'd today..Lungs sounds clear across all fields. Unable to obtain O2 sats due to cool extremities/poor circulation. Pt denies any SOB. No signs of dyspnea observed. Bowel sounds hypoactive all four quadrants. Pt is continent of B&B, incontinent pads utilized. Pt has bruising scattered throughout, skin is cool et jadiel. Cellulitis areas to (R) leg remains reddened et warm.All extremities jadiel/dusky. (L) arm dressing changed. (R) arm open to air as wound is scabbed over. Dressing changed to RLE, blister area. Blister is open w/ tan, slough drainage covered w/oil emulsion gauze, dry 4X4 et kerlix.  Pt received scheduled Tramadol w/effective pain relief. Pt uses call light appropriately. Safety precautions in place, alarms on and audible.     Face to face report given with opportunity to observe patient.    Report given to LIZBET Munoz RN   3/16/2024  1900 PM

## 2024-03-17 NOTE — PROGRESS NOTES
[facility]    Hospitalist Progress Note  Shannon Walls is a 89 year old female who  established diagnosis of hypothyroidism, permanent atrial fibrillation, anxiety, history of cva, history of tobacco use, CHD stage 3a, compression fracture of thoracic vertebra. She resides at nursing home she was brought in to hospital with leg swelling redness.  She was found elevated white blood cell count 21,000, elevated creatinine 1.1, INR 2.87, on Coumadin, MRSA wound culture was pending, patient also did have a lactic acid 3.2,.  Patient did have A-fib RVR in the emergency room did require dose of her home metoprolol and also IV push, her heart rate did improve into the 80s, patient is most of the time in A-fib.  She has been previously admitted to the hospital with cellulitis and need for wound care.  She was given Rocephin and vancomycin, patient is previously established DNR.     Assessment & Plan     Shannon Walls is a 89 year old female admitted on 3/11/2024. She  established diagnosis of hypothyroidism, permanent atrial fibrillation, anxiety, history of cva, history of tobacco use, CHD stage 3a, compression fracture of thoracic vertebra. Patent admitted with cellulitis of her right leg also had elevated lactic acid receiving IV fluids. She was started on broad spectrum antibiotics. She did have afib rvr un ER resolved with IV metoprolol.      Cellulitis right lower extremity  On 3/12 broadened antibiotics to cefepime    On vancomycin   Stopped rocephin on 3/12  Started cefepime  Monitor cultures  Monitor  lactic acid  Normal saline at 50 cc an hour  -3/13: Patient is afebrile.  White blood cell count 24,000 yesterday.  Today is pending.  Today's was 25,300.  CRP is 103.  Looking at her leg is still erythematous markedly less edema.  Does not appear to be extending anywhere else.  Blood cultures are negative.  Previous culture that she had done several months ago on the leg was coag positive  staph.  Not MRSA.  Will discontinue vancomycin put her on some doxycycline to cover for potential gram-positive's which is likely source.  Also use IV Ancef rather than cefepime.  Renal function has gone up a little bit.  -3/14: Patient remains afebrile.  White count is down to 19,800.  Repeat CRP is a 84.6.  Procalcitonin 0.25.  Currently on doxycycline and Ancef.  Nasal swab was negative for MRSA.  She does have some bullae noted on her right lower extremity now on that medial portion there is a very large wound with a little bit of blood in it.  Also another smaller 1 on the anterior lateral lower part of her leg very small about a centimeter.  Will have wound care/Debbi Bernard look at it for us, she has been following the patient for a while as an outpatient and wound care regarding lower extremities.   -3/15: She is afebrile.  CRP continues to decline is down to 30.4 this morning white count is also improving down to 12.8.  She is on the Ancef and doxycycline.  Does have still erythema in that right lower extremity I think that is going to take a while for that to go away.  So clinically I think she is responding to her current regimen.  Will continue with this for now with antibiotics over the weekend consider changing to orals tomorrow.  -3/16: Afebrile CRP is getting down I think we definitely on the right track with things.  Will continue the IV antibiotics over the weekend definitely orals on Monday.  CRP was down to 22.  She has had no fevers at all.  Leg is erythematous but it is going to be that color for quite some time.  No evidence that it spreading anywhere.  -3/17: Patient is actually doing very well she is afebrile all of her labs are improving.  Her right leg is erythematous but it will be that way for a while things look very good we will switch over to oral antibiotics today Keflex and will continue with some doxycycline for few more days also.      History Compression fracture of thoracic  vertebra, unspecified thoracic vertebral level, sequale    (H) (2/15/2024)  -PT  -Fall risk      Permanent atrial fibrillation (H) (11/29/2021)   Chronic anticoagulation (Coumadin) (2/21/2024)  -Home metoprolol  -Coumadin ordered  -3/13: INR quite elevated 7.57 today.  No evidence of bleeding.  Hemoglobin stable 12.7.  Warfarin is currently on hold until becomes more therapeutic range.  She is on her twice daily dosing of metoprolol.  Will make sure she is getting this that have been previously on hold.  Markedly elevated INR today no evidence of any bleeding.  -3/14: INR is coming down today 6.29.  No evidence of bleeding hemoglobin stable.  A-fib she had RVR yesterday.  She is on the twice daily dosing of oral metoprolol he had 1 small dose of IV metoprolol without much benefit.  Did give her a dose of IV digoxin which we have used in the past.  Rates are better today.  Will start oral digoxin in addition to her oral metoprolol.  -3/15: INR drifting downward slowly 5.71.  No evidence of any bleeding fortunately.  -3/16: A-fib rates nicely controlled.  Is on the digoxin will decide my dosage for this or if she requires it but I think she does need it.  Probably cut back on the dosage.  INR still elevated but drifting downward.  Slowly.  No bleeding issues told that she could go and have a salad today she is always avoiding greasy and such but currently given her relatively poor nutritional status think she needs to least try and will help get her INR down to relatively therapeutic level  -3/17: INR slowly drifting down is down to 4.69 today.  Told to have another salad.  No bleeding issues heart rates adequately controlled we will put her on a little bit lower dose of the dig 62.5 mcg.      Acquired hypothyroidism (4/13/2023)  -home synthroid     Anxiety disorder, unspecified (4/18/2023)  -prn ativan        Stage 3a chronic kidney disease (H) (2/5/2024)  -Daily labs  -3/13: Slight increase in creatinine 1.27 today  BUN is 24.5.     -3/14: BUN/creatinine did go up a bit today 27/1.42.  He is getting some gentle IV fluids.  The vancomycin was discontinued.  Continue to monitor closely.  -3/16: Creatinine is improving.  Down to 1.26 is getting some IV fluid unfortunately creatinine today continues to decline it is down to 1.12 with a BUN of 26.  Will stop IV fluids today.  -3/17: Stop the IV fluids will recheck her labs tomorrow in terms of BUN/creatinine.        Venous stasis ulcer of ankle without varicose veins (H) (3/11/2024)  -wound care consulted, appreciate input     Disposition: Patient is having a lot of pain apparently left lower extremity when working with physical therapy.  They are recommending likely short-term rehab stay to improve her mobility.  Patient attempted to work with physical therapy today however complained of being dizzy when she stood up.  Per PTs recommendations unless she has marked improvement she likely will require short-term stay at nursing home for rehab purposes.  Will reevaluate on Monday.  Same plan looking good  -3/17: Overall doing very well she does realize she is very weak we will look towards placement when case management is here tomorrow.       Diet: Regular Diet Adult  Fluids: None    DVT Prophylaxis: Warfarin  Code Status: No CPR- Do NOT Intubate    Disposition: Expected discharge in 1-2 days once once accepting facility is found.    Markel Ricardo MD    Interval History   Alert pleasant no distress no real complaints.  Occasional belching and heartburn.  No real breathing troubles at all legs hurt but tolerable.  Wound looks markedly better and everything looks better.  Will switch to oral antibiotics today work on placement issues tomorrow and case management is in house.    -Data reviewed today: I reviewed all new labs and imaging results over the last 24 hours. I personally reviewed no images or EKG's today.    Physical Exam   Temp: 98.1  F (36.7  C) Temp src: Tympanic BP:  99/69 Pulse: 80   Resp: 20 SpO2: 94 % O2 Device: None (Room air)    Vitals:    03/11/24 0807 03/11/24 1303 03/14/24 0206   Weight: 46.3 kg (102 lb) 49 kg (108 lb 0.4 oz) 49.7 kg (109 lb 9.1 oz)     Vital Signs with Ranges  Temp:  [97.5  F (36.4  C)-98.1  F (36.7  C)] 98.1  F (36.7  C)  Pulse:  [65-85] 80  Resp:  [18-20] 20  BP: ()/(69-84) 99/69  SpO2:  [94 %-96 %] 94 %  I/O last 3 completed shifts:  In: 463 [P.O.:60; I.V.:403]  Out: 1850 [Urine:1850]    Peripheral IV 03/14/24 Left;Posterior Hand (Active)   Site Assessment WDL 03/16/24 2000   Line Status Infusing 03/16/24 2000   Dressing Transparent 03/16/24 2000   Dressing Status intact;clean;dry 03/16/24 2000   Line Intervention Flushed 03/16/24 2000   Phlebitis Scale 0-->no symptoms 03/16/24 2000   Infiltration? no 03/16/24 2000   Number of days: 3       Wound Leg Ulceration (Active)   Wound Bed Other (Comment) 03/16/24 2000   Vaishali-wound Assessment Other (Comment) 03/16/24 2000   Estimated Circumference ( if not measured quarter sized 03/12/24 1531   Drainage Amount None 03/16/24 2000   Drainage Color/Characteristics Bateman 03/15/24 1745   Wound Care/Cleansing Medication applied - see MAR 03/12/24 1531   Dressing Per Plan of Care 03/16/24 2000   Dressing Status Clean, dry, intact 03/16/24 2000   Dressing Change Due 03/17/24 03/16/24 2000   Number of days: 94       Wound Leg Ulceration (Active)   Number of days: 93       Wound Leg (Active)   Wound Bed Other (Comment) 03/16/24 2000   Vaishali-wound Assessment Other (Comment) 03/16/24 2000   Estimated Circumference ( if not measured golf sized 03/13/24 1036   Drainage Amount None 03/16/24 2000   Drainage Color/Characteristics Tan;Creamy;Serosanguineous 03/16/24 0900   Wound Care/Cleansing Normal saline 03/16/24 0900   Dressing Per Plan of Care 03/16/24 2000   Dressing Status Clean, dry, intact 03/16/24 2000   Dressing Change Due 03/17/24 03/16/24 2000   Number of days: 6       Wound Arm (Active)   Wound Bed Other  (Comment) 03/16/24 2000   Vaishali-wound Assessment Erythema 03/16/24 2000   Estimated Circumference ( if not measured quarter sized 03/13/24 1036   Drainage Amount None 03/16/24 2000   Drainage Color/Characteristics Serosanguineous 03/16/24 0900   Wound Care/Cleansing Normal saline 03/15/24 1745   Dressing Per Plan of Care 03/16/24 2000   Dressing Status Clean, dry, intact 03/16/24 2000   Dressing Change Due 03/17/24 03/16/24 2000   Number of days: 6       Wound Arm (Active)   Wound Bed Dry scab 03/16/24 2000   Vaishali-wound Assessment Erythema 03/16/24 2000   Estimated Circumference ( if not measured dime sized 03/13/24 1036   Drainage Amount None 03/16/24 2000   Drainage Color/Characteristics Serosanguineous 03/14/24 0903   Wound Care/Cleansing Medication applied - see MAR 03/13/24 1036   Dressing Open to air 03/16/24 2000   Dressing Status Clean, dry, intact 03/16/24 2000   Dressing Change Due 03/16/24 03/15/24 1745   Number of days: 6       Wound Toe (Comment  which one) (Active)   Wound Bed Other (Comment) 03/16/24 2000   Vaishali-wound Assessment Other (Comment) 03/16/24 2000   Estimated Circumference ( if not measured pea sized 03/13/24 1036   Drainage Amount None 03/16/24 2000   Drainage Color/Characteristics Yellow;Tan 03/15/24 1745   Wound Care/Cleansing Medication applied - see MAR 03/13/24 1036   Dressing Per Plan of Care 03/16/24 2000   Dressing Status Clean, dry, intact 03/16/24 2000   Dressing Change Due 03/16/24 03/15/24 1745   Number of days: 5     No line/device    Constitutional: Alert and oriented x3. No distress    HEENT: Normocephalic/atraumatic, PERRL, EOMI, mouth moist, neck supple, no LN.     Cardiovascular: Occasionally irregular RRR.  No murmur, no  rubs, or gallops.  JVD no.  Bruits no.  Pulses 2    Respiratory: Clear to auscultation bilaterally    Abdomen: Soft, nontender, nondistended, no organomegaly. Bowel sounds present    Extremities: Warm/dry.  No edema erythema still present right leg but  better nothing seems to be spreading.  Some of this is chronic venous stasis issues.    Neuro:   Non focal, cranial nerves intact, Moves all extremities.  Medications      atorvastatin  40 mg Oral At Bedtime    cephALEXin  250 mg Oral TID    digoxin  62.5 mcg Oral Daily    doxycycline hyclate  100 mg Oral BID    furosemide  40 mg Oral QAM    magnesium oxide  400 mg Oral Daily    metoprolol succinate ER  100 mg Oral QAM    metoprolol succinate ER  100 mg Oral At Bedtime    pantoprazole  40 mg Oral BID    polyethylene glycol  17 g Oral Daily    predniSONE  5 mg Oral QAM    sodium chloride (PF)  3 mL Intracatheter Q8H    thyroid  30 mg Oral QAM AC    traMADol  50 mg Oral BID    Warfarin Therapy Reminder  1 each Oral See Admin Instructions       Data   Recent Labs   Lab 03/17/24  0535 03/16/24  0615 03/15/24  0627 03/15/24  0528 03/14/24  0522 03/13/24  0514   WBC  --   --   --  12.8* 19.8* 25.3*   HGB  --   --   --  11.4* 13.8 13.0   MCV  --   --   --  97 96 96   PLT  --   --   --  195 238 227   INR 4.69* 5.44* 5.71*  --  6.29* 7.57*   NA  --  141  --  142 142 138   POTASSIUM  --  3.5  --  3.6 4.3 3.9   CHLORIDE  --  101  --  103 104 100   CO2  --  24  --  27 28 27   BUN  --  26.7*  --  26.8* 27.3* 24.5*   CR  --  1.12*  --  1.26* 1.42* 1.27*   ANIONGAP  --  16*  --  12 10 11   EFFIE  --  8.6  --  8.4 8.4 8.8   GLC  --  84  --  93 103* 86   ALBUMIN  --   --   --  2.8* 2.9* 2.8*   PROTTOTAL  --   --   --  5.2* 5.5* 5.2*   BILITOTAL  --   --   --  0.3 0.3 0.7   ALKPHOS  --   --   --  109 160* 115   ALT  --   --   --  10 17 20   AST  --   --   --  21 24 24       No results found for this or any previous visit (from the past 24 hour(s)).

## 2024-03-17 NOTE — PHARMACY-ANTICOAGULATION SERVICE
Pharmacy Consult-Warfarin Assessment Day #7    Shannon Walls is a 89 year old female admitted on 3/11/2024 with Cellulitis of right leg    Primary Indication(s) for Anticoagulation: A-fib     Goal INR: 2-3     FYI, patient is followed by the [facility]tico[facility] at: [facility] staff draws INR and sends to [facility] and/or [facility] and [facility] doses)      Patient previously anticoagulated on Coumadin at a dose of 20 mg/week; dosed as: 2.5 mg daily except 5 mg on Fridays     Home tablet strength(s): 2.5 mg      Factors that may increase patient's bleeding risk and/or sensitivity to warfarin (Coumadin) include: Advanced age, Antibiotics, Malnutrition (Albumin = 2.8)     Factors that may decrease patient's bleeding risk and/or sensitivity to warfarin (Coumadin) include: none                3/11/2024 3/12/2024 3/13/2024 3/14/2024 3/15/2024 3/16/2024 3/16/2024        warfarin ANTICOAGULANT (COUMADIN) 2.5 MG tablet     2.5 mg, $Given HOLD HOLD HOLD HOLD HOLD    INR     2.87  5.14  7.57  6.29  5.71  5.44  4.69        CBC RESULTS:   Recent Labs   Lab Test 03/15/24  0528   HGB 11.4*          Assessment/Plan: INR remains supratherapeutic; will hold warfarin today; may be able to resume low oral dose tomorrow.    Thank You for the Consult. Will continue to follow.    [facility] ....................  3/17/2024   10:53 AM

## 2024-03-18 NOTE — PROGRESS NOTES
03/18/24 1045   Appointment Info   Signing Clinician's Name / Credentials (PT) Karely Dean DPT (Tom)   Therapeutic Activity   Therapeutic Activities: dynamic activities to improve functional performance Minutes (59747) 15   Symptoms Noted During/After Treatment Fatigue;Increased pain   Treatment Detail/Skilled Intervention Pt in chair, agreeable to PT.  Denies dizziness today.  Pt transferred sit>stand CGA from room chair.  Pt reporting pain WB through R LE today but reduced from over the weekend. Pt states that it is feeling better today.  Pt ambulated approx 35' with FWW CGA;  standing at window for 3mins with BL UE support on FWW with encouraged weight shifting, mildly antalgic gait with decreased WB tolerance through R LE.   Pt able to transfer into chair CGA with good hand placement.  Pt left sitting in chair with clip alarm on and call light in reach.  Discussed recommendation for short term rehab again with patient and she agrees that would be the best plan for her at this time due to her level of needs.   PT Discharge Planning   PT Plan Progress mobility and ambulation   PT Discharge Recommendation (DC Rec) Transitional Care Facility   PT Rationale for DC Rec Pt still demonstrating limited activity tolerance due to pain and generalized deconditioning.  Recommend short term rehab as she remains a high fall risk and would not have appropriate level of support at assisted living.   PT Brief overview of current status sit>stand CGA, ambulated 35' with FWW CGA with antalgic gait noted

## 2024-03-18 NOTE — PLAN OF CARE
Neuro/Orientation: Pt. A&Ox4 with occasional forgetfulness and calls appropriately.      Pain: Pt. C/o 6/10 pain in abd/heartburn. Scheduled tramadol and PRN maalox was given that was effective; pt. Slept through night and was up to toilet. Pt. Awoke in morning with 6/10 generalized pain and PRN dilaudid was given per orders that was effective in decreasing pt. Pain to 0/10 she stated.     Skin: Pt. Skin is dusky/red/jadiel on arms and legs. Skin cool to the touch but does have cap refill of less than 3 seconds when assessed this shift. Dressing to right heel for protection. Scabs painted with betadine per orders prior to shift change. Trace edema to BLE.     Resp: LSCTA bilaterally. Pt. On room air.      Cardiovascular: Apical pulse irregular. Coumadin not given last shift d/t INR.      GI/: PRN tums was given this shift for heartburn/indigestion/abd discomfort that was effective. Pt. Using bedside commode for toileting. Pt. Continent of urine, but has brief in place.     Wounds: Right upper arm skin tear scabbed and CAMPOS. Left forearm skin tear has dressing in place. Both to be assessed/changed daily. BLE and great toe wound have dressings in place and writer UTV. Dressings CDI and no drainage observed; to be changed q 3rd day and PRN.     Transfers: SBAx1 with FWW and gait belt     Diet: regular     Bed alarm in place for pt. Safety as pt. Did get up 1x this shift without calling and set off her bed alarm. Nursing arrived in room and education provided to use call light prior to attempting to get up OOB.     Drains/Lines/IV: Pt. Has IV in left forearm/hand that is patent, flushed and saline locked.     Repositioning: Pt. Able to T&R self and calls as needed for positioning help.      Mg+ to be rechecked this morning.    Face to face report given with opportunity to observe patient.    Report given to LIZBET Reyna RN   3/18/2024  7:23 AM

## 2024-03-18 NOTE — PROGRESS NOTES
[facility]    Hospitalist Progress Note     Shannon Walls is a 89 year old female who  established diagnosis of hypothyroidism, permanent atrial fibrillation, anxiety, history of cva, history of tobacco use, CHD stage 3a, compression fracture of thoracic vertebra. She resides at nursing home she was brought in to hospital with leg swelling redness.  She was found elevated white blood cell count 21,000, elevated creatinine 1.1, INR 2.87, on Coumadin, MRSA wound culture was pending, patient also did have a lactic acid 3.2,.  Patient did have A-fib RVR in the emergency room did require dose of her home metoprolol and also IV push, her heart rate did improve into the 80s, patient is most of the time in A-fib.  She has been previously admitted to the hospital with cellulitis and need for wound care.  She was given Rocephin and vancomycin, patient is previously established DNR.     Assessment & Plan     Shannon Walls is a 89 year old female admitted on 3/11/2024. She  established diagnosis of hypothyroidism, permanent atrial fibrillation, anxiety, history of cva, history of tobacco use, CHD stage 3a, compression fracture of thoracic vertebra. Patent admitted with cellulitis of her right leg also had elevated lactic acid receiving IV fluids. She was started on broad spectrum antibiotics. She did have afib rvr un ER resolved with IV metoprolol.      Cellulitis right lower extremity  On 3/12 broadened antibiotics to cefepime    On vancomycin   Stopped rocephin on 3/12  Started cefepime  Monitor cultures  Monitor  lactic acid  Normal saline at 50 cc an hour  -3/13: Patient is afebrile.  White blood cell count 24,000 yesterday.  Today is pending.  Today's was 25,300.  CRP is 103.  Looking at her leg is still erythematous markedly less edema.  Does not appear to be extending anywhere else.  Blood cultures are negative.  Previous culture that she had done several months ago on the leg was coag positive  staph.  Not MRSA.  Will discontinue vancomycin put her on some doxycycline to cover for potential gram-positive's which is likely source.  Also use IV Ancef rather than cefepime.  Renal function has gone up a little bit.  -3/14: Patient remains afebrile.  White count is down to 19,800.  Repeat CRP is a 84.6.  Procalcitonin 0.25.  Currently on doxycycline and Ancef.  Nasal swab was negative for MRSA.  She does have some bullae noted on her right lower extremity now on that medial portion there is a very large wound with a little bit of blood in it.  Also another smaller 1 on the anterior lateral lower part of her leg very small about a centimeter.  Will have wound care/Debbi Bernard look at it for us, she has been following the patient for a while as an outpatient and wound care regarding lower extremities.   -3/15: She is afebrile.  CRP continues to decline is down to 30.4 this morning white count is also improving down to 12.8.  She is on the Ancef and doxycycline.  Does have still erythema in that right lower extremity I think that is going to take a while for that to go away.  So clinically I think she is responding to her current regimen.  Will continue with this for now with antibiotics over the weekend consider changing to orals tomorrow.  -3/16: Afebrile CRP is getting down I think we definitely on the right track with things.  Will continue the IV antibiotics over the weekend definitely orals on Monday.  CRP was down to 22.  She has had no fevers at all.  Leg is erythematous but it is going to be that color for quite some time.  No evidence that it spreading anywhere.  -3/17: Patient is actually doing very well she is afebrile all of her labs are improving.  Her right leg is erythematous but it will be that way for a while things look very good we will switch over to oral antibiotics today Keflex and will continue with some doxycycline for few more days also.  -3/18: All looks good no fevers wound is in terms  of her right leg is also be somewhat erythematous.  But looks markedly better.    Will continue with the oral antibiotics she has been here now for a week probably about 3-4 more days and then discontinue.    Hstory Compression fracture of thoracic vertebra, unspecified thoracic vertebral level, sequale    (H) (2/15/2024)  -PT  -Fall risk      Permanent atrial fibrillation (H) (11/29/2021)   Chronic anticoagulation (Coumadin) (2/21/2024)  -Home metoprolol  -Coumadin ordered  -3/13: INR quite elevated 7.57 today.  No evidence of bleeding.  Hemoglobin stable 12.7.  Warfarin is currently on hold until becomes more therapeutic range.  She is on her twice daily dosing of metoprolol.  Will make sure she is getting this that have been previously on hold.  Markedly elevated INR today no evidence of any bleeding.  -3/14: INR is coming down today 6.29.  No evidence of bleeding hemoglobin stable.  A-fib she had RVR yesterday.  She is on the twice daily dosing of oral metoprolol he had 1 small dose of IV metoprolol without much benefit.  Did give her a dose of IV digoxin which we have used in the past.  Rates are better today.  Will start oral digoxin in addition to her oral metoprolol.  -3/15: INR drifting downward slowly 5.71.  No evidence of any bleeding fortunately.  -3/16: A-fib rates nicely controlled.  Is on the digoxin will decide my dosage for this or if she requires it but I think she does need it.  Probably cut back on the dosage.  INR still elevated but drifting downward.  Slowly.  No bleeding issues told that she could go and have a salad today she is always avoiding greasy and such but currently given her relatively poor nutritional status think she needs to least try and will help get her INR down to relatively therapeutic level  -3/17: INR slowly drifting down is down to 4.69 today.  Told to have another salad.  No bleeding issues heart rates adequately controlled we will put her on a little bit lower dose of  the dig 62.5 mcg.  -3/18: INR down to 3.1 pharmacy did decide to dose today.  Rate is adequate 70s is on the twice daily metoprolol and low-dose of dig.      Acquired hypothyroidism (4/13/2023)  -home synthroid     Anxiety disorder, unspecified (4/18/2023)  -prn ativan        Stage 3a chronic kidney disease (H) (2/5/2024)  -Daily labs  -3/13: Slight increase in creatinine 1.27 today BUN is 24.5.     -3/14: BUN/creatinine did go up a bit today 27/1.42.  He is getting some gentle IV fluids.  The vancomycin was discontinued.  Continue to monitor closely.  -3/16: Creatinine is improving.  Down to 1.26 is getting some IV fluid unfortunately creatinine today continues to decline it is down to 1.12 with a BUN of 26.  Will stop IV fluids today.  -3/17: Stop the IV fluids will recheck her labs tomorrow in terms of BUN/creatinine.  -3/18: BUN/creatinine are 28/1.05.  Back to her baseline.  IV fluids were stopped        Venous stasis ulcer of ankle without varicose veins (H) (3/11/2024)  -wound care consulted, appreciate input     Disposition: Patient is having a lot of pain apparently left lower extremity when working with physical therapy.  They are recommending likely short-term rehab stay to improve her mobility.  Patient attempted to work with physical therapy today however complained of being dizzy when she stood up.  Per PTs recommendations unless she has marked improvement she likely will require short-term stay at nursing home for rehab purposes.  Will reevaluate on Monday.  Same plan looking good  -3/17: Overall doing very well she does realize she is very weak we will look towards placement when case management is here tomorrow.  -3/18: Trying to get her placed somewhere for rehab purposes.  Ambulation is unsafe at this time to go home    Diet: Regular Diet Adult  Fluids: No    DVT Prophylaxis: Warfarin  Code Status: No CPR- Do NOT Intubate    Disposition: Expected discharge soon as accepting facility is  found  Markel Ricardo MD    Interval History   Patient alert pleasant no distress only complaint is she is not really able to walk due to weakness and the discomfort in her legs.  Otherwise afebrile all labs look good and her infection is under control she is on oral agents which we will likely switch off of in a couple more days.  No bleeding INR is drifting down also.  A-fib rates controlled.  Looking for rehab    -Data reviewed today: I reviewed all new labs and imaging results over the last 24 hours. I personally reviewed no images or EKG's today.    Physical Exam   Temp: 97.5  F (36.4  C) Temp src: Tympanic BP: 132/85 Pulse: 70   Resp: 20 SpO2: 96 % O2 Device: None (Room air)    Vitals:    03/11/24 0807 03/11/24 1303 03/14/24 0206   Weight: 46.3 kg (102 lb) 49 kg (108 lb 0.4 oz) 49.7 kg (109 lb 9.1 oz)     Vital Signs with Ranges  Temp:  [97.5  F (36.4  C)-97.7  F (36.5  C)] 97.5  F (36.4  C)  Pulse:  [64-80] 70  Resp:  [16-20] 20  BP: (121-143)/(69-85) 132/85  SpO2:  [94 %-97 %] 96 %  I/O last 3 completed shifts:  In: 240 [P.O.:240]  Out: 1550 [Urine:1550]    Peripheral IV 03/14/24 Left;Posterior Hand (Active)   Site Assessment WDL except 03/18/24 0810   Line Status Saline locked 03/18/24 0810   Dressing Transparent 03/18/24 0810   Dressing Status dry;intact;old drainage 03/18/24 0810   Line Intervention Flushed 03/18/24 0810   Phlebitis Scale 0-->no symptoms 03/18/24 0810   Infiltration? no 03/18/24 0810   Number of days: 4       Wound Leg Ulceration (Active)   Wound Bed Other (Comment) 03/17/24 2059   Vaihsali-wound Assessment Other (Comment) 03/17/24 2059   Estimated Circumference ( if not measured quarter sized 03/12/24 1531   Drainage Amount None 03/17/24 2059   Drainage Color/Characteristics Bateman 03/15/24 1745   Wound Care/Cleansing Medication applied - see MAR 03/12/24 1531   Dressing Per Plan of Care 03/17/24 2059   Dressing Status Clean, dry, intact 03/17/24 2059   Dressing Change Due 03/20/24 03/17/24  2059   Number of days: 95       Wound Leg Ulceration (Active)   Number of days: 94       Wound Leg (Active)   Wound Bed Other (Comment) 03/17/24 2059   Vaishali-wound Assessment Warm;Erythema 03/17/24 2059   Estimated Circumference ( if not measured golf sized 03/13/24 1036   Drainage Amount UTV 03/17/24 2059   Drainage Color/Characteristics Tan;Creamy 03/17/24 1759   Wound Care/Cleansing Normal saline 03/17/24 1759   Dressing Other (Comment) 03/17/24 1000   Dressing Status Clean, dry, intact 03/17/24 2059   Dressing Change Due 03/20/24 03/17/24 2059   Number of days: 7       Wound Arm (Active)   Wound Bed Dry scab 03/17/24 2059   Vaishali-wound Assessment Erythema;Cool 03/17/24 2059   Estimated Circumference ( if not measured quarter sized 03/13/24 1036   Drainage Amount None 03/17/24 2059   Drainage Color/Characteristics Other (Comment) 03/17/24 1705   Wound Care/Cleansing Normal saline 03/17/24 1705   Dressing Per Plan of Care 03/17/24 2059   Dressing Status Clean, dry, intact 03/17/24 2059   Dressing Change Due 03/18/24 03/17/24 2059   Number of days: 7       Wound Arm (Active)   Wound Bed Dry scab 03/17/24 2059   Vaishali-wound Assessment Erythema 03/17/24 2059   Estimated Circumference ( if not measured dime sized 03/13/24 1036   Drainage Amount None 03/17/24 2059   Drainage Color/Characteristics Serosanguineous 03/14/24 0903   Wound Care/Cleansing Medication applied - see MAR 03/13/24 1036   Dressing Open to air 03/17/24 2059   Dressing Status Clean, dry, intact 03/17/24 1705   Dressing Change Due 03/18/24 03/17/24 2059   Number of days: 7       Wound Toe (Comment  which one) (Active)   Wound Bed Other (Comment) 03/17/24 2059   Vaishali-wound Assessment Erythema;Cool 03/17/24 2059   Estimated Circumference ( if not measured pea sized 03/13/24 1036   Drainage Amount None 03/17/24 2059   Drainage Color/Characteristics Yellow;Tan 03/15/24 1745   Wound Care/Cleansing Medication applied - see MAR 03/13/24 1036   Dressing Per Plan  of Care 03/17/24 2059   Dressing Status Clean, dry, intact 03/17/24 2059   Dressing Change Due 03/20/24 03/17/24 2059   Number of days: 6     No line/device    Constitutional: Alert and oriented x3. No distress    HEENT: Normocephalic/atraumatic, PERRL, EOMI, mouth moist, neck supple, no LN.     Cardiovascular: Regular RRR.  No murmur, no  rubs, or gallops.  JVD no.  Bruits no.  Pulses 2    Respiratory: Clear to auscultation bilaterally    Abdomen: Soft, nontender, nondistended, no organomegaly. Bowel sounds present    Extremities: Warm/dry.  Right lower extremity still erythematous but she has a lot of chronic venous stasis issues there.  Not much edema.  Overall I think everything looks good adequate no purulence edema    Neuro:   Non focal, cranial nerves intact, Moves all extremities.  Medications      atorvastatin  40 mg Oral At Bedtime    cephALEXin  250 mg Oral TID    digoxin  62.5 mcg Oral Daily    doxycycline hyclate  100 mg Oral BID    furosemide  40 mg Oral QAM    magnesium oxide  400 mg Oral Daily    metoprolol succinate ER  100 mg Oral QAM    metoprolol succinate ER  100 mg Oral At Bedtime    pantoprazole  40 mg Oral BID    polyethylene glycol  17 g Oral Daily    predniSONE  5 mg Oral QAM    sodium chloride (PF)  3 mL Intracatheter Q8H    thyroid  30 mg Oral QAM AC    traMADol  50 mg Oral BID    warfarin ANTICOAGULANT  1.25 mg Oral ONCE at 18:00    Warfarin Therapy Reminder  1 each Oral See Admin Instructions       Data   Recent Labs   Lab 03/18/24  0542 03/17/24  0535 03/16/24  0615 03/15/24  0627 03/15/24  0528 03/14/24  0522   WBC 12.0*  --   --   --  12.8* 19.8*   HGB 14.4  --   --   --  11.4* 13.8   MCV 94  --   --   --  97 96     --   --   --  195 238   INR 3.81* 4.69* 5.44*   < >  --  6.29*     --  141  --  142 142   POTASSIUM 3.5  --  3.5  --  3.6 4.3   CHLORIDE 100  --  101  --  103 104   CO2 33*  --  24  --  27 28   BUN 27.8*  --  26.7*  --  26.8* 27.3*   CR 1.05*  --  1.12*  --   1.26* 1.42*   ANIONGAP 6*  --  16*  --  12 10   EFFIE 8.7  --  8.6  --  8.4 8.4   GLC 95  --  84  --  93 103*   ALBUMIN  --   --   --   --  2.8* 2.9*   PROTTOTAL  --   --   --   --  5.2* 5.5*   BILITOTAL  --   --   --   --  0.3 0.3   ALKPHOS  --   --   --   --  109 160*   ALT  --   --   --   --  10 17   AST  --   --   --   --  21 24    < > = values in this interval not displayed.       No results found for this or any previous visit (from the past 24 hour(s)).

## 2024-03-18 NOTE — PLAN OF CARE
Pt is A & O. Heart sounds irregular. No c/o dizziness upon movement today. .Pt's BP on the lower side this am, no c/o dizziness upon movement today. Lungs sounds clear across all fields. Bowel sounds normoactive all four quadrants. Pt is continent of B&B, incontinent pads utilized. Pt has bruising scattered throughout, skin is cool et jadiel. Cellulitis areas to (R) leg remains reddened et warm. All extremities jadiel/dusky. (L) arm dressing changed. (R) arm open to air as wound is scabbed over. Dressing changed to RLE, blister area. Blister is open w/ tan, slough drainage covered w/oil emulsion gauze, dry 4X4 et kerlix.  Pt received scheduled Tramadol w/effective pain relief. ABX's changed to PO today. Pt uses call light appropriately. Safety precautions in place, alarms on and audible.     Face to face report given with opportunity to observe patient.    Report given to LIZBET Munoz RN   3/17/2024  1900 PM

## 2024-03-18 NOTE — PROGRESS NOTES
Patient was accepted for admission to [facility] tomorrow, 03/19. Patient reports that her friend, Linda, will transport her to the facility in the morning on 03/19.       PAS-RR    Per DHS regulation, CTS team completed and submitted PAS-RR to [place] [employer] via the [employer] Line. CTS team advised SNF and they are aware a PAS-RR has been submitted.     CTS team reviewed with pt or health care agent that they may be contacted for a follow up appointment within 10 days of hospital discharge if SNF stay is less than 30 days. Contact information for [employer] was also provided.     Pt or health care agent verbalized understanding.     PAS-RR # [id number]

## 2024-03-18 NOTE — PHARMACY-MEDICATION REGIMEN REVIEW
Pharmacy Antimicrobial Stewardship Assessment     Current Antimicrobial Therapy:  Anti-infectives (From now, onward)      Start     Dose/Rate Route Frequency Ordered Stop    24 1100  doxycycline hyclate (VIBRAMYCIN) capsule 100 mg         100 mg Oral 2 TIMES DAILY 24 0958      24 1000  cephALEXin (KEFLEX) capsule 250 mg        Note to Pharmacy: Adjusted to TID per renal policy    250 mg Oral 3 TIMES DAILY 24 0954     Indication: SSTI    Days of Therapy:   Cephalexin: day 2  Doxycycline: day 6 but day 2 of PO  *Total= day 8 of abx     Pertinent Labs:  Recent Labs   Lab Test 24  0542 03/15/24  0528 24  05   WBC 12.0* 12.8* 19.8*     Recent Labs   Lab Test 24  0522 24  2304 24   LACT  --  2.7* 4.0*   PCAL 0.25  --   --     < > = values in this interval not displayed.      Temperature:  Temp (24hrs), Av.8  F (36.6  C), Min:97.5  F (36.4  C), Max:98.1  F (36.7  C)    Culture Results:   30-Day Micro Results       Collected Updated Procedure Result Status      2024 1236 2024 0612 Blood Culture Hand, Right [[id number]]   Blood from Hand, Right    Final result Component Value   Culture No Growth               2024 1224 2024 0612 Blood Culture Arm, Left [[id number]]   Blood from Arm, Left    Final result Component Value   Culture No Growth               2024 1012 2024 MRSA MSSA PCR, Nasal Swab [[id number]]    Swab from Nose    Final result Component Value   MRSA Target DNA Negative   SA Target DNA Positive                 Recent Antibiotics:      Recommendations/Interventions:  None at this time    Sheri Christian, [facility]  2024

## 2024-03-18 NOTE — PLAN OF CARE
VS as charted. Denies pain.   HRI.   RA. Lungs: Clear.   All extremities: Cool, dusky, jadiel, no numbness, tenderness, or tingling.   Wound dressing: CDI. Dressing change date: 03/21/2024.   Reported nausea. IV Zofran given w/ relief. Maalox & Tums given for heartburn.   Accepted & Discharging to [facility] on 03/19/2024.  Call light within reach, use of call light appropriately, & makes needs known.   Face to face report given with opportunity to observe patient.    Report given to LIZBET Goldberg RN   3/18/2024  3:42 PM

## 2024-03-18 NOTE — PLAN OF CARE
Received phone call from Soraida at [facility]. Soraida was updated about pt's care.   Relayed message to Jenna SIMMONS CM information received from [place].

## 2024-03-18 NOTE — PROGRESS NOTES
"CLINICAL NUTRITION SERVICES  -  REASSESSMENT NOTE    REASON FOR ASSESSMENT:  follow up    NUTRITION HISTORY  Shannon Walls is a 89 year old female admitted for cellulitis of right leg. Medical hx includes a fib, CHF, HLD, hypothyroidism, CKD stage 3, CVA. Multiple wounds. Appetite has been improving this admission. Weight stable.    CURRENT NUTRITION ORDERS  Diet Order:   Orders Placed This Encounter      Regular Diet Adult  Current Intake/Tolerance: 14 meals with % intake.     ANTHROPOMETRICS  Height: 4' 11\"  Weight: 109 lbs 9.1 oz  Body mass index is 22.13 kg/m .  Weight Status:  Normal BMI  Weight History:   Wt Readings from Last 10 Encounters:   03/14/24 49.7 kg (109 lb 9.1 oz)   03/07/24 46.3 kg (102 lb)   02/21/24 45.2 kg (99 lb 9.6 oz)   02/18/24 43.5 kg (95 lb 14.4 oz)   02/05/24 49 kg (108 lb 1.6 oz)   01/31/24 48.4 kg (106 lb 11.2 oz)   01/31/24 48.4 kg (106 lb 11.2 oz)   12/18/23 48.4 kg (106 lb 11.2 oz)   11/17/23 49.9 kg (110 lb)   10/21/23 49.9 kg (110 lb 0.2 oz)      ASSESSED NUTRITION NEEDS:  44.5kg  Estimated Energy Needs: 9439-2125 kcals (30-35 Kcal/Kg)  Estimated Protein Needs: 45-55 grams protein (1-1.2 g pro/Kg)     Malnutrition Diagnosis: met criteria for moderate malnutrition last admission (2/2024) with weight loss > 2% in 1 week (severe malnutrition), mild to moderate muscle and subcutaneous fat loss and weakness. Improving with adequate intake.  In Context of:  Acute illness or injury, Chronic illness or disease     NUTRITION INTERVENTIONS  Recommendations / Nutrition Prescription  Encourage intake during meal times. Encourage snacking between meals.     MONITORING AND EVALUATION:  Intake, weight, labs    "

## 2024-03-18 NOTE — PHARMACY-ANTICOAGULATION SERVICE
Pharmacy Consult-Warfarin Assessment Day #8    Shannon Walls is a 89 year old female admitted on 3/11/2024 with Cellulitis of right leg    Primary Indication(s) for Anticoagulation: Afib    Goal INR: 2-3    FYI, patient is followed by the Anticoagulation[facility] at: [place] staff draws INR and sends to [facility] and/or [facility] and [facility] Benjamin doses    Patient previously anticoagulated on Coumadin at a dose of 20 mg/week; dosed as: 2.5 mg daily except 5 mg on Fridays     Home tablet strength(s): 2.5 mg    Factors that may increase patient's bleeding risk and/or sensitivity to warfarin (Coumadin) include: Advanced age, Antibiotics, Malnutrition (Albumin = 2.8)     Factors that may decrease patient's bleeding risk and/or sensitivity to warfarin (Coumadin) include: none    Anticoagulation Dose History  More data exists         3/12/2024 3/13/2024 3/14/2024 3/15/2024 3/16/2024 3/17/2024 3/18/2024   Recent Dosing and Labs   INR 5.14  7.57  6.29  5.71  5.44  4.69  3.81      CBC RESULTS:   Recent Labs   Lab Test 03/18/24  0542   HGB 14.4        Assessment/Plan: INR supratherapeutic; Warfarin Dosing Guideline recommends reducing dose by 10-20%, but given her age and bleed risk and after speaking with provider, we agreed it's probably best to hold her dose again today. Will continue to monitor. Likely discharging to nursing home tomorrow.     Thank You for the Consult. Will continue to follow.    Sheri Christian [facility] ....................  3/18/2024   8:22 AM

## 2024-03-19 PROBLEM — R79.1 ELEVATED INR: Status: ACTIVE | Noted: 2024-01-01

## 2024-03-19 PROBLEM — M35.3 POLYMYALGIA RHEUMATICA (H): Status: ACTIVE | Noted: 2024-01-01

## 2024-03-19 NOTE — PLAN OF CARE
Physical Therapy Discharge Summary    Reason for therapy discharge:    Discharged to transitional care facility.    Progress towards therapy goal(s). See goals on Care Plan in [facility] electronic health record for goal details.  Goals not met.  Barriers to achieving goals:   discharge from facility.    Therapy recommendation(s):    Continued therapy is recommended.  Rationale/Recommendations:  Improve functional strength and mobility independence.

## 2024-03-19 NOTE — DISCHARGE SUMMARY
[facility]  Hospitalist Discharge Summary      Date of Admission:  3/11/2024  Date of Discharge:  3/19/2024  Discharging Provider: Markel Ricardo MD  Discharge Service: Hospitalist Service    Discharge Diagnoses      Cellulitis right lower extremity  Venous stasis ulcerations  Permanent atrial fibrillation  Polymyalgia rheumatica  Compression fracture thoracic and lumbar vertebrae  Elevated INR  Hypothyroidism  Gastral esophageal reflux disease    Clinically Significant Risk Factors     # Moderate Malnutrition: based on nutrition assessment      Follow-ups Needed After Discharge   Follow-up Appointments     Follow Up and recommended labs and tests      Follow up with Nursing home physician.  No follow up labs or test are   needed.    Follow-up with wound care weekly        None    Unresulted Labs Ordered in the Past 30 Days of this Admission       No orders found from 2/10/2024 to 3/12/2024.        These results will be followed up by not needed    Discharge Disposition   Discharged to nursing home  Condition at discharge: Stable    Hospital Course     Shannon Walls is a 89 year old female who  established diagnosis of hypothyroidism, permanent atrial fibrillation, anxiety, history of cva, history of tobacco use, CHD stage 3a, compression fracture of thoracic vertebra. She resides at nursing home she was brought in to hospital with leg swelling redness.  She was found elevated white blood cell count 21,000, elevated creatinine 1.1, INR 2.87, on Coumadin, MRSA wound culture was pending, patient also did have a lactic acid 3.2,.  Patient did have A-fib RVR in the emergency room did require dose of her home metoprolol and also IV push, her heart rate did improve into the 80s, patient is most of the time in A-fib.  She has been previously admitted to the hospital with cellulitis and need for wound care.  She was given Rocephin and vancomycin, patient is previously established DNR.     Assessment &  Vivi Walls is a 89 year old female admitted on 3/11/2024. She  established diagnosis of hypothyroidism, permanent atrial fibrillation, anxiety, history of cva, history of tobacco use, CHD stage 3a, compression fracture of thoracic vertebra. Patent admitted with cellulitis of her right leg also had elevated lactic acid receiving IV fluids. She was started on broad spectrum antibiotics. She did have afib rvr un ER resolved with IV metoprolol.      Cellulitis right lower extremity  On 3/12 broadened antibiotics to cefepime    On vancomycin   Stopped rocephin on 3/12  Started cefepime  Monitor cultures  Monitor  lactic acid  Normal saline at 50 cc an hour  -3/13: Patient is afebrile.  White blood cell count 24,000 yesterday.  Today is pending.  Today's was 25,300.  CRP is 103.  Looking at her leg is still erythematous markedly less edema.  Does not appear to be extending anywhere else.  Blood cultures are negative.  Previous culture that she had done several months ago on the leg was coag positive staph.  Not MRSA.  Will discontinue vancomycin put her on some doxycycline to cover for potential gram-positive's which is likely source.  Also use IV Ancef rather than cefepime.  Renal function has gone up a little bit.  -3/14: Patient remains afebrile.  White count is down to 19,800.  Repeat CRP is a 84.6.  Procalcitonin 0.25.  Currently on doxycycline and Ancef.  Nasal swab was negative for MRSA.  She does have some bullae noted on her right lower extremity now on that medial portion there is a very large wound with a little bit of blood in it.  Also another smaller 1 on the anterior lateral lower part of her leg very small about a centimeter.  Will have wound care/Debbi Bernard look at it for us, she has been following the patient for a while as an outpatient and wound care regarding lower extremities.   -3/15: She is afebrile.  CRP continues to decline is down to 30.4 this morning white count is also  improving down to 12.8.  She is on the Ancef and doxycycline.  Does have still erythema in that right lower extremity I think that is going to take a while for that to go away.  So clinically I think she is responding to her current regimen.  Will continue with this for now with antibiotics over the weekend consider changing to orals tomorrow.  -3/16: Afebrile CRP is getting down I think we definitely on the right track with things.  Will continue the IV antibiotics over the weekend definitely orals on Monday.  CRP was down to 22.  She has had no fevers at all.  Leg is erythematous but it is going to be that color for quite some time.  No evidence that it spreading anywhere.  -3/17: Patient is actually doing very well she is afebrile all of her labs are improving.  Her right leg is erythematous but it will be that way for a while things look very good we will switch over to oral antibiotics today Keflex and will continue with some doxycycline for few more days also.  -3/18: All looks good no fevers wound is in terms of her right leg is also be somewhat erythematous.  But looks markedly better.  -3/19: Patient has no fevers.  Her CRP is down no elevated white count.  Currently she is on the oral Keflex and some doxycycline.  Will continue this for couple more days at the nursing home.  She still has some persistent redness that right lower extremity but that always is there due to some chronic venous stasis dermatitis.  I feel that her infection is definitely under control.  And should do well.  Does have some open areas that the nursing staff are doing daily dressing changes to though should be continued at the nursing home.           Hstory Compression fracture of thoracic vertebra, unspecified thoracic vertebral level, sequale    (H) (2/15/2024)  -PT  -Fall risk  -3/19: She does use an occasional Ultram.  Those will be continued.      Permanent atrial fibrillation (H) (11/29/2021)   Chronic anticoagulation  (Coumadin) (2/21/2024)  -Home metoprolol  -Coumadin ordered  -3/13: INR quite elevated 7.57 today.  No evidence of bleeding.  Hemoglobin stable 12.7.  Warfarin is currently on hold until becomes more therapeutic range.  She is on her twice daily dosing of metoprolol.  Will make sure she is getting this that have been previously on hold.  Markedly elevated INR today no evidence of any bleeding.  -3/14: INR is coming down today 6.29.  No evidence of bleeding hemoglobin stable.  A-fib she had RVR yesterday.  She is on the twice daily dosing of oral metoprolol he had 1 small dose of IV metoprolol without much benefit.  Did give her a dose of IV digoxin which we have used in the past.  Rates are better today.  Will start oral digoxin in addition to her oral metoprolol.  -3/15: INR drifting downward slowly 5.71.  No evidence of any bleeding fortunately.  -3/16: A-fib rates nicely controlled.  Is on the digoxin will decide my dosage for this or if she requires it but I think she does need it.  Probably cut back on the dosage.  INR still elevated but drifting downward.  Slowly.  No bleeding issues told that she could go and have a salad today she is always avoiding greasy and such but currently given her relatively poor nutritional status think she needs to least try and will help get her INR down to relatively therapeutic level  -3/17: INR slowly drifting down is down to 4.69 today.  Told to have another salad.  No bleeding issues heart rates adequately controlled we will put her on a little bit lower dose of the dig 62.5 mcg.  -3/18: INR down to 3.1 pharmacy did decide to dose today.  Rate is adequate 70s is on the twice daily metoprolol and low-dose of dig.  -3/19: INR is slowly drifted down.  Pharmacy is now dosing her warfarin will need to be followed up closely.  There is no evidence of any bleeding.  Her heart rate is nicely controlled in the 70s currently.  She is on twice daily metoprolol along with just a little  bit of digoxin.  It was necessary to add the digoxin as she still had rates up in the 110-130 range.  Seems to be tolerating it well at this time.      Acquired hypothyroidism (4/13/2023)  -home synthroid     Anxiety disorder, unspecified (4/18/2023)  -prn ativan        Stage 3a chronic kidney disease (H) (2/5/2024)  -Daily labs  -3/13: Slight increase in creatinine 1.27 today BUN is 24.5.     -3/14: BUN/creatinine did go up a bit today 27/1.42.  He is getting some gentle IV fluids.  The vancomycin was discontinued.  Continue to monitor closely.  -3/16: Creatinine is improving.  Down to 1.26 is getting some IV fluid unfortunately creatinine today continues to decline it is down to 1.12 with a BUN of 26.  Will stop IV fluids today.  -3/17: Stop the IV fluids will recheck her labs tomorrow in terms of BUN/creatinine.  -3/18: BUN/creatinine are 28/1.05.  Back to her baseline.  IV fluids were stopped        Venous stasis ulcer of ankle without varicose veins (H) (3/11/2024)  -wound care consulted, appreciate input     Disposition: Patient is having a lot of pain apparently left lower extremity when working with physical therapy.  They are recommending likely short-term rehab stay to improve her mobility.  Patient attempted to work with physical therapy today however complained of being dizzy when she stood up.  Per PTs recommendations unless she has marked improvement she likely will require short-term stay at nursing home for rehab purposes.  Will reevaluate on Monday.  Same plan looking good  -3/17: Overall doing very well she does realize she is very weak we will look towards placement when case management is here tomorrow.  -3/18: Trying to get her placed somewhere for rehab purposes.  Ambulation is unsafe at this time to go home  -3/19: Patient is due to her weakness and pain and her age she is just not able to return home at this point.  She will need at least a temporary stay at the nursing for ongoing inpatient  rehab.  With a tentative plan on returning home.    Consultations This Hospital Stay   PHARMACY TO DOSE VANCO  WOUND OSTOMY CONTINENCE NURSE  IP CONSULT  PHARMACY TO DOSE WARFARIN  PHYSICAL THERAPY ADULT IP CONSULT  PHYSICAL THERAPY ADULT IP CONSULT  OCCUPATIONAL THERAPY ADULT IP CONSULT    Code Status   No CPR- Do NOT Intubate    Time Spent on this Encounter   I, Markel Ricardo MD, personally saw the patient today and spent greater than 30 minutes discharging this patient.       Markel Ricardo MD  [facility]  [address]  [place] [address]  Phone: [phone]  Fax: [phone]  ______________________________________________________________________    Physical Exam   Vital Signs: Temp: (!) 96.5  F (35.8  C) Temp src: Tympanic BP: 114/71 Pulse: 68   Resp: 18 SpO2: 92 % O2 Device: None (Room air)    Weight: 109 lbs 9.1 oz  Constitutional: awake, alert, cooperative, no apparent distress, and appears stated age  ENT: Sclera clear neck is supple mucous membranes moist  Respiratory: No increased work of breathing, good air exchange, clear to auscultation bilaterally, no crackles or wheezing  Cardiovascular: Irregular regular rhythm.  Normal S1-S2 grade 1 out of 6 to 2 out of 6 systolic murmur.  Neck veins are flat.  GI: No scars, normal bowel sounds, soft, non-distended, non-tender, no masses palpated, no hepatosplenomegally  Skin: She has a lot of bruising from IVs throughout.  Her right lower extremity does have a couple of old bullae.  It does is erythematous from the knee down to the about the ankle but this is chronic for her with minimal edema.  Does have several other open wounds on the left lower extremity which are dressed.  There is no evidence of cellulitis.  Musculoskeletal: Does have significant kyphosis.       Primary Care Physician   Gilda Mcgregor    Discharge Orders      Brief Discharge Instructions    Warfarin: take 2 mg on 3/19, then check INR on 3/20 and follow-up with   Gilda Mcgregor at [facility] for further dosing recommendations.     General info for SNF    Length of Stay Estimate: Short Term Care: Estimated # of Days <30  Condition at Discharge: Improving  Level of care:skilled   Rehabilitation Potential: Excellent  Admission H&P remains valid and up-to-date: Yes  Recent Chemotherapy: N/A  Use Nursing Home Standing Orders: Yes     Follow Up and recommended labs and tests    Follow up with Nursing home physician.  No follow up labs or test are needed.    Follow-up with wound care weekly     Reason for your hospital stay    Patient admitted with fever increasing pain right lower extremity was found to have cellulitis.  Was treated with IV antibiotics with slow improvement.  Hemodynamically stable except that her A-fib has been on the faster side requiring adjustments of her medications.  She is on metoprolol and digoxin.  INR was quite elevated slowly is drifted down now to the low 3.0 range.  Will be adjusted.  Extremely weak unable to ambulate safely to go home therefore is going to the nursing home for short-term rehab.     Wound care (specify)    Wound care to both of her lower extremities per wound care recommendations.  See orders     Activity - Up with nursing assistance     No CPR- Do NOT Intubate     Physical Therapy Adult Consult    Evaluate and treat as clinically indicated.    Reason: Weakness     Occupational Therapy Adult Consult    Evaluate and treat as clinically indicated.    Reason: Weakness     Fall precautions     Diet    Follow this diet upon discharge: Orders Placed This Encounter      Regular Diet Adult       Significant Results and Procedures   Most Recent 3 CBC's:  Recent Labs   Lab Test 03/18/24  0542 03/15/24  0528 03/14/24  0522   WBC 12.0* 12.8* 19.8*   HGB 14.4 11.4* 13.8   MCV 94 97 96    195 238     Most Recent 3 BMP's:  Recent Labs   Lab Test 03/18/24  0542 03/16/24  0615 03/15/24  0528    141 142    POTASSIUM 3.5 3.5 3.6   CHLORIDE 100 101 103   CO2 33* 24 27   BUN 27.8* 26.7* 26.8*   CR 1.05* 1.12* 1.26*   ANIONGAP 6* 16* 12   EFFIE 8.7 8.6 8.4   GLC 95 84 93     Most Recent 2 LFT's:  Recent Labs   Lab Test 03/15/24  0528 03/14/24  0522   AST 21 24   ALT 10 17   ALKPHOS 109 160*   BILITOTAL 0.3 0.3     Most Recent 3 INR's:  Recent Labs   Lab Test 03/19/24  0521 03/18/24  0542 03/17/24  0535   INR 3.12* 3.81* 4.69*     7-Day Micro Results       Collected Updated Procedure Result Status      03/18/2024 1617 03/18/2024 1653 Asymptomatic COVID-19 Virus (Coronavirus) by PCR Nasopharyngeal [[id number]]    Swab from Nasopharyngeal    Final result Component Value   SARS CoV2 PCR Negative   NEGATIVE: SARS-CoV-2 (COVID-19) RNA not detected, presumed negative.                  Most Recent Urinalysis:  Recent Labs   Lab Test 02/15/24  1128   COLOR Light Yellow   APPEARANCE Clear   URINEGLC Negative   URINEBILI Negative   URINEKETONE Negative   SG 1.011   UBLD Negative   URINEPH 7.0   PROTEIN 30*   NITRITE Negative   LEUKEST Negative   RBCU 2   WBCU <1     Most Recent ESR & CRP:  Recent Labs   Lab Test 03/18/24  0542   CRPI 9.36*   ,   Results for orders placed or performed during the hospital encounter of 03/11/24   US Lower Extremity Venous Duplex Bilateral    Narrative    Exam:US LOWER EXTREMITY VENOUS DUPLEX BILATERAL    History: Leg pain and swelling bilaterally    Comparisons:none    Technique: Venous duplex ultrasonography of the bilateral lower  extremities was performed.     Findings: The common femoral veins, superficial femoral veins and  popliteal veins are fully compressible with spontaneous and  augmentable venous flow.           Impression    Impression: No evidence of deep venous thrombosis within the lower  extremities.    PRABHAKAR GASCA MD         SYSTEM ID:  [id number]   XR Tibia and Fibula Left 2 Views    Narrative    Exam: XR TIBIA AND FIBULA LEFT 2 VIEWS    Technique: Left tibia and fibula, 2  views    Comparison: None.    Exam reason: ulcers and pain, eval for underlying osteo    Findings:  No acute fracture or dislocation. No focal erosion or lucency to  suggest osteomyelitis.    Vascular calcifications are noted. There is diffuse soft tissue  swelling.      Impression    Impression:  No acute fracture or dislocation.    No focal erosion or lucency to suggest osteomyelitis.    PAULO MARTINEZ MD         SYSTEM ID:  [id number]   XR Tibia and Fibula Right 2 Views    Narrative    Exam: XR TIBIA AND FIBULA RIGHT 2 VIEWS    Technique: Right tibia and fibula, 2 views    Comparison: None.    Exam reason: ulcers and pain, eval for underlying osteo    Findings:  No acute fracture or dislocation. No focal erosion or lucency to  suggest osteomyelitis.    Vascular calcifications are noted. There is diffuse soft tissue  swelling.      Impression    Impression:  No acute fracture or dislocation.    No focal erosion or lucency to suggest osteomyelitis.    PAULO MARTINEZ MD         SYSTEM ID:  [id number]       Discharge Medications   Current Discharge Medication List        START taking these medications    Details   acetaminophen (TYLENOL) 325 MG tablet Take 2 tablets (650 mg) by mouth every 6 hours as needed for mild pain or fever    Associated Diagnoses: Compression fracture of thoracic vertebra, unspecified thoracic vertebral level, initial encounter (H)      alum & mag hydroxide-simethicone (MAALOX) 200-200-20 MG/5ML SUSP suspension Take 30 mLs by mouth every 4 hours as needed for indigestion    Associated Diagnoses: Gastroesophageal reflux disease without esophagitis      calcium carbonate (TUMS) 500 MG chewable tablet Take 1 tablet (500 mg) by mouth 4 times daily as needed for heartburn    Associated Diagnoses: Gastroesophageal reflux disease without esophagitis      cephALEXin (KEFLEX) 250 MG capsule Take 1 capsule (250 mg) by mouth 3 times daily for 2 days  Qty: 6 capsule, Refills: 0    Associated Diagnoses:  Cellulitis of right leg      digoxin (LANOXIN) 62.5 MCG tablet Take 1 tablet (62.5 mcg) by mouth daily    Associated Diagnoses: Permanent atrial fibrillation (H)      doxycycline hyclate (VIBRAMYCIN) 100 MG capsule Take 1 capsule (100 mg) by mouth 2 times daily for 2 days  Qty: 4 capsule, Refills: 0    Associated Diagnoses: Cellulitis of right leg           CONTINUE these medications which have CHANGED    Details   traMADol (ULTRAM) 50 MG tablet Take 1 tablet (50 mg) by mouth every 6 hours as needed for moderate to severe pain (back pain)  Qty: 20 tablet, Refills: 0    Associated Diagnoses: Closed nondisplaced zone I fracture of sacrum, initial encounter (H)      warfarin ANTICOAGULANT (COUMADIN) 2 MG tablet Take 2 mg on 3/19, then check INR on 3/20 and follow-up with Dr. Gilda Mcgregor at [facility] for further dosing recommendations.    Associated Diagnoses: Chronic anticoagulation           CONTINUE these medications which have NOT CHANGED    Details   atorvastatin (LIPITOR) 40 MG tablet Take 1 tablet by mouth at bedtime      calcium carbonate (OS-EFFIE) 1500 (600 Ca) MG tablet Take 600 mg by mouth every evening -4:00 PM.      furosemide (LASIX) 40 MG tablet Take 1 tablet (40 mg) by mouth every morning  Qty: 90 tablet, Refills: 3    Associated Diagnoses: Chronic combined systolic and diastolic congestive heart failure (H)      GAVILAX 17 GM/SCOOP powder Take 17 g by mouth every morning -hold if having loose stools.      metoprolol succinate ER (TOPROL XL) 100 MG 24 hr tablet Take 100 mg by mouth 2 times daily      Multiple Vitamins-Minerals (CERTAVITE SENIOR/ANTIOXIDANT) TABS Take 1 tablet by mouth every morning      NP THYROID 30 MG tablet Take 30 mg by mouth every morning (before breakfast) 7:00 AM      omeprazole (PRILOSEC) 20 MG DR capsule Take 20 mg by mouth 2 times daily      predniSONE (DELTASONE) 5 MG tablet Take 5 mg by mouth every morning      Vitamin D, Cholecalciferol, 10  MCG (400 UNIT) TABS Take 1 tablet by mouth every morning           STOP taking these medications       LORazepam (ATIVAN) 0.5 MG tablet Comments:   Reason for Stopping:             Allergies   Allergies   Allergen Reactions    Levothyroxine Shortness Of Breath and Swelling     Swelling in feet     Nitrogen Swelling and Blisters     Liquid nitrogen (swelling at sight)

## 2024-03-19 NOTE — PHARMACY-ANTICOAGULATION SERVICE
Pharmacy Consult-Warfarin Assessment Day #9    Shannon Walls is a 89 year old female admitted on 3/11/2024 with Cellulitis of right leg    Primary Indication(s) for Anticoagulation: Afib    Goal INR: 2-3    FYI, patient is followed by the Antico[facility] at: [place] staff draws INR and sends to [facility] and/or [place] and [facility] doses     Patient previously anticoagulated on Coumadin at a dose of 20 mg/week; dosed as: 2.5 mg daily except 5 mg on Fridays      Home tablet strength(s): 2.5 mg    Factors that may increase patient's bleeding risk and/or sensitivity to warfarin (Coumadin) include: Advanced age, Antibiotics, Malnutrition     Factors that may decrease patient's bleeding risk and/or sensitivity to warfarin (Coumadin) include: -    Anticoagulation Dose History  More data exists         3/13/2024 3/14/2024 3/15/2024 3/16/2024 3/17/2024 3/18/2024 3/19/2024   Recent Dosing and Labs   INR 7.57  6.29  5.71  5.44  4.69  3.81  3.12      CBC RESULTS:   Recent Labs   Lab Test 03/18/24  0542   HGB 14.4          Assessment/Plan: INR still slightly supratherapeutic but has come down significantly. Patient discharging to [facility] today. Hasn't gotten a dose of warfarin here since 3/11 (2.5 mg). Recommend giving 80% of normal dose today = 2 mg, then check INR tomorrow and follow-up with Gilda Mcgregor for further dosing recommendations.     Thank You for the Consult. Will continue to follow.    Sheri Christian [facility] ....................  3/19/2024   7:53 AM

## 2024-03-19 NOTE — PLAN OF CARE
Neuro/Orientation: Pt. A&Ox4 with occasional forgetfulness and calls appropriately.      Pain: Pt. C/o 6/10 pain in abd. Requested PRN oral dilaudid from provider d/t pt. Loss of IV access. Order received and PRN oral dilaudid was given per orders that was effective.     Skin: Pt. Skin is dusky/red/jadiel on arms and legs. Skin cool to the touch r/t poor circulation. Dressing to right heel for protection. Scabs being painted with betadine per orders.     Resp: LSCTA bilaterally. Pt. On room air.      Cardiovascular: Apical pulse irregular. INR being checked daily.     GI/: Pt. Using bedside commode for toileting. Pt. Continent of urine, but has brief in place.     Wounds: Right upper arm skin tear scabbed and CAMPOS. Left forearm skin tear has dressing in place. Both to be assessed/changed daily. BLE and great toe wound have dressings in place and writer UTV. Dressings CDI and no drainage observed; to be changed q 3rd day and PRN. DTBC 3/20/24.     Transfers: SBAx1 with FWW and gait belt     Diet: regular     Bed alarm in place for pt. Safety r/t fall risk.     Repositioning: Pt. Able to T&R self and calls as needed for positioning help.         Face to face report given with opportunity to observe patient.    Report given to LIZBET Campo RN   3/19/2024  7:24 AM

## 2024-03-19 NOTE — PLAN OF CARE
Patient discharged at 9:40 AM via wheel chair accompanied by other:friend and staff. Prescriptions - None ordered for discharge. All belongings sent with patient.     Discharge instructions reviewed with [facility] . Listed belongings gathered and returned to patient. Yes    Patient discharged to SNF ([facility]) .   Report called to Nursing Home:  [facility] (Call finished at [phone])     Surgical Patient   Surgical Procedures during stay: No  Did patient receive discharge instruction on wound care and recognition of infection symptoms? Yes    MISC  Follow up appointment made:  No  Home medications returned to patient: N/A  Patient reports pain was well managed at discharge: Yes

## 2024-03-19 NOTE — PLAN OF CARE
Pt is A&O x4. VS as charted, afebrile, denies pain. On RA. IV access lost. Ok to leave out per MD. Assist x1 to bedside commode. Dressing to wounds CDI. Able to make needs known, call light in reach.    Face to face report given with opportunity to observe patient.    Report given to LIZBET Cook RN   3/18/2024  7:19 PM

## 2024-03-19 NOTE — PROGRESS NOTES
Name: Shannon Walls    MRN#: [id number]    Reason for Hospitalization: Cellulitis of right leg [L03.115]    Discharge Date: 3/19/2024    Patient / Family response to discharge plan: Patient agreeable to discharge to [facility] via transportation by her friend.     Follow-Up Appt:   Future Appointments   Date Time Provider Department Center   2/3/2025  9:00 AM Devin Meyer, DO [facility]       Other Providers (Care Coordinator, County Services, PCA services etc): No    Discharge Disposition: nursing home

## 2024-03-27 NOTE — PATIENT INSTRUCTIONS
Wound Care Instructions  Left 5th toe, right 2nd toe, left medial malleolus, right medial malleolus:  1) Wash your hands and work space  2) Gather all your supplies: clean wash cloths, mild soap (baby soap), Mepilex Ag foam, Medipore tape  3) Cleanse wounds with mild soap, rinse, pat dry  4) Dress wounds with Mepilex Ag foam (cut to fit each wound, sticky side down), secure with Medipore tape   5) Change dressing Mondays and Fridays  6) Dispose of all dirty supplies  7) Wash hands and equipment    Wound Care Instructions right heel:  1) Wash your hands and work space  2) Gather all your supplies: clean wash cloths, mild soap (baby soap), Mepilex Ag foam, Medipore tape, Posey boot  3) Cleanse wounds with mild soap, rinse, pat dry  4) Dress wound with Mepilex Ag foam (cut to fit each wound, sticky side down), secure with Medipore tape; apply Posey boot when sitting   5) Change dressing Mondays and Fridays  6) Dispose of all dirty supplies  7) Wash hands and equipment    Wound Care Instructions left forearm:  1) Wash your hands and work space  2) Gather all your supplies: clean wash cloths, mild soap (baby soap), Mepilex Ag foam, Medipore tape, conform rolled gauze  3) Cleanse wounds with mild soap, rinse, pat dry  4) Leave the Mepitel one in place.  Dress wound with Mepilex Ag foam (cut to fit each wound, sticky side down), secure with Medipore tape   5) Change dressing Mondays and Fridays  6) Dispose of all dirty supplies  7) Wash hands and equipment    Please report any increase in pain, fevers, chills, changes in the drainage odor.   Please call [phone] if you have any questions or concerns or if any problems develop.     Follow up April 10, 2024 at 10 a.m.    Order sent to [facility] [place] for dressings and Posey boot.

## 2024-03-28 NOTE — PROGRESS NOTES
SUBJECTIVE:  Shannon Walls, 89 year old, female presents with the following Chief Complaint(s) with HPI to follow:   Chief Complaint   Patient presents with    WOUND CARE     Skin tears          HPI:  Shannon is here for the re-assessment and treatment of multiple wounds to all extremities.  Shannon has been residing at [facility] since hospital discharge.     [facility] REC REQUIRED{  Post Medication Reconciliation Status:  Discharge medications reconciled, continue medications without change     Wound history:   She was seen in wound care for the first time on 10/31/23 by Maame Campbell.  Her forehead is noted as being suspicious for skin cancer.  A biopsy was done in the past, (9/25/23) not conclusive. This area scabs, falls off and re-develops.    She lives in assisted living and staff are changing her dressings as directed.       Wound History:  10/21/23- Wounds developed, visit to ER  10/31/23- First visit to wound care.     10/31/23- MARÍA done:   FINDINGS: No Doppler flow was identified in the left posterior tibial  artery. The right posterior tibial artery was noncompressible. The  dorsalis pedis ankle brachial indices were 0.52 on the right and 0.55  on the left.  She has had multiple skin tears due to falls since the initial visit to wound care.     Patient Active Problem List   Diagnosis    Pathological fracture L4 vertebrae    Fracture of sacrum (H)    Permanent atrial fibrillation (H)    Altered mental status, unspecified altered mental status type    Acquired hypothyroidism    Anxiety disorder, unspecified    Chronic combined systolic and diastolic congestive heart failure (H)    Gastroesophageal reflux disease without esophagitis    Atrial fibrillation with RVR (H)    Fall at home, initial encounter    Current chronic use of systemic steroids    Decubitus skin ulcer    History of CVA on 4/13/23    Peripheral edema    History of tobacco abuse quitting in the 50s and 60s    Stage 3a chronic kidney  disease (H)    Elevated brain natriuretic peptide (BNP) level    Mixed hyperlipidemia    Mild aortic stenosis    H/O apical mural thrombus    PAD (peripheral artery disease) (H24)    Elevated BUN    Atrial fibrillation with rapid ventricular response (H)    Fall, initial encounter    Compression fracture of thoracic vertebra, unspecified thoracic vertebral level, initial encounter (H)    Chronic anticoagulation (Coumadin)    Cellulitis of right leg    Venous stasis ulcer of ankle without varicose veins (H)    Elevated INR    Polymyalgia rheumatica (H24)       Past Medical History:   Diagnosis Date    Atrial fibrillation with rapid ventricular response (H)        Past Surgical History:   Procedure Laterality Date    EYE SURGERY      OPEN REDUCTION INTERNAL FIXATION WRIST Right 8/22/2018    Procedure: OPEN REDUCTION INTERNAL FIXATION WRIST;  OPEN REDUCTION INTERNAL FIXATION RIGHT DISTAL RADIUS;  Surgeon: Taqueria Edwards MD;  Location: [facility] OR       History reviewed. No pertinent family history.    Social History     Tobacco Use    Smoking status: Former    Smokeless tobacco: Never   Substance Use Topics    Alcohol use: Not on file       Current Outpatient Medications   Medication Sig Dispense Refill    acetaminophen (TYLENOL) 325 MG tablet Take 2 tablets (650 mg) by mouth every 6 hours as needed for mild pain or fever      alum & mag hydroxide-simethicone (MAALOX) 200-200-20 MG/5ML SUSP suspension Take 30 mLs by mouth every 4 hours as needed for indigestion      atorvastatin (LIPITOR) 40 MG tablet Take 1 tablet by mouth at bedtime      calcium carbonate (OS-EFFIE) 1500 (600 Ca) MG tablet Take 600 mg by mouth every evening -4:00 PM.      calcium carbonate (TUMS) 500 MG chewable tablet Take 1 tablet (500 mg) by mouth 4 times daily as needed for heartburn      digoxin (LANOXIN) 62.5 MCG tablet Take 1 tablet (62.5 mcg) by mouth daily      furosemide (LASIX) 40 MG tablet Take 1 tablet (40 mg) by mouth every morning 90 tablet 3     GAVILAX 17 GM/SCOOP powder Take 17 g by mouth every morning -hold if having loose stools.      metoprolol succinate ER (TOPROL XL) 100 MG 24 hr tablet Take 100 mg by mouth 2 times daily      Multiple Vitamins-Minerals (CERTAVITE SENIOR/ANTIOXIDANT) TABS Take 1 tablet by mouth every morning      NP THYROID 30 MG tablet Take 30 mg by mouth every morning (before breakfast) 7:00 AM      omeprazole (PRILOSEC) 20 MG DR capsule Take 20 mg by mouth 2 times daily      predniSONE (DELTASONE) 5 MG tablet Take 5 mg by mouth every morning      traMADol (ULTRAM) 50 MG tablet Take 1 tablet (50 mg) by mouth every 6 hours as needed for moderate to severe pain (back pain) 20 tablet 0    Vitamin D, Cholecalciferol, 10 MCG (400 UNIT) TABS Take 1 tablet by mouth every morning      warfarin ANTICOAGULANT (COUMADIN) 2 MG tablet Take 2 mg on 3/19, then check INR on 3/20 and follow-up with Dr. Gilda Mcgregor at [facility] for further dosing recommendations.         Allergies   Allergen Reactions    Levothyroxine Shortness Of Breath and Swelling     Swelling in feet     Nitrogen Swelling and Blisters     Liquid nitrogen (swelling at sight)       REVIEW OF SYSTEMS  Constitutional, HEENT, cardiovascular, pulmonary, gi and gu systems are negative, except as otherwise noted.     OBJECTIVE:  BP 97/60 (BP Location: Left arm, Patient Position: Sitting, Cuff Size: Adult Regular)   Pulse 92   Temp (!) 96.1  F (35.6  C) (Tympanic)   SpO2 95%  HR - 96 (Apical)    Constitutional: alert and no distress  Head: Normocephalic. No masses, lesions, tenderness or abnormalities  Cardiovascular:   Lower Extremity Perfusion Assessment (10/31/23): done by Maame Campbell NP  Right lower extremity:  Warmth: cool toes  Dorsal pedal pulse: yes, faint via doppler              Posterior tibial pulse: yes, via doppler--mono              Skin color: pink              Edema: yes, +3/+4  Left lower extremity:  Warmth: cool toes  Dorsal  pedal pulse: yes, via doppler--mono              Posterior tibial pulse: yes, faint via doppler              Skin color: pink              Edema: +3/+4  Intervention: MARÍA done 10/31, FINDINGS: No Doppler flow was identified in the left posterior tibial artery. The right posterior tibial artery was noncompressible. The dorsalis pedis ankle brachial indices were 0.52 on the right and 0.55 on the left.  Respiratory:  Respirations even and unlabored  Musculoskeletal: severe kyphosis; arrived in a wheelchair; transfers with assist of one to exam chair  Skin:        Wound description:     Type of Wound:  Pressure injury stage 4   Location:  right 2nd toe   Drainage amount:  moderate   Drainage color:  serous - tan   Odor:  none   Wound bed:  pink   Depth:  full thickness   Surrounding skin:  fragile        Measurements:      Right 2nd toe - 0.5 x 0.5 cm (probes to bone - not a new finding)    Pain:  denies   Wound debridement completed on: 3/27/2024, debridement type: Instrument        Dressing change:      Wounds cleansed with mild soap, rinse, dried.  Verbal consent obtained for debridement. Sharp excisional debridement performed with ring curette to remove non-viable tissue (1 sq cm) to the level of the subcutaneous.  Dressed with Mepilex Ag foam, cut to fit wound, secured with medipore tape.        Wound description:     Type of Wound:  stasis ulcers   Location:  right lower extremity   Drainage amount:  moderate   Drainage color: serosanguinous   Odor:  none   Wound bed:  see pictures below   Depth:  full thickness   Surrounding skin:  fragile        Measurements:    Right shin - 2 x 2.2 cm (smaller)  Right medial malleolus - 8 x 3.5 cm   Pain:  tender with debridement   Wound debridement completed on: 3/27/2024, debridement type: Instrument        Dressing change:      Wound cleansed with mild soap, rinse, dried.  Verbal consent obtained for debridement. Sharp excisional debridement performed with ring curette to  remove non-viable tissue (28 sq cm) to the level of the subcutaneous.  Dressed with Mepilex Ag foam, cut to fit wound, secured with medipore tape.    Right shin      Right medial malleolus    Wound description:     Type of Wound:  stasis ulcers   Location:  left medial malleolus   Drainage amount:  moderate   Drainage color:  serous - tan   Odor:  none   Wound bed:  please see pictures below   Depth:  full thickness   Surrounding skin:  fragile, intact        Measurements:  2.2 x 1.5 x 0.3 cm (larger)   Pain:  Denies   Wound debridement completed on: 3/27/2024, debridement type: Instrument       Dressing change:      Wound cleansed with mild soap, rinse, dried.  Sharp excisional debridement performed with ring curette to remove non-viable tissue (2 sq cm) into the level of the subcutaneous.  Patient was informed of the risks and benefits and consented to the procedure.     Dressed with Mepilex Ag foam, cut to fit wound, secured with medipore tape.    Left lower extremity       Wound description:     Type of Wound:  Trauma   Location:  left forearm    Drainage amount:  moderate   Drainage color:  serous   Odor:  none   Wound bed:  please see picture below   Depth:  full thickness   Surrounding skin:  ecchymotic, thin, and fragile        Measurements:  2 x 1 cm (smaller)   Pain:  tender   Wound debridement completed on: 3/27/2024, debridement type: Mechanical        Dressing change:      Wound cleansed with mild soap, rinse, dried.  Conservative, non-excisional debridement performed with dry gauze to remove non-viable skin (2 sq cm) into the level of the dermis.  Patient was informed of the risks and benefits and consented to the procedure.  Dressed with Mepitel to the wound, then covered with Mepilex Ag foam, secured with 1/2 roll of conform rolled gauze and tape.      Left forearm    Wound description:     Type of Wound:  Pressure injury   Location:  right heel    Drainage amount:  moderate   Drainage color:   tan   Odor:  none   Wound bed:  see picture below   Depth:  full thickness   Surrounding skin:  boggy        Measurements:  1 x 0.4 cm   Pain:  tender   Wound debridement completed on: 3/27/2024, debridement type: Instrument        Dressing change:      Wound cleansed with mild soap, rinse, dried.  Sharp excisional debridement performed with Adson's forceps and iris scissors to remove non-viable tissue (1 sq cm) into the level of the dermis.  Patient was informed of the risks and benefits and consented to the procedure.  Dressed with Mepilex Ag foam, secured with medipore tape.      Right heel      Wound description:     Type of Wound:  unknown   Location:  left 5th toe    Drainage amount:  none   Drainage color:  n/a   Odor:  none   Wound bed:  see picture below   Depth:  unknown   Surrounding skin:  ecchymotic        Measurements:  not measured today   Pain:  tender   Wound debridement completed on: N/A, debridement type: N/A              Neurologic: Sensation grossly WNL.  Psychiatric: affect normal/bright, interactive     THERAPY GOAL:    Complete healing    ASSESSMENT / PLAN:  Comments:   - Unfortunately Sulma's skin if very fragile and she has poor blood flow to the bilateral lower extremities.  - She is following with Dr. Guerin, podiatrist, for the toe wound which probes to bone  - I would like them to apply a posey boot to the right heel, except when she is ambulating.  The heel is boggy and I am concerned about further breakdown.      Plan:    - Dressing changes as outlined in the after visit summary    Follow-up in wound care in two weeks or as needed for acute concerns.    Mei Bernard CNS  Surgery and Wound Care  [facility]

## 2024-03-29 NOTE — ED NOTES
Contacted Narayan Vera and susan Monet on patients discharge information.  No questions or concerns. Copy of AVS will be sent with patient

## 2024-03-29 NOTE — ED TRIAGE NOTES
Fall from standing position. Alert and ornt.  Pain in right shoulder/neck area. Right upper arm   Bruise noted on right side of head, dried blood noted, no active bleeding at this time. No deformities noted.  Skin tear on right posterior upper  pt stated the only thing she remembers is that she was on the ground.  Multiple dressings on patient from wound care team.  No increased or new back pain per pt always has back pain.   GCS 15  Reports she has 8/10 pain but pt stated she has pain all the time so no new pain unless she moves the right arm.  Repots right torticollis pain but stated that is not new and the muscle is always sore

## 2024-03-29 NOTE — ED NOTES
Brought patient apple juice with provider ok. Patient drank half and declined more at this time.  in room, drawing blood.    no

## 2024-03-29 NOTE — ED PROVIDER NOTES
[facility]  ED Provider Note    Chief Complaint   Patient presents with    Fall     History:  Shannon Walls is a 89 year old female with A-fib on Coumadin and history of apical mural thrombus, polymyalgia rheumatica, multiple compression fractures presents the emergency department today after a fall.  Patient does not remember the incident.  with Kobe from today only starts with her sitting on the ground.  EMS brought her here, stated that her vitals were normal.  No other history available    Review of Systems   Performed; see HPI for pertinent positives and negatives.     Medical history, surgical history, and social history was reviewed.  Nursing documentation, triage note, and vitals were reviewed.    Vitals:  BP: 126/95  Pulse: 70  Temp: 97.1  F (36.2  C)  Resp: 16    Physical Exam:  Constitutional: Alert and conversant. NAD   HENT: NCAT   Eyes: Normal pupils   Neck: supple   CV: No pallor symmetrical pulses in upper extremities, faintly palpable bilateral lower extremity pulses  Pulmonary/Chest: Non-labored respirations  Abdominal: non-distended   MSK: PHILIP.  Unrestricted passive range of motion of the bilateral lower extremities.  Limited range of motion of the right shoulder and elbow due to pain,  Neuro: Alert and appropriate   Skin: Warm and dry. No diaphoresis. No rashes on exposed skin    Psych: Appropriate mood and affect       MDM:      ED Course as of 03/29/24 0934   Fri Mar 29, 2024   0754 89-year-old female here after presumed fall glucose of 58.  Apple juice given.  Will recheck sugars shortly.  Given her lack of memory of events it is possible that she was in bed and fell out of bed and woke up on the ground alternatively she could have had a syncopal episode.  History is quite unclear.  CBC BMP urine ordered.  Vitals all normal.  Trauma imaging initiated.  Initial trauma exam most notable for primarily previously existing skin tears   0851 CT Head w/o Contrast  No acute  intracranial abnormality.  No acute fracture.      Chronic changes within the lungs are similar in appearance compared to  the prior study.     0851 XR Chest 1 View  Nonacute appearing, healing fractures involving the lower left ribs  with a small to moderate-sized left effusion and left basilar  atelectasis. No distinct evidence of pneumothorax.     0851 CT Cervical Spine w/o Contrast  No evidence of acute or subacute bony abnormality.      Moderate to severe multilevel degenerative change, similar when  compared to the previous study.     0914 XR Shoulder Right 2 Views  No evidence of acute or subacute bony abnormality.      Elevation of the humeral head, likely representing chronic  full-thickness rotator cuff tear.     0915 XR Elbow Right 2 Views  No evidence of acute or subacute bony abnormality.      Generalized osteopenia and mild degenerative change.     0915 XR Pelvis 1/2 Views  Minimally impacted fracture suggested in the right inferior pubic ramus, age unknown.     Generalized osteopenia limits evaluation.   0930 Patient able to get up and walk at her baseline.  I doubt that this is a new fracture.  Which is quite limited.  Appropriate to return to her nursing home       Procedures:  Procedures    Impression:  Final diagnoses:   Fall, initial encounter   Chronic right shoulder pain           Michele Blackwell MD  03/29/24 0918

## 2024-04-10 NOTE — PROGRESS NOTES
SUBJECTIVE:  Shannon Walls, 89 year old, female presents with the following Chief Complaint(s) with HPI to follow:   Chief Complaint   Patient presents with    WOUND CARE          HPI:  Shannon is here for the re-assessment and treatment of multiple wounds to all extremities.  Shannon has been residing at [facility] since hospital discharge.  She fell on 3/29/2024 and was evaluated in the emergency department.  She has a new skin tear on the right posterior upper arm and multiple healing bruises.    MED REC REQUIRED  Post Medication Reconciliation Status:  Discharge medications reconciled, continue medications without change     Wound history:   She was seen in wound care for the first time on 10/31/23 by Maame Campbell.  Her forehead is noted as being suspicious for skin cancer.  A biopsy was done in the past, (9/25/23) not conclusive. This area scabs, falls off and re-develops.    She lives in assisted living and staff are changing her dressings as directed.       Wound History:  10/21/23- Wounds developed, visit to ER  10/31/23- First visit to wound care.     10/31/23- MARÍA done:   FINDINGS: No Doppler flow was identified in the left posterior tibial  artery. The right posterior tibial artery was noncompressible. The  dorsalis pedis ankle brachial indices were 0.52 on the right and 0.55  on the left.  She has had multiple skin tears due to falls since the initial visit to wound care.     Patient Active Problem List   Diagnosis    Pathological fracture L4 vertebrae    Fracture of sacrum (H)    Permanent atrial fibrillation (H)    Altered mental status, unspecified altered mental status type    Acquired hypothyroidism    Anxiety disorder, unspecified    Chronic combined systolic and diastolic congestive heart failure (H)    Gastroesophageal reflux disease without esophagitis    Atrial fibrillation with RVR (H)    Fall at home, initial encounter    Current chronic use of systemic steroids    Decubitus skin  ulcer    History of CVA on 4/13/23    Peripheral edema    History of tobacco abuse quitting in the 50s and 60s    Stage 3a chronic kidney disease (H)    Elevated brain natriuretic peptide (BNP) level    Mixed hyperlipidemia    Mild aortic stenosis    H/O apical mural thrombus    PAD (peripheral artery disease) (H24)    Elevated BUN    Atrial fibrillation with rapid ventricular response (H)    Fall, initial encounter    Compression fracture of thoracic vertebra, unspecified thoracic vertebral level, initial encounter (H)    Chronic anticoagulation (Coumadin)    Cellulitis of right leg    Venous stasis ulcer of ankle without varicose veins (H)    Elevated INR    Polymyalgia rheumatica (H24)       Past Medical History:   Diagnosis Date    Atrial fibrillation with rapid ventricular response (H)        Past Surgical History:   Procedure Laterality Date    EYE SURGERY      OPEN REDUCTION INTERNAL FIXATION WRIST Right 8/22/2018    Procedure: OPEN REDUCTION INTERNAL FIXATION WRIST;  OPEN REDUCTION INTERNAL FIXATION RIGHT DISTAL RADIUS;  Surgeon: Taqueria Edwards MD;  Location: [facility] OR       No family history on file.    Social History     Tobacco Use    Smoking status: Former    Smokeless tobacco: Never   Substance Use Topics    Alcohol use: Not on file       Current Outpatient Medications   Medication Sig Dispense Refill    acetaminophen (TYLENOL) 325 MG tablet Take 2 tablets (650 mg) by mouth every 6 hours as needed for mild pain or fever      alum & mag hydroxide-simethicone (MAALOX) 200-200-20 MG/5ML SUSP suspension Take 30 mLs by mouth every 4 hours as needed for indigestion      atorvastatin (LIPITOR) 40 MG tablet Take 1 tablet by mouth at bedtime      calcium carbonate (OS-EFFIE) 1500 (600 Ca) MG tablet Take 600 mg by mouth every evening -4:00 PM.      calcium carbonate (TUMS) 500 MG chewable tablet Take 1 tablet (500 mg) by mouth 4 times daily as needed for heartburn      diclofenac (VOLTAREN) 1 % topical gel Apply 2 g  topically 3 times daily as needed      digoxin (LANOXIN) 62.5 MCG tablet Take 1 tablet (62.5 mcg) by mouth daily      furosemide (LASIX) 40 MG tablet Take 1 tablet (40 mg) by mouth every morning 90 tablet 3    GAVILAX 17 GM/SCOOP powder Take 17 g by mouth every morning -hold if having loose stools.      metoprolol succinate ER (TOPROL XL) 100 MG 24 hr tablet Take 100 mg by mouth 2 times daily      Multiple Vitamins-Minerals (CERTAVITE SENIOR/ANTIOXIDANT) TABS Take 1 tablet by mouth every morning      NP THYROID 30 MG tablet Take 30 mg by mouth every morning (before breakfast) 7:00 AM      omeprazole (PRILOSEC) 20 MG DR capsule Take 20 mg by mouth 2 times daily      predniSONE (DELTASONE) 5 MG tablet Take 5 mg by mouth every morning      sucralfate (CARAFATE) 1 GM tablet Take 1 g by mouth 4 times daily      traMADol (ULTRAM) 50 MG tablet Take 1 tablet (50 mg) by mouth every 6 hours as needed for moderate to severe pain (back pain) 20 tablet 0    Vitamin D, Cholecalciferol, 10 MCG (400 UNIT) TABS Take 1 tablet by mouth every morning      warfarin ANTICOAGULANT (COUMADIN) 2 MG tablet Take 2 mg on 3/19, then check INR on 3/20 and follow-up with Dr. Gilda Mcgregor at [facility] for further dosing recommendations.      LORazepam (ATIVAN) 0.5 MG tablet Take 0.5 mg by mouth at bedtime (Patient not taking: Reported on 4/10/2024)         Allergies   Allergen Reactions    Levothyroxine Shortness Of Breath and Swelling     Swelling in feet     Nitrogen Swelling and Blisters     Liquid nitrogen (swelling at sight)       REVIEW OF SYSTEMS  Constitutional, HEENT, cardiovascular, pulmonary, gi and gu systems are negative, except as otherwise noted.     OBJECTIVE:  BP 96/58   Pulse 88   Temp (!) 96.5  F (35.8  C) (Tympanic)   Resp 20   SpO2 91%      Constitutional: alert and no distress  Head: Normocephalic. No masses, lesions, tenderness or abnormalities  Cardiovascular:   Lower Extremity  Perfusion Assessment (10/31/23): done by Maame Campbell NP  Right lower extremity:  Warmth: cool toes  Dorsal pedal pulse: yes, faint via doppler              Posterior tibial pulse: yes, via doppler--mono              Skin color: pink              Edema: yes, +3/+4  Left lower extremity:  Warmth: cool toes  Dorsal pedal pulse: yes, via doppler--mono              Posterior tibial pulse: yes, faint via doppler              Skin color: pink              Edema: +3/+4  Intervention: MARÍA done 10/31, FINDINGS: No Doppler flow was identified in the left posterior tibial artery. The right posterior tibial artery was noncompressible. The dorsalis pedis ankle brachial indices were 0.52 on the right and 0.55 on the left.  Respiratory:  Respirations even and unlabored  Musculoskeletal: severe kyphosis; arrived in a wheelchair; transfers with assist of one to exam chair  Skin:   Wound description:     Type of Wound:  Trauma   Location:  right upper arm   Drainage amount:  moderate   Drainage color:  serous   Odor:  none   Wound bed:  please see picture below   Depth:  full thickness   Surrounding skin:  ecchymotic, thin, and fragile        Measurements:  5 x 0.8 cm and 1 x 0.5 cm    Pain:  tender   Wound debridement completed on: 4/10/2024, debridement type: Mechanical        Dressing change:      Wound cleansed with mild soap, rinse, dried.  Conservative, non-excisional debridement performed with dry gauze to remove non-viable skin (5 sq cm) into the level of the dermis.  Patient was informed of the risks and benefits and consented to the procedure.  Dressed with Mepitel to the wound, then covered with Mepilex Ag foam, secured with 1/2 roll of conform rolled gauze and tape.      Right posterior arm         Wound description:     Type of Wound:  Pressure injury stage 4   Location:  right 2nd toe   Drainage amount:  moderate   Drainage color:  serous - tan   Odor:  none   Wound bed:  pink   Depth:  full thickness   Surrounding  skin:  fragile        Measurements:      Right 2nd toe - 1 x 0.5 cm - bone exposed   Pain:  denies   Wound debridement completed on: 4/10/2024, debridement type: Instrument        Dressing change:      Wounds cleansed with mild soap, rinse, dried.  Verbal consent obtained for debridement. Conservative, non-excisional debridement performed with Adson's forceps to remove non-viable tissue (1 sq cm) to the level of the subcutaneous.  Dressed with Mepilex Ag foam, cut to fit wound, secured with medipore tape.        Wound description:     Type of Wound:  stasis ulcers   Location:  right lower extremity   Drainage amount:  moderate   Drainage color: serosanguinous   Odor:  none   Wound bed:  see pictures below   Depth:  full thickness   Surrounding skin:  fragile        Measurements:    Right shin - 1.5 x 1.3 cm (smaller)  Right medial malleolus - 7.8 x 2.9 x 0.6 cm   Pain:  tender with debridement   Wound debridement completed on: 4/10/2024, debridement type: Instrument        Dressing change:      Wound cleansed with mild soap, rinse, dried.  Verbal consent obtained for debridement. Sharp excisional debridement performed with ring curette,   Adson's forceps and iris scissors to remove non-viable tissue (22 sq cm) to the level of the subcutaneous.  Dressed with Mepilex Ag foam, cut to fit wound, secured with medipore tape.    Right shin      Right medial malleolus    Wound description:     Type of Wound:  stasis ulcers   Location:  left medial malleolus   Drainage amount:  moderate   Drainage color:  serous - tan   Odor:  none   Wound bed:  please see pictures below   Depth:  full thickness   Surrounding skin:  fragile, intact        Measurements:    Lateral - 2.4 x 1.5 x 0.3 cm (larger)  Medial - 2 x 0.6 cm   Pain:  Denies   Wound debridement completed on: 3/27/2024, debridement type: Instrument       Dressing change:      Wound cleansed with mild soap, rinse, dried.  Sharp excisional debridement performed with ring  curette to remove non-viable tissue (3 sq cm) into the level of the subcutaneous.  Patient was informed of the risks and benefits and consented to the procedure.     Dressed with Mepilex Ag foam, cut to fit wound, secured with medipore tape.    Left lower extremity       Wound description:     Type of Wound:  Trauma   Location:  left forearm    Drainage amount:  moderate   Drainage color:  serous   Odor:  none   Wound bed:  please see picture below   Depth:  full thickness   Surrounding skin:  ecchymotic, thin, and fragile        Measurements:  2 x 0.9 cm    Pain:  tender   Wound debridement completed on: 4/10/2024, debridement type: Mechanical        Dressing change:      Wound cleansed with mild soap, rinse, dried.  Conservative, non-excisional debridement performed with dry gauze to remove non-viable skin (2 sq cm) into the level of the dermis.  Patient was informed of the risks and benefits and consented to the procedure.  Dressed with Mepitel to the wound, then covered with Mepilex Ag foam, secured with 1/2 roll of conform rolled gauze and tape.      Left forearm    Wound description:     Type of Wound:  Pressure injury   Location:  right heel    Drainage amount:  moderate   Drainage color:  tan   Odor:  none   Wound bed:  see picture below   Depth:  full thickness   Surrounding skin:  boggy        Measurements:  0.6 x 0.4 x 0.3 cm   Pain:  tender   Wound debridement completed on: 4/10/2024, debridement type: Instrument        Dressing change:      Wound cleansed with mild soap, rinse, dried.  Sharp excisional debridement performed with Adson's forceps and iris scissors to remove non-viable tissue (1 sq cm) into the level of the dermis.  Patient was informed of the risks and benefits and consented to the procedure.  Dressed with Mepilex Ag foam, secured with medipore tape.      Right heel      Wound description:     Type of Wound:  unknown   Location:  left 5th toe    Drainage amount:  moderate   Drainage  color:  tan   Odor:  none   Wound bed:  see picture below   Depth:  full thickness   Surrounding skin:  ecchymotic        Measurements:  0.6 x 0.4 x 0.3 cm   Pain:  tender   Wound cleansed with mild soap, rinse, dried.  Sharp excisional debridement performed with sharp ring curette to remove non-viable tissue (1 sq cm) into the level of the dermis.  Patient was informed of the risks and benefits and consented to the procedure.  Dressed with Mepilex Ag foam, secured with medipore tape.              Neurologic: Sensation grossly WNL.  Psychiatric: affect normal/bright, interactive     THERAPY GOAL:    Complete healing    ASSESSMENT / PLAN:  Comments:    - Sulma fell a couple weeks ago sustaining another skin tear to the right arm and significant bruising.   - She tells me she is planning to leave the nursing home in a couple of weeks and return to the assisted living facility.   - Unfortunately Sulma's skin if very fragile and she has poor blood flow to the bilateral lower extremities - this complicates healing.  - She is following with Dr. Guerin, podiatrist, for the toe wound which probes to bone; she has not seen her in a couple of months, therefore we will facilitate getting this scheduled  - I would like them to apply a posey boot to the right heel, except when she is ambulating.  The heel is boggy and I am concerned about further breakdown.      Plan:    - Dressing changes as outlined in the after visit summary    Follow-up in wound care in one month or as needed for acute concerns.    Mei Bernard CNS  Surgery and Wound Care  [facility]

## 2024-04-10 NOTE — PATIENT INSTRUCTIONS
Wound Care Instructions  Left 5th toe, right 2nd toe, left medial malleolus, right medial malleolus right shin:  1) Wash your hands and work space  2) Gather all your supplies: clean wash cloths, mild soap (baby soap), Mepilex Ag foam, Medipore tape  3) Cleanse wounds with mild soap, rinse, pat dry  4) Dress wounds with Mepilex Ag foam (cut to fit each wound, sticky side down), secure with Medipore tape   5) Change dressing Mondays and Fridays  6) Dispose of all dirty supplies  7) Wash hands and equipment     Wound Care Instructions right heel:  1) Wash your hands and work space  2) Gather all your supplies: clean wash cloths, mild soap (baby soap), Mepilex Ag foam, Medipore tape, Posey boot  3) Cleanse wounds with mild soap, rinse, pat dry  4) Dress wound with Mepilex Ag foam (cut to fit each wound, sticky side down), secure with Medipore tape; apply Posey boot when sitting   5) Change dressing Mondays and Fridays  6) Dispose of all dirty supplies  7) Wash hands and equipment     Wound Care Instructions left forearm and right upper arm:  1) Wash your hands and work space  2) Gather all your supplies: clean wash cloths, mild soap (baby soap), Mepilex Ag foam, Medipore tape, conform rolled gauze  3) Cleanse wounds with mild soap, rinse, pat dry  4) Leave the Mepitel one in place.  Dress wound with Mepilex Ag foam (cut to fit each wound, sticky side down), secure with Medipore tape   5) Change dressing Mondays and Fridays  6) Dispose of all dirty supplies  7) Wash hands and equipment     Please report any increase in pain, fevers, chills, changes in the drainage odor.   Please call [phone] if you have any questions or concerns or if any problems develop.      Follow up May 8, 2024 at 10 a.m.

## 2024-04-28 NOTE — DISCHARGE INSTRUCTIONS

## 2024-04-28 NOTE — ED NOTES
A/ox4 prior to transfer. Transfer discussed with pt and verbalizes understanding. Pain tolerable at rest, increases with movement. Dilaudid given prior to transport. Report given to Jimena PERES and Ina SOMERS at [facility]. Taye from [facility] updated that patient is being transferred.

## 2024-04-28 NOTE — ED PROVIDER NOTES
History     Chief Complaint   Patient presents with    Fall     HPI  Shannon Walls is a 89 year old female who is here after a ground-level fall.  Complaining of right lateral chest wall pain.  States her lower extremity is weak compared to baseline.  Fell while transferring.  States she ambulates at baseline.  Had a fall recently.    Allergies:  Allergies   Allergen Reactions    Levothyroxine Shortness Of Breath and Swelling     Swelling in feet     Nitrogen Swelling and Blisters     Liquid nitrogen (swelling at sight)       Problem List:    Patient Active Problem List    Diagnosis Date Noted    Elevated INR 03/19/2024     Priority: Medium    Polymyalgia rheumatica (H24) 03/19/2024     Priority: Medium    Cellulitis of right leg 03/11/2024     Priority: Medium    Venous stasis ulcer of ankle without varicose veins (H) 03/11/2024     Priority: Medium    Chronic anticoagulation (Coumadin) 02/21/2024     Priority: Medium    Elevated BUN 02/15/2024     Priority: Medium    Atrial fibrillation with rapid ventricular response (H) 02/15/2024     Priority: Medium    Fall, initial encounter 02/15/2024     Priority: Medium    Compression fracture of thoracic vertebra, unspecified thoracic vertebral level, initial encounter (H) 02/15/2024     Priority: Medium    History of CVA on 4/13/23 02/05/2024     Priority: Medium    Peripheral edema 02/05/2024     Priority: Medium    History of tobacco abuse quitting in the 50s and 60s 02/05/2024     Priority: Medium    Stage 3a chronic kidney disease (H) 02/05/2024     Priority: Medium    Elevated brain natriuretic peptide (BNP) level 02/05/2024     Priority: Medium    Mixed hyperlipidemia 02/05/2024     Priority: Medium    Mild aortic stenosis 02/05/2024     Priority: Medium    H/O apical mural thrombus 02/05/2024     Priority: Medium     On echo from 1/14/2023      PAD (peripheral artery disease) (H24) 02/05/2024     Priority: Medium    Atrial fibrillation with RVR (H)  12/14/2023     Priority: Medium    Fall at home, initial encounter 12/14/2023     Priority: Medium    Anxiety disorder, unspecified 04/18/2023     Priority: Medium    Gastroesophageal reflux disease without esophagitis 04/18/2023     Priority: Medium    Current chronic use of systemic steroids 04/18/2023     Priority: Medium    Decubitus skin ulcer 04/18/2023     Priority: Medium    Acquired hypothyroidism 04/13/2023     Priority: Medium    Chronic combined systolic and diastolic congestive heart failure (H) 04/13/2023     Priority: Medium    Altered mental status, unspecified altered mental status type 12/29/2022     Priority: Medium    Permanent atrial fibrillation (H) 11/29/2021     Priority: Medium    Pathological fracture L4 vertebrae 11/27/2021     Priority: Medium    Fracture of sacrum (H) 11/27/2021     Priority: Medium        Past Medical History:    Past Medical History:   Diagnosis Date    Atrial fibrillation with rapid ventricular response (H)        Past Surgical History:    Past Surgical History:   Procedure Laterality Date    EYE SURGERY      OPEN REDUCTION INTERNAL FIXATION WRIST Right 8/22/2018    Procedure: OPEN REDUCTION INTERNAL FIXATION WRIST;  OPEN REDUCTION INTERNAL FIXATION RIGHT DISTAL RADIUS;  Surgeon: Taqueria Edwards MD;  Location: [facility]       Family History:    No family history on file.    Social History:  Marital Status:   [5]  Social History     Tobacco Use    Smoking status: Former    Smokeless tobacco: Never        Medications:    acetaminophen (TYLENOL) 325 MG tablet  alum & mag hydroxide-simethicone (MAALOX) 200-200-20 MG/5ML SUSP suspension  atorvastatin (LIPITOR) 40 MG tablet  calcium carbonate (OS-EFFIE) 1500 (600 Ca) MG tablet  calcium carbonate (TUMS) 500 MG chewable tablet  diclofenac (VOLTAREN) 1 % topical gel  digoxin (LANOXIN) 62.5 MCG tablet  furosemide (LASIX) 40 MG tablet  GAVILAX 17 GM/SCOOP powder  LORazepam (ATIVAN) 0.5 MG tablet  metoprolol succinate ER (TOPROL  XL) 100 MG 24 hr tablet  Multiple Vitamins-Minerals (CERTAVITE SENIOR/ANTIOXIDANT) TABS  NP THYROID 30 MG tablet  omeprazole (PRILOSEC) 20 MG DR capsule  predniSONE (DELTASONE) 5 MG tablet  sucralfate (CARAFATE) 1 GM tablet  traMADol (ULTRAM) 50 MG tablet  Vitamin D, Cholecalciferol, 10 MCG (400 UNIT) TABS  warfarin ANTICOAGULANT (COUMADIN) 2 MG tablet          Review of Systems   Constitutional:  Negative for chills and fever.   Respiratory:  Negative for cough and shortness of breath.    All other systems reviewed and are negative.      Physical Exam   BP: 127/93  Pulse: 89  Temp: 97.4  F (36.3  C)  Resp: 18  SpO2: 96 %      Physical Exam  Constitutional:       General: She is not in acute distress.     Appearance: She is not toxic-appearing or diaphoretic.   HENT:      Head: Normocephalic.      Comments: Multiple ecchymotic areas to face     Right Ear: External ear normal.      Left Ear: External ear normal.      Nose: No congestion or rhinorrhea.      Mouth/Throat:      Pharynx: Oropharynx is clear. No oropharyngeal exudate.   Eyes:      General: No scleral icterus.     Pupils: Pupils are equal, round, and reactive to light.   Cardiovascular:      Rate and Rhythm: Normal rate and regular rhythm.      Heart sounds: Normal heart sounds.   Pulmonary:      Effort: No respiratory distress.      Breath sounds: Normal breath sounds.   Abdominal:      General: Bowel sounds are normal.      Palpations: Abdomen is soft.      Tenderness: There is no abdominal tenderness.   Musculoskeletal:         General: Tenderness present.      Cervical back: Normal range of motion and neck supple.      Right lower leg: No edema.      Left lower leg: No edema.      Comments: Midline thoracic tenderness   Skin:     General: Skin is warm.      Capillary Refill: Capillary refill takes less than 2 seconds.      Findings: No rash.   Neurological:      Mental Status: She is alert. Mental status is at baseline.      Cranial Nerves: No cranial  nerve deficit.      Comments: Lower extremities 3 to 4-5 strength, no saddle anesthesia   Psychiatric:         Mood and Affect: Mood normal.         Behavior: Behavior normal.         ED Course        Procedures    Results for orders placed during the hospital encounter of 04/27/24    POC US ABDOMEN LIMITED    [facility] Procedure Note    FAST (Focused Assessment with Sonography for Trauma):    PROCEDURE: PERFORMED BY: Dr. Josafat Delatorre MD  INDICATIONS/SYMPTOM:  Chest Wall Pain  PROBE: Cardiac phased array probe  BODY LOCATION: The ultrasound was performed in the abdominal, subxiphoid, and chest areas.  FINDINGS: No evidence of free fluid in hepatorenal (Morison's pouch), perisplenic, or and pelvic areas. No evidence of pericardial effusion.  Extended FAST exam (eFAST):  Images of both lung hemithoracies taken in 2D in multiple rib spaces    Right side:  Lung sliding artifact  Present  Comet tail artifacts  Present  Left side:  Lung sliding artifact  Present  Comet tail artifacts  Present  Hemothorax: Right side Absent  Left side Absent  INTERPRETATION: The FAST exam was normal. There was no free fluid present. There was no pericardial effusion. and No evidence of pneumothorax or hemothorax  IMAGE DOCUMENTATION: Images were archived to PACS.           Critical Care time:  none              Results for orders placed or performed during the hospital encounter of 04/27/24 (from the past 24 hour(s))   POC US ABDOMEN LIMITED (FAST/RUQ)    [facility] Procedure Note      FAST (Focused Assessment with Sonography for Trauma):    PROCEDURE: PERFORMED BY: Dr. Josafat Delatorre MD  INDICATIONS/SYMPTOM:  Chest Wall Pain  PROBE: Cardiac phased array probe  BODY LOCATION: The ultrasound was performed in the abdominal, subxiphoid, and chest areas.  FINDINGS: No evidence of free fluid in hepatorenal (Morison's pouch), perisplenic, or and pelvic areas. No evidence of pericardial  effusion.   Extended FAST exam (eFAST):   Images of both lung hemithoracies taken in 2D in multiple rib spaces        Right side:  Lung sliding artifact  Present     Comet tail artifacts  Present   Left side:  Lung sliding artifact  Present     Comet tail artifacts  Present   Hemothorax: Right side Absent     Left side Absent  INTERPRETATION: The FAST exam was normal. There was no free fluid present. There was no pericardial effusion. and No evidence of pneumothorax or hemothorax  IMAGE DOCUMENTATION: Images were archived to PACS.       CBC with platelets differential    Narrative    The following orders were created for panel order CBC with platelets differential.  Procedure                               Abnormality         Status                     ---------                               -----------         ------                     CBC with platelets and d...[[id number]]  Abnormal            Final result                 Please view results for these tests on the individual orders.   INR   Result Value Ref Range    INR 1.74 (H) 0.85 - 1.15   Comprehensive metabolic panel   Result Value Ref Range    Sodium 139 135 - 145 mmol/L    Potassium 4.0 3.4 - 5.3 mmol/L    Carbon Dioxide (CO2) 33 (H) 22 - 29 mmol/L    Anion Gap 10 7 - 15 mmol/L    Urea Nitrogen 35.9 (H) 8.0 - 23.0 mg/dL    Creatinine 1.16 (H) 0.51 - 0.95 mg/dL    GFR Estimate 44 (L) >60 mL/min/1.73m2    Calcium 9.8 (H) 8.2 - 9.6 mg/dL    Chloride 96 (L) 98 - 107 mmol/L    Glucose 104 (H) 70 - 99 mg/dL    Alkaline Phosphatase 116 40 - 150 U/L    AST 34 0 - 45 U/L    ALT 31 0 - 50 U/L    Protein Total 6.0 (L) 6.4 - 8.3 g/dL    Albumin 3.5 3.5 - 5.2 g/dL    Bilirubin Total 0.5 <=1.2 mg/dL   Lactic acid whole blood   Result Value Ref Range    Lactic Acid 2.7 (H) 0.7 - 2.0 mmol/L   Blood gas venous   Result Value Ref Range    pH Venous 7.45 (H) 7.32 - 7.43    pCO2 Venous 49 40 - 50 mm Hg    pO2 Venous 29 25 - 47 mm Hg    Bicarbonate Venous 34 (H) 21 - 28 mmol/L     Base Excess/Deficit Venous 8.6 (H) -3.0 - 3.0 mmol/L    FIO2 21     Oxyhemoglobin Venous 54 (L) 70 - 75 %    O2 Sat, Venous 55.0 (L) 70.0 - 75.0 %    Narrative    In healthy individuals, oxyhemoglobin (O2Hb) and oxygen saturation (SO2) are approximately equal. In the presence of dyshemoglobins, oxyhemoglobin can be considerably lower than oxygen saturation.   ABO/Rh type and screen    Narrative    The following orders were created for panel order ABO/Rh type and screen.  Procedure                               Abnormality         Status                     ---------                               -----------         ------                     Adult Type and Screen[[id number]]                            Final result                 Please view results for these tests on the individual orders.   CBC with platelets and differential   Result Value Ref Range    WBC Count 14.3 (H) 4.0 - 11.0 10e3/uL    RBC Count 4.66 3.80 - 5.20 10e6/uL    Hemoglobin 14.3 11.7 - 15.7 g/dL    Hematocrit 44.7 35.0 - 47.0 %    MCV 96 78 - 100 fL    MCH 30.7 26.5 - 33.0 pg    MCHC 32.0 31.5 - 36.5 g/dL    RDW 15.5 (H) 10.0 - 15.0 %    Platelet Count 291 150 - 450 10e3/uL    % Neutrophils 83 %    % Lymphocytes 7 %    % Monocytes 9 %    % Eosinophils 0 %    % Basophils 0 %    % Immature Granulocytes 1 %    NRBCs per 100 WBC 0 <1 /100    Absolute Neutrophils 11.8 (H) 1.6 - 8.3 10e3/uL    Absolute Lymphocytes 1.1 0.8 - 5.3 10e3/uL    Absolute Monocytes 1.2 0.0 - 1.3 10e3/uL    Absolute Eosinophils 0.1 0.0 - 0.7 10e3/uL    Absolute Basophils 0.0 0.0 - 0.2 10e3/uL    Absolute Immature Granulocytes 0.1 <=0.4 10e3/uL    Absolute NRBCs 0.0 10e3/uL   Adult Type and Screen   Result Value Ref Range    ABO/RH(D) A POS     Antibody Screen Negative Negative    SPECIMEN EXPIRATION DATE [id number]    Extra Tube    Narrative    The following orders were created for panel order Extra Tube.  Procedure                               Abnormality         Status                      ---------                               -----------         ------                     Extra Red Top Tube[[id number]]                               Final result                 Please view results for these tests on the individual orders.   Extra Red Top Tube   Result Value Ref Range    Hold Specimen JI    Head CT w/o contrast    Narrative    Exam: CT HEAD W/O CONTRAST    Clinical history:89 years Female fall r/o fx, on thinners    Comparisons: 3/29/2024    Technique: Axial CT imaging of the head was performed Without  intervenous contrast.  This exam was performed using one or more of the following dose  reduction techniques:  Automated exposure control, adjustment of the KELLI and/or KV according  to patient's size, and/or use of iterative reconstruction technique.    FINDINGS:   Ventricles and sulci are symmetric. The gray-white matter  differentiation throughout the brain is well maintained. There is  advanced periventricular white matter change of chronic small vessel  ischemic disease. There is no evidence of intracranial mass or  hemorrhage. Visualized portions of the paranasal sinuses and mastoid  air cells are well aerated. There is no evidence of skull fracture.      Impression    IMPRESSION: No evidence of intracranial hemorrhage or skull fracture.    DONNA AGUIRRE MD         SYSTEM ID:  [id number]   CT Cervical Spine w/o Contrast    Narrative    Exam:CT CERVICAL SPINE W/O CONTRAST    History:89 years Female fall r/o fx    Comparisons:None    Technique: Axial CT imaging of the cervical spine was performed.  Coronal and sagittal reconstructions were obtained.  This exam was performed using one or more of the following dose  reduction techniques:  Automated exposure control, adjustment of the KELLI and/or KV according  to patient's size, and/or use of iterative reconstruction technique.    Findings: There is osteopenia. There is multilevel degenerative disc  disease. There is mild  degenerative anterolisthesis of C4 relation to  C5. There is no significant interval change.    No evidence of traumatic subluxation or fracture of the cervical  spine.    There is a nondisplaced fracture involving the proximal aspect of the  right first rib. This is best seen on series 7 images 3 through 6.      Impression    IMPRESSION: Nondisplaced fracture at the proximal aspect of the right  first rib.    No evidence of fracture or subluxation of the cervical spine.    DONNA AGUIRRE MD         SYSTEM ID:  [id number]   CT Chest/Abdomen/Pelvis w Contrast    Narrative    CT CHEST/ABDOMEN/PELVIS W CONTRAST    HISTORY: 89 yearsFemale R lateral chest / abd pain s/p fallFall around  1500 laying on right side, unwitnessed, did not hit head. Bruise noted  on right side of head from prev fall. Facility has concerns for  multiple falls recently. On blood thinners.     TECHNIQUE: Axial CT imaging of the chest abdomen and pelvis was  performed with contrast. Coronal and sagittal reconstructions were  obtained.  This exam was performed using one or more of the following dose  reduction techniques:  Automated exposure control, adjustment of the KELLI and/or KV according  to patient's size, and/or use of iterative reconstruction technique.      COMPARISON: 12/14/2023    FINDINGS:    CT CHEST:  There is no mediastinal or hilar or axillary lymphadenopathy.      The lungs are clear. No consolidating airspace opacities are present.  No concerning pulmonary nodules or masses are present         There is a fracture of the proximal right first rib without  significant displacement. Subacute or older nondisplaced fractures of  the posterior lateral right fourth and fifth ribs are present. There  is no evidence of displaced rib fracture.    There is osteopenia. There is kyphosis. There are multilevel  compression fracture deformities of the thoracic spine. There is  interval worsening of compression fracture deformity of T5 and  there  is now severe height loss.    There is interval development of compression fracture of T7 with  severe height loss and mild retropulsion of the posterior endplate.    There is a new compression fracture deformity of moderate severity at  T8.    Compression fracture deformities of T9, T10, T12 and L2 are unchanged.    There is a nonacute lower sternal fracture. There is kyphosis of the  thoracic spine.    There is dependent atelectasis. There is no evidence of pneumothorax  or mediastinal hematoma.      IMPRESSION CHEST: Multilevel compression fractures of the thoracic  spine with new compression fractures at T5, T7, and T8. There is no  significant central canal stenosis.    Bilateral nondisplaced fracture involving the proximal right first  rib.      CT ABDOMEN AND PELVIS:    Liver: There is normal enhancement of the hepatic parenchyma.    Gallbladder: Unremarkable    Spleen: Unremarkable    Pancreas: Unremarkable    Adrenals: Unremarkable    Kidneys: Unremarkable    Bowel: No abnormally distended or thickened loops of bowel present    Lymph nodes: There is no evidence of mass or lymphadenopathy.    PELVIS: There is no evidence of mass lymphadenopathy or abnormal fluid  collection.    There is osteopenia. There is advanced multilevel degenerative disc  disease. There are compression fracture deformities of L2 and L4  without significant change. There is a mild compression fracture  deformity of L5 which has developed since the prior study.    There is a nonacute fracture deformity of the inferior right pubic  ramus.    IMPRESSION ABDOMEN AND PELVIS: Interval development of mild  compression fracture deformity of L5. No acute changes otherwise.    DONNA AGUIRRE MD         SYSTEM ID:  [id number]       Medications   HYDROmorphone (PF) (DILAUDID) injection 0.2 mg (0.2 mg Intravenous $Given 4/27/24 2013)   iopamidol (ISOVUE-370) solution 53 mL (53 mLs Intravenous $Given 4/27/24 2032)   sodium chloride (PF)  0.9% PF flush 60 mL (50 mLs Intravenous $Given 4/27/24 2032)   HYDROmorphone (PF) (DILAUDID) injection 0.2 mg (0.2 mg Intravenous $Given 4/27/24 2333)       Assessments & Plan (with Medical Decision Making)     I have reviewed the nursing notes.    I have reviewed the findings, diagnosis, plan and need for follow up with the patient.          Medical Decision Making  The patient's presentation was of high complexity (an acute health issue posing potential threat to life or bodily function).    The patient's evaluation involved:  an assessment requiring an independent historian (ems)  ordering and/or review of 3+ test(s) in this encounter (see separate area of note for details)  discussion of management or test interpretation with another health professional (Dr. Hill)    The patient's management necessitated high risk (a parenteral controlled substance) and high risk (a decision regarding hospitalization).    89-year-old female here after ground-level fall while transferring.  Anticoagulated so I did a FAST exam upon arrival, no free fluid identified.  Given age, elected to pan scan the patient.  CT head unremarkable, CT cervical spine unremarkable, however multiple fractures infighting thoracic and lumbar spine.  Discussed case with trauma surgery who will see patient in the ER for bracing and assessment.  Patient's pain was treated with hydromorphone.  Her neuroexam was remarkable for lower extremity weakness however did not have saddle anesthesia.      Significant time was spent at the bedside assessing patients mental status, she can tell me the year where she who the president is, was able to think about what her children would want regarding her being transferred, appeared to have no issue comprehending the situation and has capacity.  Took some time to respond but responded appropriately.      I offered her going home with pain pills if she did not want to be transferred or did not care to get a brace that  was offered, she told me if she were to be offered a brace she would want it.    Discharge Medication List as of 4/28/2024 12:02 AM          Final diagnoses:   Compression fracture of T5 vertebra, initial encounter (H)   Compression fracture of T7 vertebra, initial encounter (H)   Compression fracture of T8 vertebra, initial encounter (H)   Compression fracture of L5 vertebra, initial encounter (H)   Closed fracture of one rib of right side, initial encounter       4/27/2024   [facility]       Josafat Delatorre MD  04/28/24 0014

## 2024-04-28 NOTE — ED TRIAGE NOTES
Presents via [facility] from [facility], c/o right rib pain. Fall around 1500 laying on right side, unwitnessed, did not hit head. Bruise noted on right side of head from prev fall. Facility has concerns for multiple falls recently. On blood thinners.

## 2024-04-28 NOTE — ED NOTES
Awake and alert in room. States pain significantly decreased with dilaudid. RR WDL. Difficulty obtaining accurate continuous O2 sat. Fingers all very cold, ear probe not reading. Intermittently reading mid to high 90s% on room air with forehead sat probe.

## 2024-04-28 NOTE — ED NOTES
This writer assisted lab with lab draw.   was unable to obtain blood due to pts veins. Will inform RN and Provider

## 2024-05-27 NOTE — CONSULTS
[facility] URGENT CARE  EMERGENCY DEPARTMENT ENCOUNTER        NAME: Celeste Cabral  :  1946  MRN:  [id number]  Date of evaluation: 2024  Provider: Matt Eugene PA-C  PCP: Tim Mckee MD  Note Started : 8:54 AM EDT 24    Chief Complaint: Urinary Tract Infection (Pressure, flank tenderness, slight discharge, frequency/urgency, started 4 days ago, foamy urine)      This is a 77-year-old female that presents to urgent care complaining of urinary tract infection symptoms for the past several days.  She does have a history of urinary tract infections.  She has been complaining of some bladder pressure some dysuria and frequency with urination.  No fevers or chills no back pain.  On first contact patient she appears to be in no acute distress.        Review of Systems  Pertinent positives and negatives are stated within HPI, all other systems reviewed and are negative.     Allergies: Gluten meal     --------------------------------------------- PAST HISTORY ---------------------------------------------  Past Medical History:  has a past medical history of Celiac disease, Hyperlipidemia, and Hypertension.    Past Surgical History:  has a past surgical history that includes Hysterectomy; Appendectomy; Hysterectomy, total abdominal; Wrist Closed Reduction (Right); bladder suspension (2020); Rectocele repair (2020); Breast surgery (Right); and Eye surgery (Bilateral, 2024).    Social History:  reports that she quit smoking about 44 years ago. Her smoking use included cigarettes. She has never used smokeless tobacco. She reports current alcohol use. She reports that she does not use drugs.    Family History: family history includes Cancer in her mother; Heart Disease in her father; High Blood Pressure in her father, mother, and sister; High Cholesterol in her father and mother.     The patient’s home medications have been reviewed.    The nursing notes within the ED  "[facility]    Stroke Consult Note    Reason for Consult: Stroke Code     Chief Complaint: Stroke Symptoms      HPI  Shannon Walls is a 89 year old female with past medical hx of Afib not on AC presents to ED with acute onset R arm weakness. Shannon Walls woke up this morning and was fine and normal . She went about her morning routine took her medicines and ate her breakfast. It was after 8 AM when she was in her kitchen and was thinking of cleaning the dishes she noticed that she could not hold her walker with R hand. Pt reports that she has difficulty with dexterity of her R hand only from prior injury/surgery but her arm weakness is new. She denies any hx of stroke.     Imaging Findings  Ct head neg for acute abnormalities  CTA showed L ICA stenosis . Narrowing appears to be about 52%.      Intravenous Thrombolysis  Risks (including potential for bleeding and death), benefits, and alternatives to thrombolytic therapy were discussed with Patient and Family. Tenecteplase (TNK) administered without delay.    Endovascular Treatment  Not initiated due to absence of proximal vessel occlusion    Impression   Acute ischemic stroke of L hemisphere due to large-artery atherosclerosis or Afib     Mechanism of stroke could be related to large artery atherosclerosis given L ICA narrowing but possibility of cardioembolic stroke can not be ruled out completely given hx of Afib      Recommendations  - Admit to ICU or IMC for post thrombolytic care  - Use orderset: \"Ischemic Stroke Post-Thrombolytics/Thrombectomy ICU Admission\"  - Neurochecks and vital signs per post-thrombolytic orders and monitor closely for any evidence of CNS hemorrhage, bleeding, or orolingual angioedema  - Goal  / 105  - Hold all antithrombotic and anticoagulant medications for 24 hrs post-thrombolytic  - Hold pharmacologic VTE prophylaxis for 24 hrs post-thrombolytic  - Statin:  PTA statin or Lipitor 40 mg daily  - " "Repeat Head CT 24 hrs post-thrombolytic  - MRI Brain with and without contrast  - TTE (with Bubble Study if age 60 yrs or less)  - Telemetry, EKG  - Bedside Glucose Monitoring  - A1c, Lipid Panel, Troponin x 3  - PT/OT/SLP  - Stroke Education  - Euthermia, Euglycemia        Thank you for this consult.      Fish Fierro MD  Vascular Neurology    To page me or covering stroke neurology team member, click here: [facility]  Choose \"On Call\" tab at top, then select \"NEUROLOGY/ALL SITES\" from middle drop-down box, press Enter, then look for \"stroke\" or \"telestroke\" for your site.    ______________________________________________________    Clinically Significant Risk Factors Present on Admission                   # Chronic systolic heart failure: echo within the past year with EF <40%              Past Medical History   No past medical history on file.  Past Surgical History   Past Surgical History:   Procedure Laterality Date     EYE SURGERY       OPEN REDUCTION INTERNAL FIXATION WRIST Right 8/22/2018    Procedure: OPEN REDUCTION INTERNAL FIXATION WRIST;  OPEN REDUCTION INTERNAL FIXATION RIGHT DISTAL RADIUS;  Surgeon: Taqueria Edwards MD;  Location: [facility] OR     Medications   Home Meds  Prior to Admission medications    Medication Sig Start Date End Date Taking? Authorizing Provider   aspirin 81 MG EC tablet Take 81 mg by mouth daily    Reported, Patient   calcium carbonate (OS-EFFIE) 1500 (600 Ca) MG tablet Take 600 mg by mouth daily     Reported, Patient   digoxin (LANOXIN) 125 MCG tablet Take 62.5 mcg by mouth daily     Reported, Patient   furosemide (LASIX) 20 MG tablet Take 20 mg by mouth daily    Reported, Patient   LORazepam (ATIVAN) 0.5 MG tablet Take 0.5 mg by mouth At Bedtime     Reported, Patient   metoprolol succinate ER (TOPROL XL) 50 MG 24 hr tablet Take 1 tablet (50 mg) by mouth daily 1/5/23   Markel Ricardo MD   multivitamin, therapeutic (THERA-VIT) TABS tablet Take 1 tablet by mouth daily    Reported, Patient "   NP THYROID 30 MG tablet Take 30 mg by mouth daily 12/19/22   Reported, Patient   omeprazole (PRILOSEC) 20 MG DR capsule Take 20 mg by mouth 2 times daily (before meals)     Reported, Patient   predniSONE (DELTASONE) 10 MG tablet Take 5 mg by mouth daily     Reported, Patient   Vitamin D, Cholecalciferol, 10 MCG (400 UNIT) TABS Take 1 tablet by mouth daily    Reported, Patient       Scheduled Meds      Infusion Meds      PRN Meds      Allergies   Allergies   Allergen Reactions     Levothyroxine Shortness Of Breath and Swelling     Nitrogen Swelling and Blisters     Liquid nitrogen     Family History   No family history on file.  Social History   Social History     Tobacco Use     Smoking status: Former     Smokeless tobacco: Never       Review of Systems   Review of systems not obtained due to patient factors - critical condition       PHYSICAL EXAMINATION  Temp:  [97.8  F (36.6  C)] 97.8  F (36.6  C)  Pulse:  [144] 144  Resp:  [16] 16  BP: (110)/(92) 110/92  SpO2:  [93 %] 93 %     Neuro Exam  Mental Status:  alert, oriented x 3, follows commands, speech clear and fluent, naming and repetition normal  Cranial Nerves:  visual fields intact (tested by nurse), EOMI with normal smooth pursuit, facial sensation intact and symmetric (tested by nurse), facial movements symmetric, hearing not formally tested but intact to conversation, no dysarthria, shoulder shrug equal bilaterally, tongue protrusion midline  Motor:  no abnormal movements, RUE drift with decreased R  weakness ( pt reports could be chronic)   Reflexes:  unable to test (telestroke)  Sensory:  light touch sensation intact and symmetric throughout upper and lower extremities (assessed by nurse), no extinction on double simultaneous stimulation (assessed by nurse)  Coordination:  normal finger-to-nose and heel-to-shin bilaterally without dysmetria, rapid alternating movements symmetric  Station/Gait:  unable to test due to telestroke    Dysphagia  Screen  Per Nursing    Stroke Scales    NIHSS  1a. Level of Consciousness 0-->Alert, keenly responsive   1b. LOC Questions 0-->Answers both questions correctly   1c. LOC Commands 0-->Performs both tasks correctly   2.   Best Gaze 0-->Normal   3.   Visual 0-->No visual loss   4.   Facial Palsy 0-->Normal symmetrical movements   5a. Motor Arm, Left 0-->No drift, limb holds 90 (or 45) degrees for full 10 secs   5b. Motor Arm, Right 1-->Drift, limb holds 90 (or 45) degrees, but drifts down before full 10 secs, does not hit bed or other support   6a. Motor Leg, Left 0-->No drift, leg holds 30 degree position for full 5 secs   6b. Motor Leg, right 0-->No drift, leg holds 30 degree position for full 5 secs   7.   Limb Ataxia 0-->Absent   8.   Sensory 0-->Normal, no sensory loss   9.   Best Language 0-->No aphasia, normal   10. Dysarthria 0-->Normal   11. Extinction and Inattention  0-->No abnormality   Total 1 (04/13/23 1054)       Modified Eunice Score (Pre-morbid)  3-Moderate disability; requiring some help, but able to walk without assistance    Imaging  I personally reviewed all imaging; relevant findings per HPI.     Lab Results Data   CBC  No results for input(s): WBC, RBC, HGB, HCT, PLT in the last 168 hours.  Basic Metabolic Panel    No results for input(s): NA, POTASSIUM, CHLORIDE, CO2, BUN, CR, GLC, EFFIE in the last 168 hours.  Liver Panel  No results for input(s): PROTTOTAL, ALBUMIN, BILITOTAL, ALKPHOS, AST, ALT, BILIDIRECT in the last 168 hours.  INR    Recent Labs   Lab Test 12/29/22  2106 11/27/21  2259   INR 1.31* 1.11      Lipid Profile    Recent Labs   Lab Test 08/20/18  0000   CHOL 174   HDL 47*   LDL 96   TRIG 153*     A1C  No lab results found.  Troponin  No results for input(s): CTROPT, TROPONINIS, TROPONINI, GHTROP in the last 168 hours.       Stroke Code Data Data   Stroke Code Data  (for stroke code with tele)  Stroke code activated 04/13/23   1015   First stroke provider response 04/13/23   1018    Video start time 04/13/23   1036   Video end time 04/13/23   1053   Last known normal 04/13/23   0800   Time of discovery  (or onset of symptoms)  04/13/23   0800   Head CT read by Stroke Neuro Dr/Provider 04/13/23   1032   Was stroke code de-escalated? No               Telestroke Service Details  Type of service telemedicine diagnostic assessment of acute neurological changes   Reason telemedicine is appropriate patient requires assessment with a specialist for diagnosis and treatment of neurological symptoms   Mode of transmission secure interactive audio and video communication per Abiel   Originating site (patient location) [facility]    Distant site (provider location) [facility]       I personally examined and evaluated the patient today. At the time of my evaluation and management the patient was in critical condition today due to R arm weakness. I personally managed decision about IV thrombolysis administration . I spent a total of 60 minutes providing critical care services, evaluating the patient, directing care and reviewing laboratory values and radiologic reports.

## (undated) DEVICE — APPLICATOR-CHLORAPREP 26ML TINTED CHG 2%+ 70% IPA-SURGICAL

## (undated) DEVICE — LIGHT HANDLE COVER

## (undated) DEVICE — NDL-25G 1-1/2" NON-SAFETY

## (undated) DEVICE — Device

## (undated) DEVICE — GOWN-SURG XXL LVL 3 REINFORCED

## (undated) DEVICE — DRAPE-THREE QUARTER (LARGE) SHEET

## (undated) DEVICE — GLV-8.0 PROTEXIS PI BLUE W/NEU-THERA LF/PF

## (undated) DEVICE — DRSG-SPONGE STERILE 4 X 4

## (undated) DEVICE — SUTURE-VICRYL 3-0 CT-2 J232H

## (undated) DEVICE — CUFF-DISP STERILE 18IN SINGLE BLADDER

## (undated) DEVICE — DRAPE-EXTREMITY SHEET

## (undated) DEVICE — TUBING-SUCTION 20FT

## (undated) DEVICE — DRSG-SPONGE STERILE 8 X 4

## (undated) DEVICE — BDG-ELASTIC 4 INCH

## (undated) DEVICE — BDG-ESMARK 4 INCH X 9 FT

## (undated) DEVICE — SUTURE-ETHILON 3-0 PS-2 1669H

## (undated) DEVICE — CAUTERY PAD-POLYHESIVE II ADULT

## (undated) DEVICE — CAUTERY-MEGADYNE TIP

## (undated) DEVICE — CAUTERY PENCIL

## (undated) DEVICE — BLADE-SCALPEL #15

## (undated) DEVICE — GOWN-SURG XL LVL 3 REINFORCED

## (undated) DEVICE — IMPLANTABLE DEVICE
Type: IMPLANTABLE DEVICE | Site: RADIUS | Status: NON-FUNCTIONAL
Removed: 2018-08-22

## (undated) DEVICE — SUTURE-VICRYL 2-0 CT-2 J269H

## (undated) DEVICE — SCD SLEEVE-KNEE REG.

## (undated) DEVICE — IRRIGATION-NACL 1000ML

## (undated) DEVICE — PACK-SET UP-CUSTOM

## (undated) DEVICE — GLV-8.0 ORTHO PROTEXIS PI LF/PF

## (undated) DEVICE — DRAPE-STERI 45X60CM #1010

## (undated) DEVICE — CANISTER-SUCTION 2000CC

## (undated) DEVICE — SUTURE-MONOCRYL 4-0 PS-2 Y496G

## (undated) DEVICE — DRAPE-C-ARM

## (undated) DEVICE — BDG-ELASTIC 3 INCH

## (undated) DEVICE — CAST PADDING-STERILE 4"

## (undated) DEVICE — SENSOR-OXISENSOR II ADULT

## (undated) DEVICE — IRRIGATION-H2O 1000ML

## (undated) DEVICE — NDL-BLUNT FILL 18G 1.5"

## (undated) DEVICE — LABEL-STERILE PREPRINTED FOR OR

## (undated) RX ORDER — METOPROLOL TARTRATE 1 MG/ML
INJECTION, SOLUTION INTRAVENOUS
Status: DISPENSED
Start: 2023-01-01

## (undated) RX ORDER — SODIUM CHLORIDE, SODIUM LACTATE, POTASSIUM CHLORIDE, CALCIUM CHLORIDE 600; 310; 30; 20 MG/100ML; MG/100ML; MG/100ML; MG/100ML
INJECTION, SOLUTION INTRAVENOUS
Status: DISPENSED
Start: 2023-01-01

## (undated) RX ORDER — FENTANYL CITRATE 50 UG/ML
INJECTION, SOLUTION INTRAMUSCULAR; INTRAVENOUS
Status: DISPENSED
Start: 2018-08-22

## (undated) RX ORDER — PROPOFOL 10 MG/ML
INJECTION, EMULSION INTRAVENOUS
Status: DISPENSED
Start: 2018-08-22

## (undated) RX ORDER — LIDOCAINE HYDROCHLORIDE 10 MG/ML
INJECTION, SOLUTION EPIDURAL; INFILTRATION; INTRACAUDAL; PERINEURAL
Status: DISPENSED
Start: 2023-01-01

## (undated) RX ORDER — EPHEDRINE SULFATE 50 MG/ML
INJECTION, SOLUTION INTRAMUSCULAR; INTRAVENOUS; SUBCUTANEOUS
Status: DISPENSED
Start: 2018-08-22

## (undated) RX ORDER — DIGOXIN 0.25 MG/ML
INJECTION INTRAMUSCULAR; INTRAVENOUS
Status: DISPENSED
Start: 2023-01-01

## (undated) RX ORDER — GLYCOPYRROLATE 0.2 MG/ML
INJECTION, SOLUTION INTRAMUSCULAR; INTRAVENOUS
Status: DISPENSED
Start: 2018-08-22